# Patient Record
Sex: FEMALE | Race: BLACK OR AFRICAN AMERICAN | NOT HISPANIC OR LATINO | Employment: OTHER | URBAN - METROPOLITAN AREA
[De-identification: names, ages, dates, MRNs, and addresses within clinical notes are randomized per-mention and may not be internally consistent; named-entity substitution may affect disease eponyms.]

---

## 2017-07-06 DIAGNOSIS — M81.8 IDIOPATHIC OSTEOPOROSIS: Primary | ICD-10-CM

## 2017-08-14 ENCOUNTER — HOSPITAL ENCOUNTER (OUTPATIENT)
Dept: RADIOLOGY | Facility: HOSPITAL | Age: 72
Discharge: HOME | End: 2017-08-14
Attending: INTERNAL MEDICINE
Payer: MEDICARE

## 2017-08-14 DIAGNOSIS — M81.8 IDIOPATHIC OSTEOPOROSIS: ICD-10-CM

## 2017-08-14 PROCEDURE — 77080 DXA BONE DENSITY AXIAL: CPT | Mod: TC

## 2017-08-14 PROCEDURE — 77080 DXA BONE DENSITY AXIAL: CPT | Mod: 26,,, | Performed by: RADIOLOGY

## 2018-02-21 ENCOUNTER — OFFICE VISIT (OUTPATIENT)
Dept: ENDOCRINOLOGY | Facility: CLINIC | Age: 73
End: 2018-02-21
Payer: MEDICARE

## 2018-02-21 VITALS
HEART RATE: 90 BPM | SYSTOLIC BLOOD PRESSURE: 118 MMHG | BODY MASS INDEX: 22.16 KG/M2 | WEIGHT: 112.88 LBS | DIASTOLIC BLOOD PRESSURE: 68 MMHG | HEIGHT: 60 IN

## 2018-02-21 DIAGNOSIS — R63.1 POLYDIPSIA: ICD-10-CM

## 2018-02-21 DIAGNOSIS — M81.8 IDIOPATHIC OSTEOPOROSIS: Primary | ICD-10-CM

## 2018-02-21 DIAGNOSIS — E74.89 OTHER SPECIFIED DISORDERS OF CARBOHYDRATE METABOLISM: ICD-10-CM

## 2018-02-21 DIAGNOSIS — E55.9 VITAMIN D DEFICIENCY: ICD-10-CM

## 2018-02-21 PROCEDURE — 99214 OFFICE O/P EST MOD 30 MIN: CPT | Mod: GC,S$GLB,, | Performed by: INTERNAL MEDICINE

## 2018-02-21 PROCEDURE — 99999 PR PBB SHADOW E&M-EST. PATIENT-LVL III: CPT | Mod: PBBFAC,GC,, | Performed by: INTERNAL MEDICINE

## 2018-02-21 RX ORDER — OMEPRAZOLE 40 MG/1
40 CAPSULE, DELAYED RELEASE ORAL DAILY
COMMUNITY
End: 2019-04-23

## 2018-02-21 RX ORDER — OMEPRAZOLE 20 MG/1
20 CAPSULE, DELAYED RELEASE ORAL DAILY
COMMUNITY
Start: 2017-12-07 | End: 2019-04-23

## 2018-02-21 RX ORDER — ROSUVASTATIN CALCIUM 20 MG/1
20 TABLET, COATED ORAL DAILY
COMMUNITY
Start: 2017-12-08 | End: 2020-02-21 | Stop reason: SDUPTHER

## 2018-02-21 NOTE — PROGRESS NOTES
Subjective:      Patient ID: Tyra Riley is a 73 y.o. female.    Chief Complaint:  Osteoporosis      History of Present Illness  F/u visit for OP    The patient has had the diagnosis of osteoporosis since at least 2006.   The patient took Fosamax since 2006. Then transitioned to Reclast and received it in 2011, 2013, 10/20/14 and 10/2016    Most recent BMD 8/2017 done at [facility] reveals low scores: L spine t score -3.6, Fem neck t score -3.0.     She has never had a fracture.   Falls: previous falls ~2015 and none since.   Height loss: denies     JODI and oophorectomy in 1994. Premarin from 1994 until 1997 when she developed breast cancer. Required surgery and radiation and never on chemo or aromatase inhibitors.     The patient has always had a small body frame. She weighed 88 pounds when she graduated from high school.   Mother and sister also small body frames.   The patient is very active. Exercises with line dancing 2x/week.     Vit D deficiency: taking 1 tab citracal + D and most recent levels 32.    Has had kidney stones x1.   PTH was elevated at 92 in 2011. Calcium and phos normal.   Secondary w/u for 24hr UCa, SPEP normal.     Hx of hyperthyroidism 2012 and took methimazole 10 mg three times remotely for several months. Now in remission from thyroid dx (was followed by PCP).     Prilosec for GERD. Denies steroid use.     Review of Systems   Constitutional: Negative for unexpected weight change.   Cardiovascular: Negative for palpitations.   Gastrointestinal: Negative for constipation and diarrhea.   Endocrine: Positive for polydipsia.   Musculoskeletal: Positive for arthralgias (left knee pain ).   Neurological: Negative for tremors.   Psychiatric/Behavioral: Negative for sleep disturbance.       Objective:   Physical Exam   Constitutional:   Thin and small body frame     Neck: No thyromegaly present.   Cardiovascular: Normal rate.    Pulmonary/Chest: Effort normal.   Abdominal: Soft.    Musculoskeletal: She exhibits no edema.   Gait is normal   Vitals reviewed.      Lab Review:     Results for JOANN DURAN (MRN [id number]) as of 2/21/2018 09:26   Ref. Range 9/1/2016 16:42   Sodium Latest Ref Range: 136 - 145 mmol/L 139   Potassium Latest Ref Range: 3.5 - 5.1 mmol/L 3.7   Chloride Latest Ref Range: 95 - 110 mmol/L 104   CO2 Latest Ref Range: 23 - 29 mmol/L 27   Anion Gap Latest Ref Range: 8 - 16 mmol/L 8   BUN, Bld Latest Ref Range: 8 - 23 mg/dL 13   Creatinine Latest Ref Range: 0.5 - 1.4 mg/dL 0.9   eGFR if non African American Latest Ref Range: >60 mL/min/1.73 m^2 >60   eGFR if  Latest Ref Range: >60 mL/min/1.73 m^2 >60   Glucose Latest Ref Range: 70 - 110 mg/dL 83   Calcium Latest Ref Range: 8.7 - 10.5 mg/dL 9.3   Phosphorus Latest Ref Range: 2.7 - 4.5 mg/dL 3.6   Albumin Latest Ref Range: 3.5 - 5.2 g/dL 3.7   Results for JOANN DURAN (MRN [id number]) as of 2/21/2018 09:26   Ref. Range 9/1/2016 16:42   Vit D, 25-Hydroxy Latest Ref Range: 30 - 96 ng/mL 32   Results for JOANN DURAN (MRN [id number]) as of 2/21/2018 09:26   Ref. Range 1/21/2016 16:23   TSH Latest Ref Range: 0.400 - 4.000 uIU/mL 1.615   Results for JOANN DURAN (MRN [id number]) as of 2/21/2018 09:26   Ref. Range 4/21/2011 16:30   PTH Latest Ref Range: 10.0 - 65.0 pg/ml 92 (H)   Results for JOANN DURAN (MRN [id number]) as of 2/21/2018 09:26   Ref. Range 5/2/2011 09:30   Calcium, Urine Latest Ref Range: 0.0 - 15.0 mg/dl 8.5   Calcium, 24H Urine Latest Ref Range: 4 - 12 mg/Hr 5.7   CA Urine (mg/Spec) Latest Units: mg/sp 136.0   Creatinine, Ur (mg/spec) Latest Units: mg/sp 928.0     8/2018 BMD   The L1 to L4 vertebral bone mineral density is equal to 0.753 g/cm squared with a T score of -3.6 and a Z score of -1.3.  The left femoral neck bone mineral density is equal to 0.521 g/cm squared with a T score of -3.0 and a Z score of -1.6.    Assessment:     1. Idiopathic osteoporosis    2. Polydipsia    3. Vitamin D  deficiency        Plan:     BMD remains with low t-scores, but reassuring that she has never fractured.   Since her PTH was elevated in 2011, will repeat labs to eval secondary causes of bone loss: PTH, renal panel, Vit D.   Screen celiac panel and Repeat TSH.   Discussed that options for step up in therapy would include prolia vs forteo.   Discussed benefits of anabolic therapy given her very low t scores and that forteo is not associated with increased risk of AFF since she has been on longterm antiresorptive therapy intermittently from 200-2016.   She prefers prolia therapy as she wants to avoid daily injections. Discussed expected BMD gains with prolia. Discussed s/s AFF and to notify us if any vague thigh pain.   Schedule prolia, repeat BMD due 8/2019  Fall prevention stressed.   Increase calcium in diet and increase vit D to at least 1000 iu daily.   -     PTH, intact; Future  -     RENAL FUNCTION PANEL; Future  -     Celiac Disease Panel; Future  -     VITAMIN D; Future  -     TSH; Future  -     Hemoglobin A1c; Future       denosumab (PROLIA) injection 60 mg; Inject 1 mL (60 mg total) into the skin every 6 (six) months.    Discussed w Dr Ordoñez  rtc 1 year

## 2018-03-05 ENCOUNTER — INFUSION (OUTPATIENT)
Dept: INFECTIOUS DISEASES | Facility: HOSPITAL | Age: 73
End: 2018-03-05
Attending: INTERNAL MEDICINE
Payer: MEDICARE

## 2018-03-05 VITALS — HEIGHT: 60 IN | BODY MASS INDEX: 22.16 KG/M2 | WEIGHT: 112.88 LBS

## 2018-03-05 DIAGNOSIS — M81.8 IDIOPATHIC OSTEOPOROSIS: Primary | ICD-10-CM

## 2018-03-05 PROCEDURE — 96372 THER/PROPH/DIAG INJ SC/IM: CPT

## 2018-03-05 PROCEDURE — 63600175 PHARM REV CODE 636 W HCPCS: Mod: JG | Performed by: INTERNAL MEDICINE

## 2018-03-05 RX ADMIN — DENOSUMAB 60 MG: 60 INJECTION SUBCUTANEOUS at 11:03

## 2018-03-05 NOTE — PLAN OF CARE
Problem: Health Knowledge, Opportunity to Enhance (Adult,NICU,[place],Obstetrics,Pediatric)  Goal: Knowledgeable about Health Subject/Topic  Patient will demonstrate the desired outcomes by discharge/transition of care.  Outcome: Ongoing (interventions implemented as appropriate)  PATIENT EDUCATED ON HAND WASHING AND INFECTION CONTROL. PATIENT VERBALIZED UNDERSTANDING

## 2018-04-04 NOTE — PROGRESS NOTES
I, Katherine Ordoñez, have personally taken the history and physical exam and I agree with Dr. Mae's assessment and plan.

## 2018-08-01 ENCOUNTER — APPOINTMENT (RX ONLY)
Dept: URBAN - METROPOLITAN AREA CLINIC 98 | Facility: CLINIC | Age: 73
Setting detail: DERMATOLOGY
End: 2018-08-01

## 2018-08-01 DIAGNOSIS — L30.9 DERMATITIS, UNSPECIFIED: ICD-10-CM

## 2018-08-01 PROBLEM — Z92.3 PERSONAL HISTORY OF IRRADIATION: Status: ACTIVE | Noted: 2018-08-01

## 2018-08-01 PROBLEM — E05.90 THYROTOXICOSIS, UNSPECIFIED WITHOUT THYROTOXIC CRISIS OR STORM: Status: ACTIVE | Noted: 2018-08-01

## 2018-08-01 PROBLEM — Z85.3 PERSONAL HISTORY OF MALIGNANT NEOPLASM OF BREAST: Status: ACTIVE | Noted: 2018-08-01

## 2018-08-01 PROCEDURE — 11100: CPT

## 2018-08-01 PROCEDURE — ? BIOPSY BY PUNCH METHOD

## 2018-08-01 PROCEDURE — ? TREATMENT REGIMEN

## 2018-08-01 ASSESSMENT — LOCATION ZONE DERM: LOCATION ZONE: TRUNK

## 2018-08-01 ASSESSMENT — LOCATION DETAILED DESCRIPTION DERM: LOCATION DETAILED: UPPER STERNUM

## 2018-08-01 ASSESSMENT — LOCATION SIMPLE DESCRIPTION DERM: LOCATION SIMPLE: CHEST

## 2018-08-01 NOTE — HPI: RASH
How Severe Is Your Rash?: moderate
Is This A New Presentation, Or A Follow-Up?: Rash
Additional History: Pt went to urgent care 3 days ago which she was prescribed flucinonide 0.05% cream that she did not start yet .

## 2018-08-01 NOTE — PROCEDURE: TREATMENT REGIMEN
Initiate Treatment: Fluocinonide 0.05% cream twice daily for 2 weeks (pt already has a tube)
Detail Level: Zone

## 2018-08-01 NOTE — PROCEDURE: BIOPSY BY PUNCH METHOD
Punch Size In Mm: 3
X Depth Of Punch In Cm (Optional): 0
Lab Facility: [id number]
Dressing: pressure dressing with telfa
Anesthesia Type: 1% lidocaine with epinephrine
Post-Care Instructions: I reviewed with the patient in detail post-care instructions. Patient is to keep the biopsy site dry overnight, and then apply bacitracin twice daily until healed. Patient may apply hydrogen peroxide soaks to remove any crusting.
Notification Instructions: Patient will be notified of biopsy results. However, patient instructed to call the office if not contacted within 2 weeks.
Was A Bandage Applied: Yes
Hemostasis: None
Consent: Verbal consent was obtained and risks were reviewed including but not limited to scarring, infection, bleeding, scabbing, incomplete removal, nerve damage and allergy to anesthesia.
Epidermal Sutures: 4-0 Nylon
Billing Type: Third-Party Bill
Bill For Surgical Tray: no
Home Suture Removal Text: Patient was provided a home suture removal kit and will remove their sutures at home.  If they have any questions or difficulties they will call the office.
Detail Level: Simple
Number Of Epidermal Sutures (Optional): 1
Biopsy Type: H and E
Suture Removal: 14 days
Wound Care: Petrolatum
Lab: [id number]

## 2018-08-15 ENCOUNTER — APPOINTMENT (RX ONLY)
Dept: URBAN - METROPOLITAN AREA CLINIC 98 | Facility: CLINIC | Age: 73
Setting detail: DERMATOLOGY
End: 2018-08-15

## 2018-08-15 DIAGNOSIS — Z48.02 ENCOUNTER FOR REMOVAL OF SUTURES: ICD-10-CM

## 2018-08-15 DIAGNOSIS — L30.8 OTHER SPECIFIED DERMATITIS: ICD-10-CM

## 2018-08-15 PROCEDURE — ? SUTURE REMOVAL (NO GLOBAL PERIOD)

## 2018-08-15 PROCEDURE — ? TREATMENT REGIMEN

## 2018-08-15 PROCEDURE — 99213 OFFICE O/P EST LOW 20 MIN: CPT

## 2018-08-15 ASSESSMENT — LOCATION SIMPLE DESCRIPTION DERM: LOCATION SIMPLE: CHEST

## 2018-08-15 ASSESSMENT — LOCATION DETAILED DESCRIPTION DERM: LOCATION DETAILED: STERNAL NOTCH

## 2018-08-15 ASSESSMENT — LOCATION ZONE DERM: LOCATION ZONE: TRUNK

## 2018-08-15 NOTE — PROCEDURE: TREATMENT REGIMEN
Detail Level: Zone
Otc Regimen: Continue Alavert-D as usual for patient\\nZyrtec 10mg QPM x1wk then stop
Continue Regimen: OTC cetaphil cleanser and lotion QD, indefinitely

## 2018-09-06 ENCOUNTER — INFUSION (OUTPATIENT)
Dept: INFECTIOUS DISEASES | Facility: HOSPITAL | Age: 73
End: 2018-09-06
Attending: INTERNAL MEDICINE
Payer: MEDICARE

## 2018-09-06 VITALS — HEIGHT: 60 IN | WEIGHT: 112.88 LBS | BODY MASS INDEX: 22.16 KG/M2

## 2018-09-06 DIAGNOSIS — M81.8 IDIOPATHIC OSTEOPOROSIS: Primary | ICD-10-CM

## 2018-09-06 PROCEDURE — 63600175 PHARM REV CODE 636 W HCPCS: Mod: JG | Performed by: INTERNAL MEDICINE

## 2018-09-06 PROCEDURE — 96372 THER/PROPH/DIAG INJ SC/IM: CPT

## 2018-09-06 RX ADMIN — DENOSUMAB 60 MG: 60 INJECTION SUBCUTANEOUS at 12:09

## 2019-02-14 ENCOUNTER — TELEPHONE (OUTPATIENT)
Dept: ENDOCRINOLOGY | Facility: CLINIC | Age: 74
End: 2019-02-14

## 2019-02-14 NOTE — TELEPHONE ENCOUNTER
----- Message from Jessica Bustos MA sent at 2/13/2019  4:41 PM CST -----  Contact: Self      ----- Message -----  From: Chiara Lopez  Sent: 2/13/2019   4:36 PM  To: [facility] Staff    .Patient Requesting Order    Order Needed:  Prolia    Communication Preference: [phone]    Additional Information: Sari told Pt she needs to get orders from [facility] for Prolia schedule for 3/11 from [facility].

## 2019-03-11 ENCOUNTER — TELEPHONE (OUTPATIENT)
Dept: INFECTIOUS DISEASES | Facility: HOSPITAL | Age: 74
End: 2019-03-11

## 2019-04-18 ENCOUNTER — TELEPHONE (OUTPATIENT)
Dept: ENDOCRINOLOGY | Facility: CLINIC | Age: 74
End: 2019-04-18

## 2019-04-18 ENCOUNTER — TELEPHONE (OUTPATIENT)
Dept: ENDOCRINOLOGY | Facility: HOSPITAL | Age: 74
End: 2019-04-18

## 2019-04-18 DIAGNOSIS — E55.9 VITAMIN D DEFICIENCY: ICD-10-CM

## 2019-04-18 DIAGNOSIS — M81.8 IDIOPATHIC OSTEOPOROSIS: Primary | ICD-10-CM

## 2019-04-18 NOTE — TELEPHONE ENCOUNTER
Patient needs appointment   Idiopathic osteoporosis   Previously seen by dr. Frazier, last year myself and Dr. baez  Used reclast  Now switched to prolia.     Please schedule with any fellow or myself   Last vitamin D level is low  Needs updated labs before prolia    PLAN:  Labs BEFORE prolia  Please schedule for appointment,

## 2019-04-18 NOTE — TELEPHONE ENCOUNTER
----- Message from Sari Campbell LPN sent at 2/12/2019  4:34 PM CST -----  Gd afternoon Dr Hernandez you saw Mrs Riley with Dr Crowe in 2/2018 and wanted to see her back in feb this year but she doesn't have an appt.  I left message for patient to contact your office .  Her prolia injection is due 3/11/19 and I need a new sig on therapy plan.  Can you sign plan? If not I will move injection to after she gets an appt with endo.

## 2019-04-23 ENCOUNTER — INFUSION (OUTPATIENT)
Dept: INFECTIOUS DISEASES | Facility: HOSPITAL | Age: 74
End: 2019-04-23
Attending: INTERNAL MEDICINE
Payer: MEDICARE

## 2019-04-23 ENCOUNTER — OFFICE VISIT (OUTPATIENT)
Dept: ENDOCRINOLOGY | Facility: CLINIC | Age: 74
End: 2019-04-23
Payer: MEDICARE

## 2019-04-23 VITALS
WEIGHT: 118.19 LBS | SYSTOLIC BLOOD PRESSURE: 140 MMHG | HEIGHT: 60 IN | BODY MASS INDEX: 23.2 KG/M2 | DIASTOLIC BLOOD PRESSURE: 76 MMHG | HEART RATE: 72 BPM

## 2019-04-23 VITALS — HEIGHT: 60 IN | BODY MASS INDEX: 23.2 KG/M2 | WEIGHT: 118.19 LBS

## 2019-04-23 DIAGNOSIS — E55.9 VITAMIN D DEFICIENCY: ICD-10-CM

## 2019-04-23 DIAGNOSIS — M81.8 IDIOPATHIC OSTEOPOROSIS: Primary | ICD-10-CM

## 2019-04-23 PROCEDURE — 1101F PR PT FALLS ASSESS DOC 0-1 FALLS W/OUT INJ PAST YR: ICD-10-PCS | Mod: CPTII,S$GLB,, | Performed by: INTERNAL MEDICINE

## 2019-04-23 PROCEDURE — 99213 PR OFFICE/OUTPT VISIT, EST, LEVL III, 20-29 MIN: ICD-10-PCS | Mod: S$GLB,,, | Performed by: INTERNAL MEDICINE

## 2019-04-23 PROCEDURE — 99999 PR PBB SHADOW E&M-EST. PATIENT-LVL III: ICD-10-PCS | Mod: PBBFAC,,, | Performed by: INTERNAL MEDICINE

## 2019-04-23 PROCEDURE — 99999 PR PBB SHADOW E&M-EST. PATIENT-LVL III: CPT | Mod: PBBFAC,,, | Performed by: INTERNAL MEDICINE

## 2019-04-23 PROCEDURE — 63600175 PHARM REV CODE 636 W HCPCS: Mod: JG | Performed by: INTERNAL MEDICINE

## 2019-04-23 PROCEDURE — 99213 OFFICE O/P EST LOW 20 MIN: CPT | Mod: S$GLB,,, | Performed by: INTERNAL MEDICINE

## 2019-04-23 PROCEDURE — 1101F PT FALLS ASSESS-DOCD LE1/YR: CPT | Mod: CPTII,S$GLB,, | Performed by: INTERNAL MEDICINE

## 2019-04-23 PROCEDURE — 96372 THER/PROPH/DIAG INJ SC/IM: CPT

## 2019-04-23 RX ADMIN — DENOSUMAB 60 MG: 60 INJECTION SUBCUTANEOUS at 10:04

## 2019-04-23 NOTE — PROGRESS NOTES
"Subjective:     Patient ID: Tyra Riley is a 74 y.o. female.    Chief Complaint: No chief complaint on file.    HPI:   Ms. Riley is a 74 y.o. female who is here for a follow-up visit for evaluation of osteoporosis diagnosed at least 13 years ago.     Denies falls fractures since last visit. Denies loss of height. Has back pain but occurs with prolonged standing. 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 Occasionally takes gas-X after a meal.     Previous evaluation: recent vitamin D deficiency, one episode of kidney stones.   Normal TTG, TSH, 24 hr UCa, SPEP. PTH, Calcium and phos are normal,     Review of Systems   Constitutional: Negative for chills and fever.   HENT: Negative for congestion and sinus pressure.    Eyes: Negative for visual disturbance.   Respiratory: Negative for chest tightness and shortness of breath.    Cardiovascular: Negative for chest pain, palpitations and leg swelling.   Gastrointestinal: Negative for abdominal pain and vomiting.   Genitourinary: Negative for dysuria.   Musculoskeletal: Negative for arthralgias.   Skin: Negative for rash.   Neurological: Negative for weakness.   Hematological: Does not bruise/bleed easily.   Psychiatric/Behavioral: Negative for sleep disturbance.        Objective:     Physical Exam   Constitutional: She is oriented to person, place, and time. She appears well-developed and well-nourished. No distress.   HENT:   Head: Normocephalic and atraumatic.   Nose: Nose normal.   Mouth/Throat: " Oropharynx is clear and moist. No oropharyngeal exudate.   Eyes: Pupils are equal, round, and reactive to light. Conjunctivae and EOM are normal. No scleral icterus.   Neck: Normal range of motion. Neck supple. No thyromegaly present.   Cardiovascular: Normal rate, regular rhythm, normal heart sounds and intact distal pulses.   Pulmonary/Chest: Effort normal and breath sounds normal.   Abdominal: Soft. Bowel sounds are normal. She exhibits no distension.   Musculoskeletal: Normal range of motion. She exhibits no edema or tenderness.   Two fingers breadth gap between sup iliac crest and lower costal margin   Lymphadenopathy:     She has no cervical adenopathy.   Neurological: She is alert and oriented to person, place, and time. She has normal reflexes.   Skin: Skin is warm and dry.   Psychiatric: She has a normal mood and affect.     Vitals:    04/23/19 0921   BP: (!) 140/76   Pulse: 72   Weight: 53.6 kg (118 lb 2.7 oz)   Height: 5' (1.524 m)     8/2014  The L1 to L4 vertebral bone mineral density is equal to 0.753 g/cm squared with a T score of -3.6 and a Z score of -1.3.    The left femoral neck bone mineral density is equal to 0.521 g/cm squared with a T score of -3.0 and a Z score of -1.6.      Independently reviewed bone density images, demonstrating osteoporosis of the FN, total hip and LS.     Assessment/Plan:         Ms. Riley is a 74 year old woman with osteoporosis without recent falls or fractures who is here for a follow up visit. Little late on her prolia injection, will get today after labs.     1. Idiopathic osteoporosis  Risk factors include: family history of osteoporosis (sister), PPI use in the past, JODI with BSO at age 49 years used estrogen for three years, diagnosed with breast cancer (first surgery: lobectomy, second surgery three years later: mastectomy b/l, radiation only)    Continue prolia     Needs a MVI (has very low calcium diet) does not want to add a second citra nghia.   Labs  today    2. Vitamin D deficiency    Continue vitamin D supplementation

## 2019-10-25 ENCOUNTER — INFUSION (OUTPATIENT)
Dept: INFECTIOUS DISEASES | Facility: HOSPITAL | Age: 74
End: 2019-10-25
Attending: INTERNAL MEDICINE
Payer: MEDICARE

## 2019-10-25 VITALS — WEIGHT: 118.19 LBS | HEIGHT: 60 IN | BODY MASS INDEX: 23.2 KG/M2

## 2019-10-25 DIAGNOSIS — M81.8 IDIOPATHIC OSTEOPOROSIS: Primary | ICD-10-CM

## 2019-10-25 PROCEDURE — 96372 THER/PROPH/DIAG INJ SC/IM: CPT

## 2019-10-25 PROCEDURE — 63600175 PHARM REV CODE 636 W HCPCS: Mod: JG | Performed by: INTERNAL MEDICINE

## 2019-10-25 RX ADMIN — DENOSUMAB 60 MG: 60 INJECTION SUBCUTANEOUS at 12:10

## 2019-12-23 ENCOUNTER — CLINICAL SUPPORT (OUTPATIENT)
Dept: URGENT CARE | Facility: CLINIC | Age: 74
End: 2019-12-23
Payer: MEDICARE

## 2019-12-23 VITALS
OXYGEN SATURATION: 97 % | HEART RATE: 88 BPM | HEIGHT: 60 IN | WEIGHT: 121 LBS | BODY MASS INDEX: 23.75 KG/M2 | SYSTOLIC BLOOD PRESSURE: 175 MMHG | DIASTOLIC BLOOD PRESSURE: 77 MMHG | TEMPERATURE: 98 F

## 2019-12-23 DIAGNOSIS — J06.9 VIRAL UPPER RESPIRATORY ILLNESS: Primary | ICD-10-CM

## 2019-12-23 DIAGNOSIS — J02.9 SORE THROAT: ICD-10-CM

## 2019-12-23 LAB
CTP QC/QA: YES
CTP QC/QA: YES
FLUAV AG NPH QL: NEGATIVE
FLUBV AG NPH QL: NEGATIVE
S PYO RRNA THROAT QL PROBE: NEGATIVE

## 2019-12-23 PROCEDURE — 96372 THER/PROPH/DIAG INJ SC/IM: CPT | Mod: S$GLB,,, | Performed by: NURSE PRACTITIONER

## 2019-12-23 PROCEDURE — 87804 POCT INFLUENZA A/B: ICD-10-PCS | Mod: QW,,, | Performed by: NURSE PRACTITIONER

## 2019-12-23 PROCEDURE — 87880 POCT RAPID STREP A: ICD-10-PCS | Mod: QW,,, | Performed by: NURSE PRACTITIONER

## 2019-12-23 PROCEDURE — 87804 INFLUENZA ASSAY W/OPTIC: CPT | Mod: QW,,, | Performed by: NURSE PRACTITIONER

## 2019-12-23 PROCEDURE — 99204 OFFICE O/P NEW MOD 45 MIN: CPT | Mod: 25,S$GLB,, | Performed by: NURSE PRACTITIONER

## 2019-12-23 PROCEDURE — 96372 PR INJECTION,THERAP/PROPH/DIAG2ST, IM OR SUBCUT: ICD-10-PCS | Mod: S$GLB,,, | Performed by: NURSE PRACTITIONER

## 2019-12-23 PROCEDURE — 87880 STREP A ASSAY W/OPTIC: CPT | Mod: QW,,, | Performed by: NURSE PRACTITIONER

## 2019-12-23 PROCEDURE — 99204 PR OFFICE/OUTPT VISIT, NEW, LEVL IV, 45-59 MIN: ICD-10-PCS | Mod: 25,S$GLB,, | Performed by: NURSE PRACTITIONER

## 2019-12-23 RX ORDER — CETIRIZINE HYDROCHLORIDE 10 MG/1
10 TABLET ORAL DAILY
Qty: 30 TABLET | Refills: 0 | Status: ON HOLD | OUTPATIENT
Start: 2019-12-23 | End: 2020-05-05

## 2019-12-23 RX ORDER — DEXAMETHASONE SODIUM PHOSPHATE 4 MG/ML
8 INJECTION, SOLUTION INTRA-ARTICULAR; INTRALESIONAL; INTRAMUSCULAR; INTRAVENOUS; SOFT TISSUE ONCE
Status: COMPLETED | OUTPATIENT
Start: 2019-12-23 | End: 2019-12-23

## 2019-12-23 RX ORDER — CODEINE PHOSPHATE AND GUAIFENESIN 10; 100 MG/5ML; MG/5ML
7.5 SOLUTION ORAL EVERY 6 HOURS PRN
Qty: 100 ML | Refills: 0 | Status: SHIPPED | OUTPATIENT
Start: 2019-12-23 | End: 2020-01-02

## 2019-12-23 RX ORDER — PROMETHAZINE HYDROCHLORIDE AND CODEINE PHOSPHATE 6.25; 1 MG/5ML; MG/5ML
5 SOLUTION ORAL EVERY 6 HOURS PRN
Qty: 75 ML | Refills: 0 | Status: SHIPPED | OUTPATIENT
Start: 2019-12-23 | End: 2020-01-02

## 2019-12-23 RX ORDER — PROMETHAZINE HYDROCHLORIDE AND DEXTROMETHORPHAN HYDROBROMIDE 6.25; 15 MG/5ML; MG/5ML
5 SYRUP ORAL
Qty: 120 ML | Refills: 0 | Status: SHIPPED | OUTPATIENT
Start: 2019-12-23 | End: 2019-12-23 | Stop reason: ALTCHOICE

## 2019-12-23 RX ADMIN — DEXAMETHASONE SODIUM PHOSPHATE 8 MG: 4 INJECTION, SOLUTION INTRA-ARTICULAR; INTRALESIONAL; INTRAMUSCULAR; INTRAVENOUS; SOFT TISSUE at 11:12

## 2019-12-23 NOTE — PROGRESS NOTES
Subjective:       Patient ID: Tyra Riley is a 74 y.o. female.    Vitals:  height is 5' (1.524 m) and weight is 54.9 kg (121 lb). Her oral temperature is 97.5 °F (36.4 °C). Her blood pressure is 175/77 (abnormal) and her pulse is 88. Her oxygen saturation is 97%.     Chief Complaint: Sore Throat    Tyra Riley is a 74 year old female presenting to the clinic with c/o sore throat and cough. Symptoms began 3 days ago with no fever, chills, body aches. She has been gargling with warm salty water and spraying chloraseptic spray. Cough began yesterday with no shortness of breath or chest pain. She is tolerating PO intake without difficulty.     Sore Throat    This is a new problem. The current episode started yesterday. The problem has been gradually worsening. Neither side of throat is experiencing more pain than the other. There has been no fever. Associated symptoms include coughing and a hoarse voice. Pertinent negatives include no congestion, diarrhea, headaches, shortness of breath or vomiting. Treatments tried: Chloraseptic spray, warm salt water gargles. The treatment provided no relief.       Constitution: Negative for chills, fatigue and fever.   HENT: Positive for sore throat. Negative for congestion.    Neck: Negative for painful lymph nodes.   Cardiovascular: Negative for chest pain and leg swelling.   Eyes: Negative for double vision and blurred vision.   Respiratory: Positive for cough. Negative for shortness of breath.    Gastrointestinal: Negative for nausea, vomiting and diarrhea.   Genitourinary: Negative for dysuria, frequency, urgency and history of kidney stones.   Musculoskeletal: Negative for joint pain, joint swelling, muscle cramps and muscle ache.   Skin: Negative for color change, pale, rash and bruising.   Allergic/Immunologic: Negative for seasonal allergies.   Neurological: Negative for dizziness, history of vertigo, light-headedness, passing out and headaches.    Hematologic/Lymphatic: Negative for swollen lymph nodes.   Psychiatric/Behavioral: Negative for nervous/anxious, sleep disturbance and depression. The patient is not nervous/anxious.        Objective:      Physical Exam   Constitutional: She is oriented to person, place, and time. She appears well-developed and well-nourished. She is cooperative.  Non-toxic appearance. She does not appear ill. No distress.   HENT:   Head: Normocephalic and atraumatic.   Right Ear: Hearing, tympanic membrane, external ear and ear canal normal.   Left Ear: Hearing, tympanic membrane, external ear and ear canal normal.   Nose: Nose normal. No mucosal edema, rhinorrhea or nasal deformity. No epistaxis. Right sinus exhibits no maxillary sinus tenderness and no frontal sinus tenderness. Left sinus exhibits no maxillary sinus tenderness and no frontal sinus tenderness.   Mouth/Throat: Uvula is midline and mucous membranes are normal. No trismus in the jaw. Normal dentition. No uvula swelling. Posterior oropharyngeal erythema present. No oropharyngeal exudate.   Eyes: Conjunctivae and lids are normal. Right eye exhibits no discharge. Left eye exhibits no discharge. No scleral icterus.   Neck: Trachea normal, normal range of motion, full passive range of motion without pain and phonation normal. Neck supple.   Cardiovascular: Normal rate, regular rhythm, normal heart sounds, intact distal pulses and normal pulses.   Pulmonary/Chest: Effort normal and breath sounds normal. No respiratory distress.   Abdominal: Soft. Normal appearance and bowel sounds are normal. She exhibits no distension, no pulsatile midline mass and no mass. There is no tenderness.   Musculoskeletal: Normal range of motion. She exhibits no edema or deformity.   Neurological: She is alert and oriented to person, place, and time. She exhibits normal muscle tone. Coordination normal.   Skin: Skin is warm, dry, intact, not diaphoretic and not pale.   Psychiatric: She has a  normal mood and affect. Her speech is normal and behavior is normal. Judgment and thought content normal. Cognition and memory are normal.   Nursing note and vitals reviewed.        Assessment:       1. Viral upper respiratory illness    2. Sore throat        Plan:       The patient appears to have a viral upper respiratory infection.  Based upon the history and physical exam the patient does not appear to have a serious bacterial infection such as pneumonia, sepsis, otitis media, bacterial sinusitis, strep pharyngitis, parapharyngeal or peritonsillar abscess, meningitis.  Patient appears very well and I have given specific return precautions to the patient and/or family members.  The patient can take over the counter medications and does not appear to need antibiotics at this time.      Viral upper respiratory illness    Sore throat  -     POCT Influenza A/B  -     POCT rapid strep A    Other orders  -     cetirizine (ZYRTEC) 10 MG tablet; Take 1 tablet (10 mg total) by mouth once daily.  Dispense: 30 tablet; Refill: 0  -     dexamethasone injection 8 mg  -     promethazine-dextromethorphan (PROMETHAZINE-DM) 6.25-15 mg/5 mL Syrp; Take 5 mLs by mouth every 4 to 6 hours as needed (cough).  Dispense: 120 mL; Refill: 0

## 2019-12-23 NOTE — PATIENT INSTRUCTIONS
Viral Upper Respiratory Illness (Adult)  You have a viral upper respiratory illness (URI), which is another term for the common cold. This illness is contagious during the first few days. It is spread through the air by coughing and sneezing. It may also be spread by direct contact (touching the sick person and then touching your own eyes, nose, or mouth). Frequent handwashing will decrease risk of spread. Most viral illnesses go away within 7 to 10 days with rest and simple home remedies. Sometimes the illness may last for several weeks. Antibiotics will not kill a virus, and they are generally not prescribed for this condition.    Home care  · If symptoms are severe, rest at home for the first 2 to 3 days. When you resume activity, don't let yourself get too tired.  · Avoid being exposed to cigarette smoke (yours or others).  · You may use acetaminophen or ibuprofen to control pain and fever, unless another medicine was prescribed. (Note: If you have chronic liver or kidney disease, have ever had a stomach ulcer or gastrointestinal bleeding, or are taking blood-thinning medicines, talk with your healthcare provider before using these medicines.) Aspirin should never be given to anyone under 18 years of age who is ill with a viral infection or fever. It may cause severe liver or brain damage.  · Your appetite may be poor, so a light diet is fine. Avoid dehydration by drinking 6 to 8 glasses of fluids per day (water, soft drinks, juices, tea, or soup). Extra fluids will help loosen secretions in the nose and lungs.  · Over-the-counter cold medicines will not shorten the length of time youre sick, but they may be helpful for the following symptoms: cough, sore throat, and nasal and sinus congestion. (Note: Do not use decongestants if you have high blood pressure.)  Follow-up care  Follow up with your healthcare provider, or as advised.  When to seek medical advice  Call your healthcare provider right away if any  of these occur:  · Cough with lots of colored sputum (mucus)  · Severe headache; face, neck, or ear pain  · Difficulty swallowing due to throat pain  · Fever of 100.4°F (38°C)  Call 911, or get immediate medical care  Call emergency services right away if any of these occur:  · Chest pain, shortness of breath, wheezing, or difficulty breathing  · Coughing up blood  · Inability to swallow due to throat pain  Date Last Reviewed: 9/13/2015  © 6253-4817 [employer]. [address] [place] [address]. All rights reserved. This information is not intended as a substitute for professional medical care. Always follow your healthcare professional's instructions.

## 2020-01-06 ENCOUNTER — OFFICE VISIT (OUTPATIENT)
Dept: DERMATOLOGY | Facility: CLINIC | Age: 75
End: 2020-01-06
Payer: MEDICARE

## 2020-01-06 VITALS — WEIGHT: 121 LBS | BODY MASS INDEX: 23.75 KG/M2 | HEIGHT: 60 IN

## 2020-01-06 DIAGNOSIS — R21 RASH: Primary | ICD-10-CM

## 2020-01-06 DIAGNOSIS — L25.9 CONTACT DERMATITIS, UNSPECIFIED CONTACT DERMATITIS TYPE, UNSPECIFIED TRIGGER: ICD-10-CM

## 2020-01-06 PROCEDURE — 99999 PR PBB SHADOW E&M-EST. PATIENT-LVL III: CPT | Mod: PBBFAC,,, | Performed by: DERMATOLOGY

## 2020-01-06 PROCEDURE — 1101F PR PT FALLS ASSESS DOC 0-1 FALLS W/OUT INJ PAST YR: ICD-10-PCS | Mod: CPTII,S$GLB,, | Performed by: DERMATOLOGY

## 2020-01-06 PROCEDURE — 1159F PR MEDICATION LIST DOCUMENTED IN MEDICAL RECORD: ICD-10-PCS | Mod: S$GLB,,, | Performed by: DERMATOLOGY

## 2020-01-06 PROCEDURE — 1159F MED LIST DOCD IN RCRD: CPT | Mod: S$GLB,,, | Performed by: DERMATOLOGY

## 2020-01-06 PROCEDURE — 95044 PR ALLERGY PATCH TESTS: ICD-10-PCS | Mod: S$GLB,,, | Performed by: DERMATOLOGY

## 2020-01-06 PROCEDURE — 95044 PATCH/APPLICATION TESTS: CPT | Mod: S$GLB,,, | Performed by: DERMATOLOGY

## 2020-01-06 PROCEDURE — 1126F PR PAIN SEVERITY QUANTIFIED, NO PAIN PRESENT: ICD-10-PCS | Mod: S$GLB,,, | Performed by: DERMATOLOGY

## 2020-01-06 PROCEDURE — 1101F PT FALLS ASSESS-DOCD LE1/YR: CPT | Mod: CPTII,S$GLB,, | Performed by: DERMATOLOGY

## 2020-01-06 PROCEDURE — 99202 OFFICE O/P NEW SF 15 MIN: CPT | Mod: 25,S$GLB,, | Performed by: DERMATOLOGY

## 2020-01-06 PROCEDURE — 99999 PR PBB SHADOW E&M-EST. PATIENT-LVL III: ICD-10-PCS | Mod: PBBFAC,,, | Performed by: DERMATOLOGY

## 2020-01-06 PROCEDURE — 99202 PR OFFICE/OUTPT VISIT, NEW, LEVL II, 15-29 MIN: ICD-10-PCS | Mod: 25,S$GLB,, | Performed by: DERMATOLOGY

## 2020-01-06 PROCEDURE — 1126F AMNT PAIN NOTED NONE PRSNT: CPT | Mod: S$GLB,,, | Performed by: DERMATOLOGY

## 2020-01-06 RX ORDER — FLUOCINONIDE 0.5 MG/G
CREAM TOPICAL 2 TIMES DAILY
Qty: 45 G | Refills: 0 | Status: SHIPPED | OUTPATIENT
Start: 2020-01-06

## 2020-01-06 NOTE — PROGRESS NOTES
Subjective:       Patient ID:  Tyra Riley is a 74 y.o. female who presents for   Chief Complaint   Patient presents with    Itching     back of neck     HPI    New patient presents today for itching on the upper back/neck, x few days, very itchy, has been using Aquaphor spray.   Has random breakouts on the back about 1-2 times yearly. Use of lidex in the past helped.    Review of Systems   Constitutional: Negative for fever and chills.   HENT: Negative for nosebleeds, congestion and sore throat.    Musculoskeletal: Negative for joint swelling and arthralgias.   Skin: Positive for itching. Negative for rash.   Hematologic/Lymphatic: Does not bruise/bleed easily.        Objective:    Physical Exam   Constitutional: She appears well-developed and well-nourished. No distress.   HENT:   Mouth/Throat: Lips normal.    Eyes: No conjunctival no injection.   Neurological: She is alert and oriented to person, place, and time. She is not disoriented.   Psychiatric: She has a normal mood and affect.   Skin:   Areas Examined (abnormalities noted in diagram):   Scalp / Hair Palpated and Inspected  Head / Face Inspection Performed  Neck Inspection Performed  Chest / Axilla Inspection Performed  RUE Inspected  LUE Inspection Performed  Nails and Digits Inspection Performed              Diagram Legend     Erythematous scaling macule/papule c/w actinic keratosis       Vascular papule c/w angioma      Pigmented verrucoid papule/plaque c/w seborrheic keratosis      Yellow umbilicated papule c/w sebaceous hyperplasia      Irregularly shaped tan macule c/w lentigo     1-2 mm smooth white papules consistent with Milia      Movable subcutaneous cyst with punctum c/w epidermal inclusion cyst      Subcutaneous movable cyst c/w pilar cyst      Firm pink to brown papule c/w dermatofibroma      Pedunculated fleshy papule(s) c/w skin tag(s)      Evenly pigmented macule c/w junctional nevus     Mildly variegated pigmented, slightly  irregular-bordered macule c/w mildly atypical nevus      Flesh colored to evenly pigmented papule c/w intradermal nevus       Pink pearly papule/plaque c/w basal cell carcinoma      Erythematous hyperkeratotic cursted plaque c/w SCC      Surgical scar with no sign of skin cancer recurrence      Open and closed comedones      Inflammatory papules and pustules      Verrucoid papule consistent consistent with wart     Erythematous eczematous patches and plaques     Dystrophic onycholytic nail with subungual debris c/w onychomycosis     Umbilicated papule    Erythematous-base heme-crusted tan verrucoid plaque consistent with inflamed seborrheic keratosis     Erythematous Silvery Scaling Plaque c/w Psoriasis     See annotation      Assessment / Plan:        Rash  -     fluocinonide 0.05% (LIDEX) 0.05 % cream; Apply topically 2 (two) times daily.  Dispense: 45 g; Refill: 0  Discussed with patient the etiology and pathogenesis of the disease or skin lesion(s) and possible treatments and aggravators.    Reviewed with patient different treatment options and associated risks.  Proper application of medications and or care for affected area(s) and condition(s) reviewed.  Back applicator for creams, lotions, topical medications discussed.  Can try plastic mixing spoons or spatulas also or soft shower brushes.  No hot water bathing reviewed.  Instructed patient to use mild soaps like glycerin bar soap for washing the face and body.  Can also consider avoiding applying on body except for armpits, groin, and soles.    Contact dermatitis, unspecified contact dermatitis type, unspecified trigger  Suspected.  Discussed with patient the etiology and pathogenesis of the disease or skin lesion(s) and possible treatments and aggravators.    Reviewed with patient different treatment options and associated risks.  36 allergens applied to the back today.  Patient instructed to avoid getting the back wet or sweaty and to avoid  antihistamines.  Also advised that the testing may not divulge any useful information.  Possibly food contact related as this occurrence was after prepping meal for the holidays.           Follow up in about 2 days (around 1/8/2020).

## 2020-01-08 ENCOUNTER — OFFICE VISIT (OUTPATIENT)
Dept: DERMATOLOGY | Facility: CLINIC | Age: 75
End: 2020-01-08
Payer: MEDICARE

## 2020-01-08 VITALS — BODY MASS INDEX: 23.75 KG/M2 | WEIGHT: 121 LBS | HEIGHT: 60 IN

## 2020-01-08 DIAGNOSIS — R21 RASH: Primary | ICD-10-CM

## 2020-01-08 DIAGNOSIS — Z01.82 ENCOUNTER FOR ALLERGY TESTING: ICD-10-CM

## 2020-01-08 DIAGNOSIS — L25.9 CONTACT DERMATITIS, UNSPECIFIED CONTACT DERMATITIS TYPE, UNSPECIFIED TRIGGER: ICD-10-CM

## 2020-01-08 PROCEDURE — 1159F MED LIST DOCD IN RCRD: CPT | Mod: S$GLB,,, | Performed by: DERMATOLOGY

## 2020-01-08 PROCEDURE — 99212 OFFICE O/P EST SF 10 MIN: CPT | Mod: S$GLB,,, | Performed by: DERMATOLOGY

## 2020-01-08 PROCEDURE — 1159F PR MEDICATION LIST DOCUMENTED IN MEDICAL RECORD: ICD-10-PCS | Mod: S$GLB,,, | Performed by: DERMATOLOGY

## 2020-01-08 PROCEDURE — 99212 PR OFFICE/OUTPT VISIT, EST, LEVL II, 10-19 MIN: ICD-10-PCS | Mod: S$GLB,,, | Performed by: DERMATOLOGY

## 2020-01-08 PROCEDURE — 99999 PR PBB SHADOW E&M-EST. PATIENT-LVL III: ICD-10-PCS | Mod: PBBFAC,,, | Performed by: DERMATOLOGY

## 2020-01-08 PROCEDURE — 1126F AMNT PAIN NOTED NONE PRSNT: CPT | Mod: S$GLB,,, | Performed by: DERMATOLOGY

## 2020-01-08 PROCEDURE — 1101F PT FALLS ASSESS-DOCD LE1/YR: CPT | Mod: CPTII,S$GLB,, | Performed by: DERMATOLOGY

## 2020-01-08 PROCEDURE — 1126F PR PAIN SEVERITY QUANTIFIED, NO PAIN PRESENT: ICD-10-PCS | Mod: S$GLB,,, | Performed by: DERMATOLOGY

## 2020-01-08 PROCEDURE — 99999 PR PBB SHADOW E&M-EST. PATIENT-LVL III: CPT | Mod: PBBFAC,,, | Performed by: DERMATOLOGY

## 2020-01-08 PROCEDURE — 1101F PR PT FALLS ASSESS DOC 0-1 FALLS W/OUT INJ PAST YR: ICD-10-PCS | Mod: CPTII,S$GLB,, | Performed by: DERMATOLOGY

## 2020-01-08 NOTE — PROGRESS NOTES
Subjective:       Patient ID:  Tyra Riley is a 74 y.o. female who presents for   Chief Complaint   Patient presents with    Patch Testing     reading    Itching     HPI    Last o/v 1/6/2019    Rash  -     fluocinonide 0.05% (LIDEX) 0.05 % cream; Apply topically 2 (two) times daily.  Dispense: 45 g; Refill: 0  Discussed with patient the etiology and pathogenesis of the disease or skin lesion(s) and possible treatments and aggravators.    Reviewed with patient different treatment options and associated risks.  Proper application of medications and or care for affected area(s) and condition(s) reviewed.  Back applicator for creams, lotions, topical medications discussed.  Can try plastic mixing spoons or spatulas also or soft shower brushes.  No hot water bathing reviewed.  Instructed patient to use mild soaps like glycerin bar soap for washing the face and body.  Can also consider avoiding applying on body except for armpits, groin, and soles.     Contact dermatitis, unspecified contact dermatitis type, unspecified trigger  Suspected.  Discussed with patient the etiology and pathogenesis of the disease or skin lesion(s) and possible treatments and aggravators.    Reviewed with patient different treatment options and associated risks.  36 allergens applied to the back today.  Patient instructed to avoid getting the back wet or sweaty and to avoid antihistamines.  Also advised that the testing may not divulge any useful information.  Possibly food contact related as this occurrence was after prepping meal for the holidays.       Patient presents today for patch reading  Reaction to cobalt.      Review of Systems   Constitutional: Negative for fever and chills.   HENT: Negative for nosebleeds, congestion and sore throat.    Musculoskeletal: Negative for joint swelling and arthralgias.   Skin: Positive for itching. Negative for rash.   Hematologic/Lymphatic: Does not bruise/bleed easily.        Objective:     Physical Exam   Constitutional: She appears well-developed and well-nourished. No distress.   Eyes: No conjunctival no injection.   Neurological: She is alert and oriented to person, place, and time. She is not disoriented.   Psychiatric: She has a normal mood and affect.   Skin:   Areas Examined (abnormalities noted in diagram):   Head / Face Inspection Performed  Back Inspection Performed              Diagram Legend     Erythematous scaling macule/papule c/w actinic keratosis       Vascular papule c/w angioma      Pigmented verrucoid papule/plaque c/w seborrheic keratosis      Yellow umbilicated papule c/w sebaceous hyperplasia      Irregularly shaped tan macule c/w lentigo     1-2 mm smooth white papules consistent with Milia      Movable subcutaneous cyst with punctum c/w epidermal inclusion cyst      Subcutaneous movable cyst c/w pilar cyst      Firm pink to brown papule c/w dermatofibroma      Pedunculated fleshy papule(s) c/w skin tag(s)      Evenly pigmented macule c/w junctional nevus     Mildly variegated pigmented, slightly irregular-bordered macule c/w mildly atypical nevus      Flesh colored to evenly pigmented papule c/w intradermal nevus       Pink pearly papule/plaque c/w basal cell carcinoma      Erythematous hyperkeratotic cursted plaque c/w SCC      Surgical scar with no sign of skin cancer recurrence      Open and closed comedones      Inflammatory papules and pustules      Verrucoid papule consistent consistent with wart     Erythematous eczematous patches and plaques     Dystrophic onycholytic nail with subungual debris c/w onychomycosis     Umbilicated papule    Erythematous-base heme-crusted tan verrucoid plaque consistent with inflamed seborrheic keratosis     Erythematous Silvery Scaling Plaque c/w Psoriasis     See annotation      Assessment / Plan:        Rash  Discussed with patient the etiology and pathogenesis of the disease or skin lesion(s) and possible treatments and aggravators.     Patient to watch for recurrence or flares or worsening and to call the clinic for a follow up appointment for such.    Contact dermatitis, unspecified contact dermatitis type, unspecified trigger  Discussed with patient the etiology and pathogenesis of the disease or skin lesion(s) and possible treatments and aggravators.    [place] handouts given.  Patient to watch for recurrence or flares or worsening and to call the clinic for a follow up appointment for such.  Patient and or guardian to monitor this area/lesion or these areas/lesions for changes or worsening.  Patient and or guardian to contact us if any changes are noted for such.    Encounter for allergy testing  36 patch tests read today.  Pt to check back with family/friend for late phase reaction.  Edges of patch strips remarked for easy reference.  Panel frames given to patient to take home.           Follow up if symptoms worsen or fail to improve.

## 2020-02-06 ENCOUNTER — TELEPHONE (OUTPATIENT)
Dept: PRIMARY CARE CLINIC | Facility: CLINIC | Age: 75
End: 2020-02-06

## 2020-02-06 NOTE — TELEPHONE ENCOUNTER
----- Message from Benito Reis sent at 2/6/2020 12:11 PM CST -----  Contact: pt  Type: Needs Medical Advice    Who Called:  pt    Best Call Back Number: [phone] or [phone]  Additional Information: pt would like to est care with Dr. Sanderson. Pt would be a new pt. Please call to advise.

## 2020-02-07 NOTE — TELEPHONE ENCOUNTER
Nadia Sanderson MD  You 5 minutes ago (8:17 AM)      Yes I will. Please tell her to bring her medications and any medical records that she has to her appointment.     Routing comment

## 2020-02-17 ENCOUNTER — LAB VISIT (OUTPATIENT)
Dept: LAB | Facility: HOSPITAL | Age: 75
End: 2020-02-17
Attending: INTERNAL MEDICINE
Payer: MEDICARE

## 2020-02-17 ENCOUNTER — OFFICE VISIT (OUTPATIENT)
Dept: PRIMARY CARE CLINIC | Facility: CLINIC | Age: 75
End: 2020-02-17
Payer: MEDICARE

## 2020-02-17 ENCOUNTER — TELEPHONE (OUTPATIENT)
Dept: PRIMARY CARE CLINIC | Facility: CLINIC | Age: 75
End: 2020-02-17

## 2020-02-17 VITALS
WEIGHT: 115.94 LBS | SYSTOLIC BLOOD PRESSURE: 136 MMHG | OXYGEN SATURATION: 98 % | BODY MASS INDEX: 22.76 KG/M2 | HEART RATE: 72 BPM | HEIGHT: 60 IN | DIASTOLIC BLOOD PRESSURE: 64 MMHG | TEMPERATURE: 98 F

## 2020-02-17 DIAGNOSIS — E55.9 VITAMIN D DEFICIENCY: ICD-10-CM

## 2020-02-17 DIAGNOSIS — M81.8 IDIOPATHIC OSTEOPOROSIS: ICD-10-CM

## 2020-02-17 DIAGNOSIS — D50.9 IRON DEFICIENCY ANEMIA, UNSPECIFIED IRON DEFICIENCY ANEMIA TYPE: ICD-10-CM

## 2020-02-17 DIAGNOSIS — E53.8 B12 DEFICIENCY: ICD-10-CM

## 2020-02-17 DIAGNOSIS — R03.0 PREHYPERTENSION: ICD-10-CM

## 2020-02-17 DIAGNOSIS — E78.00 HYPERCHOLESTEREMIA: Primary | ICD-10-CM

## 2020-02-17 DIAGNOSIS — E78.00 HYPERCHOLESTEREMIA: ICD-10-CM

## 2020-02-17 DIAGNOSIS — D64.9 ANEMIA, UNSPECIFIED TYPE: ICD-10-CM

## 2020-02-17 DIAGNOSIS — R42 DIZZINESS: ICD-10-CM

## 2020-02-17 DIAGNOSIS — R53.83 FATIGUE, UNSPECIFIED TYPE: ICD-10-CM

## 2020-02-17 DIAGNOSIS — Z85.3 HISTORY OF BREAST CANCER: ICD-10-CM

## 2020-02-17 LAB
25(OH)D3+25(OH)D2 SERPL-MCNC: 22 NG/ML (ref 30–96)
ALBUMIN SERPL BCP-MCNC: 4 G/DL (ref 3.5–5.2)
ALP SERPL-CCNC: 36 U/L (ref 55–135)
ALT SERPL W/O P-5'-P-CCNC: 10 U/L (ref 10–44)
ANION GAP SERPL CALC-SCNC: 8 MMOL/L (ref 8–16)
AST SERPL-CCNC: 16 U/L (ref 10–40)
BASOPHILS # BLD AUTO: 0.03 K/UL (ref 0–0.2)
BASOPHILS NFR BLD: 0.6 % (ref 0–1.9)
BILIRUB SERPL-MCNC: 0.5 MG/DL (ref 0.1–1)
BUN SERPL-MCNC: 22 MG/DL (ref 8–23)
CALCIUM SERPL-MCNC: 8.9 MG/DL (ref 8.7–10.5)
CHLORIDE SERPL-SCNC: 108 MMOL/L (ref 95–110)
CHOLEST SERPL-MCNC: 270 MG/DL (ref 120–199)
CHOLEST/HDLC SERPL: 4.4 {RATIO} (ref 2–5)
CO2 SERPL-SCNC: 27 MMOL/L (ref 23–29)
CREAT SERPL-MCNC: 1 MG/DL (ref 0.5–1.4)
DIFFERENTIAL METHOD: ABNORMAL
EOSINOPHIL # BLD AUTO: 0.1 K/UL (ref 0–0.5)
EOSINOPHIL NFR BLD: 1.7 % (ref 0–8)
ERYTHROCYTE [DISTWIDTH] IN BLOOD BY AUTOMATED COUNT: 12.6 % (ref 11.5–14.5)
EST. GFR  (AFRICAN AMERICAN): >60 ML/MIN/1.73 M^2
EST. GFR  (NON AFRICAN AMERICAN): 56 ML/MIN/1.73 M^2
GLUCOSE SERPL-MCNC: 99 MG/DL (ref 70–110)
HCT VFR BLD AUTO: 36.6 % (ref 37–48.5)
HDLC SERPL-MCNC: 62 MG/DL (ref 40–75)
HDLC SERPL: 23 % (ref 20–50)
HGB BLD-MCNC: 11.9 G/DL (ref 12–16)
IMM GRANULOCYTES # BLD AUTO: 0.02 K/UL (ref 0–0.04)
IMM GRANULOCYTES NFR BLD AUTO: 0.4 % (ref 0–0.5)
LDLC SERPL CALC-MCNC: 188.2 MG/DL (ref 63–159)
LYMPHOCYTES # BLD AUTO: 1.4 K/UL (ref 1–4.8)
LYMPHOCYTES NFR BLD: 28.4 % (ref 18–48)
MCH RBC QN AUTO: 32.3 PG (ref 27–31)
MCHC RBC AUTO-ENTMCNC: 32.5 G/DL (ref 32–36)
MCV RBC AUTO: 100 FL (ref 82–98)
MONOCYTES # BLD AUTO: 0.4 K/UL (ref 0.3–1)
MONOCYTES NFR BLD: 8.8 % (ref 4–15)
NEUTROPHILS # BLD AUTO: 2.9 K/UL (ref 1.8–7.7)
NEUTROPHILS NFR BLD: 60.1 % (ref 38–73)
NONHDLC SERPL-MCNC: 208 MG/DL
NRBC BLD-RTO: 0 /100 WBC
PLATELET # BLD AUTO: 198 K/UL (ref 150–350)
PMV BLD AUTO: 10.2 FL (ref 9.2–12.9)
POTASSIUM SERPL-SCNC: 4.1 MMOL/L (ref 3.5–5.1)
PROT SERPL-MCNC: 7.2 G/DL (ref 6–8.4)
RBC # BLD AUTO: 3.68 M/UL (ref 4–5.4)
SODIUM SERPL-SCNC: 143 MMOL/L (ref 136–145)
T4 FREE SERPL-MCNC: 0.68 NG/DL (ref 0.71–1.51)
TRIGL SERPL-MCNC: 99 MG/DL (ref 30–150)
TSH SERPL DL<=0.005 MIU/L-ACNC: 1.57 UIU/ML (ref 0.34–5.6)
VIT B12 SERPL-MCNC: 1167 PG/ML (ref 210–950)
WBC # BLD AUTO: 4.75 K/UL (ref 3.9–12.7)

## 2020-02-17 PROCEDURE — 80053 COMPREHEN METABOLIC PANEL: CPT

## 2020-02-17 PROCEDURE — 36415 COLL VENOUS BLD VENIPUNCTURE: CPT

## 2020-02-17 PROCEDURE — 86803 HEPATITIS C AB TEST: CPT

## 2020-02-17 PROCEDURE — 99215 OFFICE O/P EST HI 40 MIN: CPT | Mod: HCNC,S$GLB,, | Performed by: INTERNAL MEDICINE

## 2020-02-17 PROCEDURE — 99215 PR OFFICE/OUTPT VISIT, EST, LEVL V, 40-54 MIN: ICD-10-PCS | Mod: HCNC,S$GLB,, | Performed by: INTERNAL MEDICINE

## 2020-02-17 PROCEDURE — 82306 VITAMIN D 25 HYDROXY: CPT

## 2020-02-17 PROCEDURE — 84443 ASSAY THYROID STIM HORMONE: CPT

## 2020-02-17 PROCEDURE — 84439 ASSAY OF FREE THYROXINE: CPT

## 2020-02-17 PROCEDURE — 82607 VITAMIN B-12: CPT

## 2020-02-17 PROCEDURE — 80061 LIPID PANEL: CPT

## 2020-02-17 PROCEDURE — 85025 COMPLETE CBC W/AUTO DIFF WBC: CPT

## 2020-02-17 RX ORDER — PLANT STANOL ESTER 450 MG
1 TABLET ORAL EVERY MORNING
COMMUNITY
End: 2020-08-06

## 2020-02-17 RX ORDER — VIT C/E/ZN/COPPR/LUTEIN/ZEAXAN 250MG-90MG
1000 CAPSULE ORAL DAILY
COMMUNITY
End: 2020-02-20

## 2020-02-17 NOTE — PROGRESS NOTES
Primary Care Provider Appointment    Subjective:      Patient ID: Tyra Riley is a 75 y.o. female with hx of osteoporosis, hx of breast cancer now in remission, hx of nephrolithasis and right renal cysts, GERD, HLD, hx of anemia    Chief Complaint: Establish Care    New patient to me, is a volunteer at [employer] and saw clinic flyers in the lobby.     Reports dizziness, had 3 episodes in the past 3 months. Only lasts for a few seconds.  Occurs at home or in Orthodoxy, denies any passing out. Has not check blood pressure. No new supplements and has not changed her diet. Denies any ringing in the ears, sometimes will have a ear ache and will use mineral oil.   No changes in vision or headaches.     Fatigue - wakes up tired, sometimes will take naps which may affect her sleep later but takes melatonin. Used to do vitamin B12 supplement but she stopped this when she was found to be allergic to cobalt. Will usually get up 1x overnight. Does not have a routine.   Sleeps between 4-6 hours a night which is her usual.  With melatonin, will sleep for 7 and does feel rested.     Osteoporosis since 2006, has been on fosamax, reclast now on prolia, last injection 10/25/2019 . Next injection due on 4/28/2020.     No hx of fracture, last fall in 2015.     Hx of breast cancer in 1997, s/p lumpectomy and recurrence in 2000 s/p b/l mastectomy, surgery and radiation only.     Had large left renal cysts removed by urology, has right small cysts noted and is being monitored by urology.   Has not had any recent bouts of active passing of renal stones.    Hx of anal leakage which improved on fiber, has not used this in months. Has BM every 4 days.     Poor appetite, has noticed more gradually. Sometimes can eat 1 meal a day. Reports in her younger years max wt 110 but used to eat 3 meals a day.     Functional Assessment     Patient is independent  of bathing, dressing, eating, transferring, using toilet and walking     Patient is independent  "of finance management, driving/transportation management, shopping, phone/computer use, housework/household chores and medication management     Other Providers:   Dr. Mae/Dr. Tiago AGRAWAL - endocrinology: last seen in 2018   Dr. Trujillo, dermatology - last visit 1/8/2020 - contact dermatitis, allergy patch testing   Dr. Shraddha Krishnamurthy, urology at [facility]   Dr. Noa Langston, hematology, oncology - last seen in 2015             Past Surgical History:   Procedure Laterality Date    BLADDER REPAIR      BREAST RECONSTRUCTION      CATARACT EXTRACTION      COLONOSCOPY N/A 1/4/2016    Procedure: COLONOSCOPY;  Surgeon: Kieran Krishnamurthy MD;  Location: [facility] ([address] [place] [address]);  Service: Endoscopy;  Laterality: N/A;    COLONOSCOPY W/ POLYPECTOMY      HYSTERECTOMY  1994    JODI, ovaries remain ("pain")    LASIK      MASTECTOMY  2000    Bilateral    OOPHORECTOMY  1994    PARTIAL NEPHRECTOMY      cyst from left kidney    Toes surgery      Hammer toe surgery    TONSILLECTOMY         Past Medical History:   Diagnosis Date    Abnormal Pap smear     colpo then hyst    Allergy     Breast cancer 1997, 2000    Colon polyp     Fever blister     GERD (gastroesophageal reflux disease)     Hyperlipidemia     Hyperthyroidism     Kidney cysts     Osteoporosis, unspecified     Seasonal allergies        Social History     Socioeconomic History    Marital status: Single     Spouse name: Not on file    Number of children: 1    Years of education: Not on file    Highest education level: Bachelor's degree (e.g., BA, AB, BS)   Occupational History     Comment: [employer]    Social Needs    Financial resource strain: Not hard at all    Food insecurity:     Worry: Never true     Inability: Never true    Transportation needs:     Medical: No     Non-medical: No   Tobacco Use    Smoking status: Never Smoker    Smokeless tobacco: Never Used   Substance and Sexual Activity    Alcohol use: Yes     Alcohol/week: 1.0 standard " drinks     Types: 1 Glasses of wine per week     Comment: Rare    Drug use: No    Sexual activity: Never   Lifestyle    Physical activity:     Days per week: 1 day     Minutes per session: 60 min    Stress: To some extent   Relationships    Social connections:     Talks on phone: More than three times a week     Gets together: More than three times a week     Attends Church service: More than 4 times per year     Active member of club or organization: Yes     Attends meetings of clubs or organizations: More than 4 times per year     Relationship status: Never    Other Topics Concern    Are you pregnant or think you may be? No    Breast-feeding Not Asked   Social History Narrative    Retired     Children:  Mike ([place])     Qian: Shinto    Hobbies: Line Dancing once a week , in Synagogue choir , [employer]        Review of Systems   Constitutional: Positive for fatigue. Negative for unexpected weight change.   HENT: Negative for congestion and sinus pressure.    Eyes: Negative for visual disturbance.   Respiratory: Negative for shortness of breath and wheezing.    Cardiovascular: Negative for chest pain and palpitations.   Gastrointestinal: Negative for abdominal pain, blood in stool, constipation and diarrhea.   Genitourinary: Negative for difficulty urinating and pelvic pain.   Musculoskeletal: Negative for arthralgias and gait problem.   Skin: Negative for rash.   Allergic/Immunologic: Negative for immunocompromised state.   Neurological: Positive for dizziness. Negative for tremors and weakness.   Hematological: Does not bruise/bleed easily.   Psychiatric/Behavioral: Negative for confusion and suicidal ideas. The patient is not nervous/anxious.        Objective:   /64 (BP Location: Right arm, Patient Position: Sitting, BP Method: Medium (Manual))   Pulse 72   Temp 97.8 °F (36.6 °C) (Oral)   Ht 5' (1.524 m)   Wt 52.6 kg (115 lb 15.4 oz)   SpO2 98%   BMI 22.65 kg/m²      Physical Exam   Constitutional: She is oriented to person, place, and time. She appears well-developed and well-nourished.   HENT:   Head: Normocephalic.   Right Ear: External ear normal.   Left Ear: External ear normal.   Mouth/Throat: Oropharynx is clear and moist. No oropharyngeal exudate.   Moderate nasal turbinate swelling    Eyes: Pupils are equal, round, and reactive to light. Conjunctivae are normal.   Neck: Normal range of motion. Neck supple.   Cardiovascular: Normal rate, regular rhythm, normal heart sounds and intact distal pulses.   No murmur heard.  Pulmonary/Chest: Effort normal and breath sounds normal. No respiratory distress. She has no wheezes.   B/l mastectomy scarring noted, no masses noted    Abdominal: Soft. Bowel sounds are normal. She exhibits no distension and no mass. There is no tenderness.   Musculoskeletal: Normal range of motion. She exhibits no edema, tenderness or deformity.   Neurological: She is alert and oriented to person, place, and time. No cranial nerve deficit or sensory deficit. She exhibits normal muscle tone. Coordination normal.   Skin: Skin is warm and dry. No rash noted.   Psychiatric: She has a normal mood and affect. Her behavior is normal. Judgment and thought content normal.   Vitals reviewed.          Lab Results   Component Value Date    WBC 4.75 02/17/2020    HGB 11.9 (L) 02/17/2020    HCT 36.6 (L) 02/17/2020     02/17/2020    CHOL 270 (H) 02/17/2020    TRIG 99 02/17/2020    HDL 62 02/17/2020    ALT 10 02/17/2020    AST 16 02/17/2020     02/17/2020    K 4.1 02/17/2020     02/17/2020    CREATININE 1.0 02/17/2020    BUN 22 02/17/2020    CO2 27 02/17/2020    TSH 1.570 02/17/2020    HGBA1C 5.3 02/21/2018       Current Outpatient Medications on File Prior to Visit   Medication Sig Dispense Refill    aspirin (ECOTRIN) 81 MG EC tablet Take 81 mg by mouth once daily.      CALCIUM PHOSPHATE TRIB/VIT D3 (CITRACAL + D ORAL) Take by mouth.       cholecalciferol, vitamin D3, (VITAMIN D3) 25 mcg (1,000 unit) capsule Take 1,000 Units by mouth once daily.      fluocinonide 0.05% (LIDEX) 0.05 % cream Apply topically 2 (two) times daily. 45 g 0    garlic 5,000 mcg Tab Take 1 capsule by mouth every evening.      loratadine/pseudoephedrine (ALAVERT D-12 ALLERGY-SINUS ORAL) Take 1 tablet by mouth once daily.      potassium gluconate 550 mg (90 mg) Tab Take 1 tablet by mouth every morning.      vit C/E/Zn/coppr/lutein/zeaxan (PRESERVISION AREDS-2 ORAL) Take 1 capsule by mouth 2 (two) times daily.      cetirizine (ZYRTEC) 10 MG tablet Take 1 tablet (10 mg total) by mouth once daily. 30 tablet 0    L GASSERI/B BIFIDUM/B LONGUM ([employer] HEALTH ORAL) Take by mouth every evening.      rosuvastatin (CRESTOR) 20 MG tablet Take 20 mg by mouth once daily.      UBIDECARENONE (CO Q-10 ORAL) Take by mouth.      VITAMIN B COMPLEX ORAL Take by mouth once daily.       No current facility-administered medications on file prior to visit.          Assessment:   75 y.o. female with multiple co-morbid illnesses here to establish care with new PCP and continue work-up of chronic issues notably hx of osteoporosis, hx of breast cancer now in remission, hx of nephrolithasis and right renal cysts, GERD, HLD, hx of anemia, dizziness and fatigue     Plan:     Problem List Items Addressed This Visit        Cardiac/Vascular    Hypercholesteremia - Primary     Check lipid panel          Relevant Orders    CBC auto differential (Completed)    Comprehensive metabolic panel (Completed)    Lipid panel (Completed)    Hepatitis C antibody    DXA Bone Density Spine And Hip    Vitamin D (Completed)    Vitamin B12 (Completed)    Orthostatic blood pressure    TSH (Completed)    T4, free (Completed)    Prehypertension     Monitor not on meds  Advised on low sodium diet to help reduce risk of higher BP   Invited to come to [facility] events             Oncology    History of breast cancer     Pt  has not f/u with heme onc since 2015   No further imaging needed   Monitor          Anemia     Check cbc with c/o of fatigue          Relevant Orders    CBC auto differential (Completed)    Comprehensive metabolic panel (Completed)    Lipid panel (Completed)    Hepatitis C antibody    DXA Bone Density Spine And Hip    Vitamin D (Completed)    Vitamin B12 (Completed)    Orthostatic blood pressure    TSH (Completed)    T4, free (Completed)    Iron deficiency anemia    Relevant Orders    CBC auto differential (Completed)    Comprehensive metabolic panel (Completed)    Lipid panel (Completed)    Hepatitis C antibody    DXA Bone Density Spine And Hip    Vitamin D (Completed)    Vitamin B12 (Completed)    Orthostatic blood pressure    TSH (Completed)    T4, free (Completed)       Endocrine    B12 deficiency     Check labs          Relevant Orders    CBC auto differential (Completed)    Comprehensive metabolic panel (Completed)    Lipid panel (Completed)    Hepatitis C antibody    DXA Bone Density Spine And Hip    Vitamin D (Completed)    Vitamin B12 (Completed)    Orthostatic blood pressure    TSH (Completed)    T4, free (Completed)       Orthopedic    Idiopathic osteoporosis     prolia infusion q6 months   Next due in April 2020          Relevant Orders    CBC auto differential (Completed)    Comprehensive metabolic panel (Completed)    Lipid panel (Completed)    Hepatitis C antibody    DXA Bone Density Spine And Hip    Vitamin D (Completed)    Vitamin B12 (Completed)    Orthostatic blood pressure    TSH (Completed)    T4, free (Completed)       Other    Fatigue     Could be due to inadequate sleep vs lab abnormality  Labs today   If negative, work on improving sleep          Relevant Orders    CBC auto differential (Completed)    Comprehensive metabolic panel (Completed)    Lipid panel (Completed)    Hepatitis C antibody    DXA Bone Density Spine And Hip    Vitamin D (Completed)    Vitamin B12 (Completed)    Orthostatic  blood pressure    TSH (Completed)    T4, free (Completed)    Dizziness     Orthstatics: lying 142/70; 136/70 sitting, 132/68 standing  Pt asx   Labs today   Less likely vertigo with quick resolution   No bruits on exam but consider getting ultrasound with hx of HLD            Other Visit Diagnoses     Vitamin D deficiency        Relevant Orders    CBC auto differential (Completed)    Comprehensive metabolic panel (Completed)    Lipid panel (Completed)    Hepatitis C antibody    DXA Bone Density Spine And Hip    Vitamin D (Completed)    Vitamin B12 (Completed)    Orthostatic blood pressure    TSH (Completed)    T4, free (Completed)          Health Maintenance       Date Due Completion Date    Hepatitis C Screening 1945 ---    TETANUS VACCINE 02/15/1965 ---    Mammogram 02/15/1987 ---    Pneumococcal Vaccine (65+ High/Highest Risk) (1 of 2 - PCV13) 02/15/2012 ---    Shingles Vaccine (2 of 3) 12/27/2014 11/1/2014    Lipid Panel 05/20/2019 5/20/2014    DEXA SCAN 08/14/2020 8/14/2017    Colonoscopy 01/04/2026 1/4/2016        Request past medical records including HM information       Follow up in about 4 weeks (around 3/16/2020) for dizziness, fatigue, HM . Total face-to-face time was 60 min, greater than 50% of this was spent on counseling and coordination of care. The following issues were discussed:  hx of osteoporosis, hx of breast cancer now in remission, hx of nephrolithasis and right renal cysts, GERD, HLD, hx of anemia, dizziness and fatigue and Updated and reviewed medical, surgical, family, social history in chart       Nadia Sanderson MD  Internal Medicine- Geriatrics  [facility] [place]

## 2020-02-17 NOTE — ASSESSMENT & PLAN NOTE
Orthstatics: lying 142/70; 136/70 sitting, 132/68 standing  Pt asx   Labs today   Less likely vertigo with quick resolution   No bruits on exam but consider getting ultrasound with hx of HLD

## 2020-02-17 NOTE — ASSESSMENT & PLAN NOTE
Could be due to inadequate sleep vs lab abnormality  Labs today   If negative, work on improving sleep

## 2020-02-17 NOTE — PATIENT INSTRUCTIONS
- labs this week to workup fatigue and dizziness  - recommend having a least one high antioxidant serving of food daily ( see handout)   - recommend women in family having genetic testing ( younger generation)   - recommend reading [employer] by Urban Clark and Prevent and Reverse Heart Disease by Dr. Veronica Drake

## 2020-02-17 NOTE — ASSESSMENT & PLAN NOTE
Monitor not on meds  Advised on low sodium diet to help reduce risk of higher BP   Invited to come to [facility] events

## 2020-02-18 LAB — HCV AB S/CO SERPL IA: <0.1 S/CO RATIO (ref 0–0.9)

## 2020-02-20 DIAGNOSIS — E03.9 HYPOTHYROIDISM, UNSPECIFIED TYPE: Primary | ICD-10-CM

## 2020-02-20 DIAGNOSIS — E55.9 VITAMIN D DEFICIENCY: Primary | ICD-10-CM

## 2020-02-20 RX ORDER — ERGOCALCIFEROL 1.25 MG/1
50000 CAPSULE ORAL
Qty: 12 CAPSULE | Refills: 0 | Status: SHIPPED | OUTPATIENT
Start: 2020-02-20 | End: 2023-07-06

## 2020-02-21 RX ORDER — ROSUVASTATIN CALCIUM 20 MG/1
20 TABLET, COATED ORAL DAILY
Qty: 90 TABLET | Refills: 3 | Status: SHIPPED | OUTPATIENT
Start: 2020-02-21 | End: 2021-04-29 | Stop reason: SDUPTHER

## 2020-02-21 NOTE — TELEPHONE ENCOUNTER
Pt not taking statin and does not have med, refilled to pharmacy     Nadia Sanderson MD  Internal Medicine- Geriatrics  [facility] - [place]

## 2020-03-02 ENCOUNTER — HOSPITAL ENCOUNTER (OUTPATIENT)
Dept: RADIOLOGY | Facility: HOSPITAL | Age: 75
Discharge: HOME | End: 2020-03-02
Attending: INTERNAL MEDICINE
Payer: MEDICARE

## 2020-03-02 DIAGNOSIS — D50.9 IRON DEFICIENCY ANEMIA, UNSPECIFIED IRON DEFICIENCY ANEMIA TYPE: ICD-10-CM

## 2020-03-02 DIAGNOSIS — E55.9 VITAMIN D DEFICIENCY: ICD-10-CM

## 2020-03-02 DIAGNOSIS — E78.00 HYPERCHOLESTEREMIA: ICD-10-CM

## 2020-03-02 DIAGNOSIS — R53.83 FATIGUE, UNSPECIFIED TYPE: ICD-10-CM

## 2020-03-02 DIAGNOSIS — M81.8 IDIOPATHIC OSTEOPOROSIS: ICD-10-CM

## 2020-03-02 DIAGNOSIS — E53.8 B12 DEFICIENCY: ICD-10-CM

## 2020-03-02 PROCEDURE — 77080 DXA BONE DENSITY AXIAL: CPT | Mod: TC,PO

## 2020-03-11 ENCOUNTER — TELEPHONE (OUTPATIENT)
Dept: PRIMARY CARE CLINIC | Facility: CLINIC | Age: 75
End: 2020-03-11

## 2020-03-11 ENCOUNTER — LAB VISIT (OUTPATIENT)
Dept: LAB | Facility: HOSPITAL | Age: 75
End: 2020-03-11
Attending: INTERNAL MEDICINE
Payer: MEDICARE

## 2020-03-11 DIAGNOSIS — E03.9 HYPOTHYROIDISM, UNSPECIFIED TYPE: ICD-10-CM

## 2020-03-11 LAB
T4 FREE SERPL-MCNC: 0.8 NG/DL (ref 0.71–1.51)
TSH SERPL DL<=0.005 MIU/L-ACNC: 2.71 UIU/ML (ref 0.4–4)

## 2020-03-11 PROCEDURE — 84443 ASSAY THYROID STIM HORMONE: CPT | Mod: HCNC

## 2020-03-11 PROCEDURE — 36415 COLL VENOUS BLD VENIPUNCTURE: CPT | Mod: HCNC,PO

## 2020-03-11 PROCEDURE — 84439 ASSAY OF FREE THYROXINE: CPT | Mod: HCNC

## 2020-03-11 NOTE — TELEPHONE ENCOUNTER
If the only symptom she is having is sore throat, I would recommend using dayquil,  halls or lozenges which are available over the counter. We have seen a lot of viral infection with sore throat being symptom.     If she develops fever, pain with eating/drinking or swallowing spit, swollen lymph nodes in her neck then she will need to be tested for strep in the office.

## 2020-03-11 NOTE — TELEPHONE ENCOUNTER
Spoke with patient and she states that she has been having a sore throat since last Saturday.  She does suffer from allergies and gets allergy injections.  She has been gargling with salt water and it's not getting better, no fever, no cough.  Wants to know what else can be done.

## 2020-03-12 NOTE — TELEPHONE ENCOUNTER
Spoke with patient and she states that she is better today.  Patient was given instructions as advised by Dr. Sanderson. She will call back if she needs to be seen.

## 2020-03-16 ENCOUNTER — OFFICE VISIT (OUTPATIENT)
Dept: PRIMARY CARE CLINIC | Facility: CLINIC | Age: 75
End: 2020-03-16
Payer: MEDICARE

## 2020-03-16 VITALS
TEMPERATURE: 98 F | DIASTOLIC BLOOD PRESSURE: 82 MMHG | WEIGHT: 117.94 LBS | BODY MASS INDEX: 23.16 KG/M2 | SYSTOLIC BLOOD PRESSURE: 120 MMHG | HEIGHT: 60 IN | OXYGEN SATURATION: 97 % | HEART RATE: 63 BPM

## 2020-03-16 DIAGNOSIS — R03.0 PREHYPERTENSION: ICD-10-CM

## 2020-03-16 DIAGNOSIS — F81.9 SPECIFIC DEVELOPMENTAL LEARNING DIFFICULTY: ICD-10-CM

## 2020-03-16 DIAGNOSIS — J30.89 NON-SEASONAL ALLERGIC RHINITIS, UNSPECIFIED TRIGGER: ICD-10-CM

## 2020-03-16 DIAGNOSIS — R53.83 FATIGUE, UNSPECIFIED TYPE: ICD-10-CM

## 2020-03-16 DIAGNOSIS — Z86.39 HISTORY OF THYROTOXICOSIS: ICD-10-CM

## 2020-03-16 DIAGNOSIS — R47.01 APHASIA: Primary | ICD-10-CM

## 2020-03-16 DIAGNOSIS — R29.818 FOCAL NEUROLOGICAL DEFICIT: ICD-10-CM

## 2020-03-16 PROCEDURE — 99499 UNLISTED E&M SERVICE: CPT | Mod: HCNC,S$GLB,, | Performed by: INTERNAL MEDICINE

## 2020-03-16 PROCEDURE — 99215 OFFICE O/P EST HI 40 MIN: CPT | Mod: HCNC,S$GLB,, | Performed by: INTERNAL MEDICINE

## 2020-03-16 PROCEDURE — 99499 RISK ADDL DX/OHS AUDIT: ICD-10-PCS | Mod: HCNC,S$GLB,, | Performed by: INTERNAL MEDICINE

## 2020-03-16 PROCEDURE — 99215 PR OFFICE/OUTPT VISIT, EST, LEVL V, 40-54 MIN: ICD-10-PCS | Mod: HCNC,S$GLB,, | Performed by: INTERNAL MEDICINE

## 2020-03-16 NOTE — ASSESSMENT & PLAN NOTE
No neuro notes with past medical record, request past imaging and former records from past pcp   Pt has high HLD with hx of not staying on statin as recommended, with worsening of sx and with some reported short term loss, will obtain MRI/MRA of Brain to look for vascular changes   Mini-cog today was 5/5, will check slums at next visit   Given pt handout on healthy brain health tips to follow as well as resources on alz dementia info for more information on potential symptoms to look out for.

## 2020-03-16 NOTE — PROGRESS NOTES
Primary Care Provider Appointment    Subjective:      Patient ID: Tyra Riley is a 75 y.o. female with hx of osteoporosis, hx of breast cancer now in remission, hx of nephrolithasis and right renal cysts, GERD, HLD, hx of anemia    Chief Complaint: Follow-up (sore throat)      Pt called with sore throat on 3/11, intermittent off and on. No other sx.   She uses zyrtec- D and she gets allergy shots q2 weeks. She is due for injection soon.     She is using vitamin D. Has increased crestor to 40 mg.     She breaks out with a rash for several years. Has seen 2 different dermatologists. She is concerned about crestor causing a rash. Only breaks out in the front and back of her neck, itches, starts with tiny bumps and starts with itching before bumps appear. Occurs about twice a year for over 5 years. Has seen derm in the past.     Reports sometimes she has issues with speech, she sometimes have trouble with getting word out - it only lasts for a few seconds. This has been occurring since at least 20 years.  She has seen 2 neurologist - one in [place] and one here locally.   She thinks that this is starting to happen more often but cannot tell me how frequent this is happening now. She was told by one that she could have TIA. She thinks she has had an EEG and MRI in the past, she does not recall either physician names. She does not recall what the other neurologist thought but he had wrote a letter to her prior pcp.   She was on plavix in the past but it made her sick. She was taking a baby aspirin a day but stopped this due to wanting to reduce medication use.     Her niece was recently diagnosed with an autoimmune disease and she is concerned about this with her issues with skin rash and speech issues.     Pt reports short term memory loss - forgets why she went to a room in her house but then will eventually will remember, this has been happening for a while. Denies issues with remembering names, issues  "driving/remembering how to get home, denies issues with keeping up with appts, cooking, paying bills. Denies family members noticing any changes.     Denies any hx of tremors or balance instablity. Denies any issues with dizziness since last visit.   Does not feel as fatigued as before.   Her routine has not changed.     Does not note sometimes a few of her fingers can get very cold at the tip, does not happen often and not all fingers at the same time. Denies noticing any color changes.     Denies dysphagia, joint pains, myalgias, fevers.      years max wt 110 but used to eat 3 meals a day.         Other Providers:   Dr. Mae/Dr. Tiago AGRAWAL - endocrinology: last seen in 2018   Dr. Trujillo, dermatology - last visit 1/8/2020 - contact dermatitis, allergy patch testing   Dr. Shraddha Krishnamurthy, urology at [facility]   Dr. Noa Langston, hematology, oncology - last seen in 2015    Received and reviewd Dr. Vaughn last medical records, will upload records in the chart - pt intemittently takes statin, f/u every 6 months. Added hx of thyrotoxicosis to her problem list.              Past Surgical History:   Procedure Laterality Date    BLADDER REPAIR      BREAST RECONSTRUCTION      CATARACT EXTRACTION      COLONOSCOPY N/A 1/4/2016    Procedure: COLONOSCOPY;  Surgeon: Kieran Krishnamurthy MD;  Location: [address]);  Service: Endoscopy;  Laterality: N/A;    COLONOSCOPY W/ POLYPECTOMY      HYSTERECTOMY  1994    JODI, ovaries remain ("pain")    LASIK      MASTECTOMY  2000    Bilateral    OOPHORECTOMY  1994    PARTIAL NEPHRECTOMY      cyst from left kidney    Toes surgery      Hammer toe surgery    TONSILLECTOMY         Past Medical History:   Diagnosis Date    Abnormal Pap smear     colpo then hyst    Allergy     Breast cancer 1997, 2000    Colon polyp     Fever blister     GERD (gastroesophageal reflux disease)     Hyperlipidemia     Hyperthyroidism     Kidney cysts     Osteoporosis, unspecified     Seasonal " allergies        Social History     Socioeconomic History    Marital status: Single     Spouse name: Not on file    Number of children: 1    Years of education: Not on file    Highest education level: Bachelor's degree (e.g., BA, AB, BS)   Occupational History     Comment: [employer]    Social Needs    Financial resource strain: Not hard at all    Food insecurity:     Worry: Never true     Inability: Never true    Transportation needs:     Medical: No     Non-medical: No   Tobacco Use    Smoking status: Never Smoker    Smokeless tobacco: Never Used   Substance and Sexual Activity    Alcohol use: Yes     Alcohol/week: 1.0 standard drinks     Types: 1 Glasses of wine per week     Comment: Rare    Drug use: No    Sexual activity: Never   Lifestyle    Physical activity:     Days per week: 1 day     Minutes per session: 60 min    Stress: To some extent   Relationships    Social connections:     Talks on phone: More than three times a week     Gets together: More than three times a week     Attends Sabianist service: More than 4 times per year     Active member of club or organization: Yes     Attends meetings of clubs or organizations: More than 4 times per year     Relationship status: Never    Other Topics Concern    Are you pregnant or think you may be? No    Breast-feeding Not Asked   Social History Narrative    Retired     Children:  Mike ([place])     Qian: Christian    Hobbies: Line Dancing once a week , in [employer] choir , [employer]        Review of Systems   Constitutional: Positive for activity change. Negative for chills, fatigue and unexpected weight change.   HENT: Positive for postnasal drip and sore throat. Negative for congestion, sinus pressure and trouble swallowing.    Eyes: Negative for visual disturbance.   Respiratory: Negative for shortness of breath and wheezing.    Cardiovascular: Negative for chest pain and palpitations.   Gastrointestinal: Negative  for abdominal pain, blood in stool, constipation and diarrhea.   Endocrine: Positive for cold intolerance.   Genitourinary: Negative for difficulty urinating.   Musculoskeletal: Negative for arthralgias and gait problem.   Skin: Negative for rash.   Allergic/Immunologic: Negative for immunocompromised state.   Neurological: Negative for dizziness, tremors and weakness.   Hematological: Does not bruise/bleed easily.   Psychiatric/Behavioral: Negative for confusion and suicidal ideas. The patient is not nervous/anxious.        Objective:   /82 (BP Location: Left arm, Patient Position: Sitting, BP Method: Medium (Manual))   Pulse 63   Temp 97.9 °F (36.6 °C) (Oral)   Ht 5' (1.524 m)   Wt 53.5 kg (117 lb 15.1 oz)   SpO2 97%   BMI 23.03 kg/m²     Physical Exam   Constitutional: She is oriented to person, place, and time. She appears well-developed and well-nourished.   HENT:   Head: Normocephalic.   Right Ear: External ear normal.   Left Ear: External ear normal.   Mouth/Throat: Oropharynx is clear and moist. No oropharyngeal exudate.   Moderate nasal turbinate swelling noted, no discharge      Eyes: Pupils are equal, round, and reactive to light. Conjunctivae are normal.   Neck: Normal range of motion. Neck supple. No thyromegaly present.   Cardiovascular: Normal rate, regular rhythm, normal heart sounds and intact distal pulses.   No murmur heard.  Pulmonary/Chest: Effort normal and breath sounds normal. No respiratory distress. She has no wheezes.   B/l mastectomy scarring noted, no masses noted    Musculoskeletal: Normal range of motion. She exhibits no edema.   No synovitis noted    Lymphadenopathy:     She has no cervical adenopathy.   Neurological: She is alert and oriented to person, place, and time.   Clear speech    Skin: No rash noted.   Psychiatric: She has a normal mood and affect. Her behavior is normal. Judgment and thought content normal.   Vitals reviewed.          Lab Results   Component  Value Date    WBC 4.75 2020    HGB 11.9 (L) 2020    HCT 36.6 (L) 2020     2020    CHOL 270 (H) 2020    TRIG 99 2020    HDL 62 2020    ALT 10 2020    AST 16 2020     2020    K 4.1 2020     2020    CREATININE 1.0 2020    BUN 22 2020    CO2 27 2020    TSH 2.708 2020    HGBA1C 5.3 2018       Current Outpatient Medications on File Prior to Visit   Medication Sig Dispense Refill    aspirin (ECOTRIN) 81 MG EC tablet Take 81 mg by mouth once daily.      ergocalciferol (ERGOCALCIFEROL) 50,000 unit Cap Take 1 capsule (50,000 Units total) by mouth every 7 days. 12 capsule 0    rosuvastatin (CRESTOR) 20 MG tablet Take 1 tablet (20 mg total) by mouth once daily. 90 tablet 3    cetirizine (ZYRTEC) 10 MG tablet Take 1 tablet (10 mg total) by mouth once daily. (Patient not taking: Reported on 3/16/2020) 30 tablet 0    fluocinonide 0.05% (LIDEX) 0.05 % cream Apply topically 2 (two) times daily. (Patient not taking: Reported on 3/16/2020) 45 g 0    garlic 5,000 mcg Tab Take 1 capsule by mouth every evening.      L GASSERI/B BIFIDUM/B LONGUM ([facility] HEALTH ORAL) Take by mouth every evening.      loratadine/pseudoephedrine (ALAVERT D-12 ALLERGY-SINUS ORAL) Take 1 tablet by mouth once daily.      potassium gluconate 550 mg (90 mg) Tab Take 1 tablet by mouth every morning.      UBIDECARENONE (CO Q-10 ORAL) Take by mouth.      vit C/E/Zn/coppr/lutein/zeaxan (PRESERVISION AREDS-2 ORAL) Take 1 capsule by mouth 2 (two) times daily.      VITAMIN B COMPLEX ORAL Take by mouth once daily.       No current facility-administered medications on file prior to visit.      Mini-Co/5       Assessment:   75 y.o. female with multiple co-morbid illnesses here for followup for continuing work-up of chronic issues notably hx of osteoporosis, hx of breast cancer now in remission, hx of nephrolithasis and right  renal cysts, GERD, HLD, hx of anemia, dizziness and fatigue     Plan:     Problem List Items Addressed This Visit        Neuro    Aphasia - Primary     No neuro notes with past medical record, request past imaging and former records from past pcp   Pt has high HLD with hx of not staying on statin as recommended, with worsening of sx and with some reported short term loss, will obtain MRI/MRA of Brain to look for vascular changes   Mini-cog today was 5/5, will check slums at next visit   Given pt handout on healthy brain health tips to follow as well as resources on alz dementia info for more information on potential symptoms to look out for.          Relevant Orders    MRI Brain W WO Contrast    MRA Brain with and without contrast       Cardiac/Vascular    Prehypertension     bp in good range here today  Cont to monitor             Endocrine    History of thyrotoxicosis       Other    Fatigue     Has resolved   Lab work without etiology   Thyroid function has normalized, cont to monitor          Non-seasonal allergic rhinitis     Advised that having underlying strong allergies can place her at greater chance of developing hives. Discuss drug rash which low suspicion of this given pt has taken crestor for years and rash has not been consistent with drug induced type of rash based upon her explanation of symptoms and time course.   Instructed to take picture of rash and call if it occurs again   We have mutually agreed for her to continue statin  for now.   Discussed given her negative sx and benign physical exam, low suspicion of undiagnosed autoimmune disorder.            Other Visit Diagnoses     Focal neurological deficit        Relevant Orders    MRI Brain W WO Contrast    MRA Brain with and without contrast    Specific developmental learning difficulty              Health Maintenance       Date Due Completion Date    Hepatitis C Screening 1945 ---    TETANUS VACCINE 02/15/1965 ---    Mammogram 02/15/1987  ---    Pneumococcal Vaccine (65+ High/Highest Risk) (1 of 2 - PCV13) 02/15/2012 ---    Shingles Vaccine (2 of 3) 12/27/2014 11/1/2014    Lipid Panel 05/20/2019 5/20/2014    DEXA SCAN 08/14/2020 8/14/2017    Colonoscopy 01/04/2026 1/4/2016              Follow up in about 3 months (around 6/16/2020) for routine follow up , cognitive exam . Total face-to-face time was 60 min, greater than 50% of this was spent on counseling and coordination of care. The following issues were discussed:  hx of osteoporosis, hx of breast cancer now in remission, hx of nephrolithasis and right renal cysts, GERD, HLD, hx of anemia, dizziness and fatigue and Updated and reviewed medical, surgical, family, social history in chart       Nadia Sanderson MD  Internal Medicine- Geriatrics  [facility] [place]

## 2020-03-16 NOTE — ASSESSMENT & PLAN NOTE
Advised that having underlying strong allergies can place her at greater chance of developing hives. Discuss drug rash which low suspicion of this given pt has taken crestor for years and rash has not been consistent with drug induced type of rash based upon her explanation of symptoms and time course.   Instructed to take picture of rash and call if it occurs again   We have mutually agreed for her to continue statin  for now.   Discussed given her negative sx and benign physical exam, low suspicion of undiagnosed autoimmune disorder.

## 2020-03-16 NOTE — PATIENT INSTRUCTIONS
- if rash breaks out, take a picture and call   - will request neurology records from prior pcp   - obtain MRI/MRA of the brain for evaluation of slurred speech   - practice good brain health activities

## 2020-03-18 ENCOUNTER — HOSPITAL ENCOUNTER (OUTPATIENT)
Dept: RADIOLOGY | Facility: HOSPITAL | Age: 75
Discharge: HOME | End: 2020-03-18
Attending: INTERNAL MEDICINE
Payer: MEDICARE

## 2020-03-18 DIAGNOSIS — R29.818 FOCAL NEUROLOGICAL DEFICIT: ICD-10-CM

## 2020-03-18 DIAGNOSIS — R47.01 APHASIA: ICD-10-CM

## 2020-03-18 PROCEDURE — 70544 MRA BRAIN WITHOUT CONTRAST: ICD-10-PCS | Mod: 26,59,HCNC, | Performed by: RADIOLOGY

## 2020-03-18 PROCEDURE — A9585 GADOBUTROL INJECTION: HCPCS | Mod: HCNC | Performed by: INTERNAL MEDICINE

## 2020-03-18 PROCEDURE — 25500020 PHARM REV CODE 255: Mod: HCNC | Performed by: INTERNAL MEDICINE

## 2020-03-18 PROCEDURE — 70544 MR ANGIOGRAPHY HEAD W/O DYE: CPT | Mod: TC,HCNC,59

## 2020-03-18 PROCEDURE — 70553 MRI BRAIN W WO CONTRAST: ICD-10-PCS | Mod: 26,HCNC,, | Performed by: RADIOLOGY

## 2020-03-18 PROCEDURE — 70553 MRI BRAIN STEM W/O & W/DYE: CPT | Mod: TC,HCNC

## 2020-03-18 PROCEDURE — 70544 MR ANGIOGRAPHY HEAD W/O DYE: CPT | Mod: 26,59,HCNC, | Performed by: RADIOLOGY

## 2020-03-18 PROCEDURE — 70553 MRI BRAIN STEM W/O & W/DYE: CPT | Mod: 26,HCNC,, | Performed by: RADIOLOGY

## 2020-03-18 RX ORDER — GADOBUTROL 604.72 MG/ML
5 INJECTION INTRAVENOUS
Status: COMPLETED | OUTPATIENT
Start: 2020-03-18 | End: 2020-03-18

## 2020-03-18 RX ADMIN — GADOBUTROL 5 ML: 604.72 INJECTION INTRAVENOUS at 12:03

## 2020-03-19 ENCOUNTER — TELEPHONE (OUTPATIENT)
Dept: PRIMARY CARE CLINIC | Facility: CLINIC | Age: 75
End: 2020-03-19

## 2020-03-19 NOTE — TELEPHONE ENCOUNTER
Called  to discuss her MRI and MRA findings - nothing found to explain her aphasia which has been chronic for years but progressively worsening for her.     Still awaiting records from prior pcp office from neurology consult.     Nadia Sanderson MD  Internal Medicine- Geriatrics  [facility] - [place]

## 2020-04-27 ENCOUNTER — TELEPHONE (OUTPATIENT)
Dept: INFECTIOUS DISEASES | Facility: HOSPITAL | Age: 75
End: 2020-04-27

## 2020-04-28 ENCOUNTER — INFUSION (OUTPATIENT)
Dept: INFECTIOUS DISEASES | Facility: HOSPITAL | Age: 75
End: 2020-04-28
Attending: INTERNAL MEDICINE
Payer: MEDICARE

## 2020-04-28 VITALS — DIASTOLIC BLOOD PRESSURE: 60 MMHG | HEART RATE: 75 BPM | SYSTOLIC BLOOD PRESSURE: 146 MMHG | TEMPERATURE: 97 F

## 2020-04-28 DIAGNOSIS — M81.8 IDIOPATHIC OSTEOPOROSIS: Primary | ICD-10-CM

## 2020-04-28 PROCEDURE — 96372 THER/PROPH/DIAG INJ SC/IM: CPT | Mod: HCNC

## 2020-04-28 PROCEDURE — 63600175 PHARM REV CODE 636 W HCPCS: Mod: JG,HCNC | Performed by: INTERNAL MEDICINE

## 2020-04-28 RX ADMIN — DENOSUMAB 60 MG: 60 INJECTION SUBCUTANEOUS at 01:04

## 2020-04-28 NOTE — PROGRESS NOTES
Patient received Prolia 60 mg injection sub Q in the left arm.  Tolerated well and left in NAD.    Patient is on vit d

## 2020-05-05 ENCOUNTER — HOSPITAL ENCOUNTER (OUTPATIENT)
Facility: HOSPITAL | Age: 75
Discharge: HOME | End: 2020-05-07
Attending: EMERGENCY MEDICINE | Admitting: INTERNAL MEDICINE
Payer: MEDICARE

## 2020-05-05 DIAGNOSIS — R07.9 CHEST PAIN: ICD-10-CM

## 2020-05-05 DIAGNOSIS — R40.20 LOSS OF CONSCIOUSNESS: Primary | ICD-10-CM

## 2020-05-05 DIAGNOSIS — R55 SYNCOPE: ICD-10-CM

## 2020-05-05 PROBLEM — N17.9 AKI (ACUTE KIDNEY INJURY): Status: ACTIVE | Noted: 2020-05-05

## 2020-05-05 LAB
ALBUMIN SERPL BCP-MCNC: 3.7 G/DL (ref 3.5–5.2)
ALP SERPL-CCNC: 45 U/L (ref 55–135)
ALT SERPL W/O P-5'-P-CCNC: 11 U/L (ref 10–44)
ANION GAP SERPL CALC-SCNC: 10 MMOL/L (ref 8–16)
AST SERPL-CCNC: 16 U/L (ref 10–40)
BASOPHILS # BLD AUTO: 0.02 K/UL (ref 0–0.2)
BASOPHILS NFR BLD: 0.4 % (ref 0–1.9)
BILIRUB SERPL-MCNC: 0.2 MG/DL (ref 0.1–1)
BUN SERPL-MCNC: 19 MG/DL (ref 8–23)
CALCIUM SERPL-MCNC: 9.2 MG/DL (ref 8.7–10.5)
CHLORIDE SERPL-SCNC: 103 MMOL/L (ref 95–110)
CO2 SERPL-SCNC: 26 MMOL/L (ref 23–29)
CREAT SERPL-MCNC: 1.3 MG/DL (ref 0.5–1.4)
DIFFERENTIAL METHOD: ABNORMAL
EOSINOPHIL # BLD AUTO: 0.1 K/UL (ref 0–0.5)
EOSINOPHIL NFR BLD: 2 % (ref 0–8)
ERYTHROCYTE [DISTWIDTH] IN BLOOD BY AUTOMATED COUNT: 12.6 % (ref 11.5–14.5)
EST. GFR  (AFRICAN AMERICAN): 47 ML/MIN/1.73 M^2
EST. GFR  (NON AFRICAN AMERICAN): 41 ML/MIN/1.73 M^2
GLUCOSE SERPL-MCNC: 111 MG/DL (ref 70–110)
HCT VFR BLD AUTO: 36.6 % (ref 37–48.5)
HGB BLD-MCNC: 11.7 G/DL (ref 12–16)
IMM GRANULOCYTES # BLD AUTO: 0.02 K/UL (ref 0–0.04)
IMM GRANULOCYTES NFR BLD AUTO: 0.4 % (ref 0–0.5)
LYMPHOCYTES # BLD AUTO: 1.4 K/UL (ref 1–4.8)
LYMPHOCYTES NFR BLD: 27.2 % (ref 18–48)
MCH RBC QN AUTO: 31.8 PG (ref 27–31)
MCHC RBC AUTO-ENTMCNC: 32 G/DL (ref 32–36)
MCV RBC AUTO: 100 FL (ref 82–98)
MONOCYTES # BLD AUTO: 0.3 K/UL (ref 0.3–1)
MONOCYTES NFR BLD: 6.6 % (ref 4–15)
NEUTROPHILS # BLD AUTO: 3.2 K/UL (ref 1.8–7.7)
NEUTROPHILS NFR BLD: 63.4 % (ref 38–73)
NRBC BLD-RTO: 0 /100 WBC
PLATELET # BLD AUTO: 193 K/UL (ref 150–350)
PMV BLD AUTO: 10 FL (ref 9.2–12.9)
POTASSIUM SERPL-SCNC: 3.8 MMOL/L (ref 3.5–5.1)
PROT SERPL-MCNC: 7.2 G/DL (ref 6–8.4)
RBC # BLD AUTO: 3.68 M/UL (ref 4–5.4)
SARS-COV-2 RDRP RESP QL NAA+PROBE: NEGATIVE
SODIUM SERPL-SCNC: 139 MMOL/L (ref 136–145)
TROPONIN I SERPL DL<=0.01 NG/ML-MCNC: <0.006 NG/ML (ref 0–0.03)
WBC # BLD AUTO: 4.97 K/UL (ref 3.9–12.7)

## 2020-05-05 PROCEDURE — U0002 COVID-19 LAB TEST NON-CDC: HCPCS | Mod: HCNC

## 2020-05-05 PROCEDURE — 85025 COMPLETE CBC W/AUTO DIFF WBC: CPT | Mod: HCNC

## 2020-05-05 PROCEDURE — 84484 ASSAY OF TROPONIN QUANT: CPT | Mod: HCNC

## 2020-05-05 PROCEDURE — 93005 ELECTROCARDIOGRAM TRACING: CPT | Mod: HCNC

## 2020-05-05 PROCEDURE — G0378 HOSPITAL OBSERVATION PER HR: HCPCS | Mod: HCNC

## 2020-05-05 PROCEDURE — 25000003 PHARM REV CODE 250: Mod: HCNC | Performed by: EMERGENCY MEDICINE

## 2020-05-05 PROCEDURE — 36415 COLL VENOUS BLD VENIPUNCTURE: CPT | Mod: HCNC

## 2020-05-05 PROCEDURE — 99285 EMERGENCY DEPT VISIT HI MDM: CPT | Mod: 25,HCNC

## 2020-05-05 PROCEDURE — 80053 COMPREHEN METABOLIC PANEL: CPT | Mod: HCNC

## 2020-05-05 RX ORDER — LANOLIN ALCOHOL/MO/W.PET/CERES
800 CREAM (GRAM) TOPICAL
Status: DISCONTINUED | OUTPATIENT
Start: 2020-05-05 | End: 2020-05-07 | Stop reason: HOSPADM

## 2020-05-05 RX ORDER — GLUCAGON 1 MG
1 KIT INJECTION
Status: DISCONTINUED | OUTPATIENT
Start: 2020-05-05 | End: 2020-05-07 | Stop reason: HOSPADM

## 2020-05-05 RX ORDER — ASPIRIN 325 MG
325 TABLET ORAL
Status: COMPLETED | OUTPATIENT
Start: 2020-05-05 | End: 2020-05-05

## 2020-05-05 RX ORDER — POTASSIUM CHLORIDE 20 MEQ/15ML
40 SOLUTION ORAL
Status: DISCONTINUED | OUTPATIENT
Start: 2020-05-05 | End: 2020-05-07 | Stop reason: HOSPADM

## 2020-05-05 RX ORDER — SODIUM CHLORIDE 9 MG/ML
INJECTION, SOLUTION INTRAVENOUS CONTINUOUS
Status: DISCONTINUED | OUTPATIENT
Start: 2020-05-05 | End: 2020-05-07 | Stop reason: HOSPADM

## 2020-05-05 RX ORDER — ASPIRIN 81 MG/1
81 TABLET ORAL DAILY
Status: DISCONTINUED | OUTPATIENT
Start: 2020-05-06 | End: 2020-05-07 | Stop reason: HOSPADM

## 2020-05-05 RX ORDER — IBUPROFEN 200 MG
16 TABLET ORAL
Status: DISCONTINUED | OUTPATIENT
Start: 2020-05-05 | End: 2020-05-07 | Stop reason: HOSPADM

## 2020-05-05 RX ORDER — TALC
6 POWDER (GRAM) TOPICAL NIGHTLY PRN
Status: DISCONTINUED | OUTPATIENT
Start: 2020-05-05 | End: 2020-05-07 | Stop reason: HOSPADM

## 2020-05-05 RX ORDER — ROSUVASTATIN CALCIUM 20 MG/1
20 TABLET, COATED ORAL DAILY
Status: DISCONTINUED | OUTPATIENT
Start: 2020-05-06 | End: 2020-05-07 | Stop reason: HOSPADM

## 2020-05-05 RX ORDER — ONDANSETRON 2 MG/ML
4 INJECTION INTRAMUSCULAR; INTRAVENOUS EVERY 6 HOURS PRN
Status: DISCONTINUED | OUTPATIENT
Start: 2020-05-05 | End: 2020-05-07 | Stop reason: HOSPADM

## 2020-05-05 RX ORDER — SODIUM CHLORIDE 0.9 % (FLUSH) 0.9 %
10 SYRINGE (ML) INJECTION
Status: DISCONTINUED | OUTPATIENT
Start: 2020-05-05 | End: 2020-05-07 | Stop reason: HOSPADM

## 2020-05-05 RX ORDER — IBUPROFEN 200 MG
24 TABLET ORAL
Status: DISCONTINUED | OUTPATIENT
Start: 2020-05-05 | End: 2020-05-07 | Stop reason: HOSPADM

## 2020-05-05 RX ORDER — ACETAMINOPHEN 325 MG/1
650 TABLET ORAL EVERY 4 HOURS PRN
Status: DISCONTINUED | OUTPATIENT
Start: 2020-05-05 | End: 2020-05-07 | Stop reason: HOSPADM

## 2020-05-05 RX ADMIN — ASPIRIN 325 MG ORAL TABLET 325 MG: 325 PILL ORAL at 07:05

## 2020-05-06 PROBLEM — R40.20 LOSS OF CONSCIOUSNESS: Status: ACTIVE | Noted: 2020-05-05

## 2020-05-06 LAB
ALBUMIN SERPL BCP-MCNC: 3.5 G/DL (ref 3.5–5.2)
ALP SERPL-CCNC: 43 U/L (ref 55–135)
ALT SERPL W/O P-5'-P-CCNC: 11 U/L (ref 10–44)
ANION GAP SERPL CALC-SCNC: 9 MMOL/L (ref 8–16)
AST SERPL-CCNC: 15 U/L (ref 10–40)
BACTERIA #/AREA URNS HPF: NORMAL /HPF
BASOPHILS # BLD AUTO: 0.02 K/UL (ref 0–0.2)
BASOPHILS NFR BLD: 0.4 % (ref 0–1.9)
BILIRUB SERPL-MCNC: 0.2 MG/DL (ref 0.1–1)
BILIRUB UR QL STRIP: NEGATIVE
BUN SERPL-MCNC: 19 MG/DL (ref 8–23)
CALCIUM SERPL-MCNC: 9.1 MG/DL (ref 8.7–10.5)
CHLORIDE SERPL-SCNC: 104 MMOL/L (ref 95–110)
CHOLEST SERPL-MCNC: 199 MG/DL (ref 120–199)
CHOLEST/HDLC SERPL: 4.3 {RATIO} (ref 2–5)
CLARITY UR: CLEAR
CO2 SERPL-SCNC: 27 MMOL/L (ref 23–29)
COLOR UR: YELLOW
CREAT SERPL-MCNC: 1.1 MG/DL (ref 0.5–1.4)
DIFFERENTIAL METHOD: ABNORMAL
EOSINOPHIL # BLD AUTO: 0.1 K/UL (ref 0–0.5)
EOSINOPHIL NFR BLD: 1.9 % (ref 0–8)
ERYTHROCYTE [DISTWIDTH] IN BLOOD BY AUTOMATED COUNT: 12.7 % (ref 11.5–14.5)
EST. GFR  (AFRICAN AMERICAN): 58 ML/MIN/1.73 M^2
EST. GFR  (NON AFRICAN AMERICAN): 50 ML/MIN/1.73 M^2
GLUCOSE SERPL-MCNC: 96 MG/DL (ref 70–110)
GLUCOSE UR QL STRIP: NEGATIVE
HCT VFR BLD AUTO: 34.1 % (ref 37–48.5)
HDLC SERPL-MCNC: 46 MG/DL (ref 40–75)
HDLC SERPL: 23.1 % (ref 20–50)
HGB BLD-MCNC: 11.1 G/DL (ref 12–16)
HGB UR QL STRIP: ABNORMAL
IMM GRANULOCYTES # BLD AUTO: 0.01 K/UL (ref 0–0.04)
IMM GRANULOCYTES NFR BLD AUTO: 0.2 % (ref 0–0.5)
KETONES UR QL STRIP: NEGATIVE
LDLC SERPL CALC-MCNC: 114.8 MG/DL (ref 63–159)
LEUKOCYTE ESTERASE UR QL STRIP: ABNORMAL
LYMPHOCYTES # BLD AUTO: 1.5 K/UL (ref 1–4.8)
LYMPHOCYTES NFR BLD: 32.3 % (ref 18–48)
MAGNESIUM SERPL-MCNC: 2.1 MG/DL (ref 1.6–2.6)
MCH RBC QN AUTO: 31.9 PG (ref 27–31)
MCHC RBC AUTO-ENTMCNC: 32.6 G/DL (ref 32–36)
MCV RBC AUTO: 98 FL (ref 82–98)
MICROSCOPIC COMMENT: NORMAL
MONOCYTES # BLD AUTO: 0.4 K/UL (ref 0.3–1)
MONOCYTES NFR BLD: 7.5 % (ref 4–15)
NEUTROPHILS # BLD AUTO: 2.7 K/UL (ref 1.8–7.7)
NEUTROPHILS NFR BLD: 57.7 % (ref 38–73)
NITRITE UR QL STRIP: NEGATIVE
NONHDLC SERPL-MCNC: 153 MG/DL
NRBC BLD-RTO: 0 /100 WBC
PH UR STRIP: 6 [PH] (ref 5–8)
PLATELET # BLD AUTO: 187 K/UL (ref 150–350)
PMV BLD AUTO: 9.9 FL (ref 9.2–12.9)
POTASSIUM SERPL-SCNC: 4 MMOL/L (ref 3.5–5.1)
PROT SERPL-MCNC: 6.8 G/DL (ref 6–8.4)
PROT UR QL STRIP: NEGATIVE
RBC # BLD AUTO: 3.48 M/UL (ref 4–5.4)
RBC #/AREA URNS HPF: 3 /HPF (ref 0–4)
SODIUM SERPL-SCNC: 140 MMOL/L (ref 136–145)
SP GR UR STRIP: 1.01 (ref 1–1.03)
SQUAMOUS #/AREA URNS HPF: 2 /HPF
TRIGL SERPL-MCNC: 191 MG/DL (ref 30–150)
TROPONIN I SERPL DL<=0.01 NG/ML-MCNC: 0.01 NG/ML (ref 0–0.03)
URN SPEC COLLECT METH UR: ABNORMAL
UROBILINOGEN UR STRIP-ACNC: NEGATIVE EU/DL
VIT B12 SERPL-MCNC: 1116 PG/ML (ref 210–950)
WBC # BLD AUTO: 4.68 K/UL (ref 3.9–12.7)
WBC #/AREA URNS HPF: 0 /HPF (ref 0–5)

## 2020-05-06 PROCEDURE — 80053 COMPREHEN METABOLIC PANEL: CPT | Mod: HCNC

## 2020-05-06 PROCEDURE — 82607 VITAMIN B-12: CPT | Mod: HCNC

## 2020-05-06 PROCEDURE — 95819 EEG AWAKE AND ASLEEP: CPT | Mod: 26,HCNC,, | Performed by: PSYCHIATRY & NEUROLOGY

## 2020-05-06 PROCEDURE — 25000003 PHARM REV CODE 250: Mod: HCNC | Performed by: NURSE PRACTITIONER

## 2020-05-06 PROCEDURE — 84425 ASSAY OF VITAMIN B-1: CPT | Mod: HCNC

## 2020-05-06 PROCEDURE — G0378 HOSPITAL OBSERVATION PER HR: HCPCS | Mod: HCNC

## 2020-05-06 PROCEDURE — 95819 EEG AWAKE AND ASLEEP: CPT | Mod: HCNC

## 2020-05-06 PROCEDURE — 84484 ASSAY OF TROPONIN QUANT: CPT | Mod: HCNC

## 2020-05-06 PROCEDURE — 96374 THER/PROPH/DIAG INJ IV PUSH: CPT

## 2020-05-06 PROCEDURE — 36415 COLL VENOUS BLD VENIPUNCTURE: CPT | Mod: HCNC

## 2020-05-06 PROCEDURE — 95819 PR EEG,W/AWAKE & ASLEEP RECORD: ICD-10-PCS | Mod: 26,HCNC,, | Performed by: PSYCHIATRY & NEUROLOGY

## 2020-05-06 PROCEDURE — 63600175 PHARM REV CODE 636 W HCPCS: Mod: HCNC | Performed by: NURSE PRACTITIONER

## 2020-05-06 PROCEDURE — 94761 N-INVAS EAR/PLS OXIMETRY MLT: CPT | Mod: HCNC

## 2020-05-06 PROCEDURE — 84207 ASSAY OF VITAMIN B-6: CPT | Mod: HCNC

## 2020-05-06 PROCEDURE — 81000 URINALYSIS NONAUTO W/SCOPE: CPT | Mod: HCNC

## 2020-05-06 PROCEDURE — 85025 COMPLETE CBC W/AUTO DIFF WBC: CPT | Mod: HCNC

## 2020-05-06 PROCEDURE — 83735 ASSAY OF MAGNESIUM: CPT | Mod: HCNC

## 2020-05-06 PROCEDURE — 80061 LIPID PANEL: CPT | Mod: HCNC

## 2020-05-06 PROCEDURE — 96361 HYDRATE IV INFUSION ADD-ON: CPT

## 2020-05-06 RX ORDER — NALOXONE HCL 0.4 MG/ML
VIAL (ML) INJECTION
Status: DISCONTINUED
Start: 2020-05-06 | End: 2020-05-06 | Stop reason: CLARIF

## 2020-05-06 RX ORDER — ONDANSETRON 4 MG/1
4 TABLET, ORALLY DISINTEGRATING ORAL EVERY 6 HOURS PRN
Qty: 12 TABLET | Refills: 0 | Status: SHIPPED | OUTPATIENT
Start: 2020-05-06 | End: 2021-04-20

## 2020-05-06 RX ADMIN — SODIUM CHLORIDE: 0.9 INJECTION, SOLUTION INTRAVENOUS at 05:05

## 2020-05-06 RX ADMIN — SODIUM CHLORIDE: 0.9 INJECTION, SOLUTION INTRAVENOUS at 04:05

## 2020-05-06 RX ADMIN — ROSUVASTATIN CALCIUM 20 MG: 20 TABLET, FILM COATED ORAL at 09:05

## 2020-05-06 RX ADMIN — Medication 6 MG: at 08:05

## 2020-05-06 RX ADMIN — ONDANSETRON 4 MG: 2 INJECTION INTRAMUSCULAR; INTRAVENOUS at 12:05

## 2020-05-06 RX ADMIN — ASPIRIN 81 MG: 81 TABLET, COATED ORAL at 09:05

## 2020-05-06 NOTE — SUBJECTIVE & OBJECTIVE
"Interval History:  Pt seen and examined.  Denies any further "black outs".  No dizziness, lightheadedness, HA, visual changes, numbness, tingling, or weakness.  No chest pain or SOB.  Pt denies any precipitating features prior to event.  She denies any leg swelling.  She denies any abdominal pain, but does endorse some nausea that improved with antiemetics.  Evaluation by neurology in progress.    Review of Systems   Constitutional: Negative for chills, fatigue and fever.   HENT: Negative for trouble swallowing.    Eyes: Negative for visual disturbance.   Respiratory: Negative for cough and shortness of breath.    Cardiovascular: Negative for chest pain and leg swelling.   Gastrointestinal: Positive for nausea. Negative for abdominal pain and vomiting.   Genitourinary: Negative for difficulty urinating.   Musculoskeletal: Negative for arthralgias.   Neurological: Negative for dizziness, seizures, syncope, weakness, light-headedness and headaches.   Psychiatric/Behavioral: Negative for confusion. The patient is not nervous/anxious.      Objective:     Vital Signs (Most Recent):  Temp: 98.3 °F (36.8 °C) (05/06/20 1451)  Pulse: 72 (05/06/20 1513)  Resp: 18 (05/06/20 1451)  BP: (!) 149/69 (05/06/20 1513)  SpO2: 97 % (05/06/20 1451) Vital Signs (24h Range):  Temp:  [96.8 °F (36 °C)-98.3 °F (36.8 °C)] 98.3 °F (36.8 °C)  Pulse:  [67-80] 72  Resp:  [14-18] 18  SpO2:  [97 %-100 %] 97 %  BP: (139-188)/(64-81) 149/69     Weight: 53.1 kg (117 lb)  Body mass index is 22.85 kg/m².    Intake/Output Summary (Last 24 hours) at 5/6/2020 1635  Last data filed at 5/6/2020 1200  Gross per 24 hour   Intake 360 ml   Output --   Net 360 ml      Physical Exam   Constitutional: She is oriented to person, place, and time. She appears well-developed and well-nourished. No distress.   HENT:   Head: Normocephalic and atraumatic.   Eyes: Pupils are equal, round, and reactive to light. Conjunctivae and EOM are normal.   Neck: Normal range of " motion. Neck supple. No thyromegaly present.   Cardiovascular: Normal rate, regular rhythm, normal heart sounds and intact distal pulses.   No murmur heard.  Pulmonary/Chest: Effort normal and breath sounds normal. No respiratory distress.   Abdominal: Soft. Bowel sounds are normal. She exhibits no distension.   Musculoskeletal: Normal range of motion. She exhibits no edema or tenderness.   Neurological: She is alert and oriented to person, place, and time. No cranial nerve deficit or sensory deficit. Coordination normal.   Skin: Skin is warm and dry. Capillary refill takes less than 2 seconds. No erythema.   Psychiatric: She has a normal mood and affect. Her behavior is normal. Judgment and thought content normal.       Significant Labs:   CBC:   Recent Labs   Lab 05/05/20 2017 05/06/20  0320   WBC 4.97 4.68   HGB 11.7* 11.1*   HCT 36.6* 34.1*    187     CMP:   Recent Labs   Lab 05/05/20 2017 05/06/20  0320    140   K 3.8 4.0    104   CO2 26 27   * 96   BUN 19 19   CREATININE 1.3 1.1   CALCIUM 9.2 9.1   PROT 7.2 6.8   ALBUMIN 3.7 3.5   BILITOT 0.2 0.2   ALKPHOS 45* 43*   AST 16 15   ALT 11 11   ANIONGAP 10 9   EGFRNONAA 41* 50*     Magnesium:   Recent Labs   Lab 05/06/20  0320   MG 2.1     TSH:   Recent Labs   Lab 03/11/20  1122   TSH 2.708       Significant Imaging:   Carotid ultrasound:No evidence of a hemodynamically significant carotid bifurcation stenosis.

## 2020-05-06 NOTE — ASSESSMENT & PLAN NOTE
Patient with episode of LOC-Pt remained alert with eyes open though not cognizant of episode.  No precipitating factors.  EKG nonischemic, carotid ultrasound without acute abnormality.  Neurology has evaluated.  EEG and echo pending.  Per Neurology, Pt appropriate for discharge if those results are normal.  Can follow-up outpatient with Neurology for ongoing workup given Hx of dizzy spells.

## 2020-05-06 NOTE — H&P
"[facility] Medicine  History & Physical    Patient Name: Tyra Riley  MRN: [id number]  Admission Date: 5/5/2020  Attending Physician: Sheri Vásquez MD   Primary Care Provider: Nadia Sanderson MD         Patient information was obtained from patient, past medical records and ER records.     Subjective:     Principal Problem:Syncope    Chief Complaint:   Chief Complaint   Patient presents with    Loss of Consciousness     states she was visiting a friend and passed out in the middle of a conversation; denies discomfort        HPI: Tyra Riley is a 75-year-old female with a past medical history of hyperlipidemia, allergies, and breast cancer post bilateral mastectomy in 2007 who presents to the emergency room tonight with reports of syncopal episode.  Patient states that earlier today she was sitting outside, under a shaded area talking to her friend when she blacked out.  Patient states she remembers waking up and her friend was asking her if she was okay while on the phone with EMS.  Patient is unsure of the duration of her loss of consciousness.  Patient denies falling, no trauma to the head reported. Per ER MD note, "EMS reports that her friend noted that her eyes were wide open and she was shaking." No history of seizure reported. Patient endorses 3 episodes of diarrhea this morning, prompting her to take Lomotil, which relieved her symptoms. Patient also endorses drinking one michael today for Moni Chirinos. Dose endorse increased alcohol intake since being under quarantine, however not a daily habit. Patient endorses "dizzy spells" over the last 3 months, that are usually relieved with "laying down on bed." Patient endorses on Sunday she developed lightheadedness after taking a shower, that was relieved with laying down as well. Patient states she usually walks her neighborhood 2-3 times per week, but hasn't "felt like it" over the past two weeks. Patient was taking " "vitamin B12 supplementation but however discontinued medication approximately one month ago due to an intermittent developing rash to the neck area. The patient states cessation of B12 has not affected the appearance/disappearance of the rash. Patient endorses a significant history of intermittent expressive aphasia that has progressed over the last 20 years, states she has been worked up by several neurologists with no definite diagnosis. She states her PCP ordered an MRI/MRA? In which she had done approximately 1 month ago. In the ER initial workup negative for acute process - patient placed in observation on 5/5/2020 at approximately 2130.   Patient states she lives at home by herself, denies the use of supplemental oxygen or ambulatory assist devices.      Past Medical History:   Diagnosis Date    Abnormal Pap smear     colpo then hyst    Allergy     Breast cancer 1997, 2000    Colon polyp     Fever blister     GERD (gastroesophageal reflux disease)     Hyperlipidemia     Hyperthyroidism     Kidney cysts     Osteoporosis, unspecified     Seasonal allergies        Past Surgical History:   Procedure Laterality Date    BLADDER REPAIR      BREAST RECONSTRUCTION      CATARACT EXTRACTION      COLONOSCOPY N/A 1/4/2016    Procedure: COLONOSCOPY;  Surgeon: Kieran Krishnamurthy MD;  Location: [address]);  Service: Endoscopy;  Laterality: N/A;    COLONOSCOPY W/ POLYPECTOMY      HYSTERECTOMY  1994    JODI, ovaries remain ("pain")    LASIK      MASTECTOMY  2000    Bilateral    OOPHORECTOMY  1994    PARTIAL NEPHRECTOMY      cyst from left kidney    Toes surgery      Hammer toe surgery    TONSILLECTOMY         Review of patient's allergies indicates:   Allergen Reactions    Darvocet a500 [propoxyphene n-acetaminophen]     Pcn [penicillins]     Sulfa (sulfonamide antibiotics)        No current facility-administered medications on file prior to encounter.      Current Outpatient Medications on File " Prior to Encounter   Medication Sig    aspirin (ECOTRIN) 81 MG EC tablet Take 81 mg by mouth once daily.    ergocalciferol (ERGOCALCIFEROL) 50,000 unit Cap Take 1 capsule (50,000 Units total) by mouth every 7 days.    garlic 5,000 mcg Tab Take 1 capsule by mouth every evening.    L GASSERI/B BIFIDUM/B LONGUM (Deb[employer]ESS [facility] ORAL) Take by mouth every evening.    loratadine/pseudoephedrine (ALAVERT D-12 ALLERGY-SINUS ORAL) Take 1 tablet by mouth once daily.    potassium gluconate 550 mg (90 mg) Tab Take 1 tablet by mouth every morning.    rosuvastatin (CRESTOR) 20 MG tablet Take 1 tablet (20 mg total) by mouth once daily.    UBIDECARENONE (CO Q-10 ORAL) Take by mouth.    vit C/E/Zn/coppr/lutein/zeaxan (PRESERVISION AREDS-2 ORAL) Take 1 capsule by mouth 2 (two) times daily.    VITAMIN B COMPLEX ORAL Take by mouth once daily.    fluocinonide 0.05% (LIDEX) 0.05 % cream Apply topically 2 (two) times daily. (Patient not taking: Reported on 3/16/2020)    [DISCONTINUED] cetirizine (ZYRTEC) 10 MG tablet Take 1 tablet (10 mg total) by mouth once daily. (Patient not taking: Reported on 3/16/2020)     Family History     Problem Relation (Age of Onset)    Breast cancer Mother, Sister, Other, Cousin, Cousin    Cancer Father, Maternal Aunt, Paternal Aunt    Celiac disease Cousin    Colon cancer Maternal Aunt, Paternal Aunt    Heart failure Paternal Aunt, Paternal Uncle, Paternal Grandmother    Osteoporosis Sister        Tobacco Use    Smoking status: Never Smoker    Smokeless tobacco: Never Used   Substance and Sexual Activity    Alcohol use: Yes     Alcohol/week: 1.0 standard drinks     Types: 1 Glasses of wine per week     Comment: Rare    Drug use: No    Sexual activity: Never     Review of Systems   Constitutional: Positive for activity change and fatigue. Negative for appetite change, chills and fever.   HENT: Negative for congestion, sinus pressure, sinus pain and sore throat.    Eyes: Negative for  photophobia and visual disturbance.   Respiratory: Negative for cough, choking, chest tightness, shortness of breath and wheezing.    Cardiovascular: Negative for chest pain, palpitations and leg swelling.   Gastrointestinal: Positive for nausea. Negative for abdominal distention, abdominal pain, blood in stool, constipation, diarrhea and vomiting.   Genitourinary: Negative for difficulty urinating, dysuria, flank pain and hematuria.   Musculoskeletal: Negative for back pain, gait problem and joint swelling.   Skin: Negative for rash and wound.   Neurological: Positive for dizziness, syncope and light-headedness. Negative for weakness, numbness and headaches.   Psychiatric/Behavioral: Positive for sleep disturbance. Negative for confusion. The patient is not nervous/anxious.      Objective:     Vital Signs (Most Recent):  Temp: 98.2 °F (36.8 °C) (05/05/20 1913)  Pulse: 71 (05/05/20 2004)  Resp: 16 (05/05/20 1913)  BP: (!) 169/76 (05/05/20 2004)  SpO2: 100 % (05/05/20 2004) Vital Signs (24h Range):  Temp:  [98.2 °F (36.8 °C)] 98.2 °F (36.8 °C)  Pulse:  [71-80] 71  Resp:  [16] 16  SpO2:  [98 %-100 %] 100 %  BP: (151-170)/(70-77) 169/76     Weight: 53.1 kg (117 lb)  Body mass index is 22.85 kg/m².    Physical Exam   Constitutional: She is oriented to person, place, and time. She appears well-developed and well-nourished. No distress.   HENT:   Head: Normocephalic and atraumatic.   Eyeglasses     Eyes: Pupils are equal, round, and reactive to light. EOM are normal. Right eye exhibits no discharge. Left eye exhibits no discharge.   Neck: Normal range of motion. No JVD present.   Cardiovascular: Normal rate, regular rhythm, normal heart sounds and intact distal pulses.   No murmur heard.  Pulmonary/Chest: Effort normal and breath sounds normal. No respiratory distress. She has no wheezes. She has no rales. She exhibits no tenderness.   On Room Air   Abdominal: Soft. Bowel sounds are normal. She exhibits no distension.  There is no tenderness. There is no rebound and no guarding.   Genitourinary:   Genitourinary Comments: Exam Deferred      Musculoskeletal: Normal range of motion. She exhibits no edema, tenderness or deformity.   Neurological: She is alert and oriented to person, place, and time. No cranial nerve deficit or sensory deficit.   Skin: Skin is warm and dry. Capillary refill takes 2 to 3 seconds. No rash noted. She is not diaphoretic. No erythema.   Scarring from bilateral mastectomy, well healed.     Psychiatric: She has a normal mood and affect. Her behavior is normal. Judgment and thought content normal.   Nursing note and vitals reviewed.        CRANIAL NERVES     CN III, IV, VI   Pupils are equal, round, and reactive to light.  Extraocular motions are normal.        Significant Labs:   CBC:   Recent Labs   Lab 05/05/20 2017   WBC 4.97   HGB 11.7*   HCT 36.6*        CMP:   Recent Labs   Lab 05/05/20 2017      K 3.8      CO2 26   *   BUN 19   CREATININE 1.3   CALCIUM 9.2   PROT 7.2   ALBUMIN 3.7   BILITOT 0.2   ALKPHOS 45*   AST 16   ALT 11   ANIONGAP 10   EGFRNONAA 41*     Recent Labs   Lab 05/05/20 2017   TROPONINI <0.006       Significant Imaging: I have reviewed all pertinent imaging results/findings within the past 24 hours.   Chest Xray:  Impression:       No acute radiographic abnormality.     CT Head without Contrast:  Impression:       No acute intracranial process.     EKG performed on 5/5/2020, impression as below:  Normal sinus rhythm  Moderate voltage criteria for LVH, may be normal variant  Borderline Abnormal ECG  When compared with ECG of 28-APR-2014 13:35,  No significant change was found    Assessment/Plan:     * Syncope  Patient with syncopal episode - troponin negative, EKG not indicative of acute ischemia, patient denies SOB/CP upon admission. No obvious neurological deficit noted on initial physical exam - patient endorses intermittent dizziness x3 months. CT head  WNL in ER. Electrolytes WNL. Patient had MRI/MRA brain done on 3/18/2020, no acute abnormalities noted upon review of radiologist report. Neurochecks q 4 hours. Will pursue further metabolic workup with Vitamin B12, B6 level in the setting of patient discontinuing her B12 supplementation one month ago. Seizure precautions. Neurology consult.        KRIS (acute kidney injury)  Mild KRIS noted with decrease in GFR, reserved creatinine. Will give light IVF hydration. Trend renal function with CMP.       Non-seasonal allergic rhinitis  Chronic, controlled. Will continue home medication.        Macrocytic anemia with vitamin B12 deficiency  Monitor and transfuse if patient becomes hemodynamically unstable or H/H < 7/21. No evidence of active or acute bleeding noted on initial physical exam. Will trend daily CBC. Vit B12 level pending.  Lab Results   Component Value Date    [id number] 1167 (H) 02/17/2020             Hypercholesteremia  Chronic, uncontrolled. Will continue home medication.    Lab Results   Component Value Date    CHOL 270 (H) 02/17/2020    CHOL 225 (H) 05/20/2014     Lab Results   Component Value Date    HDL 62 02/17/2020    HDL 58 05/20/2014     Lab Results   Component Value Date    LDLCALC 188.2 (H) 02/17/2020    LDLCALC 153.2 05/20/2014     Lab Results   Component Value Date    TRIG 99 02/17/2020    TRIG 69 05/20/2014     Lab Results   Component Value Date    CHOLHDL 23.0 02/17/2020    CHOLHDL 25.8 05/20/2014                 VTE Risk Mitigation (From admission, onward)         Ordered     IP VTE HIGH RISK PATIENT  Once      05/05/20 2242     Place sequential compression device  Until discontinued      05/05/20 2242     Place AMAN hose  Until discontinued      05/05/20 2242               Time spent seeing patient( greater than 1/2 spent in direct contact) : 65 minutes     Stephanie Wang NP  Department of Hospital Medicine   [facility]

## 2020-05-06 NOTE — PLAN OF CARE
Hospital follow up scheduled with PCP. AVS updated.      05/06/20 8796   Discharge Assessment   Assessment Type Discharge Planning Reassessment

## 2020-05-06 NOTE — PLAN OF CARE
Patient admitted to room 206 for syncope like episode. No co of sob,patient alert and oriented, states she has not taken any meds in the last 2 weeks since she just got tired of taking meds, tele on and functioning, iv intact, able to make needs known, minimal assist with transfers, safety and neuro intact

## 2020-05-06 NOTE — ASSESSMENT & PLAN NOTE
Chronic, uncontrolled. Will continue home medication.    Lab Results   Component Value Date    CHOL 199 05/06/2020    CHOL 270 (H) 02/17/2020    CHOL 225 (H) 05/20/2014     Lab Results   Component Value Date    HDL 46 05/06/2020    HDL 62 02/17/2020    HDL 58 05/20/2014     Lab Results   Component Value Date    LDLCALC 114.8 05/06/2020    LDLCALC 188.2 (H) 02/17/2020    LDLCALC 153.2 05/20/2014     Lab Results   Component Value Date    TRIG 191 (H) 05/06/2020    TRIG 99 02/17/2020    TRIG 69 05/20/2014     Lab Results   Component Value Date    CHOLHDL 23.1 05/06/2020    CHOLHDL 23.0 02/17/2020    CHOLHDL 25.8 05/20/2014

## 2020-05-06 NOTE — ASSESSMENT & PLAN NOTE
Patient with syncopal episode - troponin negative, EKG not indicative of acute ischemia, patient denies SOB/CP upon admission. No obvious neurological deficit noted on initial physical exam - patient endorses intermittent dizziness x3 months. CT head WNL in ER. Electrolytes WNL. Patient had MRI/MRA brain done on 3/18/2020, no acute abnormalities noted upon review of radiologist report. Neurochecks q 4 hours. Will pursue further metabolic workup with Vitamin B12, B6 level in the setting of patient discontinuing her B12 supplementation one month ago. Seizure precautions. Neurology consult.

## 2020-05-06 NOTE — ED PROVIDER NOTES
Encounter Date: 5/5/2020    SCRIBE #1 NOTE: I, Krysta Ocasio, am scribing for, and in the presence of, Ty Fisher MD.       History     Chief Complaint   Patient presents with    Loss of Consciousness     states she was visiting a friend and passed out in the middle of a conversation; denies discomfort       Time seen by provider: 7:36 PM on 05/05/2020    Tyra Riley is a 75 y.o. Female who presents to the ED via EMS with an onset of dizziness and loss of consciousness. She states that she was sitting outside talking to her friend and passed out mid conversation. EMS reports that her friend noted that her eyes were wide open and she was shaking. She states that she feels okay now other than some residual dizziness. She voices that boughs of dizziness have happened x 3 times in the last 6 months, which she reports having to sit down or lie down in order to get her bearings again. She notes speaking to her PCP about these episodes but has not followed up for full work-up. She endorses having some diarrhea this morning that has since resolved. She denies nausea, vomiting, chest pain, shortness of breath, heart palpitations, abdominal pain, headache, visual disturbances, ear ringing, hx of heart disease, or any other related symptoms at this time. She endorses having an extensive family hx of heart disease. Patients record shows hx of breast cancer with bilateral mastectomy. PMHx includes GERD, osteoporosis, hyperthyroidism, hyperlipidemia, kidney cysts. PSHx tonsillectomy,partial nephrectomy, hysterectomy, oophorectomy, bladder repair.     The history is provided by the patient.     Review of patient's allergies indicates:   Allergen Reactions    Darvocet a500 [propoxyphene n-acetaminophen]     Pcn [penicillins]     Sulfa (sulfonamide antibiotics)      Past Medical History:   Diagnosis Date    Abnormal Pap smear     colpo then hyst    Allergy     Breast cancer 1997, 2000    Colon polyp     Fever  "blister     GERD (gastroesophageal reflux disease)     Hyperlipidemia     Hyperthyroidism     Kidney cysts     Osteoporosis, unspecified     Seasonal allergies      Past Surgical History:   Procedure Laterality Date    BLADDER REPAIR      BREAST RECONSTRUCTION      CATARACT EXTRACTION      COLONOSCOPY N/A 2016    Procedure: COLONOSCOPY;  Surgeon: Kieran Krishnamurthy MD;  Location: [facility] ([address] [place] [address]);  Service: Endoscopy;  Laterality: N/A;    COLONOSCOPY W/ POLYPECTOMY      HYSTERECTOMY      JODI, ovaries remain ("pain")    LASIK      MASTECTOMY      Bilateral    OOPHORECTOMY      PARTIAL NEPHRECTOMY      cyst from left kidney    Toes surgery      Hammer toe surgery    TONSILLECTOMY       Family History   Problem Relation Age of Onset    Breast cancer Mother     Cancer Father         Lung Cancer    Osteoporosis Sister     Breast cancer Sister     Colon cancer Maternal Aunt     Cancer Maternal Aunt         colon    Colon cancer Paternal Aunt     Heart failure Paternal Aunt     Cancer Paternal Aunt         colon    Breast cancer Other     Breast cancer Cousin     Celiac disease Cousin     Breast cancer Cousin     Heart failure Paternal Uncle     Heart failure Paternal Grandmother     Diabetes Neg Hx     Eclampsia Neg Hx     Hypertension Neg Hx     Miscarriages / Stillbirths Neg Hx     Ovarian cancer Neg Hx      labor Neg Hx     Stroke Neg Hx     Melanoma Neg Hx     Cirrhosis Neg Hx     Crohn's disease Neg Hx     Ulcerative colitis Neg Hx     Stomach cancer Neg Hx     Rectal cancer Neg Hx     Liver cancer Neg Hx     Esophageal cancer Neg Hx     Irritable bowel syndrome Neg Hx      Social History     Tobacco Use    Smoking status: Never Smoker    Smokeless tobacco: Never Used   Substance Use Topics    Alcohol use: Yes     Alcohol/week: 1.0 standard drinks     Types: 1 Glasses of wine per week     Comment: Rare    Drug use: No     Review of Systems "   Constitutional: Negative for activity change, appetite change, chills, fatigue and fever.   HENT: Negative for ear pain.    Eyes: Negative for visual disturbance.   Respiratory: Negative for apnea and shortness of breath.    Cardiovascular: Negative for chest pain and palpitations.   Gastrointestinal: Positive for diarrhea. Negative for abdominal distention, abdominal pain, nausea and vomiting.   Genitourinary: Negative for difficulty urinating.   Musculoskeletal: Negative for neck pain.   Skin: Negative for pallor and rash.   Neurological: Positive for dizziness and syncope. Negative for headaches.   Hematological: Does not bruise/bleed easily.   Psychiatric/Behavioral: Negative for agitation.       Physical Exam     Initial Vitals [05/05/20 1913]   BP Pulse Resp Temp SpO2   (!) 151/70 80 16 98.2 °F (36.8 °C) 98 %      MAP       --         Physical Exam    Nursing note and vitals reviewed.  Constitutional: She appears well-developed and well-nourished.  Non-toxic appearance.   Nontoxic, well appearing female.    HENT:   Head: Normocephalic and atraumatic.   Eyes: EOM are normal. Pupils are equal, round, and reactive to light.   Neck: Trachea normal and normal range of motion. Neck supple.   Cardiovascular: Normal rate, regular rhythm, normal heart sounds and intact distal pulses. Exam reveals no gallop and no friction rub.    No murmur heard.  Pulmonary/Chest: Effort normal and breath sounds normal. No respiratory distress. She has no decreased breath sounds. She has no wheezes. She has no rhonchi. She has no rales.   Abdominal: Soft. Bowel sounds are normal. She exhibits no distension. There is no tenderness.   Musculoskeletal: Normal range of motion.   Neurological: She is alert and oriented to person, place, and time. She has normal strength. No cranial nerve deficit or sensory deficit. She displays a negative Romberg sign. GCS eye subscore is 4. GCS verbal subscore is 5. GCS motor subscore is 6.   Cranial  nerves II through XII grossly intact. 5/5 motor strength to all 4 extremities. Sensation is intact. Speech and cognition is normal. No focal neurologic deficit.   Skin: Skin is warm and dry.   Psychiatric: She has a normal mood and affect.         ED Course   Procedures  Labs Reviewed   CBC W/ AUTO DIFFERENTIAL - Abnormal; Notable for the following components:       Result Value    RBC 3.68 (*)     Hemoglobin 11.7 (*)     Hematocrit 36.6 (*)     Mean Corpuscular Volume 100 (*)     Mean Corpuscular Hemoglobin 31.8 (*)     All other components within normal limits   COMPREHENSIVE METABOLIC PANEL - Abnormal; Notable for the following components:    Glucose 111 (*)     Alkaline Phosphatase 45 (*)     eGFR if  47 (*)     eGFR if non  41 (*)     All other components within normal limits   TROPONIN I   SARS-COV-2 RNA AMPLIFICATION, QUAL   TROPONIN I          Imaging Results          CT Head Without Contrast (Final result)  Result time 05/05/20 20:19:52    Final result by Gaudencio Simpson MD (05/05/20 20:19:52)                 Impression:      No acute intracranial process.      Electronically signed by: Gaudencio Simpson  Date:    05/05/2020  Time:    20:19             Narrative:    EXAMINATION:  CT HEAD WITHOUT CONTRAST    CLINICAL HISTORY:  Confusion/delirium, altered LOC, unexplained;syncope, hx of breast cancer;    TECHNIQUE:  Low dose axial CT images obtained throughout the head without intravenous contrast. Sagittal and coronal reconstructions were performed.    COMPARISON:  None.    FINDINGS:  Metallic earrings are not removed.  Mild artifact.    Intracranial compartment:    Ventricles and sulci are normal in size for age without evidence of hydrocephalus. No extra-axial blood or fluid collections.    The brain parenchyma appears normal. No parenchymal mass, hemorrhage, edema or major vascular distribution infarct.    Skull/extracranial contents (limited evaluation): No fracture. Mastoid  air cells and paranasal sinuses are essentially clear.                               X-Ray Chest AP Portable (Final result)  Result time 05/05/20 21:01:57    Final result by Gaudencio Simpson MD (05/05/20 21:01:57)                 Impression:      No acute radiographic abnormality.      Electronically signed by: Gaudencio Simpson  Date:    05/05/2020  Time:    21:01             Narrative:    EXAMINATION:  XR CHEST AP PORTABLE    CLINICAL HISTORY:  Chest Pain;    TECHNIQUE:  Single frontal view of the chest was performed.    COMPARISON:  03/22/2016    FINDINGS:  The lungs are clear, with normal appearance of pulmonary vasculature and no pleural effusion or pneumothorax.    The cardiac silhouette is normal in size. The hilar and mediastinal contours are unremarkable.    Bones are intact.    Bilateral overlying surgical clips.  No significant change.                                 Medical Decision Making:   History:   Old Medical Records: I decided to obtain old medical records.  Independently Interpreted Test(s):   I have ordered and independently interpreted EKG Reading(s) - see prior notes  Clinical Tests:   Lab Tests: Ordered and Reviewed  Radiological Study: Ordered and Reviewed  Medical Tests: Ordered and Reviewed            Scribe Attestation:   Scribe #1: I performed the above scribed service and the documentation accurately describes the services I performed. I attest to the accuracy of the note.    I, Dr. Ty Fisher personally performed the services described in this documentation. All medical record entries made by the scribe were at my direction and in my presence.  I have reviewed the chart and agree that the record reflects my personal performance and is accurate and complete. Ty Fisher MD.  2:04 AM 05/07/2020    DISCLAIMER: This note was prepared with Dragon NaturallySpeaking voice recognition transcription software. Garbled syntax, mangled pronouns, and other bizarre constructions may be  attributed to that software system         ED Course as of May 05 2141   Tue May 05, 2020   1944 I spoke with pt sister who states over several second she occasionally gets mild aphasia.  She has no hx of strokes . She has had MRIs by neurology and are negative.  Today pt stopped talking, had a blank stare, started shaking, and then fell over.  Yesterday the patient was quite dizzy and almost passed out.  Anticipate admission for syncope.  Pt sister, Ms. Riley [phone].     [JS]   2026 Rate 67 normal sinus rhythm normal axis normal intervals no ST segment elevation or depression mild LVH    [JS]   2037 Pt's friend said they were talking  under a tree outside.  She got quiet, not talking, started shaking, eyes open, became unresponsive.  The whole episode was  5 minutes.  She was slightly post ictal     [JS]   2046 Chest x-ray shows mild cardiomegaly but no focal infiltrate pneumothorax.  She has flat diaphragms.  No signs of acute cardiopulmonary process.  X-ray independently interpreted by me.    [JS]   2138 Given syncopal episode with questionable postictal state I think the patient needs to be worked up for potential seizure versus cardiovascular component to syncope.  Thankfully CT head negative but she does have history of breast cancer.  She may need further imaging with an MRI.    [JS]      ED Course User Index  [JS] Ty Fisher MD                Clinical Impression:       ICD-10-CM ICD-9-CM   1. Loss of consciousness R40.20 780.09   2. Chest pain R07.9 786.50   3. Syncope R55 780.2         Disposition:   Disposition: Admitted  Condition: Stable     ED Disposition Condition    Observation                           Ty Fisher MD  05/07/20 0204

## 2020-05-06 NOTE — PROGRESS NOTES
"[facility] Hospital Medicine  Progress Note    Patient Name: Tyra Riley  MRN: [id number]  Patient Class: OP- Observation   Admission Date: 5/5/2020  Length of Stay: 0 days  Attending Physician: Sheri Vásquez MD  Primary Care Provider: Nadia Sanderson MD        Subjective:     Principal Problem:Loss of consciousness        HPI:  Tyra Riley is a 75-year-old female with a past medical history of hyperlipidemia, allergies, and breast cancer post bilateral mastectomy in 2007 who presents to the emergency room tonight with reports of syncopal episode.  Patient states that earlier today she was sitting outside, under a shaded area talking to her friend when she blacked out.  Patient states she remembers waking up and her friend was asking her if she was okay while on the phone with EMS.  Patient is unsure of the duration of her loss of consciousness.  Patient denies falling, no trauma to the head reported. Per ER MD note, "EMS reports that her friend noted that her eyes were wide open and she was shaking." No history of seizure reported. Patient endorses 3 episodes of diarrhea this morning, prompting her to take Lomotil, which relieved her symptoms. Patient also endorses drinking one michael today for Moni Chirinos. Dose endorse increased alcohol intake since being under quarantine, however not a daily habit. Patient endorses "dizzy spells" over the last 3 months, that are usually relieved with "laying down on bed." Patient endorses on Sunday she developed lightheadedness after taking a shower, that was relieved with laying down as well. Patient states she usually walks her neighborhood 2-3 times per week, but hasn't "felt like it" over the past two weeks. Patient was taking vitamin B12 supplementation but however discontinued medication approximately one month ago due to an intermittent developing rash to the neck area. The patient states cessation of B12 has not affected the " "appearance/disappearance of the rash. Patient endorses a significant history of intermittent expressive aphasia that has progressed over the last 20 years, states she has been worked up by several neurologists with no definite diagnosis. She states her PCP ordered an MRI/MRA? In which she had done approximately 1 month ago. In the ER initial workup negative for acute process - patient placed in observation on 5/5/2020 at approximately 2130.   Patient states she lives at home by herself, denies the use of supplemental oxygen or ambulatory assist devices.      Overview/Hospital Course:  No notes on file    Interval History:  Pt seen and examined.  Denies any further "black outs".  No dizziness, lightheadedness, HA, visual changes, numbness, tingling, or weakness.  No chest pain or SOB.  Pt denies any precipitating features prior to event.  She denies any leg swelling.  She denies any abdominal pain, but does endorse some nausea that improved with antiemetics.  Evaluation by neurology in progress.    Review of Systems   Constitutional: Negative for chills, fatigue and fever.   HENT: Negative for trouble swallowing.    Eyes: Negative for visual disturbance.   Respiratory: Negative for cough and shortness of breath.    Cardiovascular: Negative for chest pain and leg swelling.   Gastrointestinal: Positive for nausea. Negative for abdominal pain and vomiting.   Genitourinary: Negative for difficulty urinating.   Musculoskeletal: Negative for arthralgias.   Neurological: Negative for dizziness, seizures, syncope, weakness, light-headedness and headaches.   Psychiatric/Behavioral: Negative for confusion. The patient is not nervous/anxious.      Objective:     Vital Signs (Most Recent):  Temp: 98.3 °F (36.8 °C) (05/06/20 1451)  Pulse: 72 (05/06/20 1513)  Resp: 18 (05/06/20 1451)  BP: (!) 149/69 (05/06/20 1513)  SpO2: 97 % (05/06/20 1451) Vital Signs (24h Range):  Temp:  [96.8 °F (36 °C)-98.3 °F (36.8 °C)] 98.3 °F (36.8 " °C)  Pulse:  [67-80] 72  Resp:  [14-18] 18  SpO2:  [97 %-100 %] 97 %  BP: (139-188)/(64-81) 149/69     Weight: 53.1 kg (117 lb)  Body mass index is 22.85 kg/m².    Intake/Output Summary (Last 24 hours) at 5/6/2020 1635  Last data filed at 5/6/2020 1200  Gross per 24 hour   Intake 360 ml   Output --   Net 360 ml      Physical Exam   Constitutional: She is oriented to person, place, and time. She appears well-developed and well-nourished. No distress.   HENT:   Head: Normocephalic and atraumatic.   Eyes: Pupils are equal, round, and reactive to light. Conjunctivae and EOM are normal.   Neck: Normal range of motion. Neck supple. No thyromegaly present.   Cardiovascular: Normal rate, regular rhythm, normal heart sounds and intact distal pulses.   No murmur heard.  Pulmonary/Chest: Effort normal and breath sounds normal. No respiratory distress.   Abdominal: Soft. Bowel sounds are normal. She exhibits no distension.   Musculoskeletal: Normal range of motion. She exhibits no edema or tenderness.   Neurological: She is alert and oriented to person, place, and time. No cranial nerve deficit or sensory deficit. Coordination normal.   Skin: Skin is warm and dry. Capillary refill takes less than 2 seconds. No erythema.   Psychiatric: She has a normal mood and affect. Her behavior is normal. Judgment and thought content normal.       Significant Labs:   CBC:   Recent Labs   Lab 05/05/20 2017 05/06/20  0320   WBC 4.97 4.68   HGB 11.7* 11.1*   HCT 36.6* 34.1*    187     CMP:   Recent Labs   Lab 05/05/20 2017 05/06/20  0320    140   K 3.8 4.0    104   CO2 26 27   * 96   BUN 19 19   CREATININE 1.3 1.1   CALCIUM 9.2 9.1   PROT 7.2 6.8   ALBUMIN 3.7 3.5   BILITOT 0.2 0.2   ALKPHOS 45* 43*   AST 16 15   ALT 11 11   ANIONGAP 10 9   EGFRNONAA 41* 50*     Magnesium:   Recent Labs   Lab 05/06/20  0320   MG 2.1     TSH:   Recent Labs   Lab 03/11/20  1122   TSH 2.708       Significant Imaging:   Carotid  ultrasound:No evidence of a hemodynamically significant carotid bifurcation stenosis.      Assessment/Plan:      * Loss of consciousness  Patient with episode of LOC-Pt remained alert with eyes open though not cognizant of episode.  No precipitating factors.  EKG nonischemic, carotid ultrasound without acute abnormality.  Neurology has evaluated.  EEG and echo pending.  Per Neurology, Pt appropriate for discharge if those results are normal.  Can follow-up outpatient with Neurology for ongoing workup given Hx of dizzy spells.    KRIS (acute kidney injury)  Mild KRIS noted with decrease in GFR, reserved creatinine.  This is improving.  Will give light IVF hydration. Trend renal function with CMP.       Non-seasonal allergic rhinitis  Chronic, controlled. Will continue home medication.        Macrocytic anemia with vitamin B12 deficiency  Monitor and transfuse if patient becomes hemodynamically unstable or H/H < 7/21. No evidence of active or acute bleeding noted on initial physical exam. Will trend daily CBC.   Lab Results   Component Value Date    [id number] 1116 (H) 05/06/2020             Hypercholesteremia  Chronic, uncontrolled. Will continue home medication.    Lab Results   Component Value Date    CHOL 199 05/06/2020    CHOL 270 (H) 02/17/2020    CHOL 225 (H) 05/20/2014     Lab Results   Component Value Date    HDL 46 05/06/2020    HDL 62 02/17/2020    HDL 58 05/20/2014     Lab Results   Component Value Date    LDLCALC 114.8 05/06/2020    LDLCALC 188.2 (H) 02/17/2020    LDLCALC 153.2 05/20/2014     Lab Results   Component Value Date    TRIG 191 (H) 05/06/2020    TRIG 99 02/17/2020    TRIG 69 05/20/2014     Lab Results   Component Value Date    CHOLHDL 23.1 05/06/2020    CHOLHDL 23.0 02/17/2020    CHOLHDL 25.8 05/20/2014                 VTE Risk Mitigation (From admission, onward)         Ordered     IP VTE HIGH RISK PATIENT  Once      05/05/20 2242                Discussed with neurology.  Per Neurology, Pt  appropriate for discharge if TTE an EEG negative.  Can follow-up with neurology as an outpatient.  Terese Schmidt NP  Department of Hospital Medicine   [facility]

## 2020-05-06 NOTE — CONSULTS
"[facility]  Neurology  Consult Note    Patient Name: Tyra Riley  MRN: [id number]  Admission Date: 5/5/2020  Hospital Length of Stay: 0 days  Code Status: Full Code   Attending Provider: Dr Vásquez  Consulting Provider: Dr Diaz  Primary Care Physician: Nadia Sanderson MD  Principal Problem:Syncope      Subjective:     Chief Complaint:  syncope    HPI from EMR: Tyra Riley is a 75-year-old female with a past medical history of hyperlipidemia, allergies, and breast cancer post bilateral mastectomy in 2007 [facility].  Patient states that earlier today she was sitting outside, under a shaded area talking to her friend when she blacked out.  Patient states she remembers waking up and her friend was asking her if she was okay while on the phone with EMS.  Patient is unsure of the duration of her loss of consciousness.  Patient denies falling, no trauma to the head reported. Per ER MD note, "EMS reports that her friend noted that her eyes were wide open and she was shaking." No history of seizure reported. Patient endorses 3 episodes of diarrhea this morning, prompting her to take Lomotil, which relieved her symptoms. Patient also endorses drinking one michael today for [facility]. Dose endorse increased alcohol intake since being under quarantine, however not a daily habit. Patient endorses "dizzy spells" over the last 3 months, that are usually relieved with "laying down on bed." Patient endorses on Sunday she developed lightheadedness after taking a shower, that was relieved with laying down as well. Patient states she usually walks her neighborhood 2-3 times per week, but hasn't "felt like it" over the past two weeks. Patient was taking vitamin B12 supplementation but however discontinued medication approximately one month ago due to an intermittent developing rash to the neck area. The patient states cessation of B12 " "has not affected the appearance/disappearance of the rash. Patient endorses a significant history of intermittent expressive aphasia that has progressed over the last 20 years, states she has been worked up by several neurologists with no definite diagnosis. She states her PCP ordered an MRI/MRA? In which she had done approximately 1 month ago. In the ER initial workup negative for acute process - patient placed in observation on 5/5/2020 at approximately 2130.   Patient states she lives at home by herself, denies the use of supplemental oxygen or ambulatory assist devices.     Neurological Consult: Pt reports that she was talking with her friend and lost consciousness. She denies that it has happened before. She reports that she had 3 episodes of dizziness in the past but never lost consciousness.  She does not know of any triggers. Pt denies pain and vision changes. H/O GERD, Breast Cancer, HLD, Hyperthyroid, kidney cysts, osteoporosis. Pt does take aspirin and crestor at home.  CT head showed no acute abnormality. MRI and MRA from March 2020 were unremarkable.  EEG ordered.     Past Medical History:   Diagnosis Date    Abnormal Pap smear     colpo then hyst    Allergy     Breast cancer 1997, 2000    Colon polyp     Fever blister     GERD (gastroesophageal reflux disease)     Hyperlipidemia     Hyperthyroidism     Kidney cysts     Osteoporosis, unspecified     Seasonal allergies        Past Surgical History:   Procedure Laterality Date    BLADDER REPAIR      BREAST RECONSTRUCTION      CATARACT EXTRACTION      COLONOSCOPY N/A 1/4/2016    Procedure: COLONOSCOPY;  Surgeon: Kieran Krishnamurthy MD;  [facility] ([address] [place] [address]);  Service: Endoscopy;  Laterality: N/A;    COLONOSCOPY W/ POLYPECTOMY      HYSTERECTOMY  1994    JODI, ovaries remain ("pain")    LASIK      MASTECTOMY  2000    Bilateral    OOPHORECTOMY  1994    PARTIAL NEPHRECTOMY      cyst from left kidney    Toes surgery      Hammer toe " surgery    TONSILLECTOMY         Review of patient's allergies indicates:   Allergen Reactions    Darvocet a500 [propoxyphene n-acetaminophen]     Pcn [penicillins]     Sulfa (sulfonamide antibiotics)        Current Neurological Medications: none    No current facility-administered medications on file prior to encounter.      Current Outpatient Medications on File Prior to Encounter   Medication Sig    aspirin (ECOTRIN) 81 MG EC tablet Take 81 mg by mouth once daily.    ergocalciferol (ERGOCALCIFEROL) 50,000 unit Cap Take 1 capsule (50,000 Units total) by mouth every 7 days.    garlic 5,000 mcg Tab Take 1 capsule by mouth every evening.    L GASSERI/B BIFIDUM/B LONGUM ([employer] HEALTH ORAL) Take by mouth every evening.    loratadine/pseudoephedrine (ALAVERT D-12 ALLERGY-SINUS ORAL) Take 1 tablet by mouth once daily.    potassium gluconate 550 mg (90 mg) Tab Take 1 tablet by mouth every morning.    rosuvastatin (CRESTOR) 20 MG tablet Take 1 tablet (20 mg total) by mouth once daily.    UBIDECARENONE (CO Q-10 ORAL) Take by mouth.    vit C/E/Zn/coppr/lutein/zeaxan (PRESERVISION AREDS-2 ORAL) Take 1 capsule by mouth 2 (two) times daily.    VITAMIN B COMPLEX ORAL Take by mouth once daily.    fluocinonide 0.05% (LIDEX) 0.05 % cream Apply topically 2 (two) times daily. (Patient not taking: Reported on 3/16/2020)      Family History     Problem Relation (Age of Onset)    Breast cancer Mother, Sister, Other, Cousin, Cousin    Cancer Father, Maternal Aunt, Paternal Aunt    Celiac disease Cousin    Colon cancer Maternal Aunt, Paternal Aunt    Heart failure Paternal Aunt, Paternal Uncle, Paternal Grandmother    Osteoporosis Sister        Tobacco Use    Smoking status: Never Smoker    Smokeless tobacco: Never Used   Substance and Sexual Activity    Alcohol use: Yes     Alcohol/week: 1.0 standard drinks     Types: 1 Glasses of wine per week     Comment: Rare    Drug use: No    Sexual activity: Never      Review of Systems   Constitutional: Negative.    HENT: Negative.    Eyes: Negative.    Respiratory: Negative.    Cardiovascular: Negative.    Gastrointestinal: Positive for diarrhea and nausea.   Genitourinary: Negative.    Musculoskeletal: Negative.    Skin: Negative.    Allergic/Immunologic: Negative.    Neurological: Positive for syncope.   Hematological: Negative.    Psychiatric/Behavioral: Negative.      Objective:     Vital Signs (Most Recent):  Temp: 97.4 °F (36.3 °C) (05/06/20 0825)  Pulse: 67 (05/06/20 0825)  Resp: 14 (05/06/20 0825)  BP: (!) 149/69 (05/06/20 0825)  SpO2: 99 % (05/06/20 0825) Vital Signs (24h Range):  Temp:  [96.8 °F (36 °C)-98.2 °F (36.8 °C)] 97.4 °F (36.3 °C)  Pulse:  [67-80] 67  Resp:  [14-18] 14  SpO2:  [98 %-100 %] 99 %  BP: (149-188)/(69-81) 149/69     Weight: 53.1 kg (117 lb 1 oz)  Body mass index is 22.86 kg/m².    Physical Exam   Constitutional: She is oriented to person, place, and time. She appears well-developed and well-nourished.   HENT:   Head: Normocephalic and atraumatic.   Eyes: Pupils are equal, round, and reactive to light. EOM are normal.   Neck: Normal range of motion. Neck supple.   Cardiovascular: Normal rate and normal heart sounds.   Pulmonary/Chest: Effort normal.   Abdominal: Soft. Bowel sounds are normal.   Musculoskeletal: Normal range of motion.   Neurological: She is alert and oriented to person, place, and time. She has normal strength. She has a normal Finger-Nose-Finger Test.   Skin: Skin is warm and dry.   Psychiatric: She has a normal mood and affect. Her speech is normal and behavior is normal.       NEUROLOGICAL EXAMINATION:     MENTAL STATUS   Oriented to person, place, and time.   Attention: normal. Concentration: normal.   Speech: speech is normal   Level of consciousness: alert  Able to name object. Able to repeat.     CRANIAL NERVES   Cranial nerves II through XII intact.     CN III, IV, VI   Pupils are equal, round, and reactive to  light.  Extraocular motions are normal.     MOTOR EXAM     Strength   Strength 5/5 throughout.     SENSORY EXAM   Light touch normal.     GAIT AND COORDINATION      Coordination   Finger to nose coordination: normal    Tremor   Resting tremor: absent      Significant Labs:   CMP  Sodium   Date Value Ref Range Status   05/06/2020 140 136 - 145 mmol/L Final     Potassium   Date Value Ref Range Status   05/06/2020 4.0 3.5 - 5.1 mmol/L Final     Chloride   Date Value Ref Range Status   05/06/2020 104 95 - 110 mmol/L Final     CO2   Date Value Ref Range Status   05/06/2020 27 23 - 29 mmol/L Final     Glucose   Date Value Ref Range Status   05/06/2020 96 70 - 110 mg/dL Final     BUN, Bld   Date Value Ref Range Status   05/06/2020 19 8 - 23 mg/dL Final     Creatinine   Date Value Ref Range Status   05/06/2020 1.1 0.5 - 1.4 mg/dL Final     Calcium   Date Value Ref Range Status   05/06/2020 9.1 8.7 - 10.5 mg/dL Final     Total Protein   Date Value Ref Range Status   05/06/2020 6.8 6.0 - 8.4 g/dL Final     Albumin   Date Value Ref Range Status   05/06/2020 3.5 3.5 - 5.2 g/dL Final     Total Bilirubin   Date Value Ref Range Status   05/06/2020 0.2 0.1 - 1.0 mg/dL Final     Comment:     For infants and newborns, interpretation of results should be based  on gestational age, weight and in agreement with clinical  observations.  Premature Infant recommended reference ranges:  Up to 24 hours.............<8.0 mg/dL  Up to 48 hours............<12.0 mg/dL  3-5 days..................<15.0 mg/dL  6-29 days.................<15.0 mg/dL       Alkaline Phosphatase   Date Value Ref Range Status   05/06/2020 43 (L) 55 - 135 U/L Final     AST   Date Value Ref Range Status   05/06/2020 15 10 - 40 U/L Final     ALT   Date Value Ref Range Status   05/06/2020 11 10 - 44 U/L Final     Anion Gap   Date Value Ref Range Status   05/06/2020 9 8 - 16 mmol/L Final     eGFR if    Date Value Ref Range Status   05/06/2020 58 (A) >60  mL/min/1.73 m^2 Final     eGFR if non    Date Value Ref Range Status   05/06/2020 50 (A) >60 mL/min/1.73 m^2 Final     Comment:     Calculation used to obtain the estimated glomerular filtration  rate (eGFR) is the CKD-EPI equation.        Lab Results   Component Value Date    WBC 4.68 05/06/2020    HGB 11.1 (L) 05/06/2020    HCT 34.1 (L) 05/06/2020    MCV 98 05/06/2020     05/06/2020     Lab Results   Component Value Date    LDLCALC 188.2 (H) 02/17/2020     Lab Results   Component Value Date    CHOL 270 (H) 02/17/2020    CHOL 225 (H) 05/20/2014     Lab Results   Component Value Date    HDL 62 02/17/2020    HDL 58 05/20/2014     Lab Results   Component Value Date    LDLCALC 188.2 (H) 02/17/2020    LDLCALC 153.2 05/20/2014     Lab Results   Component Value Date    TRIG 99 02/17/2020    TRIG 69 05/20/2014     Lab Results   Component Value Date    CHOLHDL 23.0 02/17/2020    CHOLHDL 25.8 05/20/2014     Lab Results   Component Value Date    HGBA1C 5.3 02/21/2018     Significant Imaging:   US Carotid Bilateral  Narrative: EXAMINATION:  US CAROTID BILATERAL    CLINICAL HISTORY:  syncope;    TECHNIQUE:  Grayscale and color Doppler ultrasound examination of the carotid and vertebral artery systems bilaterally.  Stenosis estimates are per the NASCET measurement criteria.    COMPARISON:  None.    FINDINGS:  Right:    Internal Carotid Artery (ICA) peak systolic velocity 69 cm/sec    ICA/CCA peak systolic velocity ratio: 1.4    Plaque formation: Homogeneous    Vertebral artery: Antegrade flow and normal waveform.    Left:    Internal Carotid Artery (ICA)  peak systolic velocity 52 cm/sec    ICA/CCA peak systolic velocity ratio: 1.0    Plaque formation: Homogeneous    Vertebral artery: Antegrade flow and normal waveform.  Impression: No evidence of a hemodynamically significant carotid bifurcation stenosis.    Electronically signed by: Gary Okeefe  Date:    05/06/2020  Time:    08:48  3/18/20    MRI  Brain  1. There is no acute or significant abnormality.  There is no hemorrhage, mass, mass effect, acute infarction or abnormal enhancement.  There is only minimal nonspecific white matter change.    MRA Brain:    1. Normal imaging of the intracranial arteries without critical stenosis, occlusion thrombosis, dissection or large aneurysm.  Assessment and Plan:    1. Syncope  -Rule out TIA/Seizure  -CT Head negative acute  -CUS negative, results above  -Echo ordered  -MRI noted above  -MRA noted above  EEG: ordered  -Fall/safety precautions  -Rec cardiology referral if neuro work up negative    HLD  -2/2020:CHOL 270  .2  -continue crestor/asprin  -repeat lipid panel ordered    KRIS  -GFR 58  -IM managing      Will order ECHO to evaluate heart.  Neuro workup unremarkable at this time, will continue workup.  If EEG is negative, we can continue workup outpatient.      Patient and/caregiver advised that he  should not to drive or operate heavy machinery until syncope free for at least 3 months. Also explained that patient is to avoid activities that could be high risk during syncope, including but not limited to swimming alone, climbing to high levels, cooking,  and bathing in a tub.       Patient to follow up with [facility] at [phone] within 2 weeks from discharge.      All questions were answered.          Active Diagnoses:    Diagnosis Date Noted POA    PRINCIPAL PROBLEM:  Syncope [R55] 05/05/2020 Yes    KRIS (acute kidney injury) [N17.9] 05/05/2020 Yes    Non-seasonal allergic rhinitis [J30.89] 03/16/2020 Yes    Macrocytic anemia with vitamin B12 deficiency [D51.9] 02/22/2013 Yes    Hypercholesteremia [E78.00] 08/02/2012 Yes      Problems Resolved During this Admission:       VTE Risk Mitigation (From admission, onward)         Ordered     IP VTE HIGH RISK PATIENT  Once      05/05/20 6844                Thank you for your consult. I will follow-up with patient. Please contact us if you  have any additional questions.    Minnie Courtney NP  Neurology  [facility]

## 2020-05-06 NOTE — SUBJECTIVE & OBJECTIVE
"Past Medical History:   Diagnosis Date    Abnormal Pap smear     colpo then hyst    Allergy     Breast cancer 1997, 2000    Colon polyp     Fever blister     GERD (gastroesophageal reflux disease)     Hyperlipidemia     Hyperthyroidism     Kidney cysts     Osteoporosis, unspecified     Seasonal allergies        Past Surgical History:   Procedure Laterality Date    BLADDER REPAIR      BREAST RECONSTRUCTION      CATARACT EXTRACTION      COLONOSCOPY N/A 1/4/2016    Procedure: COLONOSCOPY;  Surgeon: Kieran Krishnamurthy MD;  Location: [address]);  Service: Endoscopy;  Laterality: N/A;    COLONOSCOPY W/ POLYPECTOMY      HYSTERECTOMY  1994    JODI, ovaries remain ("pain")    LASIK      MASTECTOMY  2000    Bilateral    OOPHORECTOMY  1994    PARTIAL NEPHRECTOMY      cyst from left kidney    Toes surgery      Hammer toe surgery    TONSILLECTOMY         Review of patient's allergies indicates:   Allergen Reactions    Darvocet a500 [propoxyphene n-acetaminophen]     Pcn [penicillins]     Sulfa (sulfonamide antibiotics)        No current facility-administered medications on file prior to encounter.      Current Outpatient Medications on File Prior to Encounter   Medication Sig    aspirin (ECOTRIN) 81 MG EC tablet Take 81 mg by mouth once daily.    ergocalciferol (ERGOCALCIFEROL) 50,000 unit Cap Take 1 capsule (50,000 Units total) by mouth every 7 days.    garlic 5,000 mcg Tab Take 1 capsule by mouth every evening.    L GASSERI/B BIFIDUM/B LONGUM ([employer] COLON HEALTH ORAL) Take by mouth every evening.    loratadine/pseudoephedrine (ALAVERT D-12 ALLERGY-SINUS ORAL) Take 1 tablet by mouth once daily.    potassium gluconate 550 mg (90 mg) Tab Take 1 tablet by mouth every morning.    rosuvastatin (CRESTOR) 20 MG tablet Take 1 tablet (20 mg total) by mouth once daily.    UBIDECARENONE (CO Q-10 ORAL) Take by mouth.    vit C/E/Zn/coppr/lutein/zeaxan (PRESERVISION AREDS-2 ORAL) Take 1 capsule " by mouth 2 (two) times daily.    VITAMIN B COMPLEX ORAL Take by mouth once daily.    fluocinonide 0.05% (LIDEX) 0.05 % cream Apply topically 2 (two) times daily. (Patient not taking: Reported on 3/16/2020)    [DISCONTINUED] cetirizine (ZYRTEC) 10 MG tablet Take 1 tablet (10 mg total) by mouth once daily. (Patient not taking: Reported on 3/16/2020)     Family History     Problem Relation (Age of Onset)    Breast cancer Mother, Sister, Other, Cousin, Cousin    Cancer Father, Maternal Aunt, Paternal Aunt    Celiac disease Cousin    Colon cancer Maternal Aunt, Paternal Aunt    Heart failure Paternal Aunt, Paternal Uncle, Paternal Grandmother    Osteoporosis Sister        Tobacco Use    Smoking status: Never Smoker    Smokeless tobacco: Never Used   Substance and Sexual Activity    Alcohol use: Yes     Alcohol/week: 1.0 standard drinks     Types: 1 Glasses of wine per week     Comment: Rare    Drug use: No    Sexual activity: Never     Review of Systems   Constitutional: Positive for activity change and fatigue. Negative for appetite change, chills and fever.   HENT: Negative for congestion, sinus pressure, sinus pain and sore throat.    Eyes: Negative for photophobia and visual disturbance.   Respiratory: Negative for cough, choking, chest tightness, shortness of breath and wheezing.    Cardiovascular: Negative for chest pain, palpitations and leg swelling.   Gastrointestinal: Positive for nausea. Negative for abdominal distention, abdominal pain, blood in stool, constipation, diarrhea and vomiting.   Genitourinary: Negative for difficulty urinating, dysuria, flank pain and hematuria.   Musculoskeletal: Negative for back pain, gait problem and joint swelling.   Skin: Negative for rash and wound.   Neurological: Positive for dizziness, syncope and light-headedness. Negative for weakness, numbness and headaches.   Psychiatric/Behavioral: Positive for sleep disturbance. Negative for confusion. The patient is not  nervous/anxious.      Objective:     Vital Signs (Most Recent):  Temp: 98.2 °F (36.8 °C) (05/05/20 1913)  Pulse: 71 (05/05/20 2004)  Resp: 16 (05/05/20 1913)  BP: (!) 169/76 (05/05/20 2004)  SpO2: 100 % (05/05/20 2004) Vital Signs (24h Range):  Temp:  [98.2 °F (36.8 °C)] 98.2 °F (36.8 °C)  Pulse:  [71-80] 71  Resp:  [16] 16  SpO2:  [98 %-100 %] 100 %  BP: (151-170)/(70-77) 169/76     Weight: 53.1 kg (117 lb)  Body mass index is 22.85 kg/m².    Physical Exam   Constitutional: She is oriented to person, place, and time. She appears well-developed and well-nourished. No distress.   HENT:   Head: Normocephalic and atraumatic.   Eyeglasses     Eyes: Pupils are equal, round, and reactive to light. EOM are normal. Right eye exhibits no discharge. Left eye exhibits no discharge.   Neck: Normal range of motion. No JVD present.   Cardiovascular: Normal rate, regular rhythm, normal heart sounds and intact distal pulses.   No murmur heard.  Pulmonary/Chest: Effort normal and breath sounds normal. No respiratory distress. She has no wheezes. She has no rales. She exhibits no tenderness.   On Room Air   Abdominal: Soft. Bowel sounds are normal. She exhibits no distension. There is no tenderness. There is no rebound and no guarding.   Genitourinary:   Genitourinary Comments: Exam Deferred      Musculoskeletal: Normal range of motion. She exhibits no edema, tenderness or deformity.   Neurological: She is alert and oriented to person, place, and time. No cranial nerve deficit or sensory deficit.   Skin: Skin is warm and dry. Capillary refill takes 2 to 3 seconds. No rash noted. She is not diaphoretic. No erythema.   Scarring from bilateral mastectomy, well healed.     Psychiatric: She has a normal mood and affect. Her behavior is normal. Judgment and thought content normal.   Nursing note and vitals reviewed.        CRANIAL NERVES     CN III, IV, VI   Pupils are equal, round, and reactive to light.  Extraocular motions are normal.         Significant Labs:   CBC:   Recent Labs   Lab 05/05/20 2017   WBC 4.97   HGB 11.7*   HCT 36.6*        CMP:   Recent Labs   Lab 05/05/20 2017      K 3.8      CO2 26   *   BUN 19   CREATININE 1.3   CALCIUM 9.2   PROT 7.2   ALBUMIN 3.7   BILITOT 0.2   ALKPHOS 45*   AST 16   ALT 11   ANIONGAP 10   EGFRNONAA 41*     Recent Labs   Lab 05/05/20 2017   TROPONINI <0.006       Significant Imaging: I have reviewed all pertinent imaging results/findings within the past 24 hours.   Chest Xray:  Impression:       No acute radiographic abnormality.     CT Head without Contrast:  Impression:       No acute intracranial process.     EKG performed on 5/5/2020, impression as below:  Normal sinus rhythm  Moderate voltage criteria for LVH, may be normal variant  Borderline Abnormal ECG  When compared with ECG of 28-APR-2014 13:35,  No significant change was found

## 2020-05-06 NOTE — ASSESSMENT & PLAN NOTE
Monitor and transfuse if patient becomes hemodynamically unstable or H/H < 7/21. No evidence of active or acute bleeding noted on initial physical exam. Will trend daily CBC. Vit B12 level pending.  Lab Results   Component Value Date    LZURMARG60 1167 (H) 02/17/2020

## 2020-05-06 NOTE — ASSESSMENT & PLAN NOTE
Chronic, uncontrolled. Will continue home medication.    Lab Results   Component Value Date    CHOL 270 (H) 02/17/2020    CHOL 225 (H) 05/20/2014     Lab Results   Component Value Date    HDL 62 02/17/2020    HDL 58 05/20/2014     Lab Results   Component Value Date    LDLCALC 188.2 (H) 02/17/2020    LDLCALC 153.2 05/20/2014     Lab Results   Component Value Date    TRIG 99 02/17/2020    TRIG 69 05/20/2014     Lab Results   Component Value Date    CHOLHDL 23.0 02/17/2020    CHOLHDL 25.8 05/20/2014

## 2020-05-06 NOTE — PLAN OF CARE
CM completed discharge assessment over the phone with pt. Pt verified information on facesheet as correct. Denies POA/LW. ALTHEA is her son, Wilman [phone], who lives in [place] (did not want CM to add his information to facesheet and did not want him called). PCP is Dr. Sanderson. Pharm is [facility] on [address]. Pt reports living at listed address alone- reports having help from her sister, Ramirez, when needed. Pt denies hh/hd/dme. Reports being independent with ADLs and able to drive herself to apts. Reports that her sister will provide transportation home upon Dc. DC plan is home. CM following.   Pt requested for CM to add her brother Trang [phone] her facesheet. facesheet updated     05/06/20 1005   Discharge Assessment   Assessment Type Discharge Planning Assessment   Confirmed/corrected address and phone number on facesheet? Yes   Assessment information obtained from? Patient   Communicated expected length of stay with patient/caregiver yes   Prior to hospitilization cognitive status: Alert/Oriented   Prior to hospitalization functional status: Independent   Current cognitive status: Alert/Oriented   Current Functional Status: Independent   Lives With alone   Able to Return to Prior Arrangements yes   Is patient able to care for self after discharge? Yes   Patient's perception of discharge disposition home or selfcare   Readmission Within the Last 30 Days no previous admission in last 30 days   Patient currently being followed by outpatient case management? No   Patient currently receives any other outside agency services? No   Equipment Currently Used at Home none   Do you have any problems affording any of your prescribed medications? No   Is the patient taking medications as prescribed? yes   Does the patient have transportation home? Yes   Transportation Anticipated family or friend will provide   Does the patient receive services at the [facility]? No   Discharge Plan A Home   Discharge  Plan B Home   DME Needed Upon Discharge  none   Patient/Family in Agreement with Plan yes

## 2020-05-06 NOTE — ASSESSMENT & PLAN NOTE
Mild KRIS noted with decrease in GFR, reserved creatinine.  This is improving.  Will give light IVF hydration. Trend renal function with CMP.

## 2020-05-06 NOTE — HPI
"Tyra Riley is a 75-year-old female with a past medical history of hyperlipidemia, allergies, and breast cancer post bilateral mastectomy in 2007 who presents to the emergency room tonight with reports of syncopal episode.  Patient states that earlier today she was sitting outside, under a shaded area talking to her friend when she blacked out.  Patient states she remembers waking up and her friend was asking her if she was okay while on the phone with EMS.  Patient is unsure of the duration of her loss of consciousness.  Patient denies falling, no trauma to the head reported. Per ER MD note, "EMS reports that her friend noted that her eyes were wide open and she was shaking." No history of seizure reported. Patient endorses 3 episodes of diarrhea this morning, prompting her to take Lomotil, which relieved her symptoms. Patient also endorses drinking one michael today for Moni Chirinos. Dose endorse increased alcohol intake since being under quarantine, however not a daily habit. Patient endorses "dizzy spells" over the last 3 months, that are usually relieved with "laying down on bed." Patient endorses on Sunday she developed lightheadedness after taking a shower, that was relieved with laying down as well. Patient states she usually walks her neighborhood 2-3 times per week, but hasn't "felt like it" over the past two weeks. Patient was taking vitamin B12 supplementation but however discontinued medication approximately one month ago due to an intermittent developing rash to the neck area. The patient states cessation of B12 has not affected the appearance/disappearance of the rash. Patient endorses a significant history of intermittent expressive aphasia that has progressed over the last 20 years, states she has been worked up by several neurologists with no definite diagnosis. She states her PCP ordered an MRI/MRA? In which she had done approximately 1 month ago. In the ER initial workup negative for " acute process - patient placed in observation on 5/5/2020 at approximately 2130.   Patient states she lives at home by herself, denies the use of supplemental oxygen or ambulatory assist devices.

## 2020-05-06 NOTE — RESPIRATORY THERAPY
05/06/20 0717   PRE-TX-O2   O2 Device (Oxygen Therapy) room air   SpO2 100 %   Pulse Oximetry Type Intermittent   $ Pulse Oximetry - Multiple Charge Pulse Oximetry - Multiple

## 2020-05-06 NOTE — PLAN OF CARE
05/06/20 1457   Final Note   Assessment Type Final Discharge Note   Anticipated Discharge Disposition Home   Hospital Follow Up  Appt(s) scheduled? Yes

## 2020-05-07 VITALS
BODY MASS INDEX: 22.97 KG/M2 | WEIGHT: 117 LBS | HEART RATE: 73 BPM | DIASTOLIC BLOOD PRESSURE: 66 MMHG | SYSTOLIC BLOOD PRESSURE: 141 MMHG | OXYGEN SATURATION: 98 % | HEIGHT: 60 IN | RESPIRATION RATE: 18 BRPM | TEMPERATURE: 98 F

## 2020-05-07 LAB
ALBUMIN SERPL BCP-MCNC: 3.2 G/DL (ref 3.5–5.2)
ALP SERPL-CCNC: 41 U/L (ref 55–135)
ALT SERPL W/O P-5'-P-CCNC: 13 U/L (ref 10–44)
ANION GAP SERPL CALC-SCNC: 6 MMOL/L (ref 8–16)
AORTIC ROOT ANNULUS: 3.17 CM
AORTIC VALVE CUSP SEPERATION: 1.61 CM
ASCENDING AORTA: 3 CM
AST SERPL-CCNC: 16 U/L (ref 10–40)
AV INDEX (PROSTH): 0.91
AV MEAN GRADIENT: 4 MMHG
AV PEAK GRADIENT: 6 MMHG
AV VALVE AREA: 2.76 CM2
AV VELOCITY RATIO: 0.86
BASOPHILS # BLD AUTO: 0.02 K/UL (ref 0–0.2)
BASOPHILS NFR BLD: 0.5 % (ref 0–1.9)
BILIRUB SERPL-MCNC: 0.2 MG/DL (ref 0.1–1)
BSA FOR ECHO PROCEDURE: 1.5 M2
BUN SERPL-MCNC: 17 MG/DL (ref 8–23)
CALCIUM SERPL-MCNC: 8.6 MG/DL (ref 8.7–10.5)
CHLORIDE SERPL-SCNC: 109 MMOL/L (ref 95–110)
CO2 SERPL-SCNC: 26 MMOL/L (ref 23–29)
CREAT SERPL-MCNC: 1.1 MG/DL (ref 0.5–1.4)
CV ECHO LV RWT: 0.28 CM
DIFFERENTIAL METHOD: ABNORMAL
DOP CALC AO PEAK VEL: 1.22 M/S
DOP CALC AO VTI: 27.35 CM
DOP CALC LVOT AREA: 3 CM2
DOP CALC LVOT DIAMETER: 1.97 CM
DOP CALC LVOT PEAK VEL: 1.05 M/S
DOP CALC LVOT STROKE VOLUME: 75.46 CM3
DOP CALCLVOT PEAK VEL VTI: 24.77 CM
E WAVE DECELERATION TIME: 191.54 MSEC
E/A RATIO: 1
E/E' RATIO: 13.69 M/S
ECHO LV POSTERIOR WALL: 0.78 CM (ref 0.6–1.1)
EOSINOPHIL # BLD AUTO: 0.1 K/UL (ref 0–0.5)
EOSINOPHIL NFR BLD: 2.7 % (ref 0–8)
ERYTHROCYTE [DISTWIDTH] IN BLOOD BY AUTOMATED COUNT: 12.5 % (ref 11.5–14.5)
EST. GFR  (AFRICAN AMERICAN): 58 ML/MIN/1.73 M^2
EST. GFR  (NON AFRICAN AMERICAN): 50 ML/MIN/1.73 M^2
FRACTIONAL SHORTENING: 30 % (ref 28–44)
GLUCOSE SERPL-MCNC: 95 MG/DL (ref 70–110)
HCT VFR BLD AUTO: 32.4 % (ref 37–48.5)
HGB BLD-MCNC: 10.4 G/DL (ref 12–16)
IMM GRANULOCYTES # BLD AUTO: 0.02 K/UL (ref 0–0.04)
IMM GRANULOCYTES NFR BLD AUTO: 0.5 % (ref 0–0.5)
INTERVENTRICULAR SEPTUM: 0.78 CM (ref 0.6–1.1)
IVRT: 114.18 MSEC
LA MAJOR: 4.77 CM
LA MINOR: 4.76 CM
LA WIDTH: 3.09 CM
LEFT ATRIUM SIZE: 3.14 CM
LEFT ATRIUM VOLUME INDEX: 26.4 ML/M2
LEFT ATRIUM VOLUME: 39.3 CM3
LEFT INTERNAL DIMENSION IN SYSTOLE: 3.81 CM (ref 2.1–4)
LEFT VENTRICLE DIASTOLIC VOLUME INDEX: 98.25 ML/M2
LEFT VENTRICLE DIASTOLIC VOLUME: 146.03 ML
LEFT VENTRICLE MASS INDEX: 104 G/M2
LEFT VENTRICLE SYSTOLIC VOLUME INDEX: 41.8 ML/M2
LEFT VENTRICLE SYSTOLIC VOLUME: 62.16 ML
LEFT VENTRICULAR INTERNAL DIMENSION IN DIASTOLE: 5.48 CM (ref 3.5–6)
LEFT VENTRICULAR MASS: 153.98 G
LV LATERAL E/E' RATIO: 12.71 M/S
LV SEPTAL E/E' RATIO: 14.83 M/S
LYMPHOCYTES # BLD AUTO: 1.5 K/UL (ref 1–4.8)
LYMPHOCYTES NFR BLD: 33.4 % (ref 18–48)
MAGNESIUM SERPL-MCNC: 2 MG/DL (ref 1.6–2.6)
MCH RBC QN AUTO: 32.1 PG (ref 27–31)
MCHC RBC AUTO-ENTMCNC: 32.1 G/DL (ref 32–36)
MCV RBC AUTO: 100 FL (ref 82–98)
MONOCYTES # BLD AUTO: 0.4 K/UL (ref 0.3–1)
MONOCYTES NFR BLD: 8 % (ref 4–15)
MV A" WAVE DURATION": 148 MSEC
MV MEAN GRADIENT: 5 MMHG
MV PEAK A VEL: 0.89 M/S
MV PEAK E VEL: 0.89 M/S
MV STENOSIS PRESSURE HALF TIME: 41 MS
MV VALVE AREA P 1/2 METHOD: 5.37 CM2
NEUTROPHILS # BLD AUTO: 2.4 K/UL (ref 1.8–7.7)
NEUTROPHILS NFR BLD: 54.9 % (ref 38–73)
NRBC BLD-RTO: 0 /100 WBC
PISA TR MAX VEL: 2.77 M/S
PLATELET # BLD AUTO: 168 K/UL (ref 150–350)
PMV BLD AUTO: 10.3 FL (ref 9.2–12.9)
POTASSIUM SERPL-SCNC: 4.7 MMOL/L (ref 3.5–5.1)
PROT SERPL-MCNC: 6.1 G/DL (ref 6–8.4)
PULM VEIN A" WAVE DURATION": 65 MSEC
PULM VEIN S/D RATIO: 1.57
PV PEAK D VEL: 0.46 M/S
PV PEAK S VEL: 0.72 M/S
PV PEAK VELOCITY: 0.81 CM/S
RA MAJOR: 4.24 CM
RA PRESSURE: 3 MMHG
RA WIDTH: 2.88 CM
RBC # BLD AUTO: 3.24 M/UL (ref 4–5.4)
RIGHT VENTRICULAR END-DIASTOLIC DIMENSION: 2.53 CM
SINUS: 3.03 CM
SODIUM SERPL-SCNC: 141 MMOL/L (ref 136–145)
STJ: 2.86 CM
TDI LATERAL: 0.07 M/S
TDI SEPTAL: 0.06 M/S
TDI: 0.07 M/S
TR MAX PG: 31 MMHG
TRICUSPID ANNULAR PLANE SYSTOLIC EXCURSION: 2 CM
TV REST PULMONARY ARTERY PRESSURE: 34 MMHG
WBC # BLD AUTO: 4.37 K/UL (ref 3.9–12.7)

## 2020-05-07 PROCEDURE — 94761 N-INVAS EAR/PLS OXIMETRY MLT: CPT | Mod: HCNC

## 2020-05-07 PROCEDURE — G0378 HOSPITAL OBSERVATION PER HR: HCPCS | Mod: HCNC

## 2020-05-07 PROCEDURE — 85025 COMPLETE CBC W/AUTO DIFF WBC: CPT | Mod: HCNC

## 2020-05-07 PROCEDURE — 36415 COLL VENOUS BLD VENIPUNCTURE: CPT | Mod: HCNC

## 2020-05-07 PROCEDURE — 96361 HYDRATE IV INFUSION ADD-ON: CPT

## 2020-05-07 PROCEDURE — 25000003 PHARM REV CODE 250: Mod: HCNC | Performed by: NURSE PRACTITIONER

## 2020-05-07 PROCEDURE — 80053 COMPREHEN METABOLIC PANEL: CPT | Mod: HCNC

## 2020-05-07 PROCEDURE — 83735 ASSAY OF MAGNESIUM: CPT | Mod: HCNC

## 2020-05-07 RX ADMIN — ASPIRIN 81 MG: 81 TABLET, COATED ORAL at 08:05

## 2020-05-07 RX ADMIN — ROSUVASTATIN CALCIUM 20 MG: 20 TABLET, FILM COATED ORAL at 08:05

## 2020-05-07 NOTE — NURSING
Pt has orders for pending discharge once TTE is read by Cardiologist. Spoke with Cardiologist on unit, Dr Hancock who agreed to interpret TTE for clarity on pending D/C  - TTE results interpreted in chart. Updated UZMA Schmidt NP and stated to proceed with discharging pt now. Updated Primary Nurse Oanh Orellana.

## 2020-05-07 NOTE — NURSING
D/C instructions provided and explained to pt, understanding of D/C instructions verbalized. IV removed, catheter intact. Tolerated well. Telemetry monitor removed and returned to monitor room. VSS. Pt denies complaints. Transported off unit via wheelchair with personal items in hand.

## 2020-05-07 NOTE — HOSPITAL COURSE
Pt was admitted to the service of hospital Medicine on 05/05 for evaluation of LOC.  Pt underwent CT of the head which was negative for any acute findings.  Neurology was consulted.  Pt underwent TTE, carotid ultrasound, and EEG with no acute finding.  Her vitamin B12 levels were checked as she recently discontinued her vitamin-B supplementation.  This was within normal limits.  Remainder of vitamin-B studies pending.  She had no further episodes while she was hospitalized.  She was monitor on telemetry with no cardiac arrhythmias reported.  She was D/C home once cleared by Neurology.  She will follow-up with neurology in 2 weeks.  She was advised not to drive in case this was seizure activity.  She will follow-up with neurology in 2 weeks for further instructions regarding her ability to drive.  Return precautions were discussed at length.  Pt verbalized understanding, agreement with plan of care, and readiness for discharge.    Physical Exam:  HRRR, lungs CTA.  No neuro deficits on exam.  Abd soft, non-tender.

## 2020-05-07 NOTE — PROCEDURES
ELECTROENCEPHALOGRAM REPORT    DATE OF SERVICE:  05/06/2020  EEG NUMBER: ON   REQUESTED BY:  Dr. Diaz  LOCATION OF SERVICE:  [facility] in patient    METHODOLOGY   Electroencephalographic (EEG) recording is with electrodes placed according to the International 10-20 placement system.  Thirty two (32) channels of digital signal (sampling rate of 512/sec) including T1 and T2 was simultaneously recorded from the scalp and may include  EKG, EMG, and/or eye monitors.  Recording band pass was 0.1 to 512 hz.  Digital video recording of the patient is simultaneously recorded with the EEG.  The patient is instructed report clinical symptoms which may occur during the recording session.  EEG and video recording is stored and archived in digital format. Activation procedures which include photic stimulation, hyperventilation and instructing patients to perform simple task are done in selected patients.    The EEG is displayed on a monitor screen and can be reviewed using different montages.  Computer assisted analysis is employed to detect spike and electrographic seizure activity.   The entire record is submitted for computer analysis.  The entire recording is visually reviewed and the times identified by computer analysis as being spikes or seizures are reviewed again.  Compresses spectral analysis (CSA) is also performed on the activity recorded from each individual channel.  This is displayed as a power display of frequencies from 0 to 30 Hz over time.   The CSA is reviewed looking for asymmetries in power between homologous areas of the scalp and then compared with the original EEG recording.     [employer] software was also utilized in the review of this study.  This software suite analyzes the EEG recording in multiple domains.  Coherence and rhythmicity is computed to identify EEG sections which may contain organized seizures.  Each channel undergoes analysis to detect presence of spike and sharp waves which have  special and morphological characteristic of epileptic activity.  The routine EEG recording is converted from spacial into frequency domain.  This is then displayed comparing homologous areas to identify areas of significant asymmetry.  Algorithm to identify non-cortically generated artifact is used to separate eye movement, EMG and other artifact from the EEG    EEG FINGINGS  Waking state -     patient was awake at the onset of the recording.  Very rhythmic 9 hertz posterior dominant rhythm was seen in the occipital parietal posterior temporal regions bilaterally.  Irregular mid-range beta frequency was seen in the mid and frontal regions.  Occasionally the posterior dominant attenuate patient became drowsy.  There is no lateralized or focal changes and no spike or sharp wave activity seen.  Sleep state -   Sleep was in countered with the posterior dominant rhythm attenuated be replaced by diffused low and mid-range beta mixture which was punctuated by bursts mid-range theta along with the sleep spindles as stage 2 sleep was achieved.  Background is symmetrical evidence for focal changes nor an epileptic process.  Activation procedures:   Photic stimulation - flash rates of 1-30 per second were used with no significant driving response elicited.  Hyperventilation - Not performed    Cognitive testing  Patient was ask the her location and the month which she answered correctly  Cardiac Monitoring  Single lead EKG was obtained and normal cardiac rhythm was present the rate approximately 60      IMPRESSION:  Normal EEG

## 2020-05-07 NOTE — DISCHARGE SUMMARY
"[facility] Medicine  Discharge Summary      Patient Name: Tyra Riley  MRN: [id number]  Admission Date: 5/5/2020  Hospital Length of Stay: 0 days  Discharge Date and Time: 5/7/2020 12:35 PM  Attending Physician: No att. providers found   Discharging Provider: Terese Schmidt NP  Primary Care Provider: Nadia Sanderson MD      HPI:   Tyra Riley is a 75-year-old female with a past medical history of hyperlipidemia, allergies, and breast cancer post bilateral mastectomy in 2007 who presents to the emergency room tonight with reports of syncopal episode.  Patient states that earlier today she was sitting outside, under a shaded area talking to her friend when she blacked out.  Patient states she remembers waking up and her friend was asking her if she was okay while on the phone with EMS.  Patient is unsure of the duration of her loss of consciousness.  Patient denies falling, no trauma to the head reported. Per ER MD note, "EMS reports that her friend noted that her eyes were wide open and she was shaking." No history of seizure reported. Patient endorses 3 episodes of diarrhea this morning, prompting her to take Lomotil, which relieved her symptoms. Patient also endorses drinking one michael today for Moni Chirinos. Dose endorse increased alcohol intake since being under quarantine, however not a daily habit. Patient endorses "dizzy spells" over the last 3 months, that are usually relieved with "laying down on bed." Patient endorses on Sunday she developed lightheadedness after taking a shower, that was relieved with laying down as well. Patient states she usually walks her neighborhood 2-3 times per week, but hasn't "felt like it" over the past two weeks. Patient was taking vitamin B12 supplementation but however discontinued medication approximately one month ago due to an intermittent developing rash to the neck area. The patient states cessation of B12 has not affected the " appearance/disappearance of the rash. Patient endorses a significant history of intermittent expressive aphasia that has progressed over the last 20 years, states she has been worked up by several neurologists with no definite diagnosis. She states her PCP ordered an MRI/MRA? In which she had done approximately 1 month ago. In the ER initial workup negative for acute process - patient placed in observation on 5/5/2020 at approximately 2130.   Patient states she lives at home by herself, denies the use of supplemental oxygen or ambulatory assist devices.      * No surgery found *      Hospital Course:   Pt was admitted to the service of hospital Medicine on 05/05 for evaluation of LOC.  Pt underwent CT of the head which was negative for any acute findings.  Neurology was consulted.  Pt underwent TTE, carotid ultrasound, and EEG with no acute finding.  Her vitamin B12 levels were checked as she recently discontinued her vitamin-B supplementation.  This was within normal limits.  Remainder of vitamin-B studies pending.  She had no further episodes while she was hospitalized.  She was monitor on telemetry with no cardiac arrhythmias reported.  She was D/C home once cleared by Neurology.  She will follow-up with neurology in 2 weeks.  She was advised not to drive in case this was seizure activity.  She will follow-up with neurology in 2 weeks for further instructions regarding her ability to drive.  Return precautions were discussed at length.  Pt verbalized understanding, agreement with plan of care, and readiness for discharge.    Physical Exam:  HRRR, lungs CTA.  No neuro deficits on exam.  Abd soft, non-tender.     Consults:     No new Assessment & Plan notes have been filed under this hospital service since the last note was generated.  Service: Hospital Medicine    Final Active Diagnoses:    Diagnosis Date Noted POA    PRINCIPAL PROBLEM:  Loss of consciousness [R40.20] 05/05/2020 Yes    KRIS (acute kidney injury)  [N17.9] 05/05/2020 Yes    Non-seasonal allergic rhinitis [J30.89] 03/16/2020 Yes    Macrocytic anemia with vitamin B12 deficiency [D51.9] 02/22/2013 Yes    Hypercholesteremia [E78.00] 08/02/2012 Yes      Problems Resolved During this Admission:       Discharged Condition: good    Disposition: Home or Self Care    Follow Up:  Follow-up Information     Nadia Sanderson MD In 1 week.    Specialty:  Internal Medicine  Contact information:  [address]  [address]  [facility] [address]  [phone]             Ty Diaz MD In 2 weeks.    Specialty:  Neurology  Contact information:  [address] [address] [phone]                 Patient Instructions:      Diet Adult Regular     No driving until:   Order Comments: Cleared by neurology     Notify your health care provider if you experience any of the following:  temperature >100.4     Notify your health care provider if you experience any of the following:  persistent nausea and vomiting or diarrhea     Notify your health care provider if you experience any of the following:  severe uncontrolled pain     Notify your health care provider if you experience any of the following:  redness, tenderness, or signs of infection (pain, swelling, redness, odor or green/yellow discharge around incision site)     Notify your health care provider if you experience any of the following:  difficulty breathing or increased cough     Notify your health care provider if you experience any of the following:  persistent dizziness, light-headedness, or visual disturbances     Notify your health care provider if you experience any of the following:  increased confusion or weakness     Notify your health care provider if you experience any of the following:   Order Comments: Loss of consciousness     Activity as tolerated       Significant Diagnostic Studies: Labs:   CMP   Recent Labs   Lab 05/05/20 2017 05/06/20  0320 05/07/20  0446    140 141   K  3.8 4.0 4.7    104 109   CO2 26 27 26   * 96 95   BUN 19 19 17   CREATININE 1.3 1.1 1.1   CALCIUM 9.2 9.1 8.6*   PROT 7.2 6.8 6.1   ALBUMIN 3.7 3.5 3.2*   BILITOT 0.2 0.2 0.2   ALKPHOS 45* 43* 41*   AST 16 15 16   ALT 11 11 13   ANIONGAP 10 9 6*   ESTGFRAFRICA 47* 58* 58*   EGFRNONAA 41* 50* 50*   , CBC   Recent Labs   Lab 05/05/20 2017 05/06/20 0320 05/07/20  0446   WBC 4.97 4.68 4.37   HGB 11.7* 11.1* 10.4*   HCT 36.6* 34.1* 32.4*    187 168    and INR No results found for: INR, PROTIME    Pending Diagnostic Studies:     Procedure Component Value Units Date/Time    Vitamin B1 [[id number]] Collected:  05/06/20 0320    Order Status:  Sent Lab Status:  In process Updated:  05/06/20 0339    Specimen:  Blood     Vitamin B6 [[id number]] Collected:  05/06/20 0320    Order Status:  Sent Lab Status:  In process Updated:  05/06/20 0339    Specimen:  Blood          US Carotid Bilateral [[id number]] Resulted: 05/06/20 0848   Order Status: Completed Updated: 05/06/20 0850   Narrative:     EXAMINATION:  US CAROTID BILATERAL    CLINICAL HISTORY:  syncope;    TECHNIQUE:  Grayscale and color Doppler ultrasound examination of the carotid and vertebral artery systems bilaterally.  Stenosis estimates are per the NASCET measurement criteria.    COMPARISON:  None.    FINDINGS:  Right:    Internal Carotid Artery (ICA) peak systolic velocity 69 cm/sec    ICA/CCA peak systolic velocity ratio: 1.4    Plaque formation: Homogeneous    Vertebral artery: Antegrade flow and normal waveform.    Left:    Internal Carotid Artery (ICA)  peak systolic velocity 52 cm/sec    ICA/CCA peak systolic velocity ratio: 1.0    Plaque formation: Homogeneous    Vertebral artery: Antegrade flow and normal waveform.   Impression:       No evidence of a hemodynamically significant carotid bifurcation stenosis.      Electronically signed by: Gary Okeefe  Date: 05/06/2020  Time: 08:48   X-Ray Chest AP Portable [[id number]] Resulted: 05/05/20  2101   Order Status: Completed Updated: 05/05/20 2104   Narrative:     EXAMINATION:  XR CHEST AP PORTABLE    CLINICAL HISTORY:  Chest Pain;    TECHNIQUE:  Single frontal view of the chest was performed.    COMPARISON:  03/22/2016    FINDINGS:  The lungs are clear, with normal appearance of pulmonary vasculature and no pleural effusion or pneumothorax.    The cardiac silhouette is normal in size. The hilar and mediastinal contours are unremarkable.    Bones are intact.    Bilateral overlying surgical clips.  No significant change.   Impression:       No acute radiographic abnormality.      Electronically signed by: Gaudencio Lloyd  Date: 05/05/2020  Time: 21:01   CT Head Without Contrast [[id number]] Resulted: 05/05/20 2019   Order Status: Completed Updated: 05/05/20 2022   Narrative:     EXAMINATION:  CT HEAD WITHOUT CONTRAST    CLINICAL HISTORY:  Confusion/delirium, altered LOC, unexplained;syncope, hx of breast cancer;    TECHNIQUE:  Low dose axial CT images obtained throughout the head without intravenous contrast. Sagittal and coronal reconstructions were performed.    COMPARISON:  None.    FINDINGS:  Metallic earrings are not removed.  Mild artifact.    Intracranial compartment:    Ventricles and sulci are normal in size for age without evidence of hydrocephalus. No extra-axial blood or fluid collections.    The brain parenchyma appears normal. No parenchymal mass, hemorrhage, edema or major vascular distribution infarct.    Skull/extracranial contents (limited evaluation): No fracture. Mastoid air cells and paranasal sinuses are essentially clear.   Impression:       No acute intracranial process.      Electronically signed by: Gaudencio Lloyd  Date: 05/05/2020  Time: 20:19     TTE:  · The mean diastolic gradient across the mitral valve is 5 mmHg at a heart rate of bpm.  · Normal left ventricular systolic function. The estimated ejection fraction is 55%.  · Normal LV diastolic function.  · No wall motion  abnormalities.  · Mild tricuspid regurgitation.  · Mild pulmonic regurgitation.  · Normal right ventricular systolic function.  · Normal central venous pressure (3 mmHg).  · The estimated PA systolic pressure is 34 mmHg.  · Mild mitral regurgitation.       Medications:  Reconciled Home Medications:      Medication List      START taking these medications    ondansetron 4 MG Tbdl  Commonly known as:  ZOFRAN-ODT  Take 1 tablet (4 mg total) by mouth every 6 (six) hours as needed.        CONTINUE taking these medications    ALAVERT D-12 ALLERGY-SINUS ORAL  Take 1 tablet by mouth once daily.     aspirin 81 MG EC tablet  Commonly known as:  ECOTRIN  Take 81 mg by mouth once daily.     CO Q-10 ORAL  Take by mouth.     ergocalciferol 50,000 unit Cap  Commonly known as:  ERGOCALCIFEROL  Take 1 capsule (50,000 Units total) by mouth every 7 days.     fluocinonide 0.05% 0.05 % cream  Commonly known as:  LIDEX  Apply topically 2 (two) times daily.     garlic 5,000 mcg Tab  Take 1 capsule by mouth every evening.     Rizzoma HEALTH ORAL  Take by mouth every evening.     potassium gluconate 550 mg (90 mg) Tab  Take 1 tablet by mouth every morning.     PRESERVISION AREDS-2 ORAL  Take 1 capsule by mouth 2 (two) times daily.     rosuvastatin 20 MG tablet  Commonly known as:  CRESTOR  Take 1 tablet (20 mg total) by mouth once daily.     VITAMIN B COMPLEX ORAL  Take by mouth once daily.            Indwelling Lines/Drains at time of discharge:   Lines/Drains/Airways     None                 Time spent on the discharge of patient: 35 minutes  Patient was seen and examined on the date of discharge and determined to be suitable for discharge.         Terese Schmidt NP  Department of Hospital Medicine  [facility]

## 2020-05-07 NOTE — PLAN OF CARE
05/07/20 1231   Final Note   Assessment Type Final Discharge Note   Anticipated Discharge Disposition Home   Hospital Follow Up  Appt(s) scheduled? Yes

## 2020-05-07 NOTE — PLAN OF CARE
Pt already has follow up scheduled with PCP     05/07/20 1001   Discharge Assessment   Assessment Type Discharge Planning Reassessment

## 2020-05-08 LAB — PYRIDOXAL SERPL-MCNC: 6 UG/L (ref 5–50)

## 2020-05-11 ENCOUNTER — OFFICE VISIT (OUTPATIENT)
Dept: PRIMARY CARE CLINIC | Facility: CLINIC | Age: 75
End: 2020-05-11
Payer: MEDICARE

## 2020-05-11 VITALS
SYSTOLIC BLOOD PRESSURE: 110 MMHG | TEMPERATURE: 98 F | OXYGEN SATURATION: 97 % | WEIGHT: 114.19 LBS | HEIGHT: 60 IN | BODY MASS INDEX: 22.42 KG/M2 | DIASTOLIC BLOOD PRESSURE: 60 MMHG | HEART RATE: 86 BPM

## 2020-05-11 DIAGNOSIS — R40.20 LOSS OF CONSCIOUSNESS: ICD-10-CM

## 2020-05-11 LAB — VIT B1 BLD-MCNC: 57 UG/L (ref 38–122)

## 2020-05-11 PROCEDURE — 99214 OFFICE O/P EST MOD 30 MIN: CPT | Mod: HCNC,S$GLB,, | Performed by: NURSE PRACTITIONER

## 2020-05-11 PROCEDURE — 99214 PR OFFICE/OUTPT VISIT, EST, LEVL IV, 30-39 MIN: ICD-10-PCS | Mod: HCNC,S$GLB,, | Performed by: NURSE PRACTITIONER

## 2020-05-11 NOTE — PROGRESS NOTES
"Primary Care Provider Appointment    Subjective:      Patient ID: Tyra Riley is a 75 y.o. female. with hx of osteoporosis, hx of breast cancer now in remission, hx of nephrolithasis and right renal cysts, GERD, HLD, hx of anemia    Chief Complaint: Hospital Follow Up    Pt seen today for post hospital discharge follow-up. Pt accompanied by sister. Discharge summary reviewed. Medication reconciliation completed.      Hospital Course:   Pt was admitted to the service of hospital Medicine on 05/05 for evaluation of LOC.  Pt underwent CT of the head which was negative for any acute findings.  Neurology was consulted.  Pt underwent TTE, carotid ultrasound, and EEG with no acute finding.  Her vitamin B12 levels were checked as she recently discontinued her vitamin-B supplementation.  This was within normal limits.  Remainder of vitamin-B studies pending.  She had no further episodes while she was hospitalized.  She was monitor on telemetry with no cardiac arrhythmias reported.  She was D/C home once cleared by Neurology.  She will follow-up with neurology in 2 weeks.  She was advised not to drive in case this was seizure activity.  She will follow-up with neurology in 2 weeks for further instructions regarding her ability to drive.  Return precautions were discussed at length.  Pt verbalized understanding, agreement with plan of care, and readiness for discharge.    Pt states she feels well. In the last month, she has had 4 episodes of dizziness. She was told her bp was low while in hospital. Pt was sitting on a chair at a friend's house talking when she passed out. Her friend states she mid-sentence she stopped talking, appeared "glassy eyed" and then passed out although her eyes remained open. Friend states pt was non-responsive for approximately 5 minutes. Pt denies any previous syncopal episodes. Pt does have a hx of "episdoes of speech arrest", present since 1997 with negative work-up.    Pt has f/u with " "neurology, would like to see Dr. Tasha Rodriguez.     Past Surgical History:   Procedure Laterality Date    BLADDER REPAIR      BREAST RECONSTRUCTION      CATARACT EXTRACTION      COLONOSCOPY N/A 1/4/2016    Procedure: COLONOSCOPY;  Surgeon: Kieran Krishnamurthy MD;  Location: [facility] ([address] [place] [address]);  Service: Endoscopy;  Laterality: N/A;    COLONOSCOPY W/ POLYPECTOMY      HYSTERECTOMY  1994    JODI, ovaries remain ("pain")    LASIK      MASTECTOMY  2000    Bilateral    OOPHORECTOMY  1994    PARTIAL NEPHRECTOMY      cyst from left kidney    Toes surgery      Hammer toe surgery    TONSILLECTOMY         Past Medical History:   Diagnosis Date    Abnormal Pap smear     colpo then hyst    Allergy     Breast cancer 1997, 2000    Colon polyp     Fever blister     GERD (gastroesophageal reflux disease)     Hyperlipidemia     Hyperthyroidism     Kidney cysts     Osteoporosis, unspecified     Seasonal allergies        Social History     Socioeconomic History    Marital status: Single     Spouse name: Not on file    Number of children: 1    Years of education: Not on file    Highest education level: Bachelor's degree (e.g., BA, AB, BS)   Occupational History     Comment: [employer]    Social Needs    Financial resource strain: Not hard at all    Food insecurity:     Worry: Never true     Inability: Never true    Transportation needs:     Medical: No     Non-medical: No   Tobacco Use    Smoking status: Never Smoker    Smokeless tobacco: Never Used   Substance and Sexual Activity    Alcohol use: Yes     Alcohol/week: 1.0 standard drinks     Types: 1 Glasses of wine per week     Comment: Rare    Drug use: No    Sexual activity: Never   Lifestyle    Physical activity:     Days per week: 1 day     Minutes per session: 60 min    Stress: To some extent   Relationships    Social connections:     Talks on phone: More than three times a week     Gets together: More than three times a week     " Attends [employer] service: More than 4 times per year     Active member of club or organization: Yes     Attends meetings of clubs or organizations: More than 4 times per year     Relationship status: Never    Other Topics Concern    Are you pregnant or think you may be? No    Breast-feeding Not Asked   Social History Narrative    Retired     Children:  Mike ([place])     Qian: Orthodox    Hobbies: Line Dancing once a week , in [employer] choir , [employer]        Review of Systems   Constitutional: Negative for fatigue and unexpected weight change.   HENT: Negative for congestion and sinus pressure.    Eyes: Negative for visual disturbance.   Respiratory: Negative for shortness of breath and wheezing.    Cardiovascular: Negative for chest pain and palpitations.   Gastrointestinal: Negative for abdominal pain, blood in stool, constipation and diarrhea.   Genitourinary: Negative for difficulty urinating and pelvic pain.   Musculoskeletal: Negative for arthralgias and gait problem.   Skin: Negative for rash.   Allergic/Immunologic: Negative for immunocompromised state.   Neurological: Positive for dizziness. Negative for tremors and weakness.   Hematological: Does not bruise/bleed easily.   Psychiatric/Behavioral: Negative for confusion and suicidal ideas. The patient is not nervous/anxious.        Objective:   /60 (BP Location: Right arm, Patient Position: Sitting, BP Method: Medium (Manual))   Pulse 86   Temp 98 °F (36.7 °C) (Oral)   Ht 5' (1.524 m)   Wt 51.8 kg (114 lb 3.2 oz)   SpO2 97%   BMI 22.30 kg/m²     Physical Exam   Constitutional: She is oriented to person, place, and time. She appears well-developed and well-nourished. No distress.   Eyes: Pupils are equal, round, and reactive to light. Conjunctivae are normal.   Neck: Normal range of motion. Neck supple. No JVD present.   Cardiovascular: Normal rate, regular rhythm and normal heart sounds.   No murmur heard.  Pulmonary/Chest:  Effort normal and breath sounds normal. No stridor. No respiratory distress. She has no wheezes. She exhibits no tenderness.   Abdominal: Soft. Bowel sounds are normal. There is no tenderness. There is no rebound.   Musculoskeletal: Normal range of motion. She exhibits no edema.   Neurological: She is alert and oriented to person, place, and time. No cranial nerve deficit or sensory deficit.   Skin: Skin is warm and dry. Capillary refill takes less than 2 seconds. No rash noted. She is not diaphoretic. No erythema.   Psychiatric: She has a normal mood and affect. Her behavior is normal. Judgment and thought content normal.           Lab Results   Component Value Date    WBC 4.37 05/07/2020    HGB 10.4 (L) 05/07/2020    HCT 32.4 (L) 05/07/2020     05/07/2020    CHOL 199 05/06/2020    TRIG 191 (H) 05/06/2020    HDL 46 05/06/2020    ALT 13 05/07/2020    AST 16 05/07/2020     05/07/2020    K 4.7 05/07/2020     05/07/2020    CREATININE 1.1 05/07/2020    BUN 17 05/07/2020    CO2 26 05/07/2020    TSH 2.708 03/11/2020    HGBA1C 5.3 02/21/2018       Medication List with Changes/Refills   Current Medications    ASPIRIN (ECOTRIN) 81 MG EC TABLET    Take 81 mg by mouth once daily.    ERGOCALCIFEROL (ERGOCALCIFEROL) 50,000 UNIT CAP    Take 1 capsule (50,000 Units total) by mouth every 7 days.    FLUOCINONIDE 0.05% (LIDEX) 0.05 % CREAM    Apply topically 2 (two) times daily.    GARLIC 5,000 MCG TAB    Take 1 capsule by mouth every evening.    L GASSERI/B BIFIDUM/B LONGUM ([facility] HEALTH ORAL)    Take by mouth every evening.    LORATADINE/PSEUDOEPHEDRINE (ALAVERT D-12 ALLERGY-SINUS ORAL)    Take 1 tablet by mouth once daily.    ONDANSETRON (ZOFRAN-ODT) 4 MG TBDL    Take 1 tablet (4 mg total) by mouth every 6 (six) hours as needed.    POTASSIUM GLUCONATE 550 MG (90 MG) TAB    Take 1 tablet by mouth every morning.    ROSUVASTATIN (CRESTOR) 20 MG TABLET    Take 1 tablet (20 mg total) by mouth once daily.     UBIDECARENONE (CO Q-10 ORAL)    Take by mouth.    VIT C/E/ZN/COPPR/LUTEIN/ZEAXAN (PRESERVISION AREDS-2 ORAL)    Take 1 capsule by mouth 2 (two) times daily.    VITAMIN B COMPLEX ORAL    Take by mouth once daily.         Assessment:   75 y.o. female with multiple co-morbid illnesses here to continue chronic care management.     Plan:     Problem List Items Addressed This Visit        Neuro    Loss of consciousness     CT of the head which was negative for any acute findings.    TTE, carotid ultrasound, and EEG with no acute finding  Vitamin B levels wnl.    Pt reports she was hypotensive, bp today 110/60  4 episodes of dizziness prior to syncopal episode this month.   Pt only drinks 16oz of water daily, encourage to increase to 40oz daily  Keep bp log for 2 weeks  Follow-up with neurology                  Health Maintenance       Date Due Completion Date    TETANUS VACCINE 02/15/1965 ---    Shingles Vaccine (2 of 3) 12/27/2014 11/1/2014    Mammogram 06/12/2020 6/12/2018 (Done)    Override on 6/12/2018: Done (no longer need mammogram)    DEXA SCAN 03/02/2023 3/2/2020    Lipid Panel 05/06/2025 5/6/2020    Colonoscopy 01/04/2026 1/4/2016          No follow-ups on file. Total face-to-face time was 60 min, greater than 50% of this was spent on counseling and coordination of care.       PAT Pereira-UCHE  Family Medicine  [facility] [place]

## 2020-05-12 NOTE — ASSESSMENT & PLAN NOTE
CT of the head which was negative for any acute findings.    TTE, carotid ultrasound, and EEG with no acute finding  Vitamin B levels wnl.    Pt reports she was hypotensive, bp today 110/60  4 episodes of dizziness prior to syncopal episode this month.   Pt only drinks 16oz of water daily, encourage to increase to 40oz daily  Keep bp log for 2 weeks  Follow-up with neurology

## 2020-05-20 ENCOUNTER — TELEPHONE (OUTPATIENT)
Dept: PRIMARY CARE CLINIC | Facility: CLINIC | Age: 75
End: 2020-05-20

## 2020-05-20 NOTE — TELEPHONE ENCOUNTER
Dr. Diaz is private practice so his appointments are not in Psychiatric.  I called the office and she has an appointment on 5/27.

## 2020-05-20 NOTE — TELEPHONE ENCOUNTER
----- Message from Nadia Sanderson MD sent at 5/19/2020  3:06 PM CDT -----    Pt was referred to neurology see below but no appt seen in [facility]. Neuro in [place] was supposed to schedule but no appt in chart.     Ty Diaz MD In 2 weeks.   Specialty:  Neurology  Contact information:  [address] [facility] [id number] [phone]

## 2020-05-27 ENCOUNTER — OFFICE VISIT (OUTPATIENT)
Dept: PRIMARY CARE CLINIC | Facility: CLINIC | Age: 75
End: 2020-05-27
Payer: MEDICARE

## 2020-05-27 VITALS
OXYGEN SATURATION: 98 % | BODY MASS INDEX: 22.55 KG/M2 | WEIGHT: 114.88 LBS | HEIGHT: 60 IN | TEMPERATURE: 98 F | DIASTOLIC BLOOD PRESSURE: 90 MMHG | SYSTOLIC BLOOD PRESSURE: 170 MMHG | HEART RATE: 78 BPM

## 2020-05-27 DIAGNOSIS — R03.0 PREHYPERTENSION: ICD-10-CM

## 2020-05-27 PROCEDURE — 1101F PT FALLS ASSESS-DOCD LE1/YR: CPT | Mod: HCNC,CPTII,S$GLB, | Performed by: NURSE PRACTITIONER

## 2020-05-27 PROCEDURE — 1126F AMNT PAIN NOTED NONE PRSNT: CPT | Mod: HCNC,S$GLB,, | Performed by: NURSE PRACTITIONER

## 2020-05-27 PROCEDURE — 1159F MED LIST DOCD IN RCRD: CPT | Mod: HCNC,S$GLB,, | Performed by: NURSE PRACTITIONER

## 2020-05-27 PROCEDURE — 99213 PR OFFICE/OUTPT VISIT, EST, LEVL III, 20-29 MIN: ICD-10-PCS | Mod: HCNC,S$GLB,, | Performed by: NURSE PRACTITIONER

## 2020-05-27 PROCEDURE — 99213 OFFICE O/P EST LOW 20 MIN: CPT | Mod: HCNC,S$GLB,, | Performed by: NURSE PRACTITIONER

## 2020-05-27 PROCEDURE — 1101F PR PT FALLS ASSESS DOC 0-1 FALLS W/OUT INJ PAST YR: ICD-10-PCS | Mod: HCNC,CPTII,S$GLB, | Performed by: NURSE PRACTITIONER

## 2020-05-27 PROCEDURE — 1126F PR PAIN SEVERITY QUANTIFIED, NO PAIN PRESENT: ICD-10-PCS | Mod: HCNC,S$GLB,, | Performed by: NURSE PRACTITIONER

## 2020-05-27 PROCEDURE — 1159F PR MEDICATION LIST DOCUMENTED IN MEDICAL RECORD: ICD-10-PCS | Mod: HCNC,S$GLB,, | Performed by: NURSE PRACTITIONER

## 2020-05-27 NOTE — PROGRESS NOTES
"Primary Care Provider Appointment    Subjective:      Patient ID: Tyra Riley is a 75 y.o. female with hx of osteoporosis, hx of breast cancer now in remission, hx of nephrolithasis and right renal cysts, GERD, HLD, hx of anemia    Chief Complaint: Follow-up    Pt here for bp follow-up, accompanied by sister. Reports she has had no further episode of dizziness. Had initial consult with neuro, Dr. Diaz earlier today. Will schedule a 3 day EEG once approved by insurance.     BP in clinic 170/70 after taking it twice. Pt brought in home bp monitor to correlate, reading 182/74. Pt belives it is because she was nervous about having two appointments today, especially with neurology.  Pt educated on proper use of home BP cuff. BP log reviewed, at goal 60% of the time. Pt eats out often. Pt encourage to prepare more meals to reduce salt intake and ask for less seasoning when eating out. Not currently on bp meds, will try diet modifications first. Pt to call clinic with tomorrow's bp reading.        Past Surgical History:   Procedure Laterality Date    BLADDER REPAIR      BREAST RECONSTRUCTION      CATARACT EXTRACTION      COLONOSCOPY N/A 1/4/2016    Procedure: COLONOSCOPY;  Surgeon: Kieran Krishnamurthy MD;  Location: [address]);  Service: Endoscopy;  Laterality: N/A;    COLONOSCOPY W/ POLYPECTOMY      HYSTERECTOMY  1994    JODI, ovaries remain ("pain")    LASIK      MASTECTOMY  2000    Bilateral    OOPHORECTOMY  1994    PARTIAL NEPHRECTOMY      cyst from left kidney    Toes surgery      Hammer toe surgery    TONSILLECTOMY         Past Medical History:   Diagnosis Date    Abnormal Pap smear     colpo then hyst    Allergy     Breast cancer 1997, 2000    Colon polyp     Fever blister     GERD (gastroesophageal reflux disease)     Hyperlipidemia     Hyperthyroidism     Kidney cysts     Osteoporosis, unspecified     Seasonal allergies        Social History     Socioeconomic History    Marital " status: Single     Spouse name: Not on file    Number of children: 1    Years of education: Not on file    Highest education level: Bachelor's degree (e.g., BA, AB, BS)   Occupational History     Comment: [employer]    Social Needs    Financial resource strain: Not hard at all    Food insecurity:     Worry: Never true     Inability: Never true    Transportation needs:     Medical: No     Non-medical: No   Tobacco Use    Smoking status: Never Smoker    Smokeless tobacco: Never Used   Substance and Sexual Activity    Alcohol use: Yes     Alcohol/week: 1.0 standard drinks     Types: 1 Glasses of wine per week     Comment: Rare    Drug use: No    Sexual activity: Never   Lifestyle    Physical activity:     Days per week: 1 day     Minutes per session: 60 min    Stress: To some extent   Relationships    Social connections:     Talks on phone: More than three times a week     Gets together: More than three times a week     Attends [employer] service: More than 4 times per year     Active member of club or organization: Yes     Attends meetings of clubs or organizations: More than 4 times per year     Relationship status: Never    Other Topics Concern    Are you pregnant or think you may be? No    Breast-feeding Not Asked   Social History Narrative    Retired     Children:  Mike ([place])     Qian: Mos[employer]    Hobbies: Line Dancing once a week , in Taoist choir , [employer]        Review of Systems   Constitutional: Negative for fatigue and unexpected weight change.   HENT: Negative for congestion and sinus pressure.    Eyes: Negative for visual disturbance.   Respiratory: Negative for shortness of breath and wheezing.    Cardiovascular: Negative for chest pain and palpitations.   Musculoskeletal: Negative for arthralgias and gait problem.   Skin: Negative for rash.   Neurological: Negative for dizziness, tremors, weakness and headaches.   Psychiatric/Behavioral: Negative for  confusion and suicidal ideas. The patient is not nervous/anxious.        Objective:   BP (!) 170/90 (BP Location: Right arm, Patient Position: Sitting, BP Method: Medium (Manual))   Pulse 78   Temp 98.1 °F (36.7 °C) (Oral)   Ht 5' (1.524 m)   Wt 52.1 kg (114 lb 13.8 oz)   SpO2 98%   BMI 22.43 kg/m²     Physical Exam   Constitutional: She is oriented to person, place, and time. She appears well-developed and well-nourished. No distress.   Cardiovascular: Normal rate, regular rhythm and normal heart sounds.   No murmur heard.  Pulmonary/Chest: Effort normal and breath sounds normal. No stridor. No respiratory distress. She has no wheezes. She exhibits no tenderness.   Neurological: She is alert and oriented to person, place, and time. No cranial nerve deficit or sensory deficit.   Skin: Skin is warm and dry. Capillary refill takes less than 2 seconds. No rash noted. She is not diaphoretic. No erythema.   Psychiatric: She has a normal mood and affect. Her behavior is normal. Judgment and thought content normal.       Lab Results   Component Value Date    WBC 4.37 05/07/2020    HGB 10.4 (L) 05/07/2020    HCT 32.4 (L) 05/07/2020     05/07/2020    CHOL 199 05/06/2020    TRIG 191 (H) 05/06/2020    HDL 46 05/06/2020    ALT 13 05/07/2020    AST 16 05/07/2020     05/07/2020    K 4.7 05/07/2020     05/07/2020    CREATININE 1.1 05/07/2020    BUN 17 05/07/2020    CO2 26 05/07/2020    TSH 2.708 03/11/2020    HGBA1C 5.3 02/21/2018       Medication List with Changes/Refills   Current Medications    ASPIRIN (ECOTRIN) 81 MG EC TABLET    Take 81 mg by mouth once daily.    ERGOCALCIFEROL (ERGOCALCIFEROL) 50,000 UNIT CAP    Take 1 capsule (50,000 Units total) by mouth every 7 days.    FLUOCINONIDE 0.05% (LIDEX) 0.05 % CREAM    Apply topically 2 (two) times daily.    GARLIC 5,000 MCG TAB    Take 1 capsule by mouth every evening.    L GASSERI/B BIFIDUM/B LONGUM ([facility] COLON HEALTH ORAL)    Take by mouth every  evening.    LORATADINE/PSEUDOEPHEDRINE (ALAVERT D-12 ALLERGY-SINUS ORAL)    Take 1 tablet by mouth once daily.    ONDANSETRON (ZOFRAN-ODT) 4 MG TBDL    Take 1 tablet (4 mg total) by mouth every 6 (six) hours as needed.    POTASSIUM GLUCONATE 550 MG (90 MG) TAB    Take 1 tablet by mouth every morning.    ROSUVASTATIN (CRESTOR) 20 MG TABLET    Take 1 tablet (20 mg total) by mouth once daily.    UBIDECARENONE (CO Q-10 ORAL)    Take by mouth.    VIT C/E/ZN/COPPR/LUTEIN/ZEAXAN (PRESERVISION AREDS-2 ORAL)    Take 1 capsule by mouth 2 (two) times daily.    VITAMIN B COMPLEX ORAL    Take by mouth once daily.             RESULTS: Reviewed labs and images today    Assessment:   75 y.o. female with multiple co-morbid illnesses here to chronic care management.     Plan:     Problem List Items Addressed This Visit        Cardiac/Vascular    Prehypertension     BP today 170/70  Home BP log, 60% at goal  Pt eats out often. Pt encourage to prepare more meals to reduce salt intake and ask for less seasoning when eating out.   Not currently on bp meds, will try diet modifications first.   Pt to call clinic with tomorrow's bp reading.                  Health Maintenance       Date Due Completion Date    TETANUS VACCINE 02/15/1965 ---    Shingles Vaccine (2 of 3) 12/27/2014 11/1/2014    Mammogram 06/12/2020 6/12/2018 (Done)    Override on 6/12/2018: Done (no longer need mammogram)    Colonoscopy 01/04/2021 1/4/2016    DEXA SCAN 03/02/2023 3/2/2020    Lipid Panel 05/06/2025 5/6/2020          Follow up in about 6 weeks (around 7/8/2020). . Total face-to-face time was 20 min, 50% of this was spent on counseling and coordination of care.       SHANICE Pereira  Family Medicine  [facility] [place]

## 2020-05-27 NOTE — ASSESSMENT & PLAN NOTE
BP today 170/70  Home BP log, 60% at goal  Pt eats out often. Pt encourage to prepare more meals to reduce salt intake and ask for less seasoning when eating out.   Not currently on bp meds, will try diet modifications first.   Pt to call clinic with tomorrow's bp reading.

## 2020-05-28 ENCOUNTER — TELEPHONE (OUTPATIENT)
Dept: PRIMARY CARE CLINIC | Facility: CLINIC | Age: 75
End: 2020-05-28

## 2020-05-28 NOTE — TELEPHONE ENCOUNTER
----- Message from Gina Dallas NP sent at 5/27/2020 11:43 AM CDT -----  Expect call from pt to report BP.

## 2020-07-09 ENCOUNTER — OFFICE VISIT (OUTPATIENT)
Dept: PRIMARY CARE CLINIC | Facility: CLINIC | Age: 75
End: 2020-07-09
Payer: MEDICARE

## 2020-07-09 VITALS
WEIGHT: 114.88 LBS | DIASTOLIC BLOOD PRESSURE: 90 MMHG | HEART RATE: 70 BPM | TEMPERATURE: 98 F | SYSTOLIC BLOOD PRESSURE: 160 MMHG | OXYGEN SATURATION: 97 % | BODY MASS INDEX: 22.55 KG/M2 | HEIGHT: 60 IN

## 2020-07-09 DIAGNOSIS — D64.9 ANEMIA, UNSPECIFIED TYPE: Primary | ICD-10-CM

## 2020-07-09 DIAGNOSIS — E78.00 HYPERCHOLESTEREMIA: ICD-10-CM

## 2020-07-09 DIAGNOSIS — R47.01 APHASIA: ICD-10-CM

## 2020-07-09 DIAGNOSIS — I10 ESSENTIAL HYPERTENSION: ICD-10-CM

## 2020-07-09 PROBLEM — N17.9 AKI (ACUTE KIDNEY INJURY): Status: RESOLVED | Noted: 2020-05-05 | Resolved: 2020-07-09

## 2020-07-09 PROCEDURE — 99215 PR OFFICE/OUTPT VISIT, EST, LEVL V, 40-54 MIN: ICD-10-PCS | Mod: HCNC,S$GLB,, | Performed by: INTERNAL MEDICINE

## 2020-07-09 PROCEDURE — 99215 OFFICE O/P EST HI 40 MIN: CPT | Mod: HCNC,S$GLB,, | Performed by: INTERNAL MEDICINE

## 2020-07-09 RX ORDER — AMOXICILLIN 500 MG
CAPSULE ORAL DAILY
COMMUNITY
End: 2021-03-08

## 2020-07-09 RX ORDER — VALSARTAN 40 MG/1
40 TABLET ORAL DAILY
Qty: 30 TABLET | Refills: 0 | Status: SHIPPED | OUTPATIENT
Start: 2020-07-09 | End: 2020-08-10 | Stop reason: SDUPTHER

## 2020-07-09 NOTE — ASSESSMENT & PLAN NOTE
Discussed risk of uncontrolled HTN which pt understands  Spent significant amount of time discussing low sodium diet, target sodium rate which is 1500 mg, reviewed nutrition facts for common restaurants patient attends. Given education for pt to review and I reviewed with her in clinic.   Discussed goal BP is 140/80.   After much discussion, pt is agreeable to start medication to help lower her blood pressure and reduce risk of endorgan damage  After discussion of several agents, we have decided on valsartan 40 mg to start.   Instructed to keep bp log.

## 2020-07-09 NOTE — PATIENT INSTRUCTIONS
Given information on low sodium diet to review     Blood pressure goal 140/80.     If pressure are consistently above your goal, recommend starting valsartan 40 mg once daily.     Please inform us in one week concerning your blood pressures.

## 2020-07-09 NOTE — Clinical Note
Dr. Ty Diaz any notes since 5/27/2020  [phone]

## 2020-07-09 NOTE — PROGRESS NOTES
"  Primary Care Provider Appointment    Subjective:      Patient ID: Tyra Riley is a 75 y.o. female with hx of osteoporosis, hx of breast cancer now in remission, hx of nephrolithasis and right renal cysts, GERD, HLD, hx of anemia    Chief Complaint: Follow-up    Pt is concerned about her blood pressure. Did not bring log, she has family hx of heart disease that she is concerned about as well.     Pt reports being stressed about the covid pandemic, she cannot drive due to seizure workup, she gets nervous checking her blood pressure.     4 of her classmates passed this year, here recently she lost a close friend.     She does not know what changes to make to her diet.     She has seen Dr. Gabino Montes vascular surgeon at [facility] for second opinion concerning her aphasia, she is pending prolonged EEG for next month. She has not yet followed back up with Dr. Diaz.       Other Providers:   Dr. Mae/Dr. Tiago AGRAWAL - endocrinology: last seen in 2019  Dr. Trujillo, dermatology - last visit 1/8/2020 - contact dermatitis, allergy patch testing   Dr. Shraddha Krishnamurthy, urology at [facility]   Dr. Noa Langston, hematology, oncology - last seen in 2015  Dr. Vaughn, cardiology   Dr. Diaz, neurology last record from 5/27/2020 - ordered long term EEG procedure.   Dr. Mike Day neuro note: at least 1997,  normal MRI and EEG, no definitive diagnosis.            Past Surgical History:   Procedure Laterality Date    BLADDER REPAIR      BREAST RECONSTRUCTION      CATARACT EXTRACTION      COLONOSCOPY N/A 1/4/2016    Procedure: COLONOSCOPY;  Surgeon: Kieran Krishnamurthy MD;  Location: [address]);  Service: Endoscopy;  Laterality: N/A;    COLONOSCOPY W/ POLYPECTOMY      HYSTERECTOMY  1994    JODI, ovaries remain ("pain")    LASIK      MASTECTOMY  2000    Bilateral    OOPHORECTOMY  1994    PARTIAL NEPHRECTOMY      cyst from left kidney    Toes surgery      Hammer toe surgery    TONSILLECTOMY         Past Medical History: "   Diagnosis Date    Abnormal Pap smear     colpo then hyst    Allergy     Breast cancer 1997, 2000    Colon polyp     Fever blister     GERD (gastroesophageal reflux disease)     Hyperlipidemia     Hyperthyroidism     Kidney cysts     Osteoporosis, unspecified     Seasonal allergies        Social History     Socioeconomic History    Marital status: Single     Spouse name: Not on file    Number of children: 1    Years of education: Not on file    Highest education level: Bachelor's degree (e.g., BA, AB, BS)   Occupational History     Comment: [employer]    Social Needs    Financial resource strain: Not hard at all    Food insecurity     Worry: Never true     Inability: Never true    Transportation needs     Medical: No     Non-medical: No   Tobacco Use    Smoking status: Never Smoker    Smokeless tobacco: Never Used   Substance and Sexual Activity    Alcohol use: Yes     Alcohol/week: 1.0 standard drinks     Types: 1 Glasses of wine per week     Comment: Rare    Drug use: No    Sexual activity: Never   Lifestyle    Physical activity     Days per week: 1 day     Minutes per session: 60 min    Stress: To some extent   Relationships    Social connections     Talks on phone: More than three times a week     Gets together: More than three times a week     Attends Restorationism service: More than 4 times per year     Active member of club or organization: Yes     Attends meetings of clubs or organizations: More than 4 times per year     Relationship status: Never    Other Topics Concern    Are you pregnant or think you may be? No    Breast-feeding Not Asked   Social History Narrative    Retired     Children:  Mike ([place])     Qian: Rastafari    Hobbies: Line Dancing once a week , in [employer] , [employer]        Review of Systems   Constitutional: Negative for activity change, chills, fatigue and unexpected weight change.   HENT: Negative for congestion, postnasal  drip, sinus pressure, sore throat and trouble swallowing.    Eyes: Negative for visual disturbance.   Respiratory: Negative for shortness of breath and wheezing.    Cardiovascular: Negative for chest pain and palpitations.   Gastrointestinal: Negative for abdominal pain, blood in stool, constipation and diarrhea.   Endocrine: Negative for cold intolerance.   Genitourinary: Negative for difficulty urinating.   Musculoskeletal: Negative for arthralgias and gait problem.   Skin: Negative for rash.   Allergic/Immunologic: Negative for immunocompromised state.   Neurological: Negative for dizziness, tremors and weakness.   Hematological: Does not bruise/bleed easily.   Psychiatric/Behavioral: Positive for dysphoric mood. Negative for confusion and suicidal ideas. The patient is not nervous/anxious.        Objective:   BP (!) 160/90 (BP Location: Left arm, Patient Position: Sitting, BP Method: Medium (Manual))   Pulse 70   Temp 98.3 °F (36.8 °C) (Oral)   Ht 5' (1.524 m)   Wt 52.1 kg (114 lb 13.8 oz)   SpO2 97%   BMI 22.43 kg/m²     Physical Exam  Vitals signs reviewed.   Constitutional:       Appearance: She is well-developed.   HENT:      Head: Normocephalic.   Eyes:      Extraocular Movements: Extraocular movements intact.      Conjunctiva/sclera: Conjunctivae normal.   Neck:      Thyroid: No thyromegaly.   Cardiovascular:      Rate and Rhythm: Normal rate and regular rhythm.      Heart sounds: Normal heart sounds. No murmur.   Pulmonary:      Effort: Pulmonary effort is normal. No respiratory distress.      Breath sounds: Normal breath sounds. No wheezing.   Musculoskeletal: Normal range of motion.      Comments: No synovitis noted    Skin:     Findings: No bruising or rash.   Neurological:      General: No focal deficit present.      Mental Status: She is alert and oriented to person, place, and time. Mental status is at baseline.   Psychiatric:         Behavior: Behavior normal.         Thought Content: Thought  content normal.         Judgment: Judgment normal.             Lab Results   Component Value Date    WBC 4.37 05/07/2020    HGB 10.4 (L) 05/07/2020    HCT 32.4 (L) 05/07/2020     05/07/2020    CHOL 199 05/06/2020    TRIG 191 (H) 05/06/2020    HDL 46 05/06/2020    ALT 13 05/07/2020    AST 16 05/07/2020     05/07/2020    K 4.7 05/07/2020     05/07/2020    CREATININE 1.1 05/07/2020    BUN 17 05/07/2020    CO2 26 05/07/2020    TSH 2.708 03/11/2020    HGBA1C 5.3 02/21/2018       Current Outpatient Medications on File Prior to Visit   Medication Sig Dispense Refill    aspirin (ECOTRIN) 81 MG EC tablet Take 81 mg by mouth once daily.      ergocalciferol (ERGOCALCIFEROL) 50,000 unit Cap Take 1 capsule (50,000 Units total) by mouth every 7 days. 12 capsule 0    fluocinonide 0.05% (LIDEX) 0.05 % cream Apply topically 2 (two) times daily. 45 g 0    garlic 5,000 mcg Tab Take 1 capsule by mouth every evening.      loratadine/pseudoephedrine (ALAVERT D-12 ALLERGY-SINUS ORAL) Take 1 tablet by mouth once daily.      omega-3 fatty acids/fish oil (FISH OIL-OMEGA-3 FATTY ACIDS) 300-1,000 mg capsule Take by mouth once daily.      ondansetron (ZOFRAN-ODT) 4 MG TbDL Take 1 tablet (4 mg total) by mouth every 6 (six) hours as needed. 12 tablet 0    potassium gluconate 550 mg (90 mg) Tab Take 1 tablet by mouth every morning.      rosuvastatin (CRESTOR) 20 MG tablet Take 1 tablet (20 mg total) by mouth once daily. 90 tablet 3    vit C/E/Zn/coppr/lutein/zeaxan (PRESERVISION AREDS-2 ORAL) Take 1 capsule by mouth 2 (two) times daily.      [DISCONTINUED] VITAMIN B COMPLEX ORAL Take by mouth once daily.      L GASSERI/B BIFIDUM/B LONGUM ([employer] HEALTH ORAL) Take by mouth every evening.      UBIDECARENONE (CO Q-10 ORAL) Take by mouth.       No current facility-administered medications on file prior to visit.          Assessment:   75 y.o. female with multiple co-morbid illnesses here for followup for  continuing work-up of chronic issues notably hx of osteoporosis, hx of breast cancer now in remission, hx of nephrolithasis and right renal cysts, GERD, HLD, hx of anemia, dizziness and fatigue     Plan:     Problem List Items Addressed This Visit        Neuro    Aphasia     Intermittent, undergoing seizure workup   Pending prolonged EEG   Negative imaging studies             Cardiac/Vascular    Essential hypertension     Discussed risk of uncontrolled HTN which pt understands  Spent significant amount of time discussing low sodium diet, target sodium rate which is 1500 mg, reviewed nutrition facts for common restaurants patient attends. Given education for pt to review and I reviewed with her in clinic.   Discussed goal BP is 140/80.   After much discussion, pt is agreeable to start medication to help lower her blood pressure and reduce risk of endorgan damage  After discussion of several agents, we have decided on valsartan 40 mg to start.   Instructed to keep bp log.          Relevant Medications    valsartan (DIOVAN) 40 MG tablet      Other Visit Diagnoses     Anemia, unspecified type    -  Primary    Relevant Orders    Comprehensive metabolic panel    CBC auto differential    Iron and TIBC    Ferritin          Health Maintenance       Date Due Completion Date    Hepatitis C Screening 1945 ---    TETANUS VACCINE 02/15/1965 ---    Mammogram 02/15/1987 ---    Pneumococcal Vaccine (65+ High/Highest Risk) (1 of 2 - PCV13) 02/15/2012 ---    Shingles Vaccine (2 of 3) 12/27/2014 11/1/2014    Lipid Panel 05/20/2019 5/20/2014    DEXA SCAN 08/14/2020 8/14/2017    Colonoscopy 01/04/2026 1/4/2016              Follow up in about 4 weeks (around 8/6/2020) for high blood pressure. Total face-to-face time was 40 min, greater than 50% of this was spent on counseling and coordination of care. The following issues were discussed: htn, aphasia, anemia    Nadia Sanderson MD  Internal Medicine- Geriatrics  [facility] [place]

## 2020-07-16 ENCOUNTER — TELEPHONE (OUTPATIENT)
Dept: PRIMARY CARE CLINIC | Facility: CLINIC | Age: 75
End: 2020-07-16

## 2020-07-16 NOTE — TELEPHONE ENCOUNTER
----- Message from Nadia Sanderson MD sent at 7/16/2020  9:05 AM CDT -----  Please call patient about her blood pressure, did she start valsartan

## 2020-07-16 NOTE — TELEPHONE ENCOUNTER
Spoke with pt, Pt has not taken B/P since office visit.  Took B/P this /75.  Pt has not started Valsartan.  Discussed need and reason to check BP.  Informed pt that I would call back in 7 days to check BP log.  Pt voiced understanding and agrees to take BP.

## 2020-07-17 ENCOUNTER — LAB VISIT (OUTPATIENT)
Dept: PRIMARY CARE CLINIC | Facility: OTHER | Age: 75
End: 2020-07-17
Attending: INTERNAL MEDICINE
Payer: MEDICARE

## 2020-07-17 ENCOUNTER — TELEPHONE (OUTPATIENT)
Dept: FAMILY MEDICINE | Facility: CLINIC | Age: 75
End: 2020-07-17

## 2020-07-17 DIAGNOSIS — Z03.818 ENCOUNTER FOR OBSERVATION FOR SUSPECTED EXPOSURE TO OTHER BIOLOGICAL AGENTS RULED OUT: ICD-10-CM

## 2020-07-17 PROCEDURE — U0003 INFECTIOUS AGENT DETECTION BY NUCLEIC ACID (DNA OR RNA); SEVERE ACUTE RESPIRATORY SYNDROME CORONAVIRUS 2 (SARS-COV-2) (CORONAVIRUS DISEASE [COVID-19]), AMPLIFIED PROBE TECHNIQUE, MAKING USE OF HIGH THROUGHPUT TECHNOLOGIES AS DESCRIBED BY CMS-2020-01-R: HCPCS | Mod: HCNC

## 2020-07-17 NOTE — TELEPHONE ENCOUNTER
"Called patient and asked if she wanted to schedule her yearly mammogram. She explained the following to me: That she has history of left breast cancer diagnosed originally after an abnormal mammogram in 1997. She opted for lumpectomy followed by XRT. No chemotherapy or hormonal therapy required. In 2000 she had a recurrence in her left breast and was told "tiny" and opted for bilateral mastectomy  And reconstruction at that time. No further therapy recommended.    She further explained that she was originally told by her oncologist and gynecologist that she would not require any future mammograms, then she had one in 2018. She sees Dr. Sanderson on 8/6/2020 and agrees to ask her opinion regarding this question. She also states that she might have to get back in touch with her Oncologist to have this question answered. Explained to her that if indeed her Oncologist or Gynecologist recommended that she have no further Mammograms, that there might be a a way that we could possibly caryl her mammogram in health mas Not clinically appropriate. I will follow up pt and Dr. Sanderson regarding this question and see what her recommendations are for the future.  "

## 2020-07-20 ENCOUNTER — TELEPHONE (OUTPATIENT)
Dept: ENDOCRINOLOGY | Facility: CLINIC | Age: 75
End: 2020-07-20

## 2020-07-20 NOTE — TELEPHONE ENCOUNTER
----- Message from Chuy Borjas sent at 7/20/2020  2:05 PM CDT -----  Regarding: Pt Inquiry  Pt called requesting a call back inquiring on her injection ordered by7 Dr. Ordoñez       [phone] (home)

## 2020-07-20 NOTE — TELEPHONE ENCOUNTER
Talk to pt she have already scheduled appt with prolia injection in oct. Pt [facility] [place]. Since main campus is far away.

## 2020-07-23 ENCOUNTER — PATIENT OUTREACH (OUTPATIENT)
Dept: ADMINISTRATIVE | Facility: HOSPITAL | Age: 75
End: 2020-07-23

## 2020-07-23 ENCOUNTER — TELEPHONE (OUTPATIENT)
Dept: PRIMARY CARE CLINIC | Facility: CLINIC | Age: 75
End: 2020-07-23

## 2020-07-23 DIAGNOSIS — Z90.13 HISTORY OF BILATERAL MASTECTOMY: ICD-10-CM

## 2020-07-23 LAB — SARS-COV-2 RNA RESP QL NAA+PROBE: NEGATIVE

## 2020-07-23 NOTE — PROGRESS NOTES
Chart review completed 2020.  Care Everywhere updates requested and reviewed.  Immunizations reconciled. Media reports reviewed.  Duplicate HM overrides and  orders removed.  Overdue HM topic chart audit and/or requested.  Overdue lab testing linked to upcoming lab appointments if applies.      Health Maintenance Due   Topic Date Due    TETANUS VACCINE  02/15/1965    Shingles Vaccine (1 of 2) 02/15/1997    Pneumococcal Vaccine (65+ Low/Medium Risk) (1 of 2 - PCV13) 02/15/2012

## 2020-07-23 NOTE — TELEPHONE ENCOUNTER
Left message for pt to call office, was checking in how her B/P has been doing with medication change.

## 2020-07-24 NOTE — TELEPHONE ENCOUNTER
Spoke with pt this AM, doing well no complaints voiced.  Pt did start Losartan on 7/18 after B/P as noted.  7/18    146/68  7/19    139/66  7/20    136/69  7/21    158/79  7/22     140/68  7/23     136 79   Pt cont top work on her salt intake.  Pr next appt scheduled for 8/6

## 2020-08-05 ENCOUNTER — LAB VISIT (OUTPATIENT)
Dept: LAB | Facility: HOSPITAL | Age: 75
End: 2020-08-05
Attending: INTERNAL MEDICINE
Payer: MEDICARE

## 2020-08-05 DIAGNOSIS — D64.9 ANEMIA, UNSPECIFIED TYPE: ICD-10-CM

## 2020-08-05 LAB
ALBUMIN SERPL BCP-MCNC: 3.7 G/DL (ref 3.5–5.2)
ALP SERPL-CCNC: 48 U/L (ref 55–135)
ALT SERPL W/O P-5'-P-CCNC: 8 U/L (ref 10–44)
ANION GAP SERPL CALC-SCNC: 7 MMOL/L (ref 8–16)
AST SERPL-CCNC: 17 U/L (ref 10–40)
BASOPHILS # BLD AUTO: 0.04 K/UL (ref 0–0.2)
BASOPHILS NFR BLD: 0.9 % (ref 0–1.9)
BILIRUB SERPL-MCNC: 0.3 MG/DL (ref 0.1–1)
BUN SERPL-MCNC: 22 MG/DL (ref 8–23)
CALCIUM SERPL-MCNC: 9.9 MG/DL (ref 8.7–10.5)
CHLORIDE SERPL-SCNC: 106 MMOL/L (ref 95–110)
CO2 SERPL-SCNC: 29 MMOL/L (ref 23–29)
CREAT SERPL-MCNC: 1.2 MG/DL (ref 0.5–1.4)
DIFFERENTIAL METHOD: ABNORMAL
EOSINOPHIL # BLD AUTO: 0.4 K/UL (ref 0–0.5)
EOSINOPHIL NFR BLD: 9 % (ref 0–8)
ERYTHROCYTE [DISTWIDTH] IN BLOOD BY AUTOMATED COUNT: 12.3 % (ref 11.5–14.5)
EST. GFR  (AFRICAN AMERICAN): 51.8 ML/MIN/1.73 M^2
EST. GFR  (NON AFRICAN AMERICAN): 44.9 ML/MIN/1.73 M^2
FERRITIN SERPL-MCNC: 82 NG/ML (ref 20–300)
GLUCOSE SERPL-MCNC: 80 MG/DL (ref 70–110)
HCT VFR BLD AUTO: 36.6 % (ref 37–48.5)
HGB BLD-MCNC: 11.4 G/DL (ref 12–16)
IMM GRANULOCYTES # BLD AUTO: 0.01 K/UL (ref 0–0.04)
IMM GRANULOCYTES NFR BLD AUTO: 0.2 % (ref 0–0.5)
IRON SERPL-MCNC: 80 UG/DL (ref 30–160)
LYMPHOCYTES # BLD AUTO: 1.6 K/UL (ref 1–4.8)
LYMPHOCYTES NFR BLD: 34.6 % (ref 18–48)
MCH RBC QN AUTO: 31.6 PG (ref 27–31)
MCHC RBC AUTO-ENTMCNC: 31.1 G/DL (ref 32–36)
MCV RBC AUTO: 101 FL (ref 82–98)
MONOCYTES # BLD AUTO: 0.3 K/UL (ref 0.3–1)
MONOCYTES NFR BLD: 6.1 % (ref 4–15)
NEUTROPHILS # BLD AUTO: 2.2 K/UL (ref 1.8–7.7)
NEUTROPHILS NFR BLD: 49.2 % (ref 38–73)
NRBC BLD-RTO: 0 /100 WBC
PLATELET # BLD AUTO: 214 K/UL (ref 150–350)
PMV BLD AUTO: 11.5 FL (ref 9.2–12.9)
POTASSIUM SERPL-SCNC: 4.4 MMOL/L (ref 3.5–5.1)
PROT SERPL-MCNC: 7.4 G/DL (ref 6–8.4)
RBC # BLD AUTO: 3.61 M/UL (ref 4–5.4)
SATURATED IRON: 23 % (ref 20–50)
SODIUM SERPL-SCNC: 142 MMOL/L (ref 136–145)
TOTAL IRON BINDING CAPACITY: 346 UG/DL (ref 250–450)
TRANSFERRIN SERPL-MCNC: 234 MG/DL (ref 200–375)
WBC # BLD AUTO: 4.56 K/UL (ref 3.9–12.7)

## 2020-08-05 PROCEDURE — 82728 ASSAY OF FERRITIN: CPT | Mod: HCNC

## 2020-08-05 PROCEDURE — 80053 COMPREHEN METABOLIC PANEL: CPT | Mod: HCNC

## 2020-08-05 PROCEDURE — 85025 COMPLETE CBC W/AUTO DIFF WBC: CPT | Mod: HCNC

## 2020-08-05 PROCEDURE — 36415 COLL VENOUS BLD VENIPUNCTURE: CPT | Mod: HCNC,PO

## 2020-08-05 PROCEDURE — 83540 ASSAY OF IRON: CPT | Mod: HCNC

## 2020-08-06 ENCOUNTER — OFFICE VISIT (OUTPATIENT)
Dept: PRIMARY CARE CLINIC | Facility: CLINIC | Age: 75
End: 2020-08-06
Payer: MEDICARE

## 2020-08-06 VITALS
DIASTOLIC BLOOD PRESSURE: 80 MMHG | HEART RATE: 78 BPM | WEIGHT: 115.5 LBS | HEIGHT: 60 IN | OXYGEN SATURATION: 97 % | TEMPERATURE: 98 F | BODY MASS INDEX: 22.68 KG/M2 | SYSTOLIC BLOOD PRESSURE: 148 MMHG

## 2020-08-06 DIAGNOSIS — R53.83 FATIGUE, UNSPECIFIED TYPE: ICD-10-CM

## 2020-08-06 DIAGNOSIS — I10 ESSENTIAL HYPERTENSION: ICD-10-CM

## 2020-08-06 PROCEDURE — 99214 PR OFFICE/OUTPT VISIT, EST, LEVL IV, 30-39 MIN: ICD-10-PCS | Mod: HCNC,S$GLB,, | Performed by: INTERNAL MEDICINE

## 2020-08-06 PROCEDURE — 99214 OFFICE O/P EST MOD 30 MIN: CPT | Mod: HCNC,S$GLB,, | Performed by: INTERNAL MEDICINE

## 2020-08-06 NOTE — PROGRESS NOTES
"Primary Care Provider Appointment    Subjective:      Patient ID: Tyra Riley is a 75 y.o. female. with hx of osteoporosis, hx of breast cancer now in remission, hx of nephrolithasis and right renal cysts, GERD, HLD, hx of anemia    Chief Complaint: Hypertension    Is working on diet with lower sodium. Drinking more water.   Taking valsartan without issues.   Brought bp log.           Has not had eye appt this year.   EEG is scheduled for 8/18.   Pt does not exercise     Pt request referral for endocrinology due to need for need to have authoriozation for prolia injeciton, pt's current provider is at [facility] main [facility] and she wants to have provider here in [place].     Past Surgical History:   Procedure Laterality Date    BILATERAL MASTECTOMY  02/2000    BLADDER REPAIR      BREAST RECONSTRUCTION      CATARACT EXTRACTION      COLONOSCOPY N/A 1/4/2016    Procedure: COLONOSCOPY;  Surgeon: Kieran Krishnamurthy MD;  Location: [facility] ([address] [place] [address]);  Service: Endoscopy;  Laterality: N/A;    COLONOSCOPY W/ POLYPECTOMY      HYSTERECTOMY  1994    JODI, ovaries remain ("pain")    LASIK      MASTECTOMY  2000    Bilateral    OOPHORECTOMY  1994    PARTIAL NEPHRECTOMY      cyst from left kidney    Toes surgery      Hammer toe surgery    TONSILLECTOMY         Past Medical History:   Diagnosis Date    Abnormal Pap smear     colpo then hyst    Allergy     Breast cancer 1997, 2000    Colon polyp     Fever blister     GERD (gastroesophageal reflux disease)     Hyperlipidemia     Hyperthyroidism     Kidney cysts     Osteoporosis, unspecified     Seasonal allergies        Social History     Socioeconomic History    Marital status: Single     Spouse name: Not on file    Number of children: 1    Years of education: Not on file    Highest education level: Bachelor's degree (e.g., BA, AB, BS)   Occupational History     Comment: [employer]    Social Needs    Financial resource strain: Not hard at all "    Food insecurity     Worry: Never true     Inability: Never true    Transportation needs     Medical: No     Non-medical: No   Tobacco Use    Smoking status: Never Smoker    Smokeless tobacco: Never Used   Substance and Sexual Activity    Alcohol use: Yes     Alcohol/week: 1.0 standard drinks     Types: 1 Glasses of wine per week     Comment: Rare    Drug use: No    Sexual activity: Never   Lifestyle    Physical activity     Days per week: 1 day     Minutes per session: 60 min    Stress: To some extent   Relationships    Social connections     Talks on phone: More than three times a week     Gets together: More than three times a week     Attends Shinto service: More than 4 times per year     Active member of club or organization: Yes     Attends meetings of clubs or organizations: More than 4 times per year     Relationship status: Never    Other Topics Concern    Are you pregnant or think you may be? No    Breast-feeding Not Asked   Social History Narrative    Retired     Children:  Mike ([place])     Qian: Mandaen    Hobbies: Line Dancing once a week , in [employer] , [employer]        Review of Systems   Constitutional: Negative for activity change and appetite change.   Respiratory: Negative for cough and shortness of breath.    Cardiovascular: Negative for chest pain.   Gastrointestinal: Negative for constipation.   Neurological: Negative for dizziness and weakness.   Psychiatric/Behavioral:        + stress        Objective:   BP (!) 148/80   Pulse 78   Temp 97.8 °F (36.6 °C) (Temporal)   Ht 5' (1.524 m)   Wt 52.4 kg (115 lb 8.3 oz)   SpO2 97%   BMI 22.56 kg/m²     Physical Exam  Vitals signs reviewed.   Constitutional:       Appearance: She is normal weight.   HENT:      Head: Normocephalic.   Neurological:      General: No focal deficit present.      Mental Status: She is alert and oriented to person, place, and time. Mental status is at baseline.   Psychiatric:          Mood and Affect: Mood normal.         Behavior: Behavior normal.         Thought Content: Thought content normal.         Judgment: Judgment normal.         Lab Results   Component Value Date    WBC 4.56 08/05/2020    HGB 11.4 (L) 08/05/2020    HCT 36.6 (L) 08/05/2020     08/05/2020    CHOL 199 05/06/2020    TRIG 191 (H) 05/06/2020    HDL 46 05/06/2020    ALT 8 (L) 08/05/2020    AST 17 08/05/2020     08/05/2020    K 4.4 08/05/2020     08/05/2020    CREATININE 1.2 08/05/2020    BUN 22 08/05/2020    CO2 29 08/05/2020    TSH 2.708 03/11/2020    HGBA1C 5.3 02/21/2018       RESULTS: Reviewed labs and images today    Assessment:   75 y.o. female with multiple co-morbid illnesses here to continue work-up of chronic issues notably with hx of osteoporosis, hx of breast cancer now in remission, hx of nephrolithasis and right renal cysts, GERD, HLD, hx of anemia         Plan:     Problem List Items Addressed This Visit        Cardiac/Vascular    Essential hypertension     Better but not at goal   Cont valsartan 40 mg   Discussed with patient recommendation to exercise at least 30 mins 4 days a week but to start slowly.   Pt will ask her friend to serve as accountability partner, used to line dance together before covid             Other    Fatigue     Stable   Recommended to start back exercising                Health Maintenance       Date Due Completion Date    TETANUS VACCINE 02/15/1965 ---    Shingles Vaccine (1 of 2) 02/15/1997 ---    Pneumococcal Vaccine (65+ Low/Medium Risk) (1 of 2 - PCV13) 02/15/2012 ---    Influenza Vaccine (1) 09/01/2020 ---    Colorectal Cancer Screening 01/04/2021 1/4/2016    DEXA SCAN 03/02/2023 3/2/2020    Lipid Panel 05/06/2025 5/6/2020        Reviewed immunization - pt is update - updated tdap, zostavax - discussed taking shringrix and pneumococcal vacinations - unable to update pneumococcal vaccinations - aksing pop helaht for assistance.     Follow up in about 2  months (around 10/6/2020) for blood pressure, activity level .  Total face-to-face time was 30 min, greater than 50% of this was spent on counseling and coordination of care. The following issues were discussed: HTN, HM     Nadia Sanderson MD  Internal Medicine- Geriatrics  [facility] [place]

## 2020-08-06 NOTE — ASSESSMENT & PLAN NOTE
Better but not at goal   Cont valsartan 40 mg   Discussed with patient recommendation to exercise at least 30 mins 4 days a week but to start slowly.   Pt will ask her friend to serve as accountability partner, used to line dance together before covid

## 2020-08-06 NOTE — PATIENT INSTRUCTIONS
Blood pressure goal of less than 140/80 mostly, if notice higher range please inform me.     Please schedule eye exam once transportation is available and please have office fax report to my office.     Recommend daily activity for at least 30 minutes at least 4 days a week ( dancing, aerobics, strength training)

## 2020-08-07 ENCOUNTER — TELEPHONE (OUTPATIENT)
Dept: ENDOCRINOLOGY | Facility: CLINIC | Age: 75
End: 2020-08-07

## 2020-08-07 DIAGNOSIS — M81.8 IDIOPATHIC OSTEOPOROSIS: Primary | ICD-10-CM

## 2020-08-07 NOTE — TELEPHONE ENCOUNTER
Called and pt stated she was on phone and would call back; pt is not a pt of our clinic but would like to see Dr. Sanchez.      ----- Message from Brayan Jackson sent at 8/7/2020 10:47 AM CDT -----  Regarding: Establish care. Established ptnt of Dr Josephine Ordoñez at [facility].  Type:  Sooner Apoointment Request    Caller is requesting a sooner appointment.  Caller declined first available appointment listed below.  Caller will not accept being placed on the waitlist and is requesting a message be sent to doctor.    Name of Caller:  Ptnt  [phone]    When is the first available appointment?  01-26-21    Symptoms:  Osteoporosis, will be needing a prolia shot in October.    Additional Information:      Advised wants to change to Dr Sanchez going forward, please call to schedule.

## 2020-08-10 DIAGNOSIS — I10 ESSENTIAL HYPERTENSION: ICD-10-CM

## 2020-08-10 RX ORDER — VALSARTAN 40 MG/1
40 TABLET ORAL DAILY
Qty: 90 TABLET | Refills: 3 | Status: SHIPPED | OUTPATIENT
Start: 2020-08-10 | End: 2020-09-29

## 2020-08-17 ENCOUNTER — PATIENT OUTREACH (OUTPATIENT)
Dept: ADMINISTRATIVE | Facility: HOSPITAL | Age: 75
End: 2020-08-17

## 2020-09-22 ENCOUNTER — PATIENT OUTREACH (OUTPATIENT)
Dept: ADMINISTRATIVE | Facility: HOSPITAL | Age: 75
End: 2020-09-22

## 2020-09-22 NOTE — PROGRESS NOTES
Chart review completed 2020.  Care Everywhere updates requested and reviewed.  Immunizations reconciled. Media reports reviewed.  Duplicate HM overrides and  orders removed.  Overdue HM topic chart audit and/or requested.  Overdue lab testing linked to upcoming lab appointments if applies.      Health Maintenance Due   Topic Date Due    Shingles Vaccine (2 of 3) 2014    Influenza Vaccine (1) 2020

## 2020-09-24 ENCOUNTER — PATIENT OUTREACH (OUTPATIENT)
Dept: ADMINISTRATIVE | Facility: OTHER | Age: 75
End: 2020-09-24

## 2020-09-24 NOTE — PROGRESS NOTES
Chart was reviewed for overdue [facility] Encounters ([facility])  topics  Updates were requested from care everywhere  Health Maintenance has been updated  [facility] immunization registry triggered

## 2020-09-28 ENCOUNTER — LAB VISIT (OUTPATIENT)
Dept: LAB | Facility: HOSPITAL | Age: 75
End: 2020-09-28
Attending: INTERNAL MEDICINE
Payer: MEDICARE

## 2020-09-28 ENCOUNTER — OFFICE VISIT (OUTPATIENT)
Dept: ENDOCRINOLOGY | Facility: CLINIC | Age: 75
End: 2020-09-28
Payer: MEDICARE

## 2020-09-28 VITALS
WEIGHT: 114.31 LBS | HEIGHT: 60 IN | HEART RATE: 77 BPM | BODY MASS INDEX: 22.44 KG/M2 | SYSTOLIC BLOOD PRESSURE: 110 MMHG | DIASTOLIC BLOOD PRESSURE: 60 MMHG | TEMPERATURE: 99 F

## 2020-09-28 DIAGNOSIS — I10 ESSENTIAL HYPERTENSION: ICD-10-CM

## 2020-09-28 DIAGNOSIS — E78.00 HYPERCHOLESTEREMIA: ICD-10-CM

## 2020-09-28 DIAGNOSIS — K21.9 GASTROESOPHAGEAL REFLUX DISEASE WITHOUT ESOPHAGITIS: ICD-10-CM

## 2020-09-28 DIAGNOSIS — M81.8 IDIOPATHIC OSTEOPOROSIS: ICD-10-CM

## 2020-09-28 DIAGNOSIS — E55.9 HYPOVITAMINOSIS D: ICD-10-CM

## 2020-09-28 DIAGNOSIS — Z86.39 HISTORY OF THYROTOXICOSIS: ICD-10-CM

## 2020-09-28 DIAGNOSIS — M81.8 IDIOPATHIC OSTEOPOROSIS: Primary | ICD-10-CM

## 2020-09-28 DIAGNOSIS — Z85.3 HISTORY OF BREAST CANCER: ICD-10-CM

## 2020-09-28 DIAGNOSIS — E78.49 OTHER HYPERLIPIDEMIA: ICD-10-CM

## 2020-09-28 DIAGNOSIS — E53.8 B12 DEFICIENCY: ICD-10-CM

## 2020-09-28 DIAGNOSIS — Z78.0 POSTMENOPAUSAL: ICD-10-CM

## 2020-09-28 PROBLEM — E78.5 HYPERLIPIDEMIA: Status: ACTIVE | Noted: 2020-09-28

## 2020-09-28 PROCEDURE — 83970 ASSAY OF PARATHORMONE: CPT | Mod: HCNC

## 2020-09-28 PROCEDURE — 84075 ASSAY ALKALINE PHOSPHATASE: CPT | Mod: HCNC

## 2020-09-28 PROCEDURE — 82397 CHEMILUMINESCENT ASSAY: CPT | Mod: HCNC

## 2020-09-28 PROCEDURE — 1126F PR PAIN SEVERITY QUANTIFIED, NO PAIN PRESENT: ICD-10-PCS | Mod: HCNC,S$GLB,, | Performed by: INTERNAL MEDICINE

## 2020-09-28 PROCEDURE — 84165 PATHOLOGIST INTERPRETATION SPE: ICD-10-PCS | Mod: 26,HCNC,, | Performed by: PATHOLOGY

## 2020-09-28 PROCEDURE — 82306 VITAMIN D 25 HYDROXY: CPT | Mod: HCNC

## 2020-09-28 PROCEDURE — 82306 VITAMIN D 25 HYDROXY: CPT | Mod: 91,HCNC

## 2020-09-28 PROCEDURE — 82652 VIT D 1 25-DIHYDROXY: CPT | Mod: HCNC

## 2020-09-28 PROCEDURE — 1101F PR PT FALLS ASSESS DOC 0-1 FALLS W/OUT INJ PAST YR: ICD-10-PCS | Mod: HCNC,CPTII,S$GLB, | Performed by: INTERNAL MEDICINE

## 2020-09-28 PROCEDURE — 83519 RIA NONANTIBODY: CPT | Mod: HCNC

## 2020-09-28 PROCEDURE — 84080 ASSAY ALKALINE PHOSPHATASES: CPT | Mod: HCNC

## 2020-09-28 PROCEDURE — 1159F PR MEDICATION LIST DOCUMENTED IN MEDICAL RECORD: ICD-10-PCS | Mod: HCNC,S$GLB,, | Performed by: INTERNAL MEDICINE

## 2020-09-28 PROCEDURE — 1101F PT FALLS ASSESS-DOCD LE1/YR: CPT | Mod: HCNC,CPTII,S$GLB, | Performed by: INTERNAL MEDICINE

## 2020-09-28 PROCEDURE — 3008F PR BODY MASS INDEX (BMI) DOCUMENTED: ICD-10-PCS | Mod: HCNC,CPTII,S$GLB, | Performed by: INTERNAL MEDICINE

## 2020-09-28 PROCEDURE — 99214 OFFICE O/P EST MOD 30 MIN: CPT | Mod: HCNC,S$GLB,, | Performed by: INTERNAL MEDICINE

## 2020-09-28 PROCEDURE — 99999 PR PBB SHADOW E&M-EST. PATIENT-LVL IV: CPT | Mod: PBBFAC,HCNC,, | Performed by: INTERNAL MEDICINE

## 2020-09-28 PROCEDURE — 84165 PROTEIN E-PHORESIS SERUM: CPT | Mod: 26,HCNC,, | Performed by: PATHOLOGY

## 2020-09-28 PROCEDURE — 83520 IMMUNOASSAY QUANT NOS NONAB: CPT | Mod: HCNC

## 2020-09-28 PROCEDURE — 99999 PR PBB SHADOW E&M-EST. PATIENT-LVL IV: ICD-10-PCS | Mod: PBBFAC,HCNC,, | Performed by: INTERNAL MEDICINE

## 2020-09-28 PROCEDURE — 1159F MED LIST DOCD IN RCRD: CPT | Mod: HCNC,S$GLB,, | Performed by: INTERNAL MEDICINE

## 2020-09-28 PROCEDURE — 82523 COLLAGEN CROSSLINKS: CPT | Mod: 91,HCNC

## 2020-09-28 PROCEDURE — 99214 PR OFFICE/OUTPT VISIT, EST, LEVL IV, 30-39 MIN: ICD-10-PCS | Mod: HCNC,S$GLB,, | Performed by: INTERNAL MEDICINE

## 2020-09-28 PROCEDURE — 1126F AMNT PAIN NOTED NONE PRSNT: CPT | Mod: HCNC,S$GLB,, | Performed by: INTERNAL MEDICINE

## 2020-09-28 PROCEDURE — 82533 TOTAL CORTISOL: CPT | Mod: HCNC

## 2020-09-28 PROCEDURE — 82523 COLLAGEN CROSSLINKS: CPT | Mod: HCNC

## 2020-09-28 PROCEDURE — 82330 ASSAY OF CALCIUM: CPT | Mod: HCNC

## 2020-09-28 PROCEDURE — 3008F BODY MASS INDEX DOCD: CPT | Mod: HCNC,CPTII,S$GLB, | Performed by: INTERNAL MEDICINE

## 2020-09-28 PROCEDURE — 84165 PROTEIN E-PHORESIS SERUM: CPT | Mod: HCNC

## 2020-09-28 PROCEDURE — 80061 LIPID PANEL: CPT | Mod: HCNC

## 2020-09-28 PROCEDURE — 83937 ASSAY OF OSTEOCALCIN: CPT | Mod: HCNC

## 2020-09-28 PROCEDURE — 84550 ASSAY OF BLOOD/URIC ACID: CPT | Mod: HCNC

## 2020-09-28 PROCEDURE — 82024 ASSAY OF ACTH: CPT | Mod: HCNC

## 2020-09-28 NOTE — PROGRESS NOTES
"Subjective:      Patient ID: Tyra Riley is a 75 y.o. female.    Chief Complaint:      Patient is a 75 postmenopausal lady with osteoporosis seen for initial care visit today.     History of Present Illness    The patient, Ms Riley  Is a 75 yr old postmenopausal lady with osteoporosis seen for initial care today with me. She had previously been seen and ffed at [facility] where she had seen Dr Frazier and Dr Ordoñez.  She was first diagnosed~ 14 years ago.  Patients baseline [place] score is 7.     Denies falls or  fractures since last visit. Denies loss of height. Has back pain but occurs with prolonged standing. ("in the kitchen and cooking for awhile or doing house work.").     There is a +ve family history of osteopoosis in patients younger sister who is now .  Patient  Had a short term history of thyrotoxicosis which was medically treated for a few months. Did not require ablation nor surgery.     Previous medication:  Alendronate  -   reclast , ,  and   Prolia: 2018, 3/2018,  , 2020 (this was the latest received @ [facility])  Her most recent DEXA from 2020 continued to show osteoporosis of the hip and spine. Her next DEXA should be for ~ .     Supplementations:  Calcium: citra Niraj + D one tablet daily   Vitamin D: 2000 IUs daily     Exercise: line dance once a week  Not very active, volunteers at [employer]     Stopped taking prilosec more than one year ago. Occasionally takes gas-X after a meal.     Her background comorbidities are detailed below;    1. Idiopathic osteoporosis     2. History of breast cancer     3. History of thyrotoxicosis     4. Gastroesophageal reflux disease without esophagitis     5. B12 deficiency     6. Essential hypertension     7. Other hyperlipidemia     8. Hypercholesteremia     9. Postmenopausal     10. Hypovitaminosis D       Patient had bilateral mastectomy on account of breast ca () ?? Hormone status and " receptor status.        Review of Systems   Constitutional: Negative for chills, fever and unexpected weight change.   HENT: Negative for congestion, sinus pressure, trouble swallowing and voice change.    Eyes: Negative for visual disturbance.   Respiratory: Negative for chest tightness and shortness of breath.    Cardiovascular: Negative for chest pain, palpitations and leg swelling.   Gastrointestinal: Negative for abdominal pain and vomiting.   Genitourinary: Negative for dysuria.   Musculoskeletal: Negative for arthralgias and myalgias.   Skin: Negative for rash.   Neurological: Negative for weakness, numbness and headaches.   Hematological: Does not bruise/bleed easily.   Psychiatric/Behavioral: Negative for sleep disturbance.       Objective: /60 (BP Location: Right arm, Patient Position: Sitting, BP Method: Small (Manual))   Pulse 77   Temp 98.6 °F (37 °C) (Temporal)   Ht 5' (1.524 m)   Wt 51.9 kg (114 lb 4.9 oz)   BMI 22.32 kg/m²  Body surface area is 1.48 meters squared.         Physical Exam  Vitals signs reviewed.   Constitutional:       Appearance: Normal appearance.      Comments: Pleasant elderly lady with petite build. She is not pale, anicteric and afebrile. Well hydrated.  Clinically comfortable. Not in any active distress. Patient looks significantly younger than stated chronologic age.   HENT:      Head: Normocephalic.      Mouth/Throat:      Mouth: Mucous membranes are moist.      Pharynx: No posterior oropharyngeal erythema.   Eyes:      General: No scleral icterus.     Conjunctiva/sclera: Conjunctivae normal.      Pupils: Pupils are equal, round, and reactive to light.   Neck:      Musculoskeletal: Normal range of motion and neck supple. No neck rigidity or muscular tenderness.      Vascular: No carotid bruit.   Cardiovascular:      Rate and Rhythm: Normal rate and regular rhythm.      Pulses: Normal pulses.      Heart sounds: Normal heart sounds. No murmur.   Pulmonary:       Effort: Pulmonary effort is normal. No respiratory distress.      Breath sounds: Normal breath sounds. No stridor. No wheezing, rhonchi or rales.   Abdominal:      General: Abdomen is flat. Bowel sounds are normal. There is no distension.      Tenderness: There is no abdominal tenderness.   Musculoskeletal: Normal range of motion.         General: No swelling.   Lymphadenopathy:      Cervical: No cervical adenopathy.   Skin:     General: Skin is warm and dry.      Coloration: Skin is not jaundiced or pale.      Findings: No bruising or rash.   Neurological:      General: No focal deficit present.      Mental Status: She is alert and oriented to person, place, and time.      Cranial Nerves: No cranial nerve deficit.   Psychiatric:         Mood and Affect: Mood normal.         Behavior: Behavior normal.         Thought Content: Thought content normal.         Lab Review:     Results for JOANN DURAN (MRN [id number]) as of 9/28/2020 15:20   Ref. Range 5/7/2020 04:46 7/17/2020 10:35 8/5/2020 10:17   WBC Latest Ref Range: 3.90 - 12.70 K/uL 4.37  4.56   RBC Latest Ref Range: 4.00 - 5.40 M/uL 3.24 (L)  3.61 (L)   Hemoglobin Latest Ref Range: 12.0 - 16.0 g/dL 10.4 (L)  11.4 (L)   Hematocrit Latest Ref Range: 37.0 - 48.5 % 32.4 (L)  36.6 (L)   MCV Latest Ref Range: 82 - 98 fL 100 (H)  101 (H)   MCH Latest Ref Range: 27.0 - 31.0 pg 32.1 (H)  31.6 (H)   MCHC Latest Ref Range: 32.0 - 36.0 g/dL 32.1  31.1 (L)   RDW Latest Ref Range: 11.5 - 14.5 % 12.5  12.3   Platelets Latest Ref Range: 150 - 350 K/uL 168  214   MPV Latest Ref Range: 9.2 - 12.9 fL 10.3  11.5   Gran% Latest Ref Range: 38.0 - 73.0 % 54.9  49.2   Gran # (ANC) Latest Ref Range: 1.8 - 7.7 K/uL 2.4  2.2   Lymph% Latest Ref Range: 18.0 - 48.0 % 33.4  34.6   Lymph # Latest Ref Range: 1.0 - 4.8 K/uL 1.5  1.6   Mono% Latest Ref Range: 4.0 - 15.0 % 8.0  6.1   Mono # Latest Ref Range: 0.3 - 1.0 K/uL 0.4  0.3   Eosinophil% Latest Ref Range: 0.0 - 8.0 % 2.7  9.0 (H)   Eos #  Latest Ref Range: 0.0 - 0.5 K/uL 0.1  0.4   Basophil% Latest Ref Range: 0.0 - 1.9 % 0.5  0.9   Baso # Latest Ref Range: 0.00 - 0.20 K/uL 0.02  0.04   nRBC Latest Ref Range: 0 /100 WBC 0  0   Differential Method Unknown Automated  Automated   Immature Grans (Abs) Latest Ref Range: 0.00 - 0.04 K/uL 0.02  0.01   Immature Granulocytes Latest Ref Range: 0.0 - 0.5 % 0.5  0.2   Iron Latest Ref Range: 30 - 160 ug/dL   80   TIBC Latest Ref Range: 250 - 450 ug/dL   346   Saturated Iron Latest Ref Range: 20 - 50 %   23   Transferrin Latest Ref Range: 200 - 375 mg/dL   234   Ferritin Latest Ref Range: 20.0 - 300.0 ng/mL   82   Sodium Latest Ref Range: 136 - 145 mmol/L 141  142   Potassium Latest Ref Range: 3.5 - 5.1 mmol/L 4.7  4.4   Chloride Latest Ref Range: 95 - 110 mmol/L 109  106   CO2 Latest Ref Range: 23 - 29 mmol/L 26  29   Anion Gap Latest Ref Range: 8 - 16 mmol/L 6 (L)  7 (L)   BUN, Bld Latest Ref Range: 8 - 23 mg/dL 17  22   Creatinine Latest Ref Range: 0.5 - 1.4 mg/dL 1.1  1.2   eGFR if non African American Latest Ref Range: >60 mL/min/1.73 m^2 50 (A)  44.9 (A)   eGFR if African American Latest Ref Range: >60 mL/min/1.73 m^2 58 (A)  51.8 (A)   Glucose Latest Ref Range: 70 - 110 mg/dL 95  80   Calcium Latest Ref Range: 8.7 - 10.5 mg/dL 8.6 (L)  9.9   Magnesium Latest Ref Range: 1.6 - 2.6 mg/dL 2.0     Alkaline Phosphatase Latest Ref Range: 55 - 135 U/L 41 (L)  48 (L)   PROTEIN TOTAL Latest Ref Range: 6.0 - 8.4 g/dL 6.1  7.4   Albumin Latest Ref Range: 3.5 - 5.2 g/dL 3.2 (L)  3.7   BILIRUBIN TOTAL Latest Ref Range: 0.1 - 1.0 mg/dL 0.2  0.3   AST Latest Ref Range: 10 - 40 U/L 16  17   ALT Latest Ref Range: 10 - 44 U/L 13  8 (L)   SARS-CoV-2 RNA, Amplification, Qual Unknown  Negative        Assessment:     1. Idiopathic osteoporosis  Calcitriol    Fibroblast Growth Factor 23, Plasma    Phosphorus, Random Urine    Calcium, Random Urine    Osteocalcin    N-telopeptide, urine    N-Telopeptide, Serum    Protein  Electrophoresis, Random Urine    Protein electrophoresis, serum    PTH-related peptide    ACTH    Cortisol    Procollagen Type 1 Propeptide    C Telopeptide (CTX), Serum    Alkaline phosphatase, isoenzymes    Misc Sendout Test, Blood 25 OH D: 24, 25 Di OH Vit D ratio   2. History of breast cancer     3. History of thyrotoxicosis     4. Gastroesophageal reflux disease without esophagitis     5. B12 deficiency     6. Essential hypertension  Microalbumin/creatinine urine ratio    Creatinine, urine, random    Uric acid    Urinalysis    Lipid Panel   7. Other hyperlipidemia  Lipid Panel   8. Hypercholesteremia     9. Postmenopausal     10. Hypovitaminosis D  Vitamin D    PTH, intact    Calcium, Ionized    Calcitriol        1. Idiopathic osteoporosis  Risk factors include: family history of osteoporosis (sister), PPI use in the past, JODI with BSO at age 49 years used estrogen for three years, diagnosed with breast cancer (first surgery: lobectomy, second surgery three years later: mastectomy b/l, radiation only)     Continue prolia; has received 4 doses thus far. To complete set of 6 and will then commence medication holiday. Her next prolia injection is due now; she is going to start getting these @ [facility] instead of main campus.    After completing present Prolia set of 6 depending on bone turno over marker results may consider placing her on anabolic agents while in that  Medication holiday. For ffup DEXA ~ 03/22.     To continue calcium and vitamin supplementation as before.      2. Vitamin D deficiency     To check 25 OH Vit D levels and replete as needed    3. Regarding GERD; clinically stable.     4.  Regarding essential hypertension; BP well controlled. No change to present antihypertensive regimen. To continue serial ambulatory BP and pulse profile tracking.    Regarding hyperlipidemia; To check current lipid panel and for now to continue present antilipidemic regimen.     5. Regarding history of breast cancer;  apparently cured.     6. Regarding history of thyrotoxicosis; now resolved. Most recent TFTs were normal.    Plan:       FFup in ~ 6mths.

## 2020-09-28 NOTE — LETTER
September 28, 2020    Dr. Juvencio Sanchez  [address] [place]  [address]  [phone]      To Whom It May Concern:      Tyra Riley needs a letter of clearance stating she has no dental caries or other active jaw disease and that it is cleared to start either Prolia (denosumab) or Zometa (Reclast) for osteoporosis. If you have any questions or concerns, or if I can be of further assistance, please do not hesitate to contact my office.       Regards,      Juvencio Sanchez MD

## 2020-09-29 ENCOUNTER — PATIENT MESSAGE (OUTPATIENT)
Dept: OTHER | Facility: OTHER | Age: 75
End: 2020-09-29

## 2020-09-29 DIAGNOSIS — R80.8 OTHER PROTEINURIA: ICD-10-CM

## 2020-09-29 DIAGNOSIS — I10 ESSENTIAL HYPERTENSION: ICD-10-CM

## 2020-09-29 PROBLEM — R80.9 PROTEINURIA: Status: ACTIVE | Noted: 2020-09-29

## 2020-09-29 LAB
25(OH)D3+25(OH)D2 SERPL-MCNC: 46 NG/ML (ref 30–96)
ALBUMIN SERPL ELPH-MCNC: 4.22 G/DL (ref 3.35–5.55)
ALPHA1 GLOB SERPL ELPH-MCNC: 0.38 G/DL (ref 0.17–0.41)
ALPHA2 GLOB SERPL ELPH-MCNC: 1.01 G/DL (ref 0.43–0.99)
B-GLOBULIN SERPL ELPH-MCNC: 0.83 G/DL (ref 0.5–1.1)
CA-I BLDV-SCNC: 1.32 MMOL/L (ref 1.06–1.42)
CHOLEST SERPL-MCNC: 159 MG/DL (ref 120–199)
CHOLEST/HDLC SERPL: 2.5 {RATIO} (ref 2–5)
CORTIS SERPL-MCNC: 7.9 UG/DL
GAMMA GLOB SERPL ELPH-MCNC: 1.35 G/DL (ref 0.67–1.58)
HDLC SERPL-MCNC: 64 MG/DL (ref 40–75)
HDLC SERPL: 40.3 % (ref 20–50)
LDLC SERPL CALC-MCNC: 78.6 MG/DL (ref 63–159)
NONHDLC SERPL-MCNC: 95 MG/DL
PROT SERPL-MCNC: 7.8 G/DL (ref 6–8.4)
PTH-INTACT SERPL-MCNC: 53 PG/ML (ref 9–77)
TRIGL SERPL-MCNC: 82 MG/DL (ref 30–150)
URATE SERPL-MCNC: 5.4 MG/DL (ref 2.4–5.7)

## 2020-09-29 RX ORDER — VALSARTAN 80 MG/1
80 TABLET ORAL DAILY
Qty: 90 TABLET | Refills: 3 | Status: SHIPPED | OUTPATIENT
Start: 2020-09-29 | End: 2021-04-20

## 2020-09-30 LAB — PATHOLOGIST INTERPRETATION SPE: NORMAL

## 2020-10-01 LAB
1,25(OH)2D3 SERPL-MCNC: 31 PG/ML (ref 20–79)
OSTEOCALCIN SERPL-MCNC: 7 NG/ML (ref 8–32)
PROCOLLAGEN TYPE 1 PROPEPTIDE: 14 MCG/L

## 2020-10-02 LAB
ACTH PLAS-MCNC: 14 PG/ML (ref 0–46)
PTH RELATED PROT SERPL-SCNC: 0.8 PMOL/L

## 2020-10-03 LAB
ALP BONE CFR SERPL: 48 % (ref 28–66)
ALP INTEST CFR SERPL: 0 % (ref 1–24)
ALP LIVER CFR SERPL: 52 % (ref 25–69)
ALP PLAC CFR SERPL: 0 %
ALP SERPL-CCNC: 42 U/L (ref 37–153)

## 2020-10-06 ENCOUNTER — OFFICE VISIT (OUTPATIENT)
Dept: PRIMARY CARE CLINIC | Facility: CLINIC | Age: 75
End: 2020-10-06
Payer: MEDICARE

## 2020-10-06 VITALS
WEIGHT: 116.19 LBS | BODY MASS INDEX: 22.69 KG/M2 | SYSTOLIC BLOOD PRESSURE: 132 MMHG | OXYGEN SATURATION: 98 % | TEMPERATURE: 98 F | HEART RATE: 72 BPM | DIASTOLIC BLOOD PRESSURE: 76 MMHG

## 2020-10-06 DIAGNOSIS — E78.00 HYPERCHOLESTEREMIA: ICD-10-CM

## 2020-10-06 DIAGNOSIS — D50.9 IRON DEFICIENCY ANEMIA, UNSPECIFIED IRON DEFICIENCY ANEMIA TYPE: ICD-10-CM

## 2020-10-06 DIAGNOSIS — I10 ESSENTIAL HYPERTENSION: ICD-10-CM

## 2020-10-06 DIAGNOSIS — R80.8 OTHER PROTEINURIA: Primary | ICD-10-CM

## 2020-10-06 DIAGNOSIS — R79.89 ELEVATED SERUM CREATININE: ICD-10-CM

## 2020-10-06 DIAGNOSIS — Z86.39 HISTORY OF THYROTOXICOSIS: ICD-10-CM

## 2020-10-06 DIAGNOSIS — R47.01 APHASIA: ICD-10-CM

## 2020-10-06 DIAGNOSIS — R40.20 LOSS OF CONSCIOUSNESS: ICD-10-CM

## 2020-10-06 DIAGNOSIS — K21.9 GASTROESOPHAGEAL REFLUX DISEASE WITHOUT ESOPHAGITIS: ICD-10-CM

## 2020-10-06 PROBLEM — R42 DIZZINESS: Status: RESOLVED | Noted: 2020-02-17 | Resolved: 2020-10-06

## 2020-10-06 PROBLEM — E55.9 HYPOVITAMINOSIS D: Status: RESOLVED | Noted: 2020-09-28 | Resolved: 2020-10-06

## 2020-10-06 LAB — FIBROBLAST GROWTH FACTOR 23: 105 RU/ML

## 2020-10-06 PROCEDURE — 99215 PR OFFICE/OUTPT VISIT, EST, LEVL V, 40-54 MIN: ICD-10-PCS | Mod: HCNC,S$GLB,, | Performed by: INTERNAL MEDICINE

## 2020-10-06 PROCEDURE — 99215 OFFICE O/P EST HI 40 MIN: CPT | Mod: HCNC,S$GLB,, | Performed by: INTERNAL MEDICINE

## 2020-10-06 RX ORDER — ASCORBIC ACID 500 MG
500 TABLET ORAL DAILY
COMMUNITY
Start: 2020-10-01

## 2020-10-06 NOTE — ASSESSMENT & PLAN NOTE
Noted on urine studies   Given information on how to diet information  Instructed pt to keep BP log and bring to next appt

## 2020-10-06 NOTE — PROGRESS NOTES
"Primary Care Provider Appointment    Subjective:      Patient ID: Tyra Riley is a 75 y.o. female. hx of osteoporosis, hx of breast cancer now in remission, hx of nephrolithasis and right renal cysts, GERD, HLD, hx of anemia    Chief Complaint: Follow-up    Pt reports overall she feels well. Since last visit, she had follow up with neurology - she was told no sign of stroke or seizure but was asked to see cardiology and she is asking for a referral today.  She is now back driving and going back to [employer] and she is feeling much better.      She reports that she had cysts in the past on her kidney which were removed about 6-7 years ago.   She sees Dr. Krishnamurthy at [facility] with urology for survillence of her kidney, has follow up next year.     Drinking mostly water, is taking her blood pressure medication daily. She asks to review recent bloodwork done by endocrinology recently.      She started walking last week in her neighborhood but has not been consistent.     She was having cramps in her legs and had several studies at neurology office, was told that studies were negative. Was told no clot. Denies since then, has not had this happen in a few months.     Denies fatigue, pt thinks that this was a response due to covid pandemic.     Denies dizziness     Occasional gerd     Has not had her eye appts- she sees ophthalmology in [place] and she wishes to stay with this group.           Past Surgical History:   Procedure Laterality Date    BILATERAL MASTECTOMY  02/2000    BLADDER REPAIR      BREAST RECONSTRUCTION      CATARACT EXTRACTION      COLONOSCOPY N/A 1/4/2016    Procedure: COLONOSCOPY;  Surgeon: Kieran Krishnamurthy MD;  Location: [facility] ([address] [place] [address]);  Service: Endoscopy;  Laterality: N/A;    COLONOSCOPY W/ POLYPECTOMY      HYSTERECTOMY  1994    JODI, ovaries remain ("pain")    LASIK      MASTECTOMY  2000    Bilateral    OOPHORECTOMY  1994    PARTIAL NEPHRECTOMY      cyst from left kidney    Toes " surgery      Hammer toe surgery    TONSILLECTOMY         Past Medical History:   Diagnosis Date    Abnormal Pap smear     colpo then hyst    Allergy     Breast cancer 1997, 2000    Colon polyp     Fever blister     GERD (gastroesophageal reflux disease)     Hyperlipidemia     Hyperthyroidism     Kidney cysts     Osteoporosis, unspecified     Seasonal allergies        Social History     Socioeconomic History    Marital status: Single     Spouse name: Not on file    Number of children: 1    Years of education: Not on file    Highest education level: Bachelor's degree (e.g., BA, AB, BS)   Occupational History     Comment: [employer]    Social Needs    Financial resource strain: Not hard at all    Food insecurity     Worry: Never true     Inability: Never true    Transportation needs     Medical: No     Non-medical: No   Tobacco Use    Smoking status: Never Smoker    Smokeless tobacco: Never Used   Substance and Sexual Activity    Alcohol use: Yes     Alcohol/week: 1.0 standard drinks     Types: 1 Glasses of wine per week     Comment: Rare    Drug use: No    Sexual activity: Never   Lifestyle    Physical activity     Days per week: 1 day     Minutes per session: 60 min    Stress: To some extent   Relationships    Social connections     Talks on phone: More than three times a week     Gets together: More than three times a week     Attends [employer] service: More than 4 times per year     Active member of club or organization: Yes     Attends meetings of clubs or organizations: More than 4 times per year     Relationship status: Never    Other Topics Concern    Are you pregnant or think you may be? No    Breast-feeding Not Asked   Social History Narrative    Retired     Children:  Mike (Victor)     Qian: Islam    Hobbies: Line Dancing once a week , in [employer] , [employer]        Review of Systems   Constitutional: Negative for activity change, appetite  change, fatigue and fever.   HENT: Negative for congestion.    Respiratory: Negative for cough, choking and shortness of breath.    Cardiovascular: Negative for chest pain, palpitations and leg swelling.   Gastrointestinal: Negative for abdominal pain, constipation, diarrhea, nausea and vomiting.   Genitourinary: Negative for difficulty urinating.   Musculoskeletal: Negative for arthralgias.   Skin: Negative for rash and wound.   Neurological: Negative for dizziness, syncope, weakness, light-headedness and numbness.   Psychiatric/Behavioral: Negative for dysphoric mood. The patient is not nervous/anxious.        Objective:   /76 (BP Location: Left arm, Patient Position: Sitting)   Pulse 72   Temp 97.9 °F (36.6 °C) (Temporal)   Wt 52.7 kg (116 lb 2.9 oz)   SpO2 98%   BMI 22.69 kg/m²     Physical Exam  Vitals signs reviewed.   Constitutional:       General: She is not in acute distress.     Appearance: She is normal weight. She is not toxic-appearing.   HENT:      Head: Normocephalic and atraumatic.   Cardiovascular:      Rate and Rhythm: Normal rate and regular rhythm.      Heart sounds: Normal heart sounds.   Pulmonary:      Effort: No respiratory distress.      Breath sounds: Normal breath sounds. No wheezing or rales.   Abdominal:      General: Bowel sounds are normal.      Palpations: Abdomen is soft. There is no mass.      Tenderness: There is no abdominal tenderness.   Musculoskeletal: Normal range of motion.   Skin:     General: Skin is dry.      Findings: No lesion or rash.   Neurological:      General: No focal deficit present.      Mental Status: She is alert and oriented to person, place, and time. Mental status is at baseline.         Lab Results   Component Value Date    WBC 4.56 08/05/2020    HGB 11.4 (L) 08/05/2020    HCT 36.6 (L) 08/05/2020     08/05/2020    CHOL 159 09/28/2020    TRIG 82 09/28/2020    HDL 64 09/28/2020    ALT 8 (L) 08/05/2020    AST 17 08/05/2020      08/05/2020    K 4.4 08/05/2020     08/05/2020    CREATININE 1.2 08/05/2020    BUN 22 08/05/2020    CO2 29 08/05/2020    TSH 2.708 03/11/2020    HGBA1C 5.3 02/21/2018       RESULTS: Reviewed labs and images today    Assessment:   75 y.o. female with multiple co-morbid illnesses here to continue work-up of chronic issues notably hx of osteoporosis, hx of breast cancer now in remission, hx of nephrolithasis and right renal cysts, GERD, HLD, hx of anemia       Plan:     Problem List Items Addressed This Visit        Neuro    Aphasia     No recent episodes per patient          Loss of consciousness     Obtain last neurology note   Will refer to cardiology per neuro recommendation               Cardiac/Vascular    Hypercholesteremia     Lipid panel continues to improve with crestor   Monitor          Essential hypertension     bp in good range today  Pt now on 80 mg of valsartan               Renal/    Proteinuria - Primary     Noted on urine studies   Given information on how to diet information  Instructed pt to keep BP log and bring to next appt          Relevant Orders    Renal Function Panel    Urinalysis    Microalbumin/Creatinine Ratio, Urine       Oncology    Iron deficiency anemia     Stable per last labs, no significant changes             Endocrine    History of thyrotoxicosis     Last thyroid function panel wnl this year   Monitor             GI    GERD (gastroesophageal reflux disease)     Currently not active   Discussed to avoid food triggers             Other    Elevated serum creatinine     Cr usually 1.0 or less, most recent of 1.2 in August with GFR of 51  Recent increase in valsartan in July for cause? Otherwise no new medications. Pt is following a lower sodium diet than previously. BP is better today, If continues to have decline, will rule out renal artery stenosis.   Recheck renal function in one month                Health Maintenance       Date Due Completion Date    Colorectal Cancer  Screening 01/04/2021 1/4/2016    Shingles Vaccine (3 of 3) 11/12/2020 9/17/2020    TETANUS VACCINE 06/23/2022 6/23/2012    DEXA SCAN 03/02/2023 3/2/2020    Lipid Panel 09/28/2025 9/28/2020          Follow up in about 3 months (around 1/6/2021) for HTN, proteinuria .  Total face-to-face time was 60 min, greater than 50% of this was spent on counseling and coordination of care. The following issues were discussed: HTN, elevated serum creatinine, loss of consciousness     Nadia Sanderson MD  Internal Medicine- Geriatrics  [facility] [place]

## 2020-10-06 NOTE — Clinical Note
Request last note from [facility] - include prolonged EEG as well and any recent imaging studies

## 2020-10-06 NOTE — ASSESSMENT & PLAN NOTE
Cr usually 1.0 or less, most recent of 1.2 in August with GFR of 51  Recent increase in valsartan in July for cause? Otherwise no new medications. Pt is following a lower sodium diet than previously. BP is better today, If continues to have decline, will rule out renal artery stenosis.   Recheck renal function in one month

## 2020-10-08 LAB
COLLAGEN CTX SERPL-MCNC: 56 PG/ML
NTX TELOPEPTIDE: <5 NM BCE

## 2020-10-16 LAB
24R-OH-CALCIDIOL SERPL-MCNC: 3.65 NG/ML
25(OH)D2 SERPL-MCNC: 13 NG/ML
25(OH)D3 SERPL-MCNC: 52 NG/ML
25(OH)D3+25(OH)D2 SERPL-MCNC: 65 NG/ML
25HDN:24,25 DIHYDROXY VITD RATIO: 17.81

## 2020-10-21 ENCOUNTER — TELEPHONE (OUTPATIENT)
Dept: ENDOCRINOLOGY | Facility: CLINIC | Age: 75
End: 2020-10-21

## 2020-10-21 NOTE — TELEPHONE ENCOUNTER
Returned patient's call and informed patient that Proila injection is scheduled by [facility].  Patient verbalized understanding.

## 2020-11-04 ENCOUNTER — PATIENT MESSAGE (OUTPATIENT)
Dept: PRIMARY CARE CLINIC | Facility: CLINIC | Age: 75
End: 2020-11-04

## 2020-11-05 ENCOUNTER — LAB VISIT (OUTPATIENT)
Dept: LAB | Facility: HOSPITAL | Age: 75
End: 2020-11-05
Attending: INTERNAL MEDICINE
Payer: MEDICARE

## 2020-11-05 DIAGNOSIS — R80.8 OTHER PROTEINURIA: ICD-10-CM

## 2020-11-05 LAB
ALBUMIN SERPL BCP-MCNC: 3.6 G/DL (ref 3.5–5.2)
ANION GAP SERPL CALC-SCNC: 11 MMOL/L (ref 8–16)
BACTERIA #/AREA URNS HPF: ABNORMAL /HPF
BILIRUB UR QL STRIP: NEGATIVE
BUN SERPL-MCNC: 20 MG/DL (ref 8–23)
CALCIUM SERPL-MCNC: 9.8 MG/DL (ref 8.7–10.5)
CHLORIDE SERPL-SCNC: 102 MMOL/L (ref 95–110)
CLARITY UR: CLEAR
CO2 SERPL-SCNC: 28 MMOL/L (ref 23–29)
COLOR UR: YELLOW
CREAT SERPL-MCNC: 1.2 MG/DL (ref 0.5–1.4)
EST. GFR  (AFRICAN AMERICAN): 52 ML/MIN/1.73 M^2
EST. GFR  (NON AFRICAN AMERICAN): 45 ML/MIN/1.73 M^2
GLUCOSE SERPL-MCNC: 90 MG/DL (ref 70–110)
GLUCOSE UR QL STRIP: NEGATIVE
HGB UR QL STRIP: ABNORMAL
KETONES UR QL STRIP: NEGATIVE
LEUKOCYTE ESTERASE UR QL STRIP: ABNORMAL
MICROSCOPIC COMMENT: ABNORMAL
NITRITE UR QL STRIP: NEGATIVE
PH UR STRIP: 6 [PH] (ref 5–8)
PHOSPHATE SERPL-MCNC: 5 MG/DL (ref 2.7–4.5)
POTASSIUM SERPL-SCNC: 3.6 MMOL/L (ref 3.5–5.1)
PROT UR QL STRIP: NEGATIVE
RBC #/AREA URNS HPF: 5 /HPF (ref 0–4)
SODIUM SERPL-SCNC: 141 MMOL/L (ref 136–145)
SP GR UR STRIP: 1.02 (ref 1–1.03)
SQUAMOUS #/AREA URNS HPF: 3 /HPF
URN SPEC COLLECT METH UR: ABNORMAL
UROBILINOGEN UR STRIP-ACNC: NEGATIVE EU/DL
WBC #/AREA URNS HPF: 3 /HPF (ref 0–5)

## 2020-11-05 PROCEDURE — 36415 COLL VENOUS BLD VENIPUNCTURE: CPT | Mod: HCNC

## 2020-11-05 PROCEDURE — 82043 UR ALBUMIN QUANTITATIVE: CPT | Mod: HCNC

## 2020-11-05 PROCEDURE — 81000 URINALYSIS NONAUTO W/SCOPE: CPT | Mod: HCNC

## 2020-11-05 PROCEDURE — 80069 RENAL FUNCTION PANEL: CPT | Mod: HCNC

## 2020-11-06 LAB
ALBUMIN/CREAT UR: 19.6 UG/MG (ref 0–30)
CREAT UR-MCNC: 143 MG/DL (ref 15–325)
MICROALBUMIN UR DL<=1MG/L-MCNC: 28 UG/ML

## 2020-11-09 ENCOUNTER — INFUSION (OUTPATIENT)
Dept: INFUSION THERAPY | Facility: HOSPITAL | Age: 75
End: 2020-11-09
Attending: INTERNAL MEDICINE
Payer: MEDICARE

## 2020-11-09 ENCOUNTER — TELEPHONE (OUTPATIENT)
Dept: INFUSION THERAPY | Facility: HOSPITAL | Age: 75
End: 2020-11-09

## 2020-11-09 VITALS
OXYGEN SATURATION: 100 % | SYSTOLIC BLOOD PRESSURE: 137 MMHG | HEIGHT: 60 IN | DIASTOLIC BLOOD PRESSURE: 75 MMHG | TEMPERATURE: 98 F | RESPIRATION RATE: 15 BRPM | BODY MASS INDEX: 22.78 KG/M2 | WEIGHT: 116 LBS | HEART RATE: 77 BPM

## 2020-11-09 DIAGNOSIS — M81.8 IDIOPATHIC OSTEOPOROSIS: Primary | ICD-10-CM

## 2020-11-09 PROCEDURE — 63600175 PHARM REV CODE 636 W HCPCS: Mod: JG | Performed by: INTERNAL MEDICINE

## 2020-11-09 PROCEDURE — 96372 THER/PROPH/DIAG INJ SC/IM: CPT

## 2020-11-09 RX ADMIN — DENOSUMAB 60 MG: 60 INJECTION SUBCUTANEOUS at 02:11

## 2020-11-09 NOTE — PLAN OF CARE
Problem: Infection  Goal: Infection Symptom Resolution  Outcome: Ongoing, Progressing  Intervention: Prevent or Manage Infection  Flowsheets (Taken 11/9/2020 4926)  Infection Management: aseptic technique maintained  Isolation Precautions:   protective environment initiated   protective environment maintained

## 2020-11-10 ENCOUNTER — DOCUMENTATION ONLY (OUTPATIENT)
Dept: ENDOCRINOLOGY | Facility: CLINIC | Age: 75
End: 2020-11-10

## 2020-11-10 DIAGNOSIS — M81.8 IDIOPATHIC OSTEOPOROSIS: Primary | ICD-10-CM

## 2020-11-11 NOTE — PROGRESS NOTES
Patient has received her next prolia injection (# 5); 11/9/2020.  Her next (#6 of the present set of 6) should be for  ~  05/21.

## 2020-11-13 ENCOUNTER — TELEPHONE (OUTPATIENT)
Dept: PRIMARY CARE CLINIC | Facility: CLINIC | Age: 75
End: 2020-11-13

## 2020-11-13 DIAGNOSIS — N18.31 STAGE 3A CHRONIC KIDNEY DISEASE: ICD-10-CM

## 2020-11-13 DIAGNOSIS — R79.89 ELEVATED SERUM CREATININE: Primary | ICD-10-CM

## 2020-11-13 NOTE — TELEPHONE ENCOUNTER
----- Message from Nadia Sanderson MD sent at 11/13/2020 11:27 AM CST -----  Please request last note from Dr. Shraddha Krishnamurthy urology at [facility] and last renal ultrasound or CT imaging.

## 2020-11-20 ENCOUNTER — HOSPITAL ENCOUNTER (OUTPATIENT)
Dept: RADIOLOGY | Facility: HOSPITAL | Age: 75
Discharge: HOME | End: 2020-11-20
Attending: INTERNAL MEDICINE
Payer: MEDICARE

## 2020-11-20 DIAGNOSIS — N18.31 STAGE 3A CHRONIC KIDNEY DISEASE: ICD-10-CM

## 2020-11-20 PROCEDURE — 93975 VASCULAR STUDY: CPT | Mod: TC,HCNC

## 2020-11-20 PROCEDURE — 93975 VASCULAR STUDY: CPT | Mod: 26,HCNC,, | Performed by: RADIOLOGY

## 2020-11-20 PROCEDURE — 93975 US RENAL ARTERY STENOSIS HYPERTEN (XPD): ICD-10-PCS | Mod: 26,HCNC,, | Performed by: RADIOLOGY

## 2020-11-24 ENCOUNTER — PATIENT MESSAGE (OUTPATIENT)
Dept: PRIMARY CARE CLINIC | Facility: CLINIC | Age: 75
End: 2020-11-24

## 2020-12-08 ENCOUNTER — TELEPHONE (OUTPATIENT)
Dept: PRIMARY CARE CLINIC | Facility: CLINIC | Age: 75
End: 2020-12-08

## 2020-12-08 NOTE — TELEPHONE ENCOUNTER
Yes I would recommend 1-2 tablet of extra strength tylenol up to every 8 hours as needed. If her pain is daily, I would recommend scheduling 1-2 tablets daily for a week and see if this help.   Heating pad is a great idea.

## 2020-12-08 NOTE — TELEPHONE ENCOUNTER
Pt called with c/o lower back pain,  Stated has been going on for a few months, pain increases with standing and is relieved with sitting.  Pt denies any urinary tract symptoms.  Denies fever, frequency, burning or pain with urination.  Pt asked if she could take Tylenol for the pain and try heat to the area.

## 2020-12-11 ENCOUNTER — PATIENT MESSAGE (OUTPATIENT)
Dept: OTHER | Facility: OTHER | Age: 75
End: 2020-12-11

## 2020-12-23 ENCOUNTER — IMMUNIZATION (OUTPATIENT)
Dept: FAMILY MEDICINE | Facility: CLINIC | Age: 75
End: 2020-12-23
Payer: MEDICARE

## 2020-12-23 DIAGNOSIS — Z23 NEED FOR VACCINATION: ICD-10-CM

## 2020-12-23 PROCEDURE — 91300 COVID-19, MRNA, LNP-S, PF, 30 MCG/0.3 ML DOSE VACCINE: CPT | Mod: S$GLB,,, | Performed by: INTERNAL MEDICINE

## 2020-12-23 PROCEDURE — 0001A COVID-19, MRNA, LNP-S, PF, 30 MCG/0.3 ML DOSE VACCINE: ICD-10-PCS | Mod: S$GLB,,, | Performed by: INTERNAL MEDICINE

## 2020-12-23 PROCEDURE — 0001A COVID-19, MRNA, LNP-S, PF, 30 MCG/0.3 ML DOSE VACCINE: CPT | Mod: S$GLB,,, | Performed by: INTERNAL MEDICINE

## 2020-12-23 PROCEDURE — 91300 COVID-19, MRNA, LNP-S, PF, 30 MCG/0.3 ML DOSE VACCINE: ICD-10-PCS | Mod: S$GLB,,, | Performed by: INTERNAL MEDICINE

## 2021-01-06 ENCOUNTER — OFFICE VISIT (OUTPATIENT)
Dept: PRIMARY CARE CLINIC | Facility: CLINIC | Age: 76
End: 2021-01-06
Payer: MEDICARE

## 2021-01-06 VITALS
DIASTOLIC BLOOD PRESSURE: 66 MMHG | RESPIRATION RATE: 16 BRPM | HEART RATE: 80 BPM | OXYGEN SATURATION: 98 % | BODY MASS INDEX: 22.51 KG/M2 | HEIGHT: 60 IN | TEMPERATURE: 97 F | WEIGHT: 114.63 LBS | SYSTOLIC BLOOD PRESSURE: 122 MMHG

## 2021-01-06 DIAGNOSIS — Z12.11 COLON CANCER SCREENING: Primary | ICD-10-CM

## 2021-01-06 DIAGNOSIS — I10 ESSENTIAL HYPERTENSION: ICD-10-CM

## 2021-01-06 DIAGNOSIS — Z87.898 HISTORY OF SYNCOPE: ICD-10-CM

## 2021-01-06 DIAGNOSIS — M54.9 DORSALGIA, UNSPECIFIED: ICD-10-CM

## 2021-01-06 DIAGNOSIS — R47.01 APHASIA: ICD-10-CM

## 2021-01-06 DIAGNOSIS — N18.31 STAGE 3A CHRONIC KIDNEY DISEASE: ICD-10-CM

## 2021-01-06 PROCEDURE — 1101F PT FALLS ASSESS-DOCD LE1/YR: CPT | Mod: CPTII,S$GLB,, | Performed by: INTERNAL MEDICINE

## 2021-01-06 PROCEDURE — 1101F PR PT FALLS ASSESS DOC 0-1 FALLS W/OUT INJ PAST YR: ICD-10-PCS | Mod: CPTII,S$GLB,, | Performed by: INTERNAL MEDICINE

## 2021-01-06 PROCEDURE — 3288F FALL RISK ASSESSMENT DOCD: CPT | Mod: CPTII,S$GLB,, | Performed by: INTERNAL MEDICINE

## 2021-01-06 PROCEDURE — 99215 OFFICE O/P EST HI 40 MIN: CPT | Mod: S$GLB,,, | Performed by: INTERNAL MEDICINE

## 2021-01-06 PROCEDURE — 1125F PR PAIN SEVERITY QUANTIFIED, PAIN PRESENT: ICD-10-PCS | Mod: S$GLB,,, | Performed by: INTERNAL MEDICINE

## 2021-01-06 PROCEDURE — 99215 PR OFFICE/OUTPT VISIT, EST, LEVL V, 40-54 MIN: ICD-10-PCS | Mod: S$GLB,,, | Performed by: INTERNAL MEDICINE

## 2021-01-06 PROCEDURE — 3288F PR FALLS RISK ASSESSMENT DOCUMENTED: ICD-10-PCS | Mod: CPTII,S$GLB,, | Performed by: INTERNAL MEDICINE

## 2021-01-06 PROCEDURE — 3008F PR BODY MASS INDEX (BMI) DOCUMENTED: ICD-10-PCS | Mod: CPTII,S$GLB,, | Performed by: INTERNAL MEDICINE

## 2021-01-06 PROCEDURE — 1125F AMNT PAIN NOTED PAIN PRSNT: CPT | Mod: S$GLB,,, | Performed by: INTERNAL MEDICINE

## 2021-01-06 PROCEDURE — 3008F BODY MASS INDEX DOCD: CPT | Mod: CPTII,S$GLB,, | Performed by: INTERNAL MEDICINE

## 2021-01-07 ENCOUNTER — TELEPHONE (OUTPATIENT)
Dept: PRIMARY CARE CLINIC | Facility: CLINIC | Age: 76
End: 2021-01-07

## 2021-01-13 ENCOUNTER — IMMUNIZATION (OUTPATIENT)
Dept: FAMILY MEDICINE | Facility: CLINIC | Age: 76
End: 2021-01-13
Payer: MEDICARE

## 2021-01-13 DIAGNOSIS — Z23 NEED FOR VACCINATION: ICD-10-CM

## 2021-01-13 PROCEDURE — 0002A COVID-19, MRNA, LNP-S, PF, 30 MCG/0.3 ML DOSE VACCINE: CPT | Mod: CV19,S$GLB,, | Performed by: INTERNAL MEDICINE

## 2021-01-13 PROCEDURE — 0002A COVID-19, MRNA, LNP-S, PF, 30 MCG/0.3 ML DOSE VACCINE: ICD-10-PCS | Mod: CV19,S$GLB,, | Performed by: INTERNAL MEDICINE

## 2021-01-13 PROCEDURE — 91300 COVID-19, MRNA, LNP-S, PF, 30 MCG/0.3 ML DOSE VACCINE: CPT | Mod: S$GLB,,, | Performed by: INTERNAL MEDICINE

## 2021-01-13 PROCEDURE — 91300 COVID-19, MRNA, LNP-S, PF, 30 MCG/0.3 ML DOSE VACCINE: ICD-10-PCS | Mod: S$GLB,,, | Performed by: INTERNAL MEDICINE

## 2021-01-20 ENCOUNTER — OFFICE VISIT (OUTPATIENT)
Dept: CARDIOLOGY | Facility: CLINIC | Age: 76
End: 2021-01-20
Payer: MEDICARE

## 2021-01-20 ENCOUNTER — TELEPHONE (OUTPATIENT)
Dept: GASTROENTEROLOGY | Facility: CLINIC | Age: 76
End: 2021-01-20

## 2021-01-20 VITALS
WEIGHT: 112 LBS | BODY MASS INDEX: 21.99 KG/M2 | OXYGEN SATURATION: 98 % | HEIGHT: 60 IN | HEART RATE: 81 BPM | SYSTOLIC BLOOD PRESSURE: 126 MMHG | RESPIRATION RATE: 16 BRPM | DIASTOLIC BLOOD PRESSURE: 78 MMHG

## 2021-01-20 DIAGNOSIS — Z01.818 PRE-OP TESTING: ICD-10-CM

## 2021-01-20 DIAGNOSIS — D51.8 MACROCYTIC ANEMIA WITH VITAMIN B12 DEFICIENCY: ICD-10-CM

## 2021-01-20 DIAGNOSIS — Z86.010 HISTORY OF COLON POLYPS: Primary | ICD-10-CM

## 2021-01-20 DIAGNOSIS — Z87.898 HISTORY OF SYNCOPE: ICD-10-CM

## 2021-01-20 DIAGNOSIS — N18.31 STAGE 3A CHRONIC KIDNEY DISEASE: ICD-10-CM

## 2021-01-20 DIAGNOSIS — Z00.00 ANNUAL PHYSICAL EXAM: ICD-10-CM

## 2021-01-20 DIAGNOSIS — I10 ESSENTIAL HYPERTENSION: ICD-10-CM

## 2021-01-20 DIAGNOSIS — R40.20 LOSS OF CONSCIOUSNESS: Primary | ICD-10-CM

## 2021-01-20 DIAGNOSIS — R00.2 PALPITATIONS: ICD-10-CM

## 2021-01-20 DIAGNOSIS — E78.00 HYPERCHOLESTEREMIA: ICD-10-CM

## 2021-01-20 PROCEDURE — 99205 PR OFFICE/OUTPT VISIT, NEW, LEVL V, 60-74 MIN: ICD-10-PCS | Mod: 25,S$GLB,, | Performed by: INTERNAL MEDICINE

## 2021-01-20 PROCEDURE — 99205 OFFICE O/P NEW HI 60 MIN: CPT | Mod: 25,S$GLB,, | Performed by: INTERNAL MEDICINE

## 2021-01-20 PROCEDURE — 1159F PR MEDICATION LIST DOCUMENTED IN MEDICAL RECORD: ICD-10-PCS | Mod: S$GLB,,, | Performed by: INTERNAL MEDICINE

## 2021-01-20 PROCEDURE — 93000 ELECTROCARDIOGRAM COMPLETE: CPT | Mod: S$GLB,,, | Performed by: INTERNAL MEDICINE

## 2021-01-20 PROCEDURE — 1125F PR PAIN SEVERITY QUANTIFIED, PAIN PRESENT: ICD-10-PCS | Mod: S$GLB,,, | Performed by: INTERNAL MEDICINE

## 2021-01-20 PROCEDURE — 93000 EKG 12-LEAD: ICD-10-PCS | Mod: S$GLB,,, | Performed by: INTERNAL MEDICINE

## 2021-01-20 PROCEDURE — 1159F MED LIST DOCD IN RCRD: CPT | Mod: S$GLB,,, | Performed by: INTERNAL MEDICINE

## 2021-01-20 PROCEDURE — 1125F AMNT PAIN NOTED PAIN PRSNT: CPT | Mod: S$GLB,,, | Performed by: INTERNAL MEDICINE

## 2021-01-20 RX ORDER — ASCORBIC ACID 1000 MG
TABLET ORAL
COMMUNITY
End: 2021-06-10

## 2021-01-20 NOTE — PLAN OF CARE
Problem: Patient Care Overview  Goal: Individualization & Mutuality  Outcome: Ongoing (interventions implemented as appropriate)  PATIENT EDUCATED ON HAND WASHING AND INFECTION CONTROL. PATIENT VERBALIZED UNDERSTANDING       ENT ISSUE/POD: Right Epistaxis    HPI: 73 yo female with significant cardiac history on coumadin and well known by ENT for epistaxis and multiple packing placements / cauterizations, presents with new episode of right nare epistaxis s/p cautery and packed with dissolvable nasopore and surgicel. Pt denies further bleeding since. INR 2.71         PAST MEDICAL & SURGICAL HISTORY:  COPD (chronic obstructive pulmonary disease)    Hypertension    CHF (congestive heart failure)    Tricuspid valve replaced    Mitral valve replaced    Gout    HTN (hypertension)    CHF (congestive heart failure)    COPD (chronic obstructive pulmonary disease)  on home O2    MIGUEL (obstructive sleep apnea)    Pulmonary hypertension    Atrial fibrillation  S/P Ablation    No significant past surgical history    Tricuspid valve replaced  mechanical    History of mitral valve replacement with mechanical valve      Allergies    No Known Allergies    Intolerances      MEDICATIONS  (STANDING):  allopurinol 100 milliGRAM(s) Oral daily  amoxicillin  500 milliGRAM(s)/clavulanate 1 Tablet(s) Oral two times a day  BACItracin   Ointment 1 Application(s) Topical two times a day  budesonide 160 MICROgram(s)/formoterol 4.5 MICROgram(s) Inhaler 2 Puff(s) Inhalation two times a day  metoprolol succinate ER 25 milliGRAM(s) Oral daily  simvastatin 10 milliGRAM(s) Oral at bedtime  sodium chloride 0.65% Nasal 2 Spray(s) Both Nostrils four times a day  warfarin 5 milliGRAM(s) Oral once    MEDICATIONS  (PRN):      Social History: see consult    Family history: see consult    ROS:   ENT: all negative except as noted in HPI   Pulm: denies SOB, cough, hemoptysis  Neuro: denies numbness/tingling, loss of sensation  Endo: denies heat/cold intolerance, excessive sweating      Vital Signs Last 24 Hrs  T(C): 37.6 (20 Jan 2021 11:35), Max: 37.8 (19 Jan 2021 21:06)  T(F): 99.6 (20 Jan 2021 11:35), Max: 100 (19 Jan 2021 21:06)  HR: 68 (20 Jan 2021 11:35) (58 - 88)  BP: 130/70 (20 Jan 2021 11:35) (96/56 - 133/64)  BP(mean): --  RR: 20 (20 Jan 2021 11:35) (18 - 20)  SpO2: 96% (20 Jan 2021 11:35) (94% - 97%)                          8.3    5.75  )-----------( 318      ( 19 Jan 2021 06:57 )             28.2    01-20    130<L>  |  87<L>  |  83<H>  ----------------------------<  99  4.6   |  33<H>  |  2.00<H>    Ca    10.2      20 Jan 2021 06:54  Mg     2.4     01-19     PT/INR - ( 20 Jan 2021 06:56 )   PT: 31.0 sec;   INR: 2.71 ratio             PHYSICAL EXAM:  Gen: NAD  Skin: No rashes, bruises, or lesions  Head: Normocephalic, Atraumatic  Face: no edema, erythema, or fluctuance. Parotid glands soft without mass  Eyes: no scleral injection  Nose: Right nare: packed with nasopore wrapped in surgicel coated in bacitracin, removed. Left nare: no active bleeding or discharge.   Mouth: No Stridor / Drooling / Trismus.  Mucosa moist, tongue/uvula midline, oropharynx clear with no blood in posterior oropharynx  Neck: Flat, supple, no lymphadenopathy, trachea midline, no masses  Lymphatic: No lymphadenopathy  Resp: breathing easily, no stridor  Neuro: facial nerve intact, no facial droop

## 2021-01-28 ENCOUNTER — HOSPITAL ENCOUNTER (OUTPATIENT)
Dept: CARDIOLOGY | Facility: CLINIC | Age: 76
Discharge: HOME | End: 2021-01-28
Attending: INTERNAL MEDICINE
Payer: MEDICARE

## 2021-01-28 DIAGNOSIS — I10 ESSENTIAL HYPERTENSION: ICD-10-CM

## 2021-01-28 DIAGNOSIS — N18.31 STAGE 3A CHRONIC KIDNEY DISEASE: ICD-10-CM

## 2021-01-28 DIAGNOSIS — Z87.898 HISTORY OF SYNCOPE: ICD-10-CM

## 2021-01-28 PROCEDURE — 93224 HOLTER MONITOR - 48 HOUR (CUPID ONLY): ICD-10-PCS | Mod: S$GLB,,, | Performed by: INTERNAL MEDICINE

## 2021-01-28 PROCEDURE — 93224 XTRNL ECG REC UP TO 48 HRS: CPT | Mod: S$GLB,,, | Performed by: INTERNAL MEDICINE

## 2021-02-08 ENCOUNTER — TELEPHONE (OUTPATIENT)
Dept: CARDIOLOGY | Facility: CLINIC | Age: 76
End: 2021-02-08

## 2021-02-09 ENCOUNTER — LAB VISIT (OUTPATIENT)
Dept: LAB | Facility: HOSPITAL | Age: 76
End: 2021-02-09
Attending: INTERNAL MEDICINE
Payer: MEDICARE

## 2021-02-09 DIAGNOSIS — N18.31 STAGE 3A CHRONIC KIDNEY DISEASE: ICD-10-CM

## 2021-02-09 LAB
ALBUMIN SERPL BCP-MCNC: 3.9 G/DL (ref 3.5–5.2)
ANION GAP SERPL CALC-SCNC: 9 MMOL/L (ref 8–16)
BUN SERPL-MCNC: 23 MG/DL (ref 8–23)
CALCIUM SERPL-MCNC: 9.4 MG/DL (ref 8.7–10.5)
CHLORIDE SERPL-SCNC: 107 MMOL/L (ref 95–110)
CO2 SERPL-SCNC: 27 MMOL/L (ref 23–29)
CREAT SERPL-MCNC: 1.3 MG/DL (ref 0.5–1.4)
EST. GFR  (AFRICAN AMERICAN): 47 ML/MIN/1.73 M^2
EST. GFR  (NON AFRICAN AMERICAN): 40.8 ML/MIN/1.73 M^2
GLUCOSE SERPL-MCNC: 92 MG/DL (ref 70–110)
PHOSPHATE SERPL-MCNC: 3.5 MG/DL (ref 2.7–4.5)
POTASSIUM SERPL-SCNC: 4.6 MMOL/L (ref 3.5–5.1)
SODIUM SERPL-SCNC: 143 MMOL/L (ref 136–145)

## 2021-02-09 PROCEDURE — 80069 RENAL FUNCTION PANEL: CPT

## 2021-02-09 PROCEDURE — 36415 COLL VENOUS BLD VENIPUNCTURE: CPT

## 2021-02-21 ENCOUNTER — LAB VISIT (OUTPATIENT)
Dept: PRIMARY CARE CLINIC | Facility: CLINIC | Age: 76
End: 2021-02-21
Payer: MEDICARE

## 2021-02-21 DIAGNOSIS — Z01.818 PRE-OP TESTING: ICD-10-CM

## 2021-02-21 PROCEDURE — U0003 INFECTIOUS AGENT DETECTION BY NUCLEIC ACID (DNA OR RNA); SEVERE ACUTE RESPIRATORY SYNDROME CORONAVIRUS 2 (SARS-COV-2) (CORONAVIRUS DISEASE [COVID-19]), AMPLIFIED PROBE TECHNIQUE, MAKING USE OF HIGH THROUGHPUT TECHNOLOGIES AS DESCRIBED BY CMS-2020-01-R: HCPCS

## 2021-02-21 PROCEDURE — U0005 INFEC AGEN DETEC AMPLI PROBE: HCPCS

## 2021-02-22 LAB — SARS-COV-2 RNA RESP QL NAA+PROBE: NOT DETECTED

## 2021-02-24 ENCOUNTER — HOSPITAL ENCOUNTER (OUTPATIENT)
Facility: HOSPITAL | Age: 76
Discharge: HOME | End: 2021-02-24
Attending: INTERNAL MEDICINE | Admitting: INTERNAL MEDICINE
Payer: MEDICARE

## 2021-02-24 ENCOUNTER — ANESTHESIA (OUTPATIENT)
Dept: ENDOSCOPY | Facility: HOSPITAL | Age: 76
End: 2021-02-24
Payer: MEDICARE

## 2021-02-24 ENCOUNTER — ANESTHESIA EVENT (OUTPATIENT)
Dept: ENDOSCOPY | Facility: HOSPITAL | Age: 76
End: 2021-02-24
Payer: MEDICARE

## 2021-02-24 VITALS
RESPIRATION RATE: 15 BRPM | HEART RATE: 82 BPM | WEIGHT: 112 LBS | SYSTOLIC BLOOD PRESSURE: 119 MMHG | HEIGHT: 60 IN | TEMPERATURE: 98 F | OXYGEN SATURATION: 100 % | DIASTOLIC BLOOD PRESSURE: 60 MMHG | BODY MASS INDEX: 21.99 KG/M2

## 2021-02-24 DIAGNOSIS — Z86.010 HISTORY OF COLON POLYPS: ICD-10-CM

## 2021-02-24 PROBLEM — Z86.0100 HISTORY OF COLON POLYPS: Status: ACTIVE | Noted: 2021-02-24

## 2021-02-24 PROCEDURE — G0105 COLORECTAL SCRN; HI RISK IND: ICD-10-PCS | Mod: ,,, | Performed by: INTERNAL MEDICINE

## 2021-02-24 PROCEDURE — D9220A PRA ANESTHESIA: ICD-10-PCS | Mod: CRNA,,, | Performed by: NURSE ANESTHETIST, CERTIFIED REGISTERED

## 2021-02-24 PROCEDURE — D9220A PRA ANESTHESIA: Mod: ANES,,, | Performed by: ANESTHESIOLOGY

## 2021-02-24 PROCEDURE — 37000009 HC ANESTHESIA EA ADD 15 MINS: Performed by: INTERNAL MEDICINE

## 2021-02-24 PROCEDURE — 25000003 PHARM REV CODE 250: Performed by: INTERNAL MEDICINE

## 2021-02-24 PROCEDURE — D9220A PRA ANESTHESIA: ICD-10-PCS | Mod: ANES,,, | Performed by: ANESTHESIOLOGY

## 2021-02-24 PROCEDURE — 25000003 PHARM REV CODE 250: Performed by: NURSE ANESTHETIST, CERTIFIED REGISTERED

## 2021-02-24 PROCEDURE — 37000008 HC ANESTHESIA 1ST 15 MINUTES: Performed by: INTERNAL MEDICINE

## 2021-02-24 PROCEDURE — G0105 COLORECTAL SCRN; HI RISK IND: HCPCS | Performed by: INTERNAL MEDICINE

## 2021-02-24 PROCEDURE — G0105 COLORECTAL SCRN; HI RISK IND: HCPCS | Mod: ,,, | Performed by: INTERNAL MEDICINE

## 2021-02-24 PROCEDURE — D9220A PRA ANESTHESIA: Mod: CRNA,,, | Performed by: NURSE ANESTHETIST, CERTIFIED REGISTERED

## 2021-02-24 PROCEDURE — 63600175 PHARM REV CODE 636 W HCPCS: Performed by: NURSE ANESTHETIST, CERTIFIED REGISTERED

## 2021-02-24 RX ORDER — LIDOCAINE HCL/PF 100 MG/5ML
SYRINGE (ML) INTRAVENOUS
Status: DISCONTINUED | OUTPATIENT
Start: 2021-02-24 | End: 2021-02-24

## 2021-02-24 RX ORDER — PROPOFOL 10 MG/ML
VIAL (ML) INTRAVENOUS
Status: DISCONTINUED | OUTPATIENT
Start: 2021-02-24 | End: 2021-02-24

## 2021-02-24 RX ORDER — SODIUM CHLORIDE 9 MG/ML
INJECTION, SOLUTION INTRAVENOUS CONTINUOUS
Status: DISCONTINUED | OUTPATIENT
Start: 2021-02-24 | End: 2021-02-24 | Stop reason: HOSPADM

## 2021-02-24 RX ADMIN — SODIUM CHLORIDE: 0.9 INJECTION, SOLUTION INTRAVENOUS at 10:02

## 2021-02-24 RX ADMIN — PROPOFOL 50 MG: 10 INJECTION, EMULSION INTRAVENOUS at 10:02

## 2021-02-24 RX ADMIN — LIDOCAINE HYDROCHLORIDE 100 MG: 20 INJECTION INTRAVENOUS at 10:02

## 2021-02-24 RX ADMIN — GLYCOPYRROLATE 0.2 MG: 0.2 INJECTION, SOLUTION INTRAMUSCULAR; INTRAVITREAL at 10:02

## 2021-02-24 RX ADMIN — PROPOFOL 100 MG: 10 INJECTION, EMULSION INTRAVENOUS at 10:02

## 2021-03-01 LAB
OHS CV EVENT MONITOR DAY: 0
OHS CV HOLTER LENGTH DECIMAL HOURS: 48
OHS CV HOLTER LENGTH HOURS: 48
OHS CV HOLTER LENGTH MINUTES: 0

## 2021-03-08 ENCOUNTER — OFFICE VISIT (OUTPATIENT)
Dept: CARDIOLOGY | Facility: CLINIC | Age: 76
End: 2021-03-08
Payer: MEDICARE

## 2021-03-08 VITALS
RESPIRATION RATE: 16 BRPM | OXYGEN SATURATION: 98 % | BODY MASS INDEX: 21.6 KG/M2 | HEIGHT: 60 IN | HEART RATE: 72 BPM | WEIGHT: 110 LBS | SYSTOLIC BLOOD PRESSURE: 142 MMHG | DIASTOLIC BLOOD PRESSURE: 72 MMHG

## 2021-03-08 DIAGNOSIS — R55 SYNCOPE, UNSPECIFIED SYNCOPE TYPE: Primary | ICD-10-CM

## 2021-03-08 DIAGNOSIS — R00.2 PALPITATIONS: ICD-10-CM

## 2021-03-08 DIAGNOSIS — D51.8 MACROCYTIC ANEMIA WITH VITAMIN B12 DEFICIENCY: ICD-10-CM

## 2021-03-08 DIAGNOSIS — N18.31 STAGE 3A CHRONIC KIDNEY DISEASE: ICD-10-CM

## 2021-03-08 DIAGNOSIS — I10 ESSENTIAL HYPERTENSION: ICD-10-CM

## 2021-03-08 DIAGNOSIS — Z86.39 HISTORY OF THYROTOXICOSIS: ICD-10-CM

## 2021-03-08 DIAGNOSIS — K21.9 GASTROESOPHAGEAL REFLUX DISEASE WITHOUT ESOPHAGITIS: ICD-10-CM

## 2021-03-08 DIAGNOSIS — E78.00 HYPERCHOLESTEREMIA: ICD-10-CM

## 2021-03-08 PROCEDURE — 1101F PR PT FALLS ASSESS DOC 0-1 FALLS W/OUT INJ PAST YR: ICD-10-PCS | Mod: CPTII,S$GLB,, | Performed by: INTERNAL MEDICINE

## 2021-03-08 PROCEDURE — 99214 OFFICE O/P EST MOD 30 MIN: CPT | Mod: S$GLB,,, | Performed by: INTERNAL MEDICINE

## 2021-03-08 PROCEDURE — 3078F DIAST BP <80 MM HG: CPT | Mod: CPTII,S$GLB,, | Performed by: INTERNAL MEDICINE

## 2021-03-08 PROCEDURE — 1159F PR MEDICATION LIST DOCUMENTED IN MEDICAL RECORD: ICD-10-PCS | Mod: S$GLB,,, | Performed by: INTERNAL MEDICINE

## 2021-03-08 PROCEDURE — 3077F PR MOST RECENT SYSTOLIC BLOOD PRESSURE >= 140 MM HG: ICD-10-PCS | Mod: CPTII,S$GLB,, | Performed by: INTERNAL MEDICINE

## 2021-03-08 PROCEDURE — 1159F MED LIST DOCD IN RCRD: CPT | Mod: S$GLB,,, | Performed by: INTERNAL MEDICINE

## 2021-03-08 PROCEDURE — 3008F BODY MASS INDEX DOCD: CPT | Mod: CPTII,S$GLB,, | Performed by: INTERNAL MEDICINE

## 2021-03-08 PROCEDURE — 99214 PR OFFICE/OUTPT VISIT, EST, LEVL IV, 30-39 MIN: ICD-10-PCS | Mod: S$GLB,,, | Performed by: INTERNAL MEDICINE

## 2021-03-08 PROCEDURE — 3288F FALL RISK ASSESSMENT DOCD: CPT | Mod: CPTII,S$GLB,, | Performed by: INTERNAL MEDICINE

## 2021-03-08 PROCEDURE — 1101F PT FALLS ASSESS-DOCD LE1/YR: CPT | Mod: CPTII,S$GLB,, | Performed by: INTERNAL MEDICINE

## 2021-03-08 PROCEDURE — 1126F AMNT PAIN NOTED NONE PRSNT: CPT | Mod: S$GLB,,, | Performed by: INTERNAL MEDICINE

## 2021-03-08 PROCEDURE — 3008F PR BODY MASS INDEX (BMI) DOCUMENTED: ICD-10-PCS | Mod: CPTII,S$GLB,, | Performed by: INTERNAL MEDICINE

## 2021-03-08 PROCEDURE — 1126F PR PAIN SEVERITY QUANTIFIED, NO PAIN PRESENT: ICD-10-PCS | Mod: S$GLB,,, | Performed by: INTERNAL MEDICINE

## 2021-03-08 PROCEDURE — 3078F PR MOST RECENT DIASTOLIC BLOOD PRESSURE < 80 MM HG: ICD-10-PCS | Mod: CPTII,S$GLB,, | Performed by: INTERNAL MEDICINE

## 2021-03-08 PROCEDURE — 3288F PR FALLS RISK ASSESSMENT DOCUMENTED: ICD-10-PCS | Mod: CPTII,S$GLB,, | Performed by: INTERNAL MEDICINE

## 2021-03-08 PROCEDURE — 3077F SYST BP >= 140 MM HG: CPT | Mod: CPTII,S$GLB,, | Performed by: INTERNAL MEDICINE

## 2021-03-30 ENCOUNTER — OFFICE VISIT (OUTPATIENT)
Dept: ENDOCRINOLOGY | Facility: CLINIC | Age: 76
End: 2021-03-30
Payer: MEDICARE

## 2021-03-30 ENCOUNTER — LAB VISIT (OUTPATIENT)
Dept: LAB | Facility: HOSPITAL | Age: 76
End: 2021-03-30
Attending: INTERNAL MEDICINE
Payer: MEDICARE

## 2021-03-30 VITALS
BODY MASS INDEX: 21.28 KG/M2 | HEIGHT: 60 IN | SYSTOLIC BLOOD PRESSURE: 124 MMHG | WEIGHT: 108.38 LBS | OXYGEN SATURATION: 98 % | TEMPERATURE: 98 F | DIASTOLIC BLOOD PRESSURE: 60 MMHG | HEART RATE: 71 BPM

## 2021-03-30 DIAGNOSIS — D51.8 MACROCYTIC ANEMIA WITH VITAMIN B12 DEFICIENCY: ICD-10-CM

## 2021-03-30 DIAGNOSIS — Z86.39 HISTORY OF THYROTOXICOSIS: ICD-10-CM

## 2021-03-30 DIAGNOSIS — N18.31 STAGE 3A CHRONIC KIDNEY DISEASE: ICD-10-CM

## 2021-03-30 DIAGNOSIS — D50.9 IRON DEFICIENCY ANEMIA, UNSPECIFIED IRON DEFICIENCY ANEMIA TYPE: ICD-10-CM

## 2021-03-30 DIAGNOSIS — M81.8 IDIOPATHIC OSTEOPOROSIS: ICD-10-CM

## 2021-03-30 DIAGNOSIS — Z78.0 POSTMENOPAUSAL: ICD-10-CM

## 2021-03-30 DIAGNOSIS — K21.9 GASTROESOPHAGEAL REFLUX DISEASE WITHOUT ESOPHAGITIS: ICD-10-CM

## 2021-03-30 DIAGNOSIS — E78.49 OTHER HYPERLIPIDEMIA: ICD-10-CM

## 2021-03-30 DIAGNOSIS — I10 ESSENTIAL HYPERTENSION: ICD-10-CM

## 2021-03-30 DIAGNOSIS — E78.00 HYPERCHOLESTEREMIA: ICD-10-CM

## 2021-03-30 DIAGNOSIS — E55.9 HYPOVITAMINOSIS D: ICD-10-CM

## 2021-03-30 DIAGNOSIS — R80.8 OTHER PROTEINURIA: ICD-10-CM

## 2021-03-30 DIAGNOSIS — M81.8 IDIOPATHIC OSTEOPOROSIS: Primary | ICD-10-CM

## 2021-03-30 DIAGNOSIS — Z85.3 HISTORY OF BREAST CANCER: ICD-10-CM

## 2021-03-30 DIAGNOSIS — Z86.010 HISTORY OF COLON POLYPS: ICD-10-CM

## 2021-03-30 LAB — CA-I BLDV-SCNC: 1.31 MMOL/L (ref 1.06–1.42)

## 2021-03-30 PROCEDURE — 80053 COMPREHEN METABOLIC PANEL: CPT | Performed by: INTERNAL MEDICINE

## 2021-03-30 PROCEDURE — 84432 ASSAY OF THYROGLOBULIN: CPT | Performed by: INTERNAL MEDICINE

## 2021-03-30 PROCEDURE — 83970 ASSAY OF PARATHORMONE: CPT | Performed by: INTERNAL MEDICINE

## 2021-03-30 PROCEDURE — 3288F FALL RISK ASSESSMENT DOCD: CPT | Mod: CPTII,S$GLB,, | Performed by: INTERNAL MEDICINE

## 2021-03-30 PROCEDURE — 99999 PR PBB SHADOW E&M-EST. PATIENT-LVL IV: CPT | Mod: PBBFAC,,, | Performed by: INTERNAL MEDICINE

## 2021-03-30 PROCEDURE — 82607 VITAMIN B-12: CPT | Mod: 91 | Performed by: INTERNAL MEDICINE

## 2021-03-30 PROCEDURE — 82652 VIT D 1 25-DIHYDROXY: CPT | Performed by: INTERNAL MEDICINE

## 2021-03-30 PROCEDURE — 80061 LIPID PANEL: CPT | Performed by: INTERNAL MEDICINE

## 2021-03-30 PROCEDURE — 1101F PT FALLS ASSESS-DOCD LE1/YR: CPT | Mod: CPTII,S$GLB,, | Performed by: INTERNAL MEDICINE

## 2021-03-30 PROCEDURE — 84439 ASSAY OF FREE THYROXINE: CPT | Performed by: INTERNAL MEDICINE

## 2021-03-30 PROCEDURE — 3008F BODY MASS INDEX DOCD: CPT | Mod: CPTII,S$GLB,, | Performed by: INTERNAL MEDICINE

## 2021-03-30 PROCEDURE — 99214 PR OFFICE/OUTPT VISIT, EST, LEVL IV, 30-39 MIN: ICD-10-PCS | Mod: S$GLB,,, | Performed by: INTERNAL MEDICINE

## 2021-03-30 PROCEDURE — 99214 OFFICE O/P EST MOD 30 MIN: CPT | Mod: S$GLB,,, | Performed by: INTERNAL MEDICINE

## 2021-03-30 PROCEDURE — 3008F PR BODY MASS INDEX (BMI) DOCUMENTED: ICD-10-PCS | Mod: CPTII,S$GLB,, | Performed by: INTERNAL MEDICINE

## 2021-03-30 PROCEDURE — 82746 ASSAY OF FOLIC ACID SERUM: CPT | Performed by: INTERNAL MEDICINE

## 2021-03-30 PROCEDURE — 3074F SYST BP LT 130 MM HG: CPT | Mod: CPTII,S$GLB,, | Performed by: INTERNAL MEDICINE

## 2021-03-30 PROCEDURE — 36415 COLL VENOUS BLD VENIPUNCTURE: CPT | Mod: PO | Performed by: INTERNAL MEDICINE

## 2021-03-30 PROCEDURE — 1159F PR MEDICATION LIST DOCUMENTED IN MEDICAL RECORD: ICD-10-PCS | Mod: S$GLB,,, | Performed by: INTERNAL MEDICINE

## 2021-03-30 PROCEDURE — 84480 ASSAY TRIIODOTHYRONINE (T3): CPT | Performed by: INTERNAL MEDICINE

## 2021-03-30 PROCEDURE — 82747 ASSAY OF FOLIC ACID RBC: CPT | Performed by: INTERNAL MEDICINE

## 2021-03-30 PROCEDURE — 3288F PR FALLS RISK ASSESSMENT DOCUMENTED: ICD-10-PCS | Mod: CPTII,S$GLB,, | Performed by: INTERNAL MEDICINE

## 2021-03-30 PROCEDURE — 1126F PR PAIN SEVERITY QUANTIFIED, NO PAIN PRESENT: ICD-10-PCS | Mod: S$GLB,,, | Performed by: INTERNAL MEDICINE

## 2021-03-30 PROCEDURE — 99999 PR PBB SHADOW E&M-EST. PATIENT-LVL IV: ICD-10-PCS | Mod: PBBFAC,,, | Performed by: INTERNAL MEDICINE

## 2021-03-30 PROCEDURE — 3078F PR MOST RECENT DIASTOLIC BLOOD PRESSURE < 80 MM HG: ICD-10-PCS | Mod: CPTII,S$GLB,, | Performed by: INTERNAL MEDICINE

## 2021-03-30 PROCEDURE — 1159F MED LIST DOCD IN RCRD: CPT | Mod: S$GLB,,, | Performed by: INTERNAL MEDICINE

## 2021-03-30 PROCEDURE — 82607 VITAMIN B-12: CPT | Performed by: INTERNAL MEDICINE

## 2021-03-30 PROCEDURE — 3078F DIAST BP <80 MM HG: CPT | Mod: CPTII,S$GLB,, | Performed by: INTERNAL MEDICINE

## 2021-03-30 PROCEDURE — 82330 ASSAY OF CALCIUM: CPT | Performed by: INTERNAL MEDICINE

## 2021-03-30 PROCEDURE — 84443 ASSAY THYROID STIM HORMONE: CPT | Performed by: INTERNAL MEDICINE

## 2021-03-30 PROCEDURE — 85025 COMPLETE CBC W/AUTO DIFF WBC: CPT | Performed by: INTERNAL MEDICINE

## 2021-03-30 PROCEDURE — 1101F PR PT FALLS ASSESS DOC 0-1 FALLS W/OUT INJ PAST YR: ICD-10-PCS | Mod: CPTII,S$GLB,, | Performed by: INTERNAL MEDICINE

## 2021-03-30 PROCEDURE — 84550 ASSAY OF BLOOD/URIC ACID: CPT | Performed by: INTERNAL MEDICINE

## 2021-03-30 PROCEDURE — 82306 VITAMIN D 25 HYDROXY: CPT | Performed by: INTERNAL MEDICINE

## 2021-03-30 PROCEDURE — 1126F AMNT PAIN NOTED NONE PRSNT: CPT | Mod: S$GLB,,, | Performed by: INTERNAL MEDICINE

## 2021-03-30 PROCEDURE — 3074F PR MOST RECENT SYSTOLIC BLOOD PRESSURE < 130 MM HG: ICD-10-PCS | Mod: CPTII,S$GLB,, | Performed by: INTERNAL MEDICINE

## 2021-03-30 RX ORDER — LANOLIN ALCOHOL/MO/W.PET/CERES
250 CREAM (GRAM) TOPICAL DAILY
COMMUNITY
End: 2021-06-10

## 2021-03-31 LAB
25(OH)D3+25(OH)D2 SERPL-MCNC: 50 NG/ML (ref 30–96)
ALBUMIN SERPL BCP-MCNC: 3.8 G/DL (ref 3.5–5.2)
ALP SERPL-CCNC: 49 U/L (ref 55–135)
ALT SERPL W/O P-5'-P-CCNC: 13 U/L (ref 10–44)
ANION GAP SERPL CALC-SCNC: 8 MMOL/L (ref 8–16)
AST SERPL-CCNC: 24 U/L (ref 10–40)
BASOPHILS # BLD AUTO: 0.03 K/UL (ref 0–0.2)
BASOPHILS NFR BLD: 0.6 % (ref 0–1.9)
BILIRUB SERPL-MCNC: 0.3 MG/DL (ref 0.1–1)
BUN SERPL-MCNC: 26 MG/DL (ref 8–23)
CALCIUM SERPL-MCNC: 9.2 MG/DL (ref 8.7–10.5)
CHLORIDE SERPL-SCNC: 105 MMOL/L (ref 95–110)
CHOLEST SERPL-MCNC: 166 MG/DL (ref 120–199)
CHOLEST/HDLC SERPL: 3.1 {RATIO} (ref 2–5)
CO2 SERPL-SCNC: 26 MMOL/L (ref 23–29)
CREAT SERPL-MCNC: 1.3 MG/DL (ref 0.5–1.4)
DIFFERENTIAL METHOD: ABNORMAL
EOSINOPHIL # BLD AUTO: 0.2 K/UL (ref 0–0.5)
EOSINOPHIL NFR BLD: 3.8 % (ref 0–8)
ERYTHROCYTE [DISTWIDTH] IN BLOOD BY AUTOMATED COUNT: 13.5 % (ref 11.5–14.5)
EST. GFR  (AFRICAN AMERICAN): 46.7 ML/MIN/1.73 M^2
EST. GFR  (NON AFRICAN AMERICAN): 40.5 ML/MIN/1.73 M^2
FOLATE SERPL-MCNC: 17.2 NG/ML (ref 4–24)
GLUCOSE SERPL-MCNC: 84 MG/DL (ref 70–110)
HCT VFR BLD AUTO: 32.9 % (ref 37–48.5)
HDLC SERPL-MCNC: 53 MG/DL (ref 40–75)
HDLC SERPL: 31.9 % (ref 20–50)
HGB BLD-MCNC: 10.5 G/DL (ref 12–16)
IMM GRANULOCYTES # BLD AUTO: 0.01 K/UL (ref 0–0.04)
IMM GRANULOCYTES NFR BLD AUTO: 0.2 % (ref 0–0.5)
LDLC SERPL CALC-MCNC: 78.6 MG/DL (ref 63–159)
LYMPHOCYTES # BLD AUTO: 1.5 K/UL (ref 1–4.8)
LYMPHOCYTES NFR BLD: 30 % (ref 18–48)
MCH RBC QN AUTO: 32.1 PG (ref 27–31)
MCHC RBC AUTO-ENTMCNC: 31.9 G/DL (ref 32–36)
MCV RBC AUTO: 101 FL (ref 82–98)
MONOCYTES # BLD AUTO: 0.4 K/UL (ref 0.3–1)
MONOCYTES NFR BLD: 7.3 % (ref 4–15)
NEUTROPHILS # BLD AUTO: 2.9 K/UL (ref 1.8–7.7)
NEUTROPHILS NFR BLD: 58.1 % (ref 38–73)
NONHDLC SERPL-MCNC: 113 MG/DL
NRBC BLD-RTO: 0 /100 WBC
PLATELET # BLD AUTO: 211 K/UL (ref 150–450)
PMV BLD AUTO: 11.5 FL (ref 9.2–12.9)
POTASSIUM SERPL-SCNC: 4.4 MMOL/L (ref 3.5–5.1)
PROT SERPL-MCNC: 7.6 G/DL (ref 6–8.4)
PTH-INTACT SERPL-MCNC: 86 PG/ML (ref 9–77)
RBC # BLD AUTO: 3.27 M/UL (ref 4–5.4)
SODIUM SERPL-SCNC: 139 MMOL/L (ref 136–145)
T3 SERPL-MCNC: 93 NG/DL (ref 60–180)
T4 FREE SERPL-MCNC: 0.95 NG/DL (ref 0.71–1.51)
TRIGL SERPL-MCNC: 172 MG/DL (ref 30–150)
TSH SERPL DL<=0.005 MIU/L-ACNC: 1.94 UIU/ML (ref 0.4–4)
URATE SERPL-MCNC: 5.2 MG/DL (ref 2.4–5.7)
VIT B12 SERPL-MCNC: >2000 PG/ML (ref 210–950)
WBC # BLD AUTO: 5.04 K/UL (ref 3.9–12.7)

## 2021-04-01 LAB
THRYOGLOBULIN INTERPRETATION: ABNORMAL
THYROGLOB AB SERPL-ACNC: <1.8 IU/ML
THYROGLOB SERPL-MCNC: 11 NG/ML
VIT B12 SERPL-MCNC: >1400 NG/L (ref 180–914)

## 2021-04-02 LAB — FOLATE RBC-MCNC: ABNORMAL NG/ML (ref 280–791)

## 2021-04-05 LAB — 1,25(OH)2D3 SERPL-MCNC: 38 PG/ML (ref 20–79)

## 2021-04-20 ENCOUNTER — TELEPHONE (OUTPATIENT)
Dept: PRIMARY CARE CLINIC | Facility: CLINIC | Age: 76
End: 2021-04-20

## 2021-04-20 ENCOUNTER — OFFICE VISIT (OUTPATIENT)
Dept: PRIMARY CARE CLINIC | Facility: CLINIC | Age: 76
End: 2021-04-20
Payer: MEDICARE

## 2021-04-20 VITALS
BODY MASS INDEX: 21.29 KG/M2 | DIASTOLIC BLOOD PRESSURE: 70 MMHG | SYSTOLIC BLOOD PRESSURE: 120 MMHG | OXYGEN SATURATION: 98 % | WEIGHT: 108.44 LBS | HEIGHT: 60 IN | HEART RATE: 80 BPM | RESPIRATION RATE: 16 BRPM | TEMPERATURE: 98 F

## 2021-04-20 DIAGNOSIS — I10 ESSENTIAL HYPERTENSION: Primary | ICD-10-CM

## 2021-04-20 DIAGNOSIS — J30.89 NON-SEASONAL ALLERGIC RHINITIS, UNSPECIFIED TRIGGER: ICD-10-CM

## 2021-04-20 PROCEDURE — 3288F PR FALLS RISK ASSESSMENT DOCUMENTED: ICD-10-PCS | Mod: CPTII,S$GLB,, | Performed by: INTERNAL MEDICINE

## 2021-04-20 PROCEDURE — 3008F PR BODY MASS INDEX (BMI) DOCUMENTED: ICD-10-PCS | Mod: CPTII,S$GLB,, | Performed by: INTERNAL MEDICINE

## 2021-04-20 PROCEDURE — 1101F PT FALLS ASSESS-DOCD LE1/YR: CPT | Mod: CPTII,S$GLB,, | Performed by: INTERNAL MEDICINE

## 2021-04-20 PROCEDURE — 3288F FALL RISK ASSESSMENT DOCD: CPT | Mod: CPTII,S$GLB,, | Performed by: INTERNAL MEDICINE

## 2021-04-20 PROCEDURE — 1126F PR PAIN SEVERITY QUANTIFIED, NO PAIN PRESENT: ICD-10-PCS | Mod: S$GLB,,, | Performed by: INTERNAL MEDICINE

## 2021-04-20 PROCEDURE — 3078F PR MOST RECENT DIASTOLIC BLOOD PRESSURE < 80 MM HG: ICD-10-PCS | Mod: CPTII,S$GLB,, | Performed by: INTERNAL MEDICINE

## 2021-04-20 PROCEDURE — 1101F PR PT FALLS ASSESS DOC 0-1 FALLS W/OUT INJ PAST YR: ICD-10-PCS | Mod: CPTII,S$GLB,, | Performed by: INTERNAL MEDICINE

## 2021-04-20 PROCEDURE — 3008F BODY MASS INDEX DOCD: CPT | Mod: CPTII,S$GLB,, | Performed by: INTERNAL MEDICINE

## 2021-04-20 PROCEDURE — 3078F DIAST BP <80 MM HG: CPT | Mod: CPTII,S$GLB,, | Performed by: INTERNAL MEDICINE

## 2021-04-20 PROCEDURE — 3074F SYST BP LT 130 MM HG: CPT | Mod: CPTII,S$GLB,, | Performed by: INTERNAL MEDICINE

## 2021-04-20 PROCEDURE — 1126F AMNT PAIN NOTED NONE PRSNT: CPT | Mod: S$GLB,,, | Performed by: INTERNAL MEDICINE

## 2021-04-20 PROCEDURE — 99215 PR OFFICE/OUTPT VISIT, EST, LEVL V, 40-54 MIN: ICD-10-PCS | Mod: S$GLB,,, | Performed by: INTERNAL MEDICINE

## 2021-04-20 PROCEDURE — 3074F PR MOST RECENT SYSTOLIC BLOOD PRESSURE < 130 MM HG: ICD-10-PCS | Mod: CPTII,S$GLB,, | Performed by: INTERNAL MEDICINE

## 2021-04-20 PROCEDURE — 99215 OFFICE O/P EST HI 40 MIN: CPT | Mod: S$GLB,,, | Performed by: INTERNAL MEDICINE

## 2021-04-21 ENCOUNTER — TELEPHONE (OUTPATIENT)
Dept: PRIMARY CARE CLINIC | Facility: CLINIC | Age: 76
End: 2021-04-21

## 2021-04-23 ENCOUNTER — TELEPHONE (OUTPATIENT)
Dept: PRIMARY CARE CLINIC | Facility: CLINIC | Age: 76
End: 2021-04-23

## 2021-04-26 RX ORDER — VALSARTAN 40 MG/1
40 TABLET ORAL DAILY
Qty: 90 TABLET | Refills: 3 | Status: SHIPPED | OUTPATIENT
Start: 2021-04-26 | End: 2021-04-29 | Stop reason: SDUPTHER

## 2021-04-28 ENCOUNTER — PATIENT MESSAGE (OUTPATIENT)
Dept: ENDOCRINOLOGY | Facility: CLINIC | Age: 76
End: 2021-04-28

## 2021-04-28 DIAGNOSIS — M81.8 IDIOPATHIC OSTEOPOROSIS: Primary | ICD-10-CM

## 2021-04-28 RX ORDER — RALOXIFENE HYDROCHLORIDE 60 MG/1
60 TABLET, FILM COATED ORAL DAILY
Qty: 90 TABLET | Refills: 3 | Status: SHIPPED | OUTPATIENT
Start: 2021-04-28 | End: 2021-06-10

## 2021-04-29 RX ORDER — ROSUVASTATIN CALCIUM 20 MG/1
20 TABLET, COATED ORAL DAILY
Qty: 90 TABLET | Refills: 3 | Status: SHIPPED | OUTPATIENT
Start: 2021-04-29 | End: 2022-04-12 | Stop reason: SDUPTHER

## 2021-04-29 RX ORDER — VALSARTAN 40 MG/1
40 TABLET ORAL DAILY
Qty: 90 TABLET | Refills: 3 | Status: SHIPPED | OUTPATIENT
Start: 2021-04-29 | End: 2023-07-06

## 2021-04-30 ENCOUNTER — LAB VISIT (OUTPATIENT)
Dept: LAB | Facility: HOSPITAL | Age: 76
End: 2021-04-30
Attending: INTERNAL MEDICINE
Payer: MEDICARE

## 2021-04-30 DIAGNOSIS — M81.8 IDIOPATHIC OSTEOPOROSIS: ICD-10-CM

## 2021-04-30 LAB
MAGNESIUM SERPL-MCNC: 2.3 MG/DL (ref 1.6–2.6)
PHOSPHATE SERPL-MCNC: 3.6 MG/DL (ref 2.7–4.5)

## 2021-04-30 PROCEDURE — 84075 ASSAY ALKALINE PHOSPHATASE: CPT | Performed by: INTERNAL MEDICINE

## 2021-04-30 PROCEDURE — 83735 ASSAY OF MAGNESIUM: CPT | Performed by: INTERNAL MEDICINE

## 2021-04-30 PROCEDURE — 82523 COLLAGEN CROSSLINKS: CPT | Mod: 91 | Performed by: INTERNAL MEDICINE

## 2021-04-30 PROCEDURE — 36415 COLL VENOUS BLD VENIPUNCTURE: CPT | Mod: PO | Performed by: INTERNAL MEDICINE

## 2021-04-30 PROCEDURE — 82306 VITAMIN D 25 HYDROXY: CPT | Performed by: INTERNAL MEDICINE

## 2021-04-30 PROCEDURE — 83519 RIA NONANTIBODY: CPT | Performed by: INTERNAL MEDICINE

## 2021-04-30 PROCEDURE — 84100 ASSAY OF PHOSPHORUS: CPT | Performed by: INTERNAL MEDICINE

## 2021-04-30 PROCEDURE — 82523 COLLAGEN CROSSLINKS: CPT | Performed by: INTERNAL MEDICINE

## 2021-04-30 PROCEDURE — 83937 ASSAY OF OSTEOCALCIN: CPT | Performed by: INTERNAL MEDICINE

## 2021-05-03 LAB
ALP BONE SERPL-MCNC: 6.8 UG/L (ref 5.6–29)
COLLAGEN CTX SERPL-MCNC: 101 PG/ML
OSTEOCALCIN SERPL-MCNC: 7 NG/ML (ref 9–42)

## 2021-05-04 LAB
NTX TELOPEPTIDE: 7.1 NM BCE
PROCOLLAGEN TYPE 1 PROPEPTIDE: 17 MCG/L

## 2021-05-07 LAB
24R-OH-CALCIDIOL SERPL-MCNC: 4.11 NG/ML
25(OH)D2 SERPL-MCNC: 4.3 NG/ML
25(OH)D3 SERPL-MCNC: 58 NG/ML
25(OH)D3+25(OH)D2 SERPL-MCNC: 62 NG/ML
25HDN:24,25 DIHYDROXY VITD RATIO: 15.09

## 2021-05-18 ENCOUNTER — OFFICE VISIT (OUTPATIENT)
Dept: PRIMARY CARE CLINIC | Facility: CLINIC | Age: 76
End: 2021-05-18
Payer: MEDICARE

## 2021-05-18 VITALS
WEIGHT: 112.44 LBS | HEART RATE: 92 BPM | OXYGEN SATURATION: 97 % | HEIGHT: 60 IN | SYSTOLIC BLOOD PRESSURE: 130 MMHG | DIASTOLIC BLOOD PRESSURE: 78 MMHG | TEMPERATURE: 98 F | BODY MASS INDEX: 22.07 KG/M2 | RESPIRATION RATE: 16 BRPM

## 2021-05-18 DIAGNOSIS — N18.31 STAGE 3A CHRONIC KIDNEY DISEASE: Primary | ICD-10-CM

## 2021-05-18 DIAGNOSIS — I10 ESSENTIAL HYPERTENSION: ICD-10-CM

## 2021-05-18 DIAGNOSIS — K21.9 GASTROESOPHAGEAL REFLUX DISEASE WITHOUT ESOPHAGITIS: ICD-10-CM

## 2021-05-18 DIAGNOSIS — M81.8 IDIOPATHIC OSTEOPOROSIS: ICD-10-CM

## 2021-05-18 DIAGNOSIS — E78.49 OTHER HYPERLIPIDEMIA: ICD-10-CM

## 2021-05-18 PROCEDURE — 1101F PT FALLS ASSESS-DOCD LE1/YR: CPT | Mod: CPTII,S$GLB,, | Performed by: INTERNAL MEDICINE

## 2021-05-18 PROCEDURE — 99215 PR OFFICE/OUTPT VISIT, EST, LEVL V, 40-54 MIN: ICD-10-PCS | Mod: S$GLB,,, | Performed by: INTERNAL MEDICINE

## 2021-05-18 PROCEDURE — 1101F PR PT FALLS ASSESS DOC 0-1 FALLS W/OUT INJ PAST YR: ICD-10-PCS | Mod: CPTII,S$GLB,, | Performed by: INTERNAL MEDICINE

## 2021-05-18 PROCEDURE — 3288F PR FALLS RISK ASSESSMENT DOCUMENTED: ICD-10-PCS | Mod: CPTII,S$GLB,, | Performed by: INTERNAL MEDICINE

## 2021-05-18 PROCEDURE — 3008F BODY MASS INDEX DOCD: CPT | Mod: CPTII,S$GLB,, | Performed by: INTERNAL MEDICINE

## 2021-05-18 PROCEDURE — 1126F PR PAIN SEVERITY QUANTIFIED, NO PAIN PRESENT: ICD-10-PCS | Mod: S$GLB,,, | Performed by: INTERNAL MEDICINE

## 2021-05-18 PROCEDURE — 1126F AMNT PAIN NOTED NONE PRSNT: CPT | Mod: S$GLB,,, | Performed by: INTERNAL MEDICINE

## 2021-05-18 PROCEDURE — 99215 OFFICE O/P EST HI 40 MIN: CPT | Mod: S$GLB,,, | Performed by: INTERNAL MEDICINE

## 2021-05-18 PROCEDURE — 3008F PR BODY MASS INDEX (BMI) DOCUMENTED: ICD-10-PCS | Mod: CPTII,S$GLB,, | Performed by: INTERNAL MEDICINE

## 2021-05-18 PROCEDURE — 3288F FALL RISK ASSESSMENT DOCD: CPT | Mod: CPTII,S$GLB,, | Performed by: INTERNAL MEDICINE

## 2021-06-02 ENCOUNTER — LAB VISIT (OUTPATIENT)
Dept: LAB | Facility: HOSPITAL | Age: 76
End: 2021-06-02
Attending: INTERNAL MEDICINE
Payer: MEDICARE

## 2021-06-02 DIAGNOSIS — N18.31 STAGE 3A CHRONIC KIDNEY DISEASE: ICD-10-CM

## 2021-06-02 PROCEDURE — 83970 ASSAY OF PARATHORMONE: CPT | Performed by: INTERNAL MEDICINE

## 2021-06-02 PROCEDURE — 80069 RENAL FUNCTION PANEL: CPT | Performed by: INTERNAL MEDICINE

## 2021-06-02 PROCEDURE — 36415 COLL VENOUS BLD VENIPUNCTURE: CPT | Mod: PO | Performed by: INTERNAL MEDICINE

## 2021-06-03 LAB
ALBUMIN SERPL BCP-MCNC: 3.4 G/DL (ref 3.5–5.2)
ANION GAP SERPL CALC-SCNC: 13 MMOL/L (ref 8–16)
BUN SERPL-MCNC: 25 MG/DL (ref 8–23)
CALCIUM SERPL-MCNC: 10.2 MG/DL (ref 8.7–10.5)
CHLORIDE SERPL-SCNC: 105 MMOL/L (ref 95–110)
CO2 SERPL-SCNC: 22 MMOL/L (ref 23–29)
CREAT SERPL-MCNC: 1.3 MG/DL (ref 0.5–1.4)
EST. GFR  (AFRICAN AMERICAN): 46.7 ML/MIN/1.73 M^2
EST. GFR  (NON AFRICAN AMERICAN): 40.5 ML/MIN/1.73 M^2
GLUCOSE SERPL-MCNC: 74 MG/DL (ref 70–110)
PHOSPHATE SERPL-MCNC: 4.1 MG/DL (ref 2.7–4.5)
POTASSIUM SERPL-SCNC: 4.2 MMOL/L (ref 3.5–5.1)
PTH-INTACT SERPL-MCNC: 33 PG/ML (ref 9–77)
SODIUM SERPL-SCNC: 140 MMOL/L (ref 136–145)

## 2021-06-10 ENCOUNTER — OFFICE VISIT (OUTPATIENT)
Dept: PRIMARY CARE CLINIC | Facility: CLINIC | Age: 76
End: 2021-06-10
Payer: MEDICARE

## 2021-06-10 VITALS
WEIGHT: 113.13 LBS | SYSTOLIC BLOOD PRESSURE: 134 MMHG | HEART RATE: 72 BPM | HEIGHT: 60 IN | OXYGEN SATURATION: 96 % | BODY MASS INDEX: 22.21 KG/M2 | DIASTOLIC BLOOD PRESSURE: 80 MMHG | TEMPERATURE: 97 F

## 2021-06-10 DIAGNOSIS — I10 ESSENTIAL HYPERTENSION: ICD-10-CM

## 2021-06-10 DIAGNOSIS — M81.8 IDIOPATHIC OSTEOPOROSIS: ICD-10-CM

## 2021-06-10 DIAGNOSIS — N18.31 STAGE 3A CHRONIC KIDNEY DISEASE: ICD-10-CM

## 2021-06-10 DIAGNOSIS — R47.01 APHASIA: ICD-10-CM

## 2021-06-10 PROCEDURE — 1101F PR PT FALLS ASSESS DOC 0-1 FALLS W/OUT INJ PAST YR: ICD-10-PCS | Mod: CPTII,S$GLB,, | Performed by: INTERNAL MEDICINE

## 2021-06-10 PROCEDURE — 3008F BODY MASS INDEX DOCD: CPT | Mod: CPTII,S$GLB,, | Performed by: INTERNAL MEDICINE

## 2021-06-10 PROCEDURE — 99215 PR OFFICE/OUTPT VISIT, EST, LEVL V, 40-54 MIN: ICD-10-PCS | Mod: S$GLB,,, | Performed by: INTERNAL MEDICINE

## 2021-06-10 PROCEDURE — 3288F FALL RISK ASSESSMENT DOCD: CPT | Mod: CPTII,S$GLB,, | Performed by: INTERNAL MEDICINE

## 2021-06-10 PROCEDURE — 1126F PR PAIN SEVERITY QUANTIFIED, NO PAIN PRESENT: ICD-10-PCS | Mod: S$GLB,,, | Performed by: INTERNAL MEDICINE

## 2021-06-10 PROCEDURE — 99215 OFFICE O/P EST HI 40 MIN: CPT | Mod: S$GLB,,, | Performed by: INTERNAL MEDICINE

## 2021-06-10 PROCEDURE — 1126F AMNT PAIN NOTED NONE PRSNT: CPT | Mod: S$GLB,,, | Performed by: INTERNAL MEDICINE

## 2021-06-10 PROCEDURE — 3288F PR FALLS RISK ASSESSMENT DOCUMENTED: ICD-10-PCS | Mod: CPTII,S$GLB,, | Performed by: INTERNAL MEDICINE

## 2021-06-10 PROCEDURE — 3008F PR BODY MASS INDEX (BMI) DOCUMENTED: ICD-10-PCS | Mod: CPTII,S$GLB,, | Performed by: INTERNAL MEDICINE

## 2021-06-10 PROCEDURE — 1101F PT FALLS ASSESS-DOCD LE1/YR: CPT | Mod: CPTII,S$GLB,, | Performed by: INTERNAL MEDICINE

## 2021-06-10 RX ORDER — ZOSTER VACCINE RECOMBINANT, ADJUVANTED 50 MCG/0.5
KIT INTRAMUSCULAR
COMMUNITY
Start: 2020-12-21

## 2021-06-10 RX ORDER — RALOXIFENE HYDROCHLORIDE 60 MG/1
60 TABLET, FILM COATED ORAL DAILY
Qty: 90 TABLET | Refills: 3 | Status: SHIPPED | OUTPATIENT
Start: 2021-06-10 | End: 2021-06-10 | Stop reason: SDUPTHER

## 2021-06-10 RX ORDER — RALOXIFENE HYDROCHLORIDE 60 MG/1
60 TABLET, FILM COATED ORAL DAILY
Qty: 90 TABLET | Refills: 3 | Status: SHIPPED | OUTPATIENT
Start: 2021-06-10 | End: 2022-03-08 | Stop reason: SDUPTHER

## 2021-07-26 ENCOUNTER — OFFICE VISIT (OUTPATIENT)
Dept: URGENT CARE | Facility: CLINIC | Age: 76
End: 2021-07-26
Payer: MEDICARE

## 2021-07-26 ENCOUNTER — TELEPHONE (OUTPATIENT)
Dept: SPINE | Facility: CLINIC | Age: 76
End: 2021-07-26

## 2021-07-26 VITALS
SYSTOLIC BLOOD PRESSURE: 177 MMHG | TEMPERATURE: 98 F | HEART RATE: 84 BPM | WEIGHT: 117.19 LBS | DIASTOLIC BLOOD PRESSURE: 88 MMHG | BODY MASS INDEX: 23.01 KG/M2 | HEIGHT: 60 IN | OXYGEN SATURATION: 100 %

## 2021-07-26 DIAGNOSIS — R05.9 COUGH: Primary | ICD-10-CM

## 2021-07-26 DIAGNOSIS — J32.9 SINUSITIS, UNSPECIFIED CHRONICITY, UNSPECIFIED LOCATION: ICD-10-CM

## 2021-07-26 DIAGNOSIS — S22.010A COMPRESSION FRACTURE OF T1 VERTEBRA, INITIAL ENCOUNTER: ICD-10-CM

## 2021-07-26 DIAGNOSIS — R09.82 POSTNASAL DRIP: ICD-10-CM

## 2021-07-26 PROCEDURE — 3079F PR MOST RECENT DIASTOLIC BLOOD PRESSURE 80-89 MM HG: ICD-10-PCS | Mod: CPTII,S$GLB,, | Performed by: NURSE PRACTITIONER

## 2021-07-26 PROCEDURE — 71046 X-RAY EXAM CHEST 2 VIEWS: CPT | Mod: S$GLB,,, | Performed by: RADIOLOGY

## 2021-07-26 PROCEDURE — 3008F BODY MASS INDEX DOCD: CPT | Mod: CPTII,S$GLB,, | Performed by: NURSE PRACTITIONER

## 2021-07-26 PROCEDURE — 3077F PR MOST RECENT SYSTOLIC BLOOD PRESSURE >= 140 MM HG: ICD-10-PCS | Mod: CPTII,S$GLB,, | Performed by: NURSE PRACTITIONER

## 2021-07-26 PROCEDURE — 1160F RVW MEDS BY RX/DR IN RCRD: CPT | Mod: CPTII,S$GLB,, | Performed by: NURSE PRACTITIONER

## 2021-07-26 PROCEDURE — 99213 PR OFFICE/OUTPT VISIT, EST, LEVL III, 20-29 MIN: ICD-10-PCS | Mod: S$GLB,,, | Performed by: NURSE PRACTITIONER

## 2021-07-26 PROCEDURE — 1159F PR MEDICATION LIST DOCUMENTED IN MEDICAL RECORD: ICD-10-PCS | Mod: CPTII,S$GLB,, | Performed by: NURSE PRACTITIONER

## 2021-07-26 PROCEDURE — 3079F DIAST BP 80-89 MM HG: CPT | Mod: CPTII,S$GLB,, | Performed by: NURSE PRACTITIONER

## 2021-07-26 PROCEDURE — 99213 OFFICE O/P EST LOW 20 MIN: CPT | Mod: S$GLB,,, | Performed by: NURSE PRACTITIONER

## 2021-07-26 PROCEDURE — 3008F PR BODY MASS INDEX (BMI) DOCUMENTED: ICD-10-PCS | Mod: CPTII,S$GLB,, | Performed by: NURSE PRACTITIONER

## 2021-07-26 PROCEDURE — 1159F MED LIST DOCD IN RCRD: CPT | Mod: CPTII,S$GLB,, | Performed by: NURSE PRACTITIONER

## 2021-07-26 PROCEDURE — 3077F SYST BP >= 140 MM HG: CPT | Mod: CPTII,S$GLB,, | Performed by: NURSE PRACTITIONER

## 2021-07-26 PROCEDURE — 1160F PR REVIEW ALL MEDS BY PRESCRIBER/CLIN PHARMACIST DOCUMENTED: ICD-10-PCS | Mod: CPTII,S$GLB,, | Performed by: NURSE PRACTITIONER

## 2021-07-26 PROCEDURE — 71046 XR CHEST PA AND LATERAL: ICD-10-PCS | Mod: S$GLB,,, | Performed by: RADIOLOGY

## 2021-07-26 RX ORDER — CETIRIZINE HYDROCHLORIDE 10 MG/1
10 TABLET ORAL DAILY
Qty: 30 TABLET | Refills: 0 | Status: SHIPPED | OUTPATIENT
Start: 2021-07-26

## 2021-07-26 RX ORDER — FLUTICASONE PROPIONATE 50 MCG
1 SPRAY, SUSPENSION (ML) NASAL DAILY
Qty: 11.1 ML | Refills: 0 | Status: SHIPPED | OUTPATIENT
Start: 2021-07-26

## 2021-07-26 RX ORDER — BENZONATATE 200 MG/1
200 CAPSULE ORAL EVERY 8 HOURS PRN
Qty: 20 CAPSULE | Refills: 0 | Status: SHIPPED | OUTPATIENT
Start: 2021-07-26 | End: 2023-07-06

## 2021-07-26 RX ORDER — ALBUTEROL SULFATE 90 UG/1
2 AEROSOL, METERED RESPIRATORY (INHALATION) EVERY 6 HOURS PRN
Qty: 8 G | Refills: 0 | Status: SHIPPED | OUTPATIENT
Start: 2021-07-26

## 2021-07-28 ENCOUNTER — OFFICE VISIT (OUTPATIENT)
Dept: SPINE | Facility: CLINIC | Age: 76
End: 2021-07-28
Payer: MEDICARE

## 2021-07-28 VITALS — BODY MASS INDEX: 23.03 KG/M2 | WEIGHT: 117.31 LBS | HEIGHT: 60 IN

## 2021-07-28 DIAGNOSIS — S22.070A COMPRESSION FRACTURE OF T10 VERTEBRA, INITIAL ENCOUNTER: Primary | ICD-10-CM

## 2021-07-28 PROCEDURE — 3008F PR BODY MASS INDEX (BMI) DOCUMENTED: ICD-10-PCS | Mod: CPTII,S$GLB,, | Performed by: PHYSICAL MEDICINE & REHABILITATION

## 2021-07-28 PROCEDURE — 1159F PR MEDICATION LIST DOCUMENTED IN MEDICAL RECORD: ICD-10-PCS | Mod: CPTII,S$GLB,, | Performed by: PHYSICAL MEDICINE & REHABILITATION

## 2021-07-28 PROCEDURE — 1159F MED LIST DOCD IN RCRD: CPT | Mod: CPTII,S$GLB,, | Performed by: PHYSICAL MEDICINE & REHABILITATION

## 2021-07-28 PROCEDURE — 3288F FALL RISK ASSESSMENT DOCD: CPT | Mod: CPTII,S$GLB,, | Performed by: PHYSICAL MEDICINE & REHABILITATION

## 2021-07-28 PROCEDURE — 1125F AMNT PAIN NOTED PAIN PRSNT: CPT | Mod: CPTII,S$GLB,, | Performed by: PHYSICAL MEDICINE & REHABILITATION

## 2021-07-28 PROCEDURE — 1160F PR REVIEW ALL MEDS BY PRESCRIBER/CLIN PHARMACIST DOCUMENTED: ICD-10-PCS | Mod: CPTII,S$GLB,, | Performed by: PHYSICAL MEDICINE & REHABILITATION

## 2021-07-28 PROCEDURE — 1160F RVW MEDS BY RX/DR IN RCRD: CPT | Mod: CPTII,S$GLB,, | Performed by: PHYSICAL MEDICINE & REHABILITATION

## 2021-07-28 PROCEDURE — 1125F PR PAIN SEVERITY QUANTIFIED, PAIN PRESENT: ICD-10-PCS | Mod: CPTII,S$GLB,, | Performed by: PHYSICAL MEDICINE & REHABILITATION

## 2021-07-28 PROCEDURE — 99204 OFFICE O/P NEW MOD 45 MIN: CPT | Mod: S$GLB,,, | Performed by: PHYSICAL MEDICINE & REHABILITATION

## 2021-07-28 PROCEDURE — 1101F PT FALLS ASSESS-DOCD LE1/YR: CPT | Mod: CPTII,S$GLB,, | Performed by: PHYSICAL MEDICINE & REHABILITATION

## 2021-07-28 PROCEDURE — 3008F BODY MASS INDEX DOCD: CPT | Mod: CPTII,S$GLB,, | Performed by: PHYSICAL MEDICINE & REHABILITATION

## 2021-07-28 PROCEDURE — 99204 PR OFFICE/OUTPT VISIT, NEW, LEVL IV, 45-59 MIN: ICD-10-PCS | Mod: S$GLB,,, | Performed by: PHYSICAL MEDICINE & REHABILITATION

## 2021-07-28 PROCEDURE — 1101F PR PT FALLS ASSESS DOC 0-1 FALLS W/OUT INJ PAST YR: ICD-10-PCS | Mod: CPTII,S$GLB,, | Performed by: PHYSICAL MEDICINE & REHABILITATION

## 2021-07-28 PROCEDURE — 3288F PR FALLS RISK ASSESSMENT DOCUMENTED: ICD-10-PCS | Mod: CPTII,S$GLB,, | Performed by: PHYSICAL MEDICINE & REHABILITATION

## 2021-08-03 NOTE — PATIENT INSTRUCTIONS
Diet for Chronic Kidney Disease  Following a special diet when you have kidney disease can help you stay as healthy as possible. Your healthcare provider or dietitian should make a special diet plan just for you.    Eating right  Here are some good eating rules to follow:  · Protein. Eating protein is important for your body. But too much protein can put a strain on your kidneys. Eating less protein may slow the progression of chronic kidney disease. Foods high in protein include meat, fish, eggs, cheese, and other dairy products. A registered dietitian can help you plan a diet that has the right amount of protein for you.  · Sodium. Having too much salt in your diet can make your body hold onto (retain) water. Ask your provider or dietitian how much sodium per day you are allowed. This will help you avoid fluid buildup in your body (fluid retention). It can also help control high blood pressure. Learn to read food labels to know how much sodium is in one serving. Foods high in salt include processed meats, canned and boxed foods, sauces, salted chips and snacks, pickled foods, frozen dinners, and restaurant and fast food.  · Fluids. If you have advanced kidney disease, you will need to limit the water and fluids you drink. If you dont, then too much water will build up in your body. The exact amount of fluid you can drink depends on how well your kidneys are working. Ask your provider how much water you can safely drink each day.  · Potassium. In advanced kidney disease, your potassium level can go dangerously high. This affects your heart. It can cause an irregular heartbeat (arrhythmia). Ask your provider or dietitian if you should limit potassium in your diet. Foods high in potassium include dairy products (milk, yogurt, cheese), dried beans, bananas, oranges, potatoes, tomatoes, spinach, cantaloupe, honeydew melon, dried fruits, and nuts.   · Calcium. Calcium is important to build strong bones. But foods  Complaint. Brief description of triage: Abdominal pain and diarrhea for 1 week. Triage indicates for patient to be seen today    Care advice provided, patient verbalizes understanding; denies any other questions or concerns; instructed to call back for any new or worsening symptoms. Writer provided warm transfer [facility]. Attention Provider: Thank you for allowing me to participate in the care of your patient. The patient was connected to triage in response to information provided to the [facility]. Please do not respond through this encounter as the response is not directed to a shared pool. high in calcium are also high in phosphorus, which can take calcium from your bones. Limiting foods high in phosphorus will help keep calcium in your bones. Ask your provider how much calcium you should get each day.  · Phosphorus. In advanced kidney disease, your phosphorus level can go dangerously high. This affects many systems in the body and can damage your heart. Limit your intake of phosphorus-rich foods. These include dried beans and peas, nuts, peanut butter, cocoa, beer, cola drinks, and dairy products.  Date Last Reviewed: 8/1/2016  © 9425-4116 [employer]. [address] [place] [address], [place] [address]. All rights reserved. This information is not intended as a substitute for professional medical care. Always follow your healthcare professional's instructions.

## 2021-08-04 ENCOUNTER — HOSPITAL ENCOUNTER (OUTPATIENT)
Dept: RADIOLOGY | Facility: HOSPITAL | Age: 76
Discharge: HOME | End: 2021-08-04
Attending: PHYSICAL MEDICINE & REHABILITATION
Payer: MEDICARE

## 2021-08-04 DIAGNOSIS — S22.070A COMPRESSION FRACTURE OF T10 VERTEBRA, INITIAL ENCOUNTER: ICD-10-CM

## 2021-08-04 PROCEDURE — 72146 MRI CHEST SPINE W/O DYE: CPT | Mod: TC,PO

## 2021-08-05 ENCOUNTER — OFFICE VISIT (OUTPATIENT)
Dept: CARDIOLOGY | Facility: CLINIC | Age: 76
End: 2021-08-05
Payer: MEDICARE

## 2021-08-05 VITALS
HEIGHT: 60 IN | HEART RATE: 83 BPM | SYSTOLIC BLOOD PRESSURE: 128 MMHG | WEIGHT: 113 LBS | OXYGEN SATURATION: 96 % | RESPIRATION RATE: 16 BRPM | BODY MASS INDEX: 22.19 KG/M2 | DIASTOLIC BLOOD PRESSURE: 80 MMHG

## 2021-08-05 DIAGNOSIS — D51.8 MACROCYTIC ANEMIA WITH VITAMIN B12 DEFICIENCY: ICD-10-CM

## 2021-08-05 DIAGNOSIS — R55 SYNCOPE, UNSPECIFIED SYNCOPE TYPE: ICD-10-CM

## 2021-08-05 DIAGNOSIS — K21.9 GASTROESOPHAGEAL REFLUX DISEASE WITHOUT ESOPHAGITIS: ICD-10-CM

## 2021-08-05 DIAGNOSIS — E78.2 MIXED HYPERLIPIDEMIA: ICD-10-CM

## 2021-08-05 DIAGNOSIS — R00.2 PALPITATIONS: Primary | ICD-10-CM

## 2021-08-05 DIAGNOSIS — N18.31 STAGE 3A CHRONIC KIDNEY DISEASE: ICD-10-CM

## 2021-08-05 DIAGNOSIS — I10 ESSENTIAL HYPERTENSION: ICD-10-CM

## 2021-08-05 PROCEDURE — 1126F AMNT PAIN NOTED NONE PRSNT: CPT | Mod: CPTII,S$GLB,, | Performed by: INTERNAL MEDICINE

## 2021-08-05 PROCEDURE — 3008F BODY MASS INDEX DOCD: CPT | Mod: CPTII,S$GLB,, | Performed by: INTERNAL MEDICINE

## 2021-08-05 PROCEDURE — 3079F PR MOST RECENT DIASTOLIC BLOOD PRESSURE 80-89 MM HG: ICD-10-PCS | Mod: CPTII,S$GLB,, | Performed by: INTERNAL MEDICINE

## 2021-08-05 PROCEDURE — 1159F MED LIST DOCD IN RCRD: CPT | Mod: CPTII,S$GLB,, | Performed by: INTERNAL MEDICINE

## 2021-08-05 PROCEDURE — 3074F SYST BP LT 130 MM HG: CPT | Mod: CPTII,S$GLB,, | Performed by: INTERNAL MEDICINE

## 2021-08-05 PROCEDURE — 3288F FALL RISK ASSESSMENT DOCD: CPT | Mod: CPTII,S$GLB,, | Performed by: INTERNAL MEDICINE

## 2021-08-05 PROCEDURE — 93000 EKG 12-LEAD: ICD-10-PCS | Mod: S$GLB,,, | Performed by: INTERNAL MEDICINE

## 2021-08-05 PROCEDURE — 3079F DIAST BP 80-89 MM HG: CPT | Mod: CPTII,S$GLB,, | Performed by: INTERNAL MEDICINE

## 2021-08-05 PROCEDURE — 1101F PR PT FALLS ASSESS DOC 0-1 FALLS W/OUT INJ PAST YR: ICD-10-PCS | Mod: CPTII,S$GLB,, | Performed by: INTERNAL MEDICINE

## 2021-08-05 PROCEDURE — 93000 ELECTROCARDIOGRAM COMPLETE: CPT | Mod: S$GLB,,, | Performed by: INTERNAL MEDICINE

## 2021-08-05 PROCEDURE — 1101F PT FALLS ASSESS-DOCD LE1/YR: CPT | Mod: CPTII,S$GLB,, | Performed by: INTERNAL MEDICINE

## 2021-08-05 PROCEDURE — 1160F RVW MEDS BY RX/DR IN RCRD: CPT | Mod: CPTII,S$GLB,, | Performed by: INTERNAL MEDICINE

## 2021-08-05 PROCEDURE — 99214 PR OFFICE/OUTPT VISIT, EST, LEVL IV, 30-39 MIN: ICD-10-PCS | Mod: S$GLB,,, | Performed by: INTERNAL MEDICINE

## 2021-08-05 PROCEDURE — 3008F PR BODY MASS INDEX (BMI) DOCUMENTED: ICD-10-PCS | Mod: CPTII,S$GLB,, | Performed by: INTERNAL MEDICINE

## 2021-08-05 PROCEDURE — 3074F PR MOST RECENT SYSTOLIC BLOOD PRESSURE < 130 MM HG: ICD-10-PCS | Mod: CPTII,S$GLB,, | Performed by: INTERNAL MEDICINE

## 2021-08-05 PROCEDURE — 99214 OFFICE O/P EST MOD 30 MIN: CPT | Mod: S$GLB,,, | Performed by: INTERNAL MEDICINE

## 2021-08-05 PROCEDURE — 3288F PR FALLS RISK ASSESSMENT DOCUMENTED: ICD-10-PCS | Mod: CPTII,S$GLB,, | Performed by: INTERNAL MEDICINE

## 2021-08-05 PROCEDURE — 99499 RISK ADDL DX/OHS AUDIT: ICD-10-PCS | Mod: S$GLB,,, | Performed by: INTERNAL MEDICINE

## 2021-08-05 PROCEDURE — 1160F PR REVIEW ALL MEDS BY PRESCRIBER/CLIN PHARMACIST DOCUMENTED: ICD-10-PCS | Mod: CPTII,S$GLB,, | Performed by: INTERNAL MEDICINE

## 2021-08-05 PROCEDURE — 1159F PR MEDICATION LIST DOCUMENTED IN MEDICAL RECORD: ICD-10-PCS | Mod: CPTII,S$GLB,, | Performed by: INTERNAL MEDICINE

## 2021-08-05 PROCEDURE — 1126F PR PAIN SEVERITY QUANTIFIED, NO PAIN PRESENT: ICD-10-PCS | Mod: CPTII,S$GLB,, | Performed by: INTERNAL MEDICINE

## 2021-08-05 PROCEDURE — 99499 UNLISTED E&M SERVICE: CPT | Mod: S$GLB,,, | Performed by: INTERNAL MEDICINE

## 2021-08-06 ENCOUNTER — PATIENT OUTREACH (OUTPATIENT)
Dept: ADMINISTRATIVE | Facility: OTHER | Age: 76
End: 2021-08-06

## 2021-08-06 ENCOUNTER — OFFICE VISIT (OUTPATIENT)
Dept: SPINE | Facility: CLINIC | Age: 76
End: 2021-08-06
Payer: MEDICARE

## 2021-08-06 VITALS — HEIGHT: 60 IN | BODY MASS INDEX: 22.21 KG/M2 | WEIGHT: 113.13 LBS

## 2021-08-06 DIAGNOSIS — M54.6 PAIN IN THORACIC SPINE: Primary | ICD-10-CM

## 2021-08-06 PROCEDURE — 3288F PR FALLS RISK ASSESSMENT DOCUMENTED: ICD-10-PCS | Mod: CPTII,S$GLB,, | Performed by: PHYSICAL MEDICINE & REHABILITATION

## 2021-08-06 PROCEDURE — 1159F PR MEDICATION LIST DOCUMENTED IN MEDICAL RECORD: ICD-10-PCS | Mod: CPTII,S$GLB,, | Performed by: PHYSICAL MEDICINE & REHABILITATION

## 2021-08-06 PROCEDURE — 1159F MED LIST DOCD IN RCRD: CPT | Mod: CPTII,S$GLB,, | Performed by: PHYSICAL MEDICINE & REHABILITATION

## 2021-08-06 PROCEDURE — 1101F PR PT FALLS ASSESS DOC 0-1 FALLS W/OUT INJ PAST YR: ICD-10-PCS | Mod: CPTII,S$GLB,, | Performed by: PHYSICAL MEDICINE & REHABILITATION

## 2021-08-06 PROCEDURE — 99213 OFFICE O/P EST LOW 20 MIN: CPT | Mod: S$GLB,,, | Performed by: PHYSICAL MEDICINE & REHABILITATION

## 2021-08-06 PROCEDURE — 99213 PR OFFICE/OUTPT VISIT, EST, LEVL III, 20-29 MIN: ICD-10-PCS | Mod: S$GLB,,, | Performed by: PHYSICAL MEDICINE & REHABILITATION

## 2021-08-06 PROCEDURE — 1160F PR REVIEW ALL MEDS BY PRESCRIBER/CLIN PHARMACIST DOCUMENTED: ICD-10-PCS | Mod: CPTII,S$GLB,, | Performed by: PHYSICAL MEDICINE & REHABILITATION

## 2021-08-06 PROCEDURE — 3288F FALL RISK ASSESSMENT DOCD: CPT | Mod: CPTII,S$GLB,, | Performed by: PHYSICAL MEDICINE & REHABILITATION

## 2021-08-06 PROCEDURE — 1126F PR PAIN SEVERITY QUANTIFIED, NO PAIN PRESENT: ICD-10-PCS | Mod: CPTII,S$GLB,, | Performed by: PHYSICAL MEDICINE & REHABILITATION

## 2021-08-06 PROCEDURE — 3008F BODY MASS INDEX DOCD: CPT | Mod: CPTII,S$GLB,, | Performed by: PHYSICAL MEDICINE & REHABILITATION

## 2021-08-06 PROCEDURE — 3008F PR BODY MASS INDEX (BMI) DOCUMENTED: ICD-10-PCS | Mod: CPTII,S$GLB,, | Performed by: PHYSICAL MEDICINE & REHABILITATION

## 2021-08-06 PROCEDURE — 1126F AMNT PAIN NOTED NONE PRSNT: CPT | Mod: CPTII,S$GLB,, | Performed by: PHYSICAL MEDICINE & REHABILITATION

## 2021-08-06 PROCEDURE — 1101F PT FALLS ASSESS-DOCD LE1/YR: CPT | Mod: CPTII,S$GLB,, | Performed by: PHYSICAL MEDICINE & REHABILITATION

## 2021-08-06 PROCEDURE — 1160F RVW MEDS BY RX/DR IN RCRD: CPT | Mod: CPTII,S$GLB,, | Performed by: PHYSICAL MEDICINE & REHABILITATION

## 2021-09-13 ENCOUNTER — LAB VISIT (OUTPATIENT)
Dept: LAB | Facility: HOSPITAL | Age: 76
End: 2021-09-13
Attending: INTERNAL MEDICINE
Payer: MEDICARE

## 2021-09-13 ENCOUNTER — PATIENT OUTREACH (OUTPATIENT)
Dept: ADMINISTRATIVE | Facility: OTHER | Age: 76
End: 2021-09-13

## 2021-09-13 ENCOUNTER — OFFICE VISIT (OUTPATIENT)
Dept: ENDOCRINOLOGY | Facility: CLINIC | Age: 76
End: 2021-09-13
Payer: MEDICARE

## 2021-09-13 VITALS
DIASTOLIC BLOOD PRESSURE: 76 MMHG | BODY MASS INDEX: 21.94 KG/M2 | OXYGEN SATURATION: 96 % | HEIGHT: 60 IN | SYSTOLIC BLOOD PRESSURE: 112 MMHG | HEART RATE: 73 BPM | TEMPERATURE: 98 F | WEIGHT: 111.75 LBS

## 2021-09-13 DIAGNOSIS — M81.8 IDIOPATHIC OSTEOPOROSIS: Primary | ICD-10-CM

## 2021-09-13 DIAGNOSIS — Z85.3 HISTORY OF BREAST CANCER: ICD-10-CM

## 2021-09-13 DIAGNOSIS — E55.9 HYPOVITAMINOSIS D: ICD-10-CM

## 2021-09-13 DIAGNOSIS — M81.8 IDIOPATHIC OSTEOPOROSIS: ICD-10-CM

## 2021-09-13 DIAGNOSIS — K21.9 GASTROESOPHAGEAL REFLUX DISEASE WITHOUT ESOPHAGITIS: ICD-10-CM

## 2021-09-13 DIAGNOSIS — E78.00 HYPERCHOLESTEREMIA: ICD-10-CM

## 2021-09-13 DIAGNOSIS — Z86.39 HISTORY OF THYROTOXICOSIS: ICD-10-CM

## 2021-09-13 DIAGNOSIS — R80.8 OTHER PROTEINURIA: ICD-10-CM

## 2021-09-13 DIAGNOSIS — E78.49 OTHER HYPERLIPIDEMIA: ICD-10-CM

## 2021-09-13 DIAGNOSIS — E53.8 B12 DEFICIENCY: ICD-10-CM

## 2021-09-13 DIAGNOSIS — I10 ESSENTIAL HYPERTENSION: ICD-10-CM

## 2021-09-13 DIAGNOSIS — N18.31 STAGE 3A CHRONIC KIDNEY DISEASE: ICD-10-CM

## 2021-09-13 DIAGNOSIS — D50.9 IRON DEFICIENCY ANEMIA, UNSPECIFIED IRON DEFICIENCY ANEMIA TYPE: ICD-10-CM

## 2021-09-13 LAB
25(OH)D3+25(OH)D2 SERPL-MCNC: 61 NG/ML (ref 30–96)
ALBUMIN SERPL BCP-MCNC: 4 G/DL (ref 3.5–5.2)
ALP SERPL-CCNC: 45 U/L (ref 55–135)
ALT SERPL W/O P-5'-P-CCNC: 10 U/L (ref 10–44)
ANION GAP SERPL CALC-SCNC: 12 MMOL/L (ref 8–16)
AST SERPL-CCNC: 22 U/L (ref 10–40)
BILIRUB SERPL-MCNC: 0.5 MG/DL (ref 0.1–1)
BUN SERPL-MCNC: 26 MG/DL (ref 8–23)
CALCIUM SERPL-MCNC: 10 MG/DL (ref 8.7–10.5)
CHLORIDE SERPL-SCNC: 103 MMOL/L (ref 95–110)
CO2 SERPL-SCNC: 25 MMOL/L (ref 23–29)
CREAT SERPL-MCNC: 1.5 MG/DL (ref 0.5–1.4)
EST. GFR  (AFRICAN AMERICAN): 39.3 ML/MIN/1.73 M^2
EST. GFR  (NON AFRICAN AMERICAN): 34.1 ML/MIN/1.73 M^2
GLUCOSE SERPL-MCNC: 86 MG/DL (ref 70–110)
POTASSIUM SERPL-SCNC: 4.6 MMOL/L (ref 3.5–5.1)
PROT SERPL-MCNC: 8.1 G/DL (ref 6–8.4)
PTH-INTACT SERPL-MCNC: 74.8 PG/ML (ref 9–77)
SODIUM SERPL-SCNC: 140 MMOL/L (ref 136–145)
T3 SERPL-MCNC: 109 NG/DL (ref 60–180)
T4 FREE SERPL-MCNC: 0.9 NG/DL (ref 0.71–1.51)
TSH SERPL DL<=0.005 MIU/L-ACNC: 3.43 UIU/ML (ref 0.4–4)

## 2021-09-13 PROCEDURE — 1126F PR PAIN SEVERITY QUANTIFIED, NO PAIN PRESENT: ICD-10-PCS | Mod: CPTII,S$GLB,, | Performed by: INTERNAL MEDICINE

## 2021-09-13 PROCEDURE — 3066F PR DOCUMENTATION OF TREATMENT FOR NEPHROPATHY: ICD-10-PCS | Mod: CPTII,S$GLB,, | Performed by: INTERNAL MEDICINE

## 2021-09-13 PROCEDURE — 3288F PR FALLS RISK ASSESSMENT DOCUMENTED: ICD-10-PCS | Mod: CPTII,S$GLB,, | Performed by: INTERNAL MEDICINE

## 2021-09-13 PROCEDURE — 1160F RVW MEDS BY RX/DR IN RCRD: CPT | Mod: CPTII,S$GLB,, | Performed by: INTERNAL MEDICINE

## 2021-09-13 PROCEDURE — 82330 ASSAY OF CALCIUM: CPT | Performed by: INTERNAL MEDICINE

## 2021-09-13 PROCEDURE — 99214 PR OFFICE/OUTPT VISIT, EST, LEVL IV, 30-39 MIN: ICD-10-PCS | Mod: S$GLB,,, | Performed by: INTERNAL MEDICINE

## 2021-09-13 PROCEDURE — 1101F PT FALLS ASSESS-DOCD LE1/YR: CPT | Mod: CPTII,S$GLB,, | Performed by: INTERNAL MEDICINE

## 2021-09-13 PROCEDURE — 1159F PR MEDICATION LIST DOCUMENTED IN MEDICAL RECORD: ICD-10-PCS | Mod: CPTII,S$GLB,, | Performed by: INTERNAL MEDICINE

## 2021-09-13 PROCEDURE — 80053 COMPREHEN METABOLIC PANEL: CPT | Performed by: INTERNAL MEDICINE

## 2021-09-13 PROCEDURE — 82306 VITAMIN D 25 HYDROXY: CPT | Performed by: INTERNAL MEDICINE

## 2021-09-13 PROCEDURE — 84439 ASSAY OF FREE THYROXINE: CPT | Performed by: INTERNAL MEDICINE

## 2021-09-13 PROCEDURE — 84480 ASSAY TRIIODOTHYRONINE (T3): CPT | Performed by: INTERNAL MEDICINE

## 2021-09-13 PROCEDURE — 1126F AMNT PAIN NOTED NONE PRSNT: CPT | Mod: CPTII,S$GLB,, | Performed by: INTERNAL MEDICINE

## 2021-09-13 PROCEDURE — 3008F PR BODY MASS INDEX (BMI) DOCUMENTED: ICD-10-PCS | Mod: CPTII,S$GLB,, | Performed by: INTERNAL MEDICINE

## 2021-09-13 PROCEDURE — 3074F SYST BP LT 130 MM HG: CPT | Mod: CPTII,S$GLB,, | Performed by: INTERNAL MEDICINE

## 2021-09-13 PROCEDURE — 1159F MED LIST DOCD IN RCRD: CPT | Mod: CPTII,S$GLB,, | Performed by: INTERNAL MEDICINE

## 2021-09-13 PROCEDURE — 3061F PR NEG MICROALBUMINURIA RESULT DOCUMENTED/REVIEW: ICD-10-PCS | Mod: CPTII,S$GLB,, | Performed by: INTERNAL MEDICINE

## 2021-09-13 PROCEDURE — 3078F DIAST BP <80 MM HG: CPT | Mod: CPTII,S$GLB,, | Performed by: INTERNAL MEDICINE

## 2021-09-13 PROCEDURE — 3066F NEPHROPATHY DOC TX: CPT | Mod: CPTII,S$GLB,, | Performed by: INTERNAL MEDICINE

## 2021-09-13 PROCEDURE — 84443 ASSAY THYROID STIM HORMONE: CPT | Performed by: INTERNAL MEDICINE

## 2021-09-13 PROCEDURE — 3078F PR MOST RECENT DIASTOLIC BLOOD PRESSURE < 80 MM HG: ICD-10-PCS | Mod: CPTII,S$GLB,, | Performed by: INTERNAL MEDICINE

## 2021-09-13 PROCEDURE — 1160F PR REVIEW ALL MEDS BY PRESCRIBER/CLIN PHARMACIST DOCUMENTED: ICD-10-PCS | Mod: CPTII,S$GLB,, | Performed by: INTERNAL MEDICINE

## 2021-09-13 PROCEDURE — 4010F PR ACE/ARB THEARPY RXD/TAKEN: ICD-10-PCS | Mod: CPTII,S$GLB,, | Performed by: INTERNAL MEDICINE

## 2021-09-13 PROCEDURE — 99999 PR PBB SHADOW E&M-EST. PATIENT-LVL IV: ICD-10-PCS | Mod: PBBFAC,,, | Performed by: INTERNAL MEDICINE

## 2021-09-13 PROCEDURE — 3061F NEG MICROALBUMINURIA REV: CPT | Mod: CPTII,S$GLB,, | Performed by: INTERNAL MEDICINE

## 2021-09-13 PROCEDURE — 3008F BODY MASS INDEX DOCD: CPT | Mod: CPTII,S$GLB,, | Performed by: INTERNAL MEDICINE

## 2021-09-13 PROCEDURE — 83970 ASSAY OF PARATHORMONE: CPT | Performed by: INTERNAL MEDICINE

## 2021-09-13 PROCEDURE — 3074F PR MOST RECENT SYSTOLIC BLOOD PRESSURE < 130 MM HG: ICD-10-PCS | Mod: CPTII,S$GLB,, | Performed by: INTERNAL MEDICINE

## 2021-09-13 PROCEDURE — 4010F ACE/ARB THERAPY RXD/TAKEN: CPT | Mod: CPTII,S$GLB,, | Performed by: INTERNAL MEDICINE

## 2021-09-13 PROCEDURE — 99999 PR PBB SHADOW E&M-EST. PATIENT-LVL IV: CPT | Mod: PBBFAC,,, | Performed by: INTERNAL MEDICINE

## 2021-09-13 PROCEDURE — 1101F PR PT FALLS ASSESS DOC 0-1 FALLS W/OUT INJ PAST YR: ICD-10-PCS | Mod: CPTII,S$GLB,, | Performed by: INTERNAL MEDICINE

## 2021-09-13 PROCEDURE — 99214 OFFICE O/P EST MOD 30 MIN: CPT | Mod: S$GLB,,, | Performed by: INTERNAL MEDICINE

## 2021-09-13 PROCEDURE — 86800 THYROGLOBULIN ANTIBODY: CPT | Performed by: INTERNAL MEDICINE

## 2021-09-13 PROCEDURE — 3288F FALL RISK ASSESSMENT DOCD: CPT | Mod: CPTII,S$GLB,, | Performed by: INTERNAL MEDICINE

## 2021-09-14 DIAGNOSIS — R79.89 AZOTEMIA: Primary | ICD-10-CM

## 2021-09-14 LAB
CA-I BLDV-SCNC: 1.28 MMOL/L (ref 1.06–1.42)
CA-I BLDV-SCNC: 1.28 MMOL/L (ref 1.06–1.42)

## 2021-09-15 LAB
THRYOGLOBULIN INTERPRETATION: ABNORMAL
THYROGLOB AB SERPL-ACNC: <1.8 IU/ML
THYROGLOB SERPL-MCNC: 13 NG/ML

## 2021-09-17 NOTE — ASSESSMENT & PLAN NOTE
Monitor and transfuse if patient becomes hemodynamically unstable or H/H < 7/21. No evidence of active or acute bleeding noted on initial physical exam. Will trend daily CBC.   Lab Results   Component Value Date    XTPASCZN26 1116 (H) 05/06/2020            Burow's Graft Text: The defect edges were debeveled with a #15 scalpel blade.  Given the location of the defect, shape of the defect, the proximity to free margins and the presence of a standing cone deformity a Burow's skin graft was deemed most appropriate. The standing cone was removed and this tissue was then trimmed to the shape of the primary defect. The adipose tissue was also removed until only dermis and epidermis were left.  The skin margins of the secondary defect were undermined to an appropriate distance in all directions utilizing iris scissors.  The secondary defect was closed with interrupted buried subcutaneous sutures.  The skin edges were then re-apposed with running  sutures.  The skin graft was then placed in the primary defect and oriented appropriately.

## 2021-09-30 ENCOUNTER — TELEPHONE (OUTPATIENT)
Dept: SPINE | Facility: CLINIC | Age: 76
End: 2021-09-30

## 2021-12-29 ENCOUNTER — OFFICE VISIT (OUTPATIENT)
Dept: URGENT CARE | Facility: CLINIC | Age: 76
End: 2021-12-29
Payer: MEDICARE

## 2021-12-29 VITALS
TEMPERATURE: 99 F | BODY MASS INDEX: 22.78 KG/M2 | HEIGHT: 60 IN | DIASTOLIC BLOOD PRESSURE: 67 MMHG | HEART RATE: 84 BPM | RESPIRATION RATE: 16 BRPM | WEIGHT: 116 LBS | SYSTOLIC BLOOD PRESSURE: 115 MMHG | OXYGEN SATURATION: 97 %

## 2021-12-29 DIAGNOSIS — Z20.822 COVID-19 RULED OUT BY LABORATORY TESTING: Primary | ICD-10-CM

## 2021-12-29 DIAGNOSIS — Z20.822 EXPOSURE TO COVID-19 VIRUS: ICD-10-CM

## 2021-12-29 LAB
CTP QC/QA: YES
SARS-COV-2 AG RESP QL IA.RAPID: NEGATIVE

## 2021-12-29 PROCEDURE — 1160F RVW MEDS BY RX/DR IN RCRD: CPT | Mod: CPTII,S$GLB,, | Performed by: NURSE PRACTITIONER

## 2021-12-29 PROCEDURE — 3061F NEG MICROALBUMINURIA REV: CPT | Mod: CPTII,S$GLB,, | Performed by: NURSE PRACTITIONER

## 2021-12-29 PROCEDURE — 3074F SYST BP LT 130 MM HG: CPT | Mod: CPTII,S$GLB,, | Performed by: NURSE PRACTITIONER

## 2021-12-29 PROCEDURE — 3066F NEPHROPATHY DOC TX: CPT | Mod: CPTII,S$GLB,, | Performed by: NURSE PRACTITIONER

## 2021-12-29 PROCEDURE — 87811 SARS CORONAVIRUS 2 ANTIGEN POCT, MANUAL READ: ICD-10-PCS | Mod: S$GLB,,, | Performed by: NURSE PRACTITIONER

## 2021-12-29 PROCEDURE — 1159F PR MEDICATION LIST DOCUMENTED IN MEDICAL RECORD: ICD-10-PCS | Mod: CPTII,S$GLB,, | Performed by: NURSE PRACTITIONER

## 2021-12-29 PROCEDURE — 3008F PR BODY MASS INDEX (BMI) DOCUMENTED: ICD-10-PCS | Mod: CPTII,S$GLB,, | Performed by: NURSE PRACTITIONER

## 2021-12-29 PROCEDURE — 87811 SARS-COV-2 COVID19 W/OPTIC: CPT | Mod: S$GLB,,, | Performed by: NURSE PRACTITIONER

## 2021-12-29 PROCEDURE — 99213 PR OFFICE/OUTPT VISIT, EST, LEVL III, 20-29 MIN: ICD-10-PCS | Mod: S$GLB,,, | Performed by: NURSE PRACTITIONER

## 2021-12-29 PROCEDURE — 1160F PR REVIEW ALL MEDS BY PRESCRIBER/CLIN PHARMACIST DOCUMENTED: ICD-10-PCS | Mod: CPTII,S$GLB,, | Performed by: NURSE PRACTITIONER

## 2021-12-29 PROCEDURE — 99213 OFFICE O/P EST LOW 20 MIN: CPT | Mod: S$GLB,,, | Performed by: NURSE PRACTITIONER

## 2021-12-29 PROCEDURE — 3078F DIAST BP <80 MM HG: CPT | Mod: CPTII,S$GLB,, | Performed by: NURSE PRACTITIONER

## 2021-12-29 PROCEDURE — 3061F PR NEG MICROALBUMINURIA RESULT DOCUMENTED/REVIEW: ICD-10-PCS | Mod: CPTII,S$GLB,, | Performed by: NURSE PRACTITIONER

## 2021-12-29 PROCEDURE — 3074F PR MOST RECENT SYSTOLIC BLOOD PRESSURE < 130 MM HG: ICD-10-PCS | Mod: CPTII,S$GLB,, | Performed by: NURSE PRACTITIONER

## 2021-12-29 PROCEDURE — 3008F BODY MASS INDEX DOCD: CPT | Mod: CPTII,S$GLB,, | Performed by: NURSE PRACTITIONER

## 2021-12-29 PROCEDURE — 4010F PR ACE/ARB THEARPY RXD/TAKEN: ICD-10-PCS | Mod: CPTII,S$GLB,, | Performed by: NURSE PRACTITIONER

## 2021-12-29 PROCEDURE — 3078F PR MOST RECENT DIASTOLIC BLOOD PRESSURE < 80 MM HG: ICD-10-PCS | Mod: CPTII,S$GLB,, | Performed by: NURSE PRACTITIONER

## 2021-12-29 PROCEDURE — 3066F PR DOCUMENTATION OF TREATMENT FOR NEPHROPATHY: ICD-10-PCS | Mod: CPTII,S$GLB,, | Performed by: NURSE PRACTITIONER

## 2021-12-29 PROCEDURE — 4010F ACE/ARB THERAPY RXD/TAKEN: CPT | Mod: CPTII,S$GLB,, | Performed by: NURSE PRACTITIONER

## 2021-12-29 PROCEDURE — 1159F MED LIST DOCD IN RCRD: CPT | Mod: CPTII,S$GLB,, | Performed by: NURSE PRACTITIONER

## 2022-01-18 ENCOUNTER — TELEPHONE (OUTPATIENT)
Dept: PRIMARY CARE CLINIC | Facility: CLINIC | Age: 77
End: 2022-01-18
Payer: MEDICARE

## 2022-01-18 NOTE — TELEPHONE ENCOUNTER
Returned pt call, pt c/o left foot pain withsudden onset, 1/14 AM, Denies trauma.  Pt requesting number to podiatry, number given to Dr.s Lee

## 2022-01-21 ENCOUNTER — HOSPITAL ENCOUNTER (OUTPATIENT)
Dept: RADIOLOGY | Facility: CLINIC | Age: 77
Discharge: HOME | End: 2022-01-21
Attending: PODIATRIST
Payer: MEDICARE

## 2022-01-21 ENCOUNTER — OFFICE VISIT (OUTPATIENT)
Dept: PODIATRY | Facility: CLINIC | Age: 77
End: 2022-01-21
Payer: MEDICARE

## 2022-01-21 VITALS — WEIGHT: 120 LBS | HEIGHT: 60 IN | OXYGEN SATURATION: 99 % | HEART RATE: 64 BPM | BODY MASS INDEX: 23.56 KG/M2

## 2022-01-21 DIAGNOSIS — M72.2 PLANTAR FASCIITIS: Primary | ICD-10-CM

## 2022-01-21 DIAGNOSIS — M79.671 RIGHT FOOT PAIN: ICD-10-CM

## 2022-01-21 DIAGNOSIS — B35.1 ONYCHOMYCOSIS DUE TO DERMATOPHYTE: ICD-10-CM

## 2022-01-21 PROCEDURE — 1160F PR REVIEW ALL MEDS BY PRESCRIBER/CLIN PHARMACIST DOCUMENTED: ICD-10-PCS | Mod: CPTII,S$GLB,, | Performed by: PODIATRIST

## 2022-01-21 PROCEDURE — 99203 PR OFFICE/OUTPT VISIT, NEW, LEVL III, 30-44 MIN: ICD-10-PCS | Mod: S$GLB,,, | Performed by: PODIATRIST

## 2022-01-21 PROCEDURE — 1159F MED LIST DOCD IN RCRD: CPT | Mod: CPTII,S$GLB,, | Performed by: PODIATRIST

## 2022-01-21 PROCEDURE — 73630 X-RAY EXAM OF FOOT: CPT | Mod: RT,S$GLB,, | Performed by: RADIOLOGY

## 2022-01-21 PROCEDURE — 3008F PR BODY MASS INDEX (BMI) DOCUMENTED: ICD-10-PCS | Mod: CPTII,S$GLB,, | Performed by: PODIATRIST

## 2022-01-21 PROCEDURE — 1126F PR PAIN SEVERITY QUANTIFIED, NO PAIN PRESENT: ICD-10-PCS | Mod: CPTII,S$GLB,, | Performed by: PODIATRIST

## 2022-01-21 PROCEDURE — 3288F PR FALLS RISK ASSESSMENT DOCUMENTED: ICD-10-PCS | Mod: CPTII,S$GLB,, | Performed by: PODIATRIST

## 2022-01-21 PROCEDURE — 73630 XR FOOT COMPLETE 3 VIEW RIGHT: ICD-10-PCS | Mod: RT,S$GLB,, | Performed by: RADIOLOGY

## 2022-01-21 PROCEDURE — 1126F AMNT PAIN NOTED NONE PRSNT: CPT | Mod: CPTII,S$GLB,, | Performed by: PODIATRIST

## 2022-01-21 PROCEDURE — 1101F PR PT FALLS ASSESS DOC 0-1 FALLS W/OUT INJ PAST YR: ICD-10-PCS | Mod: CPTII,S$GLB,, | Performed by: PODIATRIST

## 2022-01-21 PROCEDURE — 3008F BODY MASS INDEX DOCD: CPT | Mod: CPTII,S$GLB,, | Performed by: PODIATRIST

## 2022-01-21 PROCEDURE — 1160F RVW MEDS BY RX/DR IN RCRD: CPT | Mod: CPTII,S$GLB,, | Performed by: PODIATRIST

## 2022-01-21 PROCEDURE — 1159F PR MEDICATION LIST DOCUMENTED IN MEDICAL RECORD: ICD-10-PCS | Mod: CPTII,S$GLB,, | Performed by: PODIATRIST

## 2022-01-21 PROCEDURE — 99203 OFFICE O/P NEW LOW 30 MIN: CPT | Mod: S$GLB,,, | Performed by: PODIATRIST

## 2022-01-21 PROCEDURE — 1101F PT FALLS ASSESS-DOCD LE1/YR: CPT | Mod: CPTII,S$GLB,, | Performed by: PODIATRIST

## 2022-01-21 PROCEDURE — 3288F FALL RISK ASSESSMENT DOCD: CPT | Mod: CPTII,S$GLB,, | Performed by: PODIATRIST

## 2022-01-21 RX ORDER — ROSUVASTATIN CALCIUM 20 MG/1
TABLET, COATED ORAL
COMMUNITY
End: 2022-02-10 | Stop reason: SDUPTHER

## 2022-01-21 RX ORDER — VIT A/VIT C/VIT E/ZINC/COPPER 4296-226
CAPSULE ORAL
COMMUNITY

## 2022-01-21 NOTE — PROGRESS NOTES
"  [address] [facility] [address]  [place] [address]  Phone: [phone]   [phone]    Patient's PCP:Nadia Sanderson MD  Referring Provider: Aaareferral Self    Subjective:      Chief Complaint:: Foot Pain (Right foot) and Nail Problem (Bilateral fungal nails)    HPI  Tyra Fernandes is a 76 y.o. female who presents with a complaint of discomfort in the bottom of right foot lasting for since 1/14/2022 . Onset of symptoms woke up and couldn't walk and reports no trauma.  Current symptoms include discomfort when walking sometimes the pain has mostly resolved.  Aggravating factors are none. Symptoms have improved. Treatment to date have included none.  Also presents with fungal nails bilateral. Patient has had since she can remember. Has not done any treatments.       Vitals:    01/21/22 0838   Pulse: 64   SpO2: 99%   Weight: 54.4 kg (120 lb)   Height: 5' (1.524 m)   PainSc: 0-No pain      Shoe Size: 5.5-6    Past Surgical History:   Procedure Laterality Date    BILATERAL MASTECTOMY  02/2000    BLADDER REPAIR      BREAST RECONSTRUCTION      CATARACT EXTRACTION      COLONOSCOPY N/A 1/4/2016    Procedure: COLONOSCOPY;  Surgeon: Kieran Krishnamurthy MD;  Location: [facility] ([address] [place] [address]);  Service: Endoscopy;  Laterality: N/A;    COLONOSCOPY N/A 2/24/2021    Procedure: COLONOSCOPY;  Surgeon: Gila Lopez MD;  Location: [facility];  Service: Endoscopy;  Laterality: N/A;    COLONOSCOPY W/ POLYPECTOMY      HYSTERECTOMY  1994    JODI, ovaries remain ("pain")    LASIK      MASTECTOMY  2000    Bilateral    OOPHORECTOMY  1994    PARTIAL NEPHRECTOMY      cyst from left kidney    Toes surgery      Hammer toe surgery    TONSILLECTOMY       Past Medical History:   Diagnosis Date    Abnormal Pap smear     colpo then hyst    Allergy     Breast cancer 1997, 2000    Colon polyp     Fever blister     GERD (gastroesophageal reflux disease)     Hyperlipidemia     Hypertension     Hyperthyroidism     Kidney " cysts     Osteoporosis, unspecified     Seasonal allergies      Family History   Problem Relation Age of Onset    Breast cancer Mother     Cancer Father         Lung Cancer    Osteoporosis Sister     Breast cancer Sister     Colon cancer Maternal Aunt     Cancer Maternal Aunt         colon    Colon cancer Paternal Aunt     Heart failure Paternal Aunt     Cancer Paternal Aunt         colon    Breast cancer Other     Breast cancer Cousin     Celiac disease Cousin     Breast cancer Cousin     Heart failure Paternal Uncle     Heart failure Paternal Grandmother     Diabetes Neg Hx     Eclampsia Neg Hx     Hypertension Neg Hx     Miscarriages / Stillbirths Neg Hx     Ovarian cancer Neg Hx      labor Neg Hx     Stroke Neg Hx     Melanoma Neg Hx     Cirrhosis Neg Hx     Crohn's disease Neg Hx     Ulcerative colitis Neg Hx     Stomach cancer Neg Hx     Rectal cancer Neg Hx     Liver cancer Neg Hx     Esophageal cancer Neg Hx     Irritable bowel syndrome Neg Hx         Social History:   Marital Status: Single  Alcohol History:  reports current alcohol use of about 1.0 standard drink of alcohol per week.  Tobacco History:  reports that she has never smoked. She has never used smokeless tobacco.  Drug History:  reports no history of drug use.    Review of patient's allergies indicates:   Allergen Reactions    Pcn [penicillins]     Penicillamine      Other reaction(s): Unknown    Propoxyphene n-acetaminophen      Other reaction(s): Unknown    Sulfa (sulfonamide antibiotics)        Current Outpatient Medications   Medication Sig Dispense Refill    albuterol (VENTOLIN HFA) 90 mcg/actuation inhaler Inhale 2 puffs into the lungs every 6 (six) hours as needed for Wheezing or Shortness of Breath. Rescue 8 g 0    ascorbic acid, vitamin C, (VITAMIN C) 500 MG tablet Take 500 mg by mouth once daily.      aspirin (ECOTRIN) 81 MG EC tablet Take 81 mg by mouth once daily.      benzonatate  (TESSALON) 200 MG capsule Take 1 capsule (200 mg total) by mouth every 8 (eight) hours as needed for Cough. 20 capsule 0    calcium-vitamin D 250-100 mg-unit per tablet Take 2 tablets by mouth once daily.      cetirizine (ZYRTEC) 10 MG tablet Take 1 tablet (10 mg total) by mouth once daily. 30 tablet 0    ergocalciferol (ERGOCALCIFEROL) 50,000 unit Cap Take 1 capsule (50,000 Units total) by mouth every 7 days. 12 capsule 0    ergocalciferol, vitamin D2, (VITAMIN D ORAL) Take by mouth.      fluocinonide 0.05% (LIDEX) 0.05 % cream Apply topically 2 (two) times daily. 45 g 0    fluticasone propionate (FLONASE) 50 mcg/actuation nasal spray 1 spray (50 mcg total) by Each Nostril route once daily. 11.1 mL 0    loratadine/pseudoephedrine (ALAVERT D-12 ALLERGY-SINUS ORAL) Take 1 tablet by mouth once daily.      magnesium oxide 84.5 mg mag (140 mg) Cap Take 125 tablets by mouth once daily.      raloxifene (EVISTA) 60 mg tablet Take 1 tablet (60 mg total) by mouth once daily. 90 tablet 3    rosuvastatin (CRESTOR) 20 MG tablet Take 1 tablet (20 mg total) by mouth once daily. 90 tablet 3    rosuvastatin (CRESTOR) 20 MG tablet 1 tablet      SHINGRIX, PF, 50 mcg/0.5 mL injection       valsartan (DIOVAN) 40 MG tablet Take 1 tablet (40 mg total) by mouth once daily. 90 tablet 3    vit C/E/Zn/coppr/lutein/zeaxan (PRESERVISION AREDS-2 ORAL) Take 1 capsule by mouth 2 (two) times daily. 1 every other day      vitamins  A,C,E-zinc-copper (PRESERVISION AREDS) 14,320-226-200 unit-mg-unit Cap as directed       No current facility-administered medications for this visit.       Review of Systems   Constitutional: Negative for chills, fatigue, fever and unexpected weight change.   HENT: Negative for hearing loss and trouble swallowing.    Eyes: Negative for photophobia and visual disturbance.   Respiratory: Negative for cough, shortness of breath and wheezing.    Cardiovascular: Negative for chest pain, palpitations and leg  swelling.   Gastrointestinal: Negative for abdominal pain and nausea.   Genitourinary: Negative for dysuria and frequency.   Musculoskeletal: Positive for back pain. Negative for arthralgias, gait problem, joint swelling and myalgias.   Skin: Negative for rash and wound.   Neurological: Positive for numbness. Negative for seizures, weakness and headaches.   Hematological: Bruises/bleeds easily.         Objective:        Physical Exam:   Foot Exam    General  General Appearance: appears stated age and healthy   Orientation: alert and oriented to person, place, and time   Affect: appropriate   Gait: unimpaired       Right Foot/Ankle     Inspection and Palpation  Ecchymosis: none  Tenderness: plantar fascia   Swelling: none   Arch: normal  Hammertoes: absent  Skin Exam: skin intact; no drainage and no erythema   Fungus Toenails: present    Neurovascular  Dorsalis pedis: 2+  Posterior tibial: 2+  Capillary Refill: 2+  Saphenous nerve sensation: normal  Tibial nerve sensation: normal  Superficial peroneal nerve sensation: normal  Deep peroneal nerve sensation: normal  Sural nerve sensation: normal    Muscle Strength  Ankle dorsiflexion: 5  Ankle plantar flexion: 5  Ankle inversion: 5  Ankle eversion: 5  Great toe extension: 5  Great toe flexion: 5    Range of Motion    Passive  Ankle dorsiflexion: 5      Tests  Anterior drawer: negative   Talar tilt: negative   PT Tinel's sign: negative    Paresthesia: negative  Comments  Pain on palpation of the central plantar heel. No pain present  with side to side compression of the calcaneus. Negative tinnel's sign  at the tarsal tunnel. Negative Humphreys's nerve pain. Negative Calcaneal nerve pain. No soft tissue masses. Pain absent  with dorsiflexion of the ankle. No edema, erythema, or ecchymosis noted.     Nails 1 through 5 show varying degrees of thickening discoloration and dystrophy      Left Foot/Ankle      Inspection and Palpation  Ecchymosis: none  Tenderness: none    Swelling: none   Arch: normal  Hammertoes: absent  Skin Exam: no drainage and no erythema   Fungus Toenails: present    Neurovascular  Dorsalis pedis: 2+  Posterior tibial: 2+  Capillary refill: 2+  Saphenous nerve sensation: normal  Tibial nerve sensation: normal  Superficial peroneal nerve sensation: normal  Deep peroneal nerve sensation: normal  Sural nerve sensation: normal    Muscle Strength  Ankle dorsiflexion: 5  Ankle plantar flexion: 5  Ankle inversion: 5  Ankle eversion: 5  Great toe extension: 5  Great toe flexion: 5    Range of Motion    Passive  Ankle dorsiflexion: 5      Tests  Anterior drawer: negative   Talar tilt: negative   PT Tinel's sign: negative  Paresthesia: negative  Comments  Nails 1 through 5 show varying degrees of thickening discoloration and dystrophy    Physical Exam  Cardiovascular:      Pulses:           Dorsalis pedis pulses are 2+ on the right side and 2+ on the left side.        Posterior tibial pulses are 2+ on the right side and 2+ on the left side.   Feet:      Right foot:      Skin integrity: No erythema.      Toenail Condition: Fungal disease present.     Left foot:      Skin integrity: No erythema.      Toenail Condition: Fungal disease present.        Imaging: X-Ray Foot Complete Right  Narrative: EXAMINATION:  XR FOOT COMPLETE 3 VIEW RIGHT    CLINICAL HISTORY:  . Pain in right foot    TECHNIQUE:  AP, lateral, and oblique views of the right foot were performed.    COMPARISON:  None    FINDINGS:  No fracture or dislocation.  There have been apparent prior osteotomies of the 3rd through 5th toes.  Impression: As above    Electronically signed by: Brayan Das MD  Date:    01/21/2022  Time:    09:44               Assessment:       1. Plantar fasciitis    2. Right foot pain    3. Onychomycosis due to dermatophyte      Plan:   Plantar fasciitis    Right foot pain  -     X-Ray Foot Complete Right    Onychomycosis due to dermatophyte      Follow up if symptoms worsen or fail  to improve.    Procedures        Plantar Fasciitis Level I Treatment:   Anti-inflammatory  No bare feet  Over the counter insert  Restrict activity level   Use running shoes at full time  No impact exercise and stretch gastrocnemius muscle      Counseling:     I provided patient education verbally regarding:   Patient diagnosis, treatment options, as well as alternatives, risks, and benefits.     Discussed different treatment options for heel pain. I gave written and verbal instructions on heel cord stretching and this was demonstrated for the patient. Patient expressed understanding. Discussed wearing appropriate shoe gear and avoiding flats, slippers, sandals, barefoot, and sockfeet. Recommended arch supports. My recommendation for OTC supports is Spenco polysorb replacement insoles or patient may elect more aggressive treatment with prescription arch supports. We also discussed cortisone injections and NSAID therapy.     Fungal infection of toenails explained. Treatment options including no treatment, periodic debridement, topical medications, oral medications, and removal of the nail were discussed, as well as success rates and risks of recurrence. We agreed on no treatment at this time      This note was created using Dragon voice recognition software that occasionally misinterpreted phrases or words.

## 2022-01-21 NOTE — PATIENT INSTRUCTIONS
"      Patient Education       Fungal Nail Infections   The Basics   Written by the doctors and editors at [facility]   What is a fungal nail infection? -- A fungal nail infection is an infection of the nail that makes it get thick or turn white, yellow, or brown. It is caused by a type of germ called a "fungus."  Fungal infections happen in the toenails more often than the fingernails. They usually start on the big toe, and can affect 1 or more nails. People who have a toenail infection might also have a condition known as "athlete's foot" (a fungal infection of the skin on the foot). That's because certain types of fungi (plural of fungus) can cause both of these problems.  Fungi like to grow in warm and moist places. People who swim or whose feet sweat a lot might have a higher chance of getting a fungal nail infection.  What are the symptoms of a fungal nail infection? -- A fungal infection can cause a nail to:  · Turn white, yellow, or brown  · Get thick, change shape, or lift up  · Break off easily  · Hurt  Fungal nail infections don't usually lead to serious long-term problems. But in some people they can. In people who have diabetes or whose bodies have trouble fighting infections, the nail infection can make them more likely to get other infections.  Is there a test for a fungal nail infection? -- Yes. Usually, your doctor or nurse can tell if you have a fungal nail infection by talking with you and doing an exam. But to make sure, they might take a small sample of the nail. They she might look at it under a microscope, or send it to a lab for another doctor to look at. Your doctor might also send the sample to a lab for tests that can show which type of fungus is causing the infection.  Can I treat my fungal nail infection on my own? -- You can buy over-the-counter creams or products, but they usually don't work.  How are fungal nail infections treated? -- Treatment depends, in part, on how severe the " "infection is, and how much it bothers you. If your infection is mild or doesn't bother you very much, you might choose not to treat it. An untreated nail infection probably won't go away, but it probably won't cause any long-term problems either.  When people need or choose to have treatment, it usually involves "antifungal" medicines that you get with a prescription from your doctor. These medicines are taken by mouth or put on the nail.  Treatment with pills usually lasts a few months. Some people who take these medicines need to have blood tests. That's because these medicines can affect the liver.  If you don't want to or can't take antifungal pills, your doctor will talk with you about other treatment options. These might include using an antifungal medicine on the nail or having surgery to remove your nail.  Before starting any of these treatments, you should know that:  · It can take many months for your nail to look normal again.  · There is a chance that the treatment won't work. The infection might not get better, or it might come back. If either of these things happen, your doctor can try another treatment or send you to a specialist.  Can fungal nail infections be prevented? -- Sometimes. To reduce your chance of getting one, you can:  · Keep your feet clean and dry.  · Avoid sharing nail tools, such as clippers and scissors.  · Wear flip-flops or other footwear in a gym shower or locker room.  What if I want to get pregnant? -- If you want to get pregnant, let your doctor or nurse know. They might recommend that you not take certain antifungal medicines during pregnancy.  All topics are updated as new evidence becomes available and our peer review process is complete.  This topic retrieved from [employer] on: Sep 21, 2021.  Topic 26486 Version 8.0  Release: 29.4.2 - C29.263  © 2021 UpToDate, Inc. and/or its affiliates. All rights reserved.  picture 1: Fungal nail infections     These are examples of " fungal nail infections.  Graphic 94787 Version 4.0    Consumer Information Use and Disclaimer   This information is not specific medical advice and does not replace information you receive from your health care provider. This is only a brief summary of general information. It does NOT include all information about conditions, illnesses, injuries, tests, procedures, treatments, therapies, discharge instructions or life-style choices that may apply to you. You must talk with your health care provider for complete information about your health and treatment options. This information should not be used to decide whether or not to accept your health care provider's advice, instructions or recommendations. Only your health care provider has the knowledge and training to provide advice that is right for you. The use of this information is governed by the [employer] End User License Agreement, available at [email or web address]://[email or web address].[employer]/en/solutions/Face-Me/[email or web address]/[email or web address].The use of [employer] content is governed by the [employer] Terms of Use. ©2021 UpToDate, Inc. All rights reserved.  Copyright   © 2021 UpToDate, Inc. and/or its affiliates. All rights reserved.  Understanding Plantar Fasciitis    Plantar fasciitis is a condition that causes foot and heel pain. The plantar fascia is a tough band of tissue that runs across the bottom of the foot from the heel to the toes. This tissue pulls on the heel bone. It supports the arch of the foot as it pushes off the ground. If the tissue becomes irritated or red and swollen (inflamed), it is called plantar fasciitis.  How to say it  PLAN-tuhr fa-see-IY-tis     What causes plantar fasciitis?  Plantar fasciitis most often occurs from overusing the plantar fascia. The tissue may become damaged from activities that put repeated stress on the heel and foot. Or it may wear down over time with age and ankle stiffness. You are more likely to have plantar fasciitis if you:  · Do activities that  require a lot of running, jumping, or dancing  · Have a job that requires being on your feet for long periods  · Are overweight or obese  · Have certain foot problems, such as a tight Achilles tendon, flat feet, or high arches  · Often wear poorly fitting shoes    Symptoms of plantar fasciitis  The condition most often causes pain in the heel and the bottom of the foot. The pain may occur when you take your first steps in the morning. It may get better as you walk throughout the day. But as you continue to put weight on the foot, the pain often returns. Pain may also occur after standing or sitting for long periods.    Treating plantar fasciitis  Treatments for plantar fasciitis include:  · Resting the foot. This involves limiting movements that make your foot hurt. You may also need to avoid certain sports and types of work for a time.  · Using cold packs. Put an ice pack on the heel and foot to help reduce pain and swelling.  · Taking pain medicines. Prescription and over-the-counter pain medicines can help relieve pain and swelling.  · Using heel cups or foot inserts (orthotics). These are placed in the shoes to help support the heel or arch and cushion the heel. You may also be told to buy proper-fitting shoes with good arch support and cushioned soles.  · Taping the foot. This supports the arch and limits the movement of the plantar fascia to help relieve symptoms.  · Wearing a night splint. This stretches the plantar fascia and leg muscles while you sleep. This may help relieve pain.  · Doing exercises and physical therapy. These stretch and strengthen the plantar fascia and the muscles in the leg that support the heel and foot.  · Getting shots of medicine into the foot. These may help relieve symptoms for a time.  · Having surgery. This may be needed if other treatments fail to relieve symptoms. During surgery, the surgeon may partially cut the plantar fascia to release tension.    Possible complications of  plantar fasciitis  Without proper care and treatment, healing may take longer than normal. Also, symptoms may continue or get worse. Over time, the plantar fascia may be damaged. This can make it hard to walk or even stand without pain.    When to call your healthcare provider  Call your healthcare provider right away if you have any of these:  · Fever of 100.4°F (38°C) or higher, or as directed  · Symptoms that dont get better with treatment, or get worse  · New symptoms, such as numbness, tingling, or weakness in the foot     Date Last Reviewed: 3/10/2016  © 1400-0855 [employer]. [address] [place] [address], [place] [address]. All rights reserved. This information is not intended as a substitute for professional medical care. Always follow your healthcare professional's instructions.

## 2022-02-10 ENCOUNTER — OFFICE VISIT (OUTPATIENT)
Dept: CARDIOLOGY | Facility: CLINIC | Age: 77
End: 2022-02-10
Payer: MEDICARE

## 2022-02-10 VITALS
RESPIRATION RATE: 16 BRPM | OXYGEN SATURATION: 98 % | WEIGHT: 115 LBS | BODY MASS INDEX: 22.58 KG/M2 | HEART RATE: 75 BPM | SYSTOLIC BLOOD PRESSURE: 132 MMHG | HEIGHT: 60 IN | DIASTOLIC BLOOD PRESSURE: 82 MMHG

## 2022-02-10 DIAGNOSIS — K21.9 GASTROESOPHAGEAL REFLUX DISEASE WITHOUT ESOPHAGITIS: ICD-10-CM

## 2022-02-10 DIAGNOSIS — R94.31 NONSPECIFIC ABNORMAL ELECTROCARDIOGRAM (ECG) (EKG): ICD-10-CM

## 2022-02-10 DIAGNOSIS — I10 ESSENTIAL HYPERTENSION: Primary | ICD-10-CM

## 2022-02-10 DIAGNOSIS — N18.31 STAGE 3A CHRONIC KIDNEY DISEASE: ICD-10-CM

## 2022-02-10 DIAGNOSIS — Z86.39 HISTORY OF THYROTOXICOSIS: ICD-10-CM

## 2022-02-10 DIAGNOSIS — E78.2 MIXED HYPERLIPIDEMIA: ICD-10-CM

## 2022-02-10 DIAGNOSIS — R00.2 PALPITATIONS: ICD-10-CM

## 2022-02-10 DIAGNOSIS — Z87.898 HISTORY OF SYNCOPE: ICD-10-CM

## 2022-02-10 PROCEDURE — 3079F DIAST BP 80-89 MM HG: CPT | Mod: CPTII,S$GLB,, | Performed by: INTERNAL MEDICINE

## 2022-02-10 PROCEDURE — 93000 EKG 12-LEAD: ICD-10-PCS | Mod: S$GLB,,, | Performed by: INTERNAL MEDICINE

## 2022-02-10 PROCEDURE — 3075F SYST BP GE 130 - 139MM HG: CPT | Mod: CPTII,S$GLB,, | Performed by: INTERNAL MEDICINE

## 2022-02-10 PROCEDURE — 1160F PR REVIEW ALL MEDS BY PRESCRIBER/CLIN PHARMACIST DOCUMENTED: ICD-10-PCS | Mod: CPTII,S$GLB,, | Performed by: INTERNAL MEDICINE

## 2022-02-10 PROCEDURE — 1126F PR PAIN SEVERITY QUANTIFIED, NO PAIN PRESENT: ICD-10-PCS | Mod: CPTII,S$GLB,, | Performed by: INTERNAL MEDICINE

## 2022-02-10 PROCEDURE — 3079F PR MOST RECENT DIASTOLIC BLOOD PRESSURE 80-89 MM HG: ICD-10-PCS | Mod: CPTII,S$GLB,, | Performed by: INTERNAL MEDICINE

## 2022-02-10 PROCEDURE — 3008F BODY MASS INDEX DOCD: CPT | Mod: CPTII,S$GLB,, | Performed by: INTERNAL MEDICINE

## 2022-02-10 PROCEDURE — 1101F PR PT FALLS ASSESS DOC 0-1 FALLS W/OUT INJ PAST YR: ICD-10-PCS | Mod: CPTII,S$GLB,, | Performed by: INTERNAL MEDICINE

## 2022-02-10 PROCEDURE — 99214 PR OFFICE/OUTPT VISIT, EST, LEVL IV, 30-39 MIN: ICD-10-PCS | Mod: S$GLB,,, | Performed by: INTERNAL MEDICINE

## 2022-02-10 PROCEDURE — 93000 ELECTROCARDIOGRAM COMPLETE: CPT | Mod: S$GLB,,, | Performed by: INTERNAL MEDICINE

## 2022-02-10 PROCEDURE — 1126F AMNT PAIN NOTED NONE PRSNT: CPT | Mod: CPTII,S$GLB,, | Performed by: INTERNAL MEDICINE

## 2022-02-10 PROCEDURE — 1159F PR MEDICATION LIST DOCUMENTED IN MEDICAL RECORD: ICD-10-PCS | Mod: CPTII,S$GLB,, | Performed by: INTERNAL MEDICINE

## 2022-02-10 PROCEDURE — 99214 OFFICE O/P EST MOD 30 MIN: CPT | Mod: S$GLB,,, | Performed by: INTERNAL MEDICINE

## 2022-02-10 PROCEDURE — 1160F RVW MEDS BY RX/DR IN RCRD: CPT | Mod: CPTII,S$GLB,, | Performed by: INTERNAL MEDICINE

## 2022-02-10 PROCEDURE — 3008F PR BODY MASS INDEX (BMI) DOCUMENTED: ICD-10-PCS | Mod: CPTII,S$GLB,, | Performed by: INTERNAL MEDICINE

## 2022-02-10 PROCEDURE — 1101F PT FALLS ASSESS-DOCD LE1/YR: CPT | Mod: CPTII,S$GLB,, | Performed by: INTERNAL MEDICINE

## 2022-02-10 PROCEDURE — 3075F PR MOST RECENT SYSTOLIC BLOOD PRESS GE 130-139MM HG: ICD-10-PCS | Mod: CPTII,S$GLB,, | Performed by: INTERNAL MEDICINE

## 2022-02-10 PROCEDURE — 3288F PR FALLS RISK ASSESSMENT DOCUMENTED: ICD-10-PCS | Mod: CPTII,S$GLB,, | Performed by: INTERNAL MEDICINE

## 2022-02-10 PROCEDURE — 3288F FALL RISK ASSESSMENT DOCD: CPT | Mod: CPTII,S$GLB,, | Performed by: INTERNAL MEDICINE

## 2022-02-10 PROCEDURE — 1159F MED LIST DOCD IN RCRD: CPT | Mod: CPTII,S$GLB,, | Performed by: INTERNAL MEDICINE

## 2022-02-10 NOTE — PROGRESS NOTES
"Subjective:    Patient ID:  Tyra Fernandes is a 76 y.o. female     Chief Complaint   Patient presents with    Hyperlipidemia    Hypertension       HPI:  Ms Tyra Fernandes is a 76 y.o. female here for follow-up.  Patient has been doing well no specific complaints at the present time.  Her breathing has been good denies any shortness of breath or difficulty in breathing denies any chest pain or tightness or heaviness denies any dizziness or lightheadedness or loss of consciousness falls or head injury.  Patient has been taking all her medications regularly.      Review of patient's allergies indicates:   Allergen Reactions    Pcn [penicillins]     Penicillamine      Other reaction(s): Unknown    Propoxyphene n-acetaminophen      Other reaction(s): Unknown    Sulfa (sulfonamide antibiotics)        Past Medical History:   Diagnosis Date    Abnormal Pap smear     colpo then hyst    Allergy     Breast cancer 1997, 2000    Colon polyp     Fever blister     GERD (gastroesophageal reflux disease)     Hyperlipidemia     Hypertension     Hyperthyroidism     Kidney cysts     Osteoporosis, unspecified     Seasonal allergies      Past Surgical History:   Procedure Laterality Date    BILATERAL MASTECTOMY  02/2000    BLADDER REPAIR      BREAST RECONSTRUCTION      CATARACT EXTRACTION      COLONOSCOPY N/A 1/4/2016    Procedure: COLONOSCOPY;  Surgeon: Kieran Krishnamurthy MD;  Location: [facility] ([address] [place] [address]);  Service: Endoscopy;  Laterality: N/A;    COLONOSCOPY N/A 2/24/2021    Procedure: COLONOSCOPY;  Surgeon: Gila Lopez MD;  Location: [facility];  Service: Endoscopy;  Laterality: N/A;    COLONOSCOPY W/ POLYPECTOMY      HYSTERECTOMY  1994    JODI, ovaries remain ("pain")    LASIK      MASTECTOMY  2000    Bilateral    OOPHORECTOMY  1994    PARTIAL NEPHRECTOMY      cyst from left kidney    Toes surgery      Hammer toe surgery    TONSILLECTOMY       Social History     Tobacco Use    Smoking status: " Never Smoker    Smokeless tobacco: Never Used   Substance Use Topics    Alcohol use: Yes     Alcohol/week: 1.0 standard drink     Types: 1 Glasses of wine per week     Comment: Rare    Drug use: No     Family History   Problem Relation Age of Onset    Breast cancer Mother     Cancer Father         Lung Cancer    Osteoporosis Sister     Breast cancer Sister     Colon cancer Maternal Aunt     Cancer Maternal Aunt         colon    Colon cancer Paternal Aunt     Heart failure Paternal Aunt     Cancer Paternal Aunt         colon    Breast cancer Other     Breast cancer Cousin     Celiac disease Cousin     Breast cancer Cousin     Heart failure Paternal Uncle     Heart failure Paternal Grandmother     Diabetes Neg Hx     Eclampsia Neg Hx     Hypertension Neg Hx     Miscarriages / Stillbirths Neg Hx     Ovarian cancer Neg Hx      labor Neg Hx     Stroke Neg Hx     Melanoma Neg Hx     Cirrhosis Neg Hx     Crohn's disease Neg Hx     Ulcerative colitis Neg Hx     Stomach cancer Neg Hx     Rectal cancer Neg Hx     Liver cancer Neg Hx     Esophageal cancer Neg Hx     Irritable bowel syndrome Neg Hx         Review of Systems:   Constitution: Negative for diaphoresis and fever.   HEENT: Negative for nosebleeds.    Cardiovascular: Negative for chest pain       No dyspnea on exertion       No leg swelling        No palpitations  Respiratory: Negative for shortness of breath and wheezing.    Hematologic/Lymphatic: Negative for bleeding problem. Does not bruise/bleed easily.   Skin: Negative for color change and rash.   Musculoskeletal: Negative for falls and myalgias.   Gastrointestinal: Negative for hematemesis and hematochezia.   Genitourinary: Negative for hematuria.   Neurological: Negative for dizziness and light-headedness.   Psychiatric/Behavioral: Negative for altered mental status and memory loss.          Objective:        Vitals:    02/10/22 1259   BP: 132/82   Pulse: 75   Resp: 16        Lab Results   Component Value Date    WBC 5.04 03/30/2021    HGB 10.5 (L) 03/30/2021    HCT 32.9 (L) 03/30/2021     03/30/2021    CHOL 166 03/30/2021    TRIG 172 (H) 03/30/2021    HDL 53 03/30/2021    ALT 10 09/13/2021    AST 22 09/13/2021     09/13/2021    K 4.6 09/13/2021     09/13/2021    CREATININE 1.5 (H) 09/13/2021    BUN 26 (H) 09/13/2021    CO2 25 09/13/2021    TSH 3.431 09/13/2021    HGBA1C 5.3 02/21/2018        ECHOCARDIOGRAM RESULTS  Results for orders placed during the hospital encounter of 05/05/20    Echo Color Flow Doppler? Yes; Bubble Contrast? Yes    Interpretation Summary  · The mean diastolic gradient across the mitral valve is 5 mmHg at a heart rate of bpm.  · Normal left ventricular systolic function. The estimated ejection fraction is 55%.  · Normal LV diastolic function.  · No wall motion abnormalities.  · Mild tricuspid regurgitation.  · Mild pulmonic regurgitation.  · Normal right ventricular systolic function.  · Normal central venous pressure (3 mmHg).  · The estimated PA systolic pressure is 34 mmHg.  · Mild mitral regurgitation.        CURRENT/PREVIOUS VISIT EKG  Results for orders placed or performed in visit on 08/05/21   IN OFFICE EKG 12-LEAD (to [place])    Collection Time: 08/05/21  4:19 PM    Narrative    Test Reason : R00.2,    Vent. Rate : 069 BPM     Atrial Rate : 069 BPM     P-R Int : 146 ms          QRS Dur : 076 ms      QT Int : 390 ms       P-R-T Axes : 089 086 067 degrees     QTc Int : 417 ms    Program found technically poor ECG  Normal sinus rhythm  Nonspecific ST and T wave abnormality  Sinus rhythm with occasional Premature ventricular complexes  When compared with ECG of 20-JAN-2021 16:06,  Premature ventricular complexes are now Present  Confirmed by Hua Hancock MD ([id number]) on 8/10/2021 11:11:22 AM    Referred By:             Confirmed By:Hua Hancock MD     No valid procedures specified.   No results found for this or any previous  visit.      Physical Exam:  CONSTITUTIONAL: No fever, no chills  HEENT: Normocephalic, atraumatic,pupils reactive to light                 NECK:  No JVD no carotid bruit  CVS: S1S2+, RRR, faint systolic murmurs,   LUNGS: Clear  ABDOMEN: Soft, NT, BS+  EXTREMITIES: No cyanosis, edema  : No bell catheter  NEURO: AAO X 3  PSY: Normal affect      Medication List with Changes/Refills   Current Medications    ALBUTEROL (VENTOLIN HFA) 90 MCG/ACTUATION INHALER    Inhale 2 puffs into the lungs every 6 (six) hours as needed for Wheezing or Shortness of Breath. Rescue    ASCORBIC ACID, VITAMIN C, (VITAMIN C) 500 MG TABLET    Take 500 mg by mouth once daily.    ASPIRIN (ECOTRIN) 81 MG EC TABLET    Take 81 mg by mouth once daily.    BENZONATATE (TESSALON) 200 MG CAPSULE    Take 1 capsule (200 mg total) by mouth every 8 (eight) hours as needed for Cough.    CALCIUM-VITAMIN D 250-100 MG-UNIT PER TABLET    Take 2 tablets by mouth once daily.    CETIRIZINE (ZYRTEC) 10 MG TABLET    Take 1 tablet (10 mg total) by mouth once daily.    ERGOCALCIFEROL (ERGOCALCIFEROL) 50,000 UNIT CAP    Take 1 capsule (50,000 Units total) by mouth every 7 days.    ERGOCALCIFEROL, VITAMIN D2, (VITAMIN D ORAL)    Take by mouth.    FLUOCINONIDE 0.05% (LIDEX) 0.05 % CREAM    Apply topically 2 (two) times daily.    FLUTICASONE PROPIONATE (FLONASE) 50 MCG/ACTUATION NASAL SPRAY    1 spray (50 mcg total) by Each Nostril route once daily.    LORATADINE/PSEUDOEPHEDRINE (ALAVERT D-12 ALLERGY-SINUS ORAL)    Take 1 tablet by mouth once daily.    MAGNESIUM OXIDE 84.5 MG MAG (140 MG) CAP    Take 125 tablets by mouth once daily.    RALOXIFENE (EVISTA) 60 MG TABLET    Take 1 tablet (60 mg total) by mouth once daily.    ROSUVASTATIN (CRESTOR) 20 MG TABLET    Take 1 tablet (20 mg total) by mouth once daily.    SHINGRIX, PF, 50 MCG/0.5 ML INJECTION        VALSARTAN (DIOVAN) 40 MG TABLET    Take 1 tablet (40 mg total) by mouth once daily.    VIT C/E/ZN/COPPR/LUTEIN/ZEAXAN  (PRESERVISION AREDS-2 ORAL)    Take 1 capsule by mouth 2 (two) times daily. 1 every other day    VITAMINS  A,C,E-ZINC-COPPER (PRESERVISION AREDS) 14,[id number] UNIT-MG-UNIT CAP    as directed   Discontinued Medications    ROSUVASTATIN (CRESTOR) 20 MG TABLET    1 tablet             Assessment:       1. Essential hypertension    2. Mixed hyperlipidemia    3. Gastroesophageal reflux disease without esophagitis    4. Nonspecific abnormal electrocardiogram (ECG) (EKG)    5. Stage 3a chronic kidney disease    6. History of syncope    7. History of thyrotoxicosis    8. Palpitations         Plan:   1. Patient is doing very well her blood pressure is well controlled is 132/82 and she is currently on valsartan 40 mg p.o. q.day continue the same.  2. Patient has hyperlipidemia she is on rosuvastatin 20 mg p.o. q.day continue the same and a primary physician is checking her cholesterol and liver function test.  3. Reviewed EKG independently patient is in normal sinus rhythm with heart rate of 75 beats per minute essentially normal intervals and within normal limits.  No significant change from the previous EKG.  4. Patient has been on albuterol inhaler only as needed and she has not used it in the recent past.  5. Patient is on multiple supplements continue the same.  6.  Will do a 2D echocardiogram for LV function ejection fraction and wall motion abnormality and valvular disease.  7. I will see her back in the office in 6 months time.        Problem List Items Addressed This Visit        Cardiac/Vascular    Essential hypertension - Primary    Palpitations    Mixed hyperlipidemia    Nonspecific abnormal electrocardiogram (ECG) (EKG)    Relevant Orders    IN OFFICE EKG 12-LEAD (to [place])       Renal/    Stage 3a chronic kidney disease       Endocrine    History of thyrotoxicosis    Overview     Seen in prior pcp notes from 2012   Hx of iodine induced hypothyroidism in 2014             GI    Gastroesophageal reflux disease  without esophagitis       Other    History of syncope          No follow-ups on file.

## 2022-02-22 ENCOUNTER — TELEPHONE (OUTPATIENT)
Dept: SPINE | Facility: CLINIC | Age: 77
End: 2022-02-22
Payer: MEDICARE

## 2022-02-22 DIAGNOSIS — M54.6 PAIN IN THORACIC SPINE: Primary | ICD-10-CM

## 2022-02-22 NOTE — TELEPHONE ENCOUNTER
Returned call to patient who reports she had a referral for PT in August but was afraid to start thearpy due to Covid. She is ready for therapy but insurance is requesting new referral. Advised would forward to provider and return call to her after response. Verbalizes understanding.

## 2022-02-22 NOTE — TELEPHONE ENCOUNTER
Spoke with patient to advise that new PT ordered paced and that the PT dept would contact her to schedule. Verbalizes understanding.

## 2022-02-22 NOTE — TELEPHONE ENCOUNTER
----- Message from Stacey M Lefort sent at 2/22/2022  8:37 AM CST -----  Pt left a vm for a callback at [phone].  Thank you.

## 2022-03-08 ENCOUNTER — CLINICAL SUPPORT (OUTPATIENT)
Dept: REHABILITATION | Facility: HOSPITAL | Age: 77
End: 2022-03-08
Attending: PHYSICAL MEDICINE & REHABILITATION
Payer: MEDICARE

## 2022-03-08 ENCOUNTER — HOSPITAL ENCOUNTER (OUTPATIENT)
Dept: RADIOLOGY | Facility: CLINIC | Age: 77
Discharge: HOME | End: 2022-03-08
Attending: INTERNAL MEDICINE
Payer: MEDICARE

## 2022-03-08 DIAGNOSIS — M53.84 DECREASED RANGE OF MOTION OF THORACIC SPINE: ICD-10-CM

## 2022-03-08 DIAGNOSIS — M81.8 IDIOPATHIC OSTEOPOROSIS: ICD-10-CM

## 2022-03-08 DIAGNOSIS — M54.6 PAIN IN THORACIC SPINE: ICD-10-CM

## 2022-03-08 DIAGNOSIS — E55.9 HYPOVITAMINOSIS D: ICD-10-CM

## 2022-03-08 PROCEDURE — 77080 DXA BONE DENSITY AXIAL: CPT | Mod: 26,,, | Performed by: RADIOLOGY

## 2022-03-08 PROCEDURE — 77080 DXA BONE DENSITY AXIAL: CPT | Mod: TC,PO

## 2022-03-08 PROCEDURE — 97161 PT EVAL LOW COMPLEX 20 MIN: CPT | Mod: PN

## 2022-03-08 PROCEDURE — 97110 THERAPEUTIC EXERCISES: CPT | Mod: PN

## 2022-03-08 PROCEDURE — 77080 DEXA BONE DENSITY SPINE HIP: ICD-10-PCS | Mod: 26,,, | Performed by: RADIOLOGY

## 2022-03-08 RX ORDER — RALOXIFENE HYDROCHLORIDE 60 MG/1
60 TABLET, FILM COATED ORAL DAILY
Qty: 90 TABLET | Refills: 3 | Status: SHIPPED | OUTPATIENT
Start: 2022-03-08 | End: 2022-04-12 | Stop reason: SDUPTHER

## 2022-03-10 PROBLEM — M54.6 CHRONIC THORACIC BACK PAIN: Status: ACTIVE | Noted: 2022-03-10

## 2022-03-10 PROBLEM — M53.84 DECREASED RANGE OF MOTION OF THORACIC SPINE: Status: ACTIVE | Noted: 2022-03-10

## 2022-03-10 PROBLEM — G89.29 CHRONIC THORACIC BACK PAIN: Status: ACTIVE | Noted: 2022-03-10

## 2022-03-10 NOTE — PLAN OF CARE
[facility]   Physical Therapy Initial Evaluation     Date: 3/8/2022   Name: Tyra Fernandes  [facility] Number: [id number]    Therapy Diagnosis:   Encounter Diagnoses   Name Primary?    Pain in thoracic spine     Decreased range of motion of thoracic spine       Physician: Artemio Garcia MD    Physician Orders: PT Eval and Treat   Medical Diagnosis from Referral: M54.6 (ICD-10-CM) - Pain in thoracic spine  Evaluation Date: 3/8/2022  Authorization Period Expiration: 02/22/2023  Plan of Care Expiration: 05/08/2022  Progress Note Due: 04/08/2022  Visit # / Visits authorized: 1/ 1   FOTO: 1/ 3     Return to MD date: prn    Precautions: Standard, hx of breast cancer, HTN, osteoporosis, chronic T10 compression fracture     Time In: 1500  Time Out: 1540  Total Appointment Time (timed & untimed codes): 40 minutes      SUBJECTIVE   Date of onset: 2021    History of current condition - Tyra reports: she began having back pain last year after volunteering at [facility]. She was standing folding patient gowns when her back began hurting. It got worse over the next few days which lead her to go get it checked out. She states it is still bothering her mostly if she is standing still for >/= 10 minutes. If she is walking or doing something she doesn't feel any discomfort. If she is hurting, lidocaine patches or a brace helps.    Falls: no falls recently     Imaging, MRI studies:   IMPRESSION:  1. Negative for acute thoracic compression fracture, or evidence of metastatic disease to the thoracic spine.  2. Chronic inferior endplate compression fracture of T10.    Prior Therapy: no  Social History:  lives alone, no steps to enter   Occupation: Volunteer at [employer]   Prior Level of Function: Prior to last year no issues with her back  Current Level of Function: standing still > 5-10 minutes, cooking, cleaning, washing dishes     Pain:  Current 1/10, worst 9/10, best 0/10   Location:  Mid-lower thoracic pain   "  Description: Aching and Dull, "piercing ache as it progresses"  Aggravating Factors: Standing, household chores cooking and cleaning  Easing Factors: lidocaine patches, and a back brace     Patients goals: "I want the pain to go away, I want to be able to clean my house and cook and clean my kitchen"     Medical History:   Past Medical History:   Diagnosis Date    Abnormal Pap smear     colpo then hyst    Allergy     Breast cancer 1997, 2000    Colon polyp     Fever blister     GERD (gastroesophageal reflux disease)     Hyperlipidemia     Hypertension     Hyperthyroidism     Kidney cysts     Osteoporosis, unspecified     Seasonal allergies        Surgical History:   Tyra Fernandes  has a past surgical history that includes Tonsillectomy; Cataract extraction; Partial nephrectomy; Toes surgery; Hysterectomy (1994); Oophorectomy (1994); LASIK; Bladder repair; Mastectomy (2000); Breast reconstruction; Colonoscopy (N/A, 1/4/2016); Colonoscopy w/ polypectomy; Bilateral mastectomy (02/2000); and Colonoscopy (N/A, 2/24/2021).    Medications:   Tyra has a current medication list which includes the following prescription(s): albuterol, ascorbic acid (vitamin c), aspirin, benzonatate, calcium-vitamin d, cetirizine, ergocalciferol, ergocalciferol (vitamin d2), fluocinonide 0.05%, fluticasone propionate, loratadine/pseudoephedrine, magnesium oxide, raloxifene, rosuvastatin, shingrix (pf), valsartan, vit c/e/zn/coppr/lutein/zeaxan, and preservision areds.    Allergies:   Review of patient's allergies indicates:   Allergen Reactions    Pcn [penicillins]     Penicillamine      Other reaction(s): Unknown    Propoxyphene n-acetaminophen      Other reaction(s): Unknown    Sulfa (sulfonamide antibiotics)           OBJECTIVE     Observation: Ambulates independently without AD    Posture:  Increased thoracic kyphosis          Myotomes Right Left Comments   L2 5/5 5/5     L3 5/5 5/5     L4 5/5 5/5     S2 5/5 5/5   "     Lumbar Range of Motion:    Limitations Pain Normal   Flexion 10%   no      40-50 degrees   Extension 50%   Yes mid thoracic pain      20 degrees   Left Side Bending 25% no      20 degrees   Right Side Bending 25% no      20 degrees      Hip Passive Range of Motion:    Right  Left  Normal   Flexion 95 95 120 degrees    Extension 5 5 20 degrees    Ext. Rotation 45 45 45-60 degrees   Int. Rotation 35 35 35 degrees        Lower Extremity Strength  Right LE  Left LE    Quadriceps: 5/5 Quadriceps: 5/5   Hamstrings: 4+/5 Hamstrings: 4+/5   Iliospoas: 4/5 Iliospoas: 4/5   Hip extension:  3-/5 Hip extension: 3-/5   Hip Abd:  4-/5 Hip Abd:  4-/5   Hip ER:  4-/5 Hip ER: 4-/5   Hip IR: 4+/5 Hip IR: 4+/5   Ankle dorsiflexion: 5/5 Ankle dorsiflexion: 5/5     Special Tests:   -SLUMP Test: plan to test at future visit   -SLR Test: negative    Joint Mobility:     - Segmental mobility testing: decreased segmental thoracic extension, decreased lumbar flexion segmental mobility in sidelying     Palpation: no TTP       CMS Impairment/Limitation/Restriction for FOTO Thoracic Spine Survey  Status Limitation G-Code CMS Severity Modifier  Intake 60% 40% Current Status CK - At least 40 percent but less than 60 percent  Predicted 66% 34% Goal Status+ CJ - At least 20 percent but less than 40 percent       TREATMENT     Total Treatment time (time-based codes) separate from Evaluation: 12 minutes      Tyra received the treatments listed below:      therapeutic exercises to develop strength, endurance, ROM and posture for 12 minutes including:    Seated thoracic extension over towel roll x10   Posterior pelvic tilt x10   Bridges x10       PATIENT EDUCATION AND HOME EXERCISES     Education provided:   - HEP to be performed twice daily     Written Home Exercises Provided: yes. Exercises were reviewed and Tyra was able to demonstrate them prior to the end of the session.  Tyra demonstrated good  understanding of the education provided. See EMR  under Patient Instructions for exercises provided during therapy sessions.    ASSESSMENT     Tyra is a 77 y.o. female referred to outpatient Physical Therapy with a medical diagnosis of pain in thoracic spine. Patient presents with complaints of mid thoracic pain around T5-6 and demonstrates decreased thoracic mobility, decreased lumbar mobility, and bilateral hip weakness. Her symptoms and deficits are limiting her ability to perform prolonged standing, household chores, and lifting/carrying activities.     Patient prognosis is Good.   Patientt will benefit from skilled outpatient Physical Therapy to address the deficits stated above and in the chart below, provide patient /family education, and to maximize patientt's level of independence.     Plan of care discussed with patient: Yes  Patient's spiritual, cultural and educational needs considered and patient is agreeable to the plan of care and goals as stated below:     Anticipated Barriers for therapy: chronicity of condition     Medical Necessity is demonstrated by the following  History  Co-morbidities and personal factors that may impact the plan of care Co-morbidities:   advanced age, HTN and history of breast cancer, osteoporosis, and chronic T10 compression fracture     Personal Factors:   no deficits     high   Examination  Body Structures and Functions, activity limitations and participation restrictions that may impact the plan of care Body Regions:   back  lower extremities  trunk    Body Systems:    ROM  strength  motor control    Participation Restrictions:   prolonged standing, household chores, and lifting/carrying activities.       Activity limitations:   Learning and applying knowledge  no deficits    General Tasks and Commands  no deficits    Communication  no deficits    Mobility  lifting and carrying objects  prolonged standing    Self care  no deficits    Domestic Life  doing house work (cleaning house, washing dishes,  laundry)    Interactions/Relationships  no deficits    Life Areas  no deficits    Community and Social Life  community life  recreation and leisure         low   Clinical Presentation stable and uncomplicated low   Decision Making/ Complexity Score: low     Goals:  Short Term Goals: 4 weeks   1. Pt will be IND with initial HEP to manage symptoms outside of PT.   2. Pt will report thoracic pain </= 6/10 at worst to demonstrate improved condition.   3. Pt will improve MMT of Bilateral hip extension to >/= 3/5 to improve tolerance for household chores.   4. Pt will improve lumbar extension ROM by >= 25% to improve tolerance for household chores.   5. Pt will report ability to stand for > 10mins without increase in thoracic pain.    Long Term Goals: 8 weeks   1. Pt will improve FOTO score to </= 34% limited to demonstrate improved functional mobility.  2. Pt will be IND with final HEP to maintain/improve strength and mobility gained in PT.   3. Pt will report thoracic pain </= 3/10 at worst to demonstrate improved condition.   4. Pt will improve MMT of Bilateral hips to >/= 4/ 5 to improve tolerance for household chores.   5. Pt will demonstrate pain free lumbar active range of motion to improve tolerance for cooking and cleaning.   6. Pt goal: Pt will report ability to cook and clean kitchen without increase in thoracic pain.      PLAN   Plan of care Certification: 3/8/2022 to 05/08/2022.    Outpatient Physical Therapy 1-2 times weekly for 8 weeks to include the following interventions: Manual Therapy, Neuromuscular Re-ed, Patient Education, Therapeutic Activities and Therapeutic Exercise.     Jah Landeros, PT      I CERTIFY THE NEED FOR THESE SERVICES FURNISHED UNDER THIS PLAN OF TREATMENT AND WHILE UNDER MY CARE   Physician's comments:     Physician's Signature: ___________________________________________________

## 2022-03-14 ENCOUNTER — CLINICAL SUPPORT (OUTPATIENT)
Dept: REHABILITATION | Facility: HOSPITAL | Age: 77
End: 2022-03-14
Attending: PHYSICAL MEDICINE & REHABILITATION
Payer: MEDICARE

## 2022-03-14 DIAGNOSIS — G89.29 CHRONIC MIDLINE THORACIC BACK PAIN: ICD-10-CM

## 2022-03-14 DIAGNOSIS — M53.84 DECREASED RANGE OF MOTION OF THORACIC SPINE: Primary | ICD-10-CM

## 2022-03-14 DIAGNOSIS — M54.6 CHRONIC MIDLINE THORACIC BACK PAIN: ICD-10-CM

## 2022-03-14 PROCEDURE — 97110 THERAPEUTIC EXERCISES: CPT | Mod: PN,CQ

## 2022-03-14 NOTE — PROGRESS NOTES
"  Physical Therapy Daily Treatment Note     Name: Tyra Fernandes  Clinic Number: [id number]    Therapy Diagnosis:        Encounter Diagnoses   Name Primary?    Pain in thoracic spine      Decreased range of motion of thoracic spine      Physician: Artemio Garcia MD    Visit Date: 3/14/2022  Physician Orders: PT Eval and Treat   Medical Diagnosis from Referral: M54.6 (ICD-10-CM) - Pain in thoracic spine  Evaluation Date: 3/8/2022  Authorization Period Expiration: 05/31/2022  Plan of Care Expiration: 05/08/2022  Progress Note Due: 04/08/2022  Visit # / Visits authorized: 1/ 24   FOTO: 1/ 3      Return to MD date: prn     Precautions: Standard, hx of breast cancer, HTN, osteoporosis, chronic T10 compression fracture        Time In: 1315  Time Out: 1401  Total Billable Time: 46 minutes  Subjective     Pt reports: Today she is not having any pain. Has been doing her HEP but needs clarification on performing PPT correctly.  She was compliant with home exercise program.  Response to previous treatment: positive  Functional change: First treat after eval     Pain: 0/10  Location: Across T5       Objective     therapeutic exercises to develop strength, endurance, ROM and posture for 46 minutes including:     Seated thoracic extension over towel roll 2 x 10   Posterior pelvic tilt 2 x 10   BKFO, 2 x 10 RTB  Bridges w RTB 2 x 10   Open books, x 10 5" holds B   Prone MT I, 2 x 10   Prone lower trap I, 2 x 10     Maybe next:  Shuttle:  Donkey kicks, 2 x 10 B 12#  Home Exercises Provided and Patient Education Provided     Education provided:   - Continue HEP     Written Home Exercises Provided: Patient instructed to cont prior HEP.  Exercises were reviewed and Tyra was able to demonstrate them prior to the end of the session.  Tyra demonstrated good  understanding of the education provided.     See EMR under Patient Instructions for exercises provided prior visit.    Assessment   Pt limited in thoracic extension and " rotation. Able to perform all therex with mod VC and TC. Would benefit from further periscapular mm strengthening and hip strengthening to offload her back. Pt with good tolerance to therapy session with no adverse effects reported.      Tyra Is progressing well towards her goals.   Pt prognosis is Good.     Pt will continue to benefit from skilled outpatient physical therapy to address the deficits listed in the problem list box on initial evaluation, provide pt/family education and to maximize pt's level of independence in the home and community environment.     Pt's spiritual, cultural and educational needs considered and pt agreeable to plan of care and goals.    Anticipated barriers to physical therapy: chronicity of condition   Goals:  Short Term Goals: 4 weeks   1. Pt will be IND with initial HEP to manage symptoms outside of PT.   2. Pt will report thoracic pain </= 6/10 at worst to demonstrate improved condition.   3. Pt will improve MMT of Bilateral hip extension to >/= 3/5 to improve tolerance for household chores.   4. Pt will improve lumbar extension ROM by >= 25% to improve tolerance for household chores.   5. Pt will report ability to stand for > 10mins without increase in thoracic pain.     Long Term Goals: 8 weeks   1. Pt will improve FOTO score to </= 34% limited to demonstrate improved functional mobility.  2. Pt will be IND with final HEP to maintain/improve strength and mobility gained in PT.   3. Pt will report thoracic pain </= 3/10 at worst to demonstrate improved condition.   4. Pt will improve MMT of Bilateral hips to >/= 4/ 5 to improve tolerance for household chores.   5. Pt will demonstrate pain free lumbar active range of motion to improve tolerance for cooking and cleaning.   6. Pt goal: Pt will report ability to cook and clean kitchen without increase in thoracic pain.         Plan     Continue to treat and progress per POC and pt tolerance       Justyn Garrett, CIRA

## 2022-03-18 ENCOUNTER — CLINICAL SUPPORT (OUTPATIENT)
Dept: REHABILITATION | Facility: HOSPITAL | Age: 77
End: 2022-03-18
Attending: PHYSICAL MEDICINE & REHABILITATION
Payer: MEDICARE

## 2022-03-18 DIAGNOSIS — M54.6 CHRONIC THORACIC BACK PAIN, UNSPECIFIED BACK PAIN LATERALITY: ICD-10-CM

## 2022-03-18 DIAGNOSIS — M53.84 DECREASED RANGE OF MOTION OF THORACIC SPINE: Primary | ICD-10-CM

## 2022-03-18 DIAGNOSIS — G89.29 CHRONIC THORACIC BACK PAIN, UNSPECIFIED BACK PAIN LATERALITY: ICD-10-CM

## 2022-03-18 PROCEDURE — 97110 THERAPEUTIC EXERCISES: CPT | Mod: PN,CQ

## 2022-03-18 NOTE — PROGRESS NOTES
"  Physical Therapy Daily Treatment Note     Name: Tyra Fernandes  Clinic Number: [id number]    Therapy Diagnosis:        Encounter Diagnoses   Name Primary?    Pain in thoracic spine      Decreased range of motion of thoracic spine      Physician: Artemio Garcia MD    Visit Date: 3/18/2022  Physician Orders: PT Eval and Treat   Medical Diagnosis from Referral: M54.6 (ICD-10-CM) - Pain in thoracic spine  Evaluation Date: 3/8/2022  Authorization Period Expiration: 05/31/2022  Plan of Care Expiration: 05/08/2022  Progress Note Due: 04/08/2022  Visit # / Visits authorized: 2/ 24   FOTO: 1/ 3      Return to MD date: prn     Precautions: Standard, hx of breast cancer, HTN, osteoporosis, chronic T10 compression fracture        Time In: 1701   Time Out: 1750   Total Billable Time: 49 minutes  Subjective     Pt reports: States she has had maybe 2 incidences of thoracic pain since the initial eval that she would rate 2/10 at most. She has not noticed much thoracic pain but today she complains of some B calf pain.   She was compliant with home exercise program.  Response to previous treatment: positive  Functional change: First treat after eval     Pain: 0/10  Location: Across T5       Objective     therapeutic exercises to develop strength, endurance, ROM and posture for  49 minutes including:     Seated thoracic extension over towel roll 2 x 10   Posterior pelvic tilt 2 x 10   BKFO, 2 x 10 RTB  Bridges w RTB 2 x 10   Open books, x 10 5" holds B   Prone hip extension, 2 x 12 B  Prone MT I, 2 x 10   Prone lower trap I, 2 x 10     Shuttle:  Donkey kicks, x 12 B 12#  Home Exercises Provided and Patient Education Provided     Education provided:   - Continue HEP     Written Home Exercises Provided: Patient instructed to cont prior HEP.  Exercises were reviewed and Tyra was able to demonstrate them prior to the end of the session.  Tyra demonstrated good  understanding of the education provided.     See EMR under Patient " Instructions for exercises provided prior visit.    Assessment   Pt with no pain level upon arrival but had a slight pain in her thoracic spine with bridging but pain was eliminated with posterior pelvic tilt prior to bridging. Pt able to progress in glute strengthening with the addition of shuttle exercise. Pt demo compensation of trunk flexion rather than isolating glute max and required verbal cues to correct. Pt with good tolerance to therapy session with no adverse effects reported.       Tyra Is progressing well towards her goals.   Pt prognosis is Good.     Pt will continue to benefit from skilled outpatient physical therapy to address the deficits listed in the problem list box on initial evaluation, provide pt/family education and to maximize pt's level of independence in the home and community environment.     Pt's spiritual, cultural and educational needs considered and pt agreeable to plan of care and goals.    Anticipated barriers to physical therapy: chronicity of condition   Goals:  Short Term Goals: 4 weeks   1. Pt will be IND with initial HEP to manage symptoms outside of PT.   2. Pt will report thoracic pain </= 6/10 at worst to demonstrate improved condition.   3. Pt will improve MMT of Bilateral hip extension to >/= 3/5 to improve tolerance for household chores.   4. Pt will improve lumbar extension ROM by >= 25% to improve tolerance for household chores.   5. Pt will report ability to stand for > 10mins without increase in thoracic pain.     Long Term Goals: 8 weeks   1. Pt will improve FOTO score to </= 34% limited to demonstrate improved functional mobility.  2. Pt will be IND with final HEP to maintain/improve strength and mobility gained in PT.   3. Pt will report thoracic pain </= 3/10 at worst to demonstrate improved condition.   4. Pt will improve MMT of Bilateral hips to >/= 4/ 5 to improve tolerance for household chores.   5. Pt will demonstrate pain free lumbar active range of motion  to improve tolerance for cooking and cleaning.   6. Pt goal: Pt will report ability to cook and clean kitchen without increase in thoracic pain.         Plan     Continue to treat and progress per POC and pt tolerance       Justyn Garrett, CIRA

## 2022-03-21 ENCOUNTER — CLINICAL SUPPORT (OUTPATIENT)
Dept: REHABILITATION | Facility: HOSPITAL | Age: 77
End: 2022-03-21
Attending: PHYSICAL MEDICINE & REHABILITATION
Payer: MEDICARE

## 2022-03-21 DIAGNOSIS — G89.29 CHRONIC THORACIC BACK PAIN, UNSPECIFIED BACK PAIN LATERALITY: ICD-10-CM

## 2022-03-21 DIAGNOSIS — M54.6 CHRONIC THORACIC BACK PAIN, UNSPECIFIED BACK PAIN LATERALITY: ICD-10-CM

## 2022-03-21 DIAGNOSIS — M53.84 DECREASED RANGE OF MOTION OF THORACIC SPINE: Primary | ICD-10-CM

## 2022-03-21 PROCEDURE — 97110 THERAPEUTIC EXERCISES: CPT | Mod: PN,CQ

## 2022-03-21 NOTE — PROGRESS NOTES
"  Physical Therapy Daily Treatment Note     Name: Tyra Fernandes  Clinic Number: [id number]    Therapy Diagnosis:        Encounter Diagnoses   Name Primary?    Pain in thoracic spine      Decreased range of motion of thoracic spine      Physician: Artemio Garcia MD    Visit Date: 3/21/2022  Physician Orders: PT Eval and Treat   Medical Diagnosis from Referral: M54.6 (ICD-10-CM) - Pain in thoracic spine  Evaluation Date: 3/8/2022  Authorization Period Expiration: 05/31/2022  Plan of Care Expiration: 05/08/2022  Progress Note Due: 04/08/2022  Visit # / Visits authorized: 3/ 24   FOTO: 1/ 3      Return to MD date: prn     Precautions: Standard, hx of breast cancer, HTN, osteoporosis, chronic T10 compression fracture        Time In: 1233  Time Out: 1315   Total Billable Time: 42 minutes  Subjective     Pt reports: No thoracic pain today   She was compliant with home exercise program.  Response to previous treatment: positive  Functional change: First treat after eval     Pain: 0/10  Location: Across T5       Objective     therapeutic exercises to develop strength, endurance, ROM and posture for  42 minutes including:     Seated thoracic extension over towel roll 2 x 10   Posterior pelvic tilt 3 x 10   BKFO, 2 x 12 RTB  Bridges w RTB 3 x 10   Open books, x 10 5" holds B   Prone hip extension, 2 x 12 B  Prone MT I, 2 x 10   Prone lower trap I, 2 x 10     Standing lower trap lift offs, 2 x 10   Thread the needle, x 10 B     Thera tree:  - Rows, 3 x 10 RTB  -Shoulder extension, 3 x 10 RTB    Shuttle:  Donkey kicks, x 12 B 12#  Home Exercises Provided and Patient Education Provided     Education provided:   - Continue HEP     Written Home Exercises Provided: Patient instructed to cont prior HEP.  Exercises were reviewed and Tyra was able to demonstrate them prior to the end of the session.  Tyra demonstrated good  understanding of the education provided.     See EMR under Patient Instructions for exercises provided " prior visit.    Assessment   Pt progressed to periscapular strengthening with no c/o increased pain throughout session. Additional strengthening required in order for patient to keep correct posture while doing crafts at home with decreased pain .Pt with good tolerance to therapy session with no adverse effects reported.      Tyra Is progressing well towards her goals.   Pt prognosis is Good.     Pt will continue to benefit from skilled outpatient physical therapy to address the deficits listed in the problem list box on initial evaluation, provide pt/family education and to maximize pt's level of independence in the home and community environment.     Pt's spiritual, cultural and educational needs considered and pt agreeable to plan of care and goals.    Anticipated barriers to physical therapy: chronicity of condition   Goals:  Short Term Goals: 4 weeks   1. Pt will be IND with initial HEP to manage symptoms outside of PT.   2. Pt will report thoracic pain </= 6/10 at worst to demonstrate improved condition.   3. Pt will improve MMT of Bilateral hip extension to >/= 3/5 to improve tolerance for household chores.   4. Pt will improve lumbar extension ROM by >= 25% to improve tolerance for household chores.   5. Pt will report ability to stand for > 10mins without increase in thoracic pain.     Long Term Goals: 8 weeks   1. Pt will improve FOTO score to </= 34% limited to demonstrate improved functional mobility.  2. Pt will be IND with final HEP to maintain/improve strength and mobility gained in PT.   3. Pt will report thoracic pain </= 3/10 at worst to demonstrate improved condition.   4. Pt will improve MMT of Bilateral hips to >/= 4/ 5 to improve tolerance for household chores.   5. Pt will demonstrate pain free lumbar active range of motion to improve tolerance for cooking and cleaning.   6. Pt goal: Pt will report ability to cook and clean kitchen without increase in thoracic pain.         Plan     Continue  to treat and progress per POC and pt tolerance       Justyn Garrett PTA

## 2022-03-24 ENCOUNTER — CLINICAL SUPPORT (OUTPATIENT)
Dept: REHABILITATION | Facility: HOSPITAL | Age: 77
End: 2022-03-24
Attending: PHYSICAL MEDICINE & REHABILITATION
Payer: MEDICARE

## 2022-03-24 DIAGNOSIS — G89.29 CHRONIC THORACIC BACK PAIN, UNSPECIFIED BACK PAIN LATERALITY: ICD-10-CM

## 2022-03-24 DIAGNOSIS — M54.6 CHRONIC THORACIC BACK PAIN, UNSPECIFIED BACK PAIN LATERALITY: ICD-10-CM

## 2022-03-24 DIAGNOSIS — M53.84 DECREASED RANGE OF MOTION OF THORACIC SPINE: Primary | ICD-10-CM

## 2022-03-24 PROCEDURE — 97110 THERAPEUTIC EXERCISES: CPT | Mod: PN,CQ

## 2022-03-24 NOTE — PROGRESS NOTES
"  Physical Therapy Daily Treatment Note     Name: Tyra Fernandes  [facility] Number: [id number]    Therapy Diagnosis:        Encounter Diagnoses   Name Primary?    Pain in thoracic spine      Decreased range of motion of thoracic spine      Physician: Artemio Garcia MD    Visit Date: 3/24/2022  Physician Orders: PT Eval and Treat   Medical Diagnosis from Referral: M54.6 (ICD-10-CM) - Pain in thoracic spine  Evaluation Date: 3/8/2022  Authorization Period Expiration: 05/31/2022  Plan of Care Expiration: 05/08/2022  Progress Note Due: 04/08/2022  Visit # / Visits authorized: 4/ 24   FOTO: 1/ 3      Return to MD date: prn     Precautions: Standard, hx of breast cancer, HTN, osteoporosis, chronic T10 compression fracture        Time In: 1103  Time Out: 1145   Total Billable Time: 42 minutes  Subjective     Pt reports: States she was 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 She took a couple tylenol and that helped. States she was good after last session until volunteering.     She was compliant with home exercise program.  Response to previous treatment: positive  Functional change:none stated      Pain: 2/10  Location: Across T5       Objective     therapeutic exercises to develop strength, endurance, ROM and posture for 42 minutes including:     Seated thoracic extension over towel roll 2 x 10   Posterior pelvic tilt 2 x 10   BKFO, 2 x 12 RTB  Bridges w RTB 3 x 10   Open books, x 10 5" holds B   Prone hip extension, 2 x 12 B    Shuttle:  Donkey kicks, x 12 B 12#  DL press, 3 x 10       Not performed seconary to time 2* to time  Prone MT II, 2 x 10   Prone lower trap II, 2 x 10   Standing lower trap lift offs, 2 x 10   Thread the needle, x 10 B   Thera tree:  - Rows, 3 x 10 RTB  -Shoulder extension, 3 x 10 RTB      Home Exercises Provided and Patient Education Provided     Education provided:   - Continue HEP     Written Home Exercises " Provided: Patient instructed to cont prior HEP.  Exercises were reviewed and Tyra was able to demonstrate them prior to the end of the session.  Tyra demonstrated good  understanding of the education provided.     See EMR under Patient Instructions for exercises provided prior visit.    Assessment   Pt progressed in LE strengthening without complaints of pain. Patient educated on importance of controlling descent into chair when sitting.  Pt with good tolerance to therapy session with no adverse effects reported.        Tyra Is progressing well towards her goals.   Pt prognosis is Good.     Pt will continue to benefit from skilled outpatient physical therapy to address the deficits listed in the problem list box on initial evaluation, provide pt/family education and to maximize pt's level of independence in the home and community environment.     Pt's spiritual, cultural and educational needs considered and pt agreeable to plan of care and goals.    Anticipated barriers to physical therapy: chronicity of condition   Goals:  Short Term Goals: 4 weeks   1. Pt will be IND with initial HEP to manage symptoms outside of PT.   2. Pt will report thoracic pain </= 6/10 at worst to demonstrate improved condition.   3. Pt will improve MMT of Bilateral hip extension to >/= 3/5 to improve tolerance for household chores.   4. Pt will improve lumbar extension ROM by >= 25% to improve tolerance for household chores.   5. Pt will report ability to stand for > 10mins without increase in thoracic pain.     Long Term Goals: 8 weeks   1. Pt will improve FOTO score to </= 34% limited to demonstrate improved functional mobility.  2. Pt will be IND with final HEP to maintain/improve strength and mobility gained in PT.   3. Pt will report thoracic pain </= 3/10 at worst to demonstrate improved condition.   4. Pt will improve MMT of Bilateral hips to >/= 4/ 5 to improve tolerance for household chores.   5. Pt will demonstrate pain free  lumbar active range of motion to improve tolerance for cooking and cleaning.   6. Pt goal: Pt will report ability to cook and clean kitchen without increase in thoracic pain.         Plan     Continue to treat and progress per POC and pt tolerance       Justyn Garrett PTA

## 2022-03-29 ENCOUNTER — CLINICAL SUPPORT (OUTPATIENT)
Dept: REHABILITATION | Facility: HOSPITAL | Age: 77
End: 2022-03-29
Attending: PHYSICAL MEDICINE & REHABILITATION
Payer: MEDICARE

## 2022-03-29 DIAGNOSIS — G89.29 CHRONIC MIDLINE THORACIC BACK PAIN: ICD-10-CM

## 2022-03-29 DIAGNOSIS — M53.84 DECREASED RANGE OF MOTION OF THORACIC SPINE: Primary | ICD-10-CM

## 2022-03-29 DIAGNOSIS — M54.6 CHRONIC MIDLINE THORACIC BACK PAIN: ICD-10-CM

## 2022-03-29 PROCEDURE — 97110 THERAPEUTIC EXERCISES: CPT | Mod: PN

## 2022-03-29 NOTE — PROGRESS NOTES
Physical Therapy Daily Treatment Note/Re-Assessment     Name: Tyra Fernandes  [facility] Number: [id number]    Therapy Diagnosis:        Encounter Diagnoses   Name Primary?    Pain in thoracic spine      Decreased range of motion of thoracic spine      Physician: Artemio Garcia MD    Visit Date: 3/29/2022  Physician Orders: PT Eval and Treat   Medical Diagnosis from Referral: M54.6 (ICD-10-CM) - Pain in thoracic spine  Evaluation Date: 3/8/2022  Authorization Period Expiration: 05/31/2022  Plan of Care Expiration: 05/08/2022  Progress Note Due: 04/08/2022  Visit # / Visits authorized: 5/24   FOTO: 1/ 3      Return to MD date: prn     Precautions: Standard, hx of breast cancer, HTN, osteoporosis, chronic T10 compression fracture      Time In: 1153  Time Out: 1233  Total Billable Time: 40 minutes    Subjective     Pt reports: she hasn't noticed any pain since before last time she was here     She was compliant with home exercise program.  Response to previous treatment: positive  Functional change: improved tolerance to activity     Pain: 2/10  Location: Across T5       Objective     Slump Test: Negative     Negative for pain with lumbar and thoracic range of motion    Mid and lower trap manual muscle testing: 3-/5 bilaterally    CMS Impairment/Limitation/Restriction for FOTO Thoracic Spine Survey  Status Limitation G-Code CMS Severity Modifier  Intake 60% 40%  Predicted 66% 34% Goal Status+ CJ - At least 20 percent but less than 40 percent  3/29/2022 63% 37% Current Status CJ - At least 20 percent but less than 40 percent    therapeutic exercises to develop strength, endurance, ROM and posture for 40 minutes including:     Assessment as above   Seated thoracic extension over towel roll x20 with 3s hold   Open books x15 each side   Posterior pelvic tilt 2 x 10   Prone lower trap level I 2x10   Prone middle trap level II 2x10   BKFO, 3x10 RTB  Bridges w RTB 3 x 10   Touchdowns level II 3x10     Not today:   Prone  hip extension, 2 x 12 B  Shuttle:  Donkey kicks, x 12 B 12#  DL press, 3 x 10   Standing lower trap lift offs, 2 x 10   Thread the needle, x 10 B   Thera tree:  - Rows, 3 x 10 RTB  -Shoulder extension, 3 x 10 RTB      Home Exercises Provided and Patient Education Provided     Education provided:   - Continue HEP     Written Home Exercises Provided: Patient instructed to cont prior HEP.  Exercises were reviewed and Tyra was able to demonstrate them prior to the end of the session.  Tyra demonstrated good  understanding of the education provided.     See EMR under Patient Instructions for exercises provided prior visit.    Assessment   Tyra has been seen for 5 visits over the past 3 weeks and demonstrates improvements in lumbar and thoracic range of motion, overall irritability, and FOTO score. She continues with lower extremity weakness and weakness of scapular stabilizers and remains a good candidate for rehab to address these deficits.     Tyra Is progressing well towards her goals.   Pt prognosis is Good.     Pt will continue to benefit from skilled outpatient physical therapy to address the deficits listed in the problem list box on initial evaluation, provide pt/family education and to maximize pt's level of independence in the home and community environment.     Pt's spiritual, cultural and educational needs considered and pt agreeable to plan of care and goals.    Anticipated barriers to physical therapy: chronicity of condition   Goals:  Short Term Goals: 4 weeks   1. Pt will be IND with initial HEP to manage symptoms outside of PT. MET  2. Pt will report thoracic pain </= 6/10 at worst to demonstrate improved condition. MET  3. Pt will improve MMT of Bilateral hip extension to >/= 3/5 to improve tolerance for household chores. MET  4. Pt will improve lumbar extension ROM by >= 25% to improve tolerance for household chores. progressing  5. Pt will report ability to stand for > 10mins without increase in  thoracic pain. MET     Long Term Goals: 8 weeks   1. Pt will improve FOTO score to </= 34% limited to demonstrate improved functional mobility.  2. Pt will be IND with final HEP to maintain/improve strength and mobility gained in PT.   3. Pt will report thoracic pain </= 3/10 at worst to demonstrate improved condition.   4. Pt will improve MMT of Bilateral hips to >/= 4/ 5 to improve tolerance for household chores.   5. Pt will demonstrate pain free lumbar active range of motion to improve tolerance for cooking and cleaning.   6. Pt goal: Pt will report ability to cook and clean kitchen without increase in thoracic pain.       PLAN   Plan of care Certification: 3/8/2022 to 05/08/2022.     Outpatient Physical Therapy 1-2 times weekly for 8 weeks to include the following interventions: Manual Therapy, Neuromuscular Re-ed, Patient Education, Therapeutic Activities and Therapeutic Exercise.         Jah Landeros, PT

## 2022-04-05 ENCOUNTER — CLINICAL SUPPORT (OUTPATIENT)
Dept: REHABILITATION | Facility: HOSPITAL | Age: 77
End: 2022-04-05
Attending: PHYSICAL MEDICINE & REHABILITATION
Payer: MEDICARE

## 2022-04-05 DIAGNOSIS — G89.29 CHRONIC MIDLINE THORACIC BACK PAIN: ICD-10-CM

## 2022-04-05 DIAGNOSIS — M53.84 DECREASED RANGE OF MOTION OF THORACIC SPINE: Primary | ICD-10-CM

## 2022-04-05 DIAGNOSIS — M54.6 CHRONIC MIDLINE THORACIC BACK PAIN: ICD-10-CM

## 2022-04-05 PROCEDURE — 97110 THERAPEUTIC EXERCISES: CPT | Mod: PN

## 2022-04-05 NOTE — PROGRESS NOTES
"  Physical Therapy Daily Treatment Note/Re-Assessment     Name: Tyra Fernandes  [facility] Number: [id number]    Therapy Diagnosis:        Encounter Diagnoses   Name Primary?    Pain in thoracic spine      Decreased range of motion of thoracic spine      Physician: Artemio Garcia MD    Visit Date: 4/5/2022  Physician Orders: PT Eval and Treat   Medical Diagnosis from Referral: M54.6 (ICD-10-CM) - Pain in thoracic spine  Evaluation Date: 3/8/2022  Authorization Period Expiration: 05/31/2022  Plan of Care Expiration: 05/08/2022  Progress Note Due: 04/08/2022  Visit # / Visits authorized: 6/24   FOTO: 2/ 3      Return to MD date: prn     Precautions: Standard, hx of breast cancer, HTN, osteoporosis, chronic T10 compression fracture      Time In: 10:31  Time Out: 11:11  Total Billable Time: 40 minutes    Subjective     Pt reports: doing well today. She was able to do housework over the weekend and only felt a slight pain in her back.     She was compliant with home exercise program.  Response to previous treatment: positive  Functional change: improved tolerance to activity     Pain: 2/10  Location: Across T5       Objective     therapeutic exercises to develop strength, endurance, ROM and posture for 40 minutes including:     Wall Slides 2x12  Seated thoracic extension over towel roll x20 with 3s hold   Open books x20 each side   Posterior pelvic tilt 2 x 10   Prone lower trap level I 2x10   Prone middle trap level II 2x10   Seated horizontal abd red TB 2x12  BKFO, 3x10 RTB  Bridges w RTB 2x10x5"   Touchdowns level II 3x10   Clams 2x12 each    Not today:   Prone hip extension, 2 x 12 B  Shuttle:  Donkey kicks, x 12 B 12#  DL press, 3 x 10   Standing lower trap lift offs, 2 x 10   Thread the needle, x 10 B   Thera tree:  - Rows, 3 x 10 RTB  -Shoulder extension, 3 x 10 RTB      Home Exercises Provided and Patient Education Provided     Education provided:   - Continue HEP     Written Home Exercises Provided: Patient " instructed to cont prior HEP.  Exercises were reviewed and Tyra was able to demonstrate them prior to the end of the session.  Tyra demonstrated good  understanding of the education provided.     See EMR under Patient Instructions for exercises provided prior visit.    Assessment   Tyra is doing well today and had no pain at rest. Still feels some weakness but no pain following sessions. Added wall slides and horizontal abd today. Continue to progress exercises for lower and upper extremity as tolerated.     Tyra Is progressing well towards her goals.   Pt prognosis is Good.     Pt will continue to benefit from skilled outpatient physical therapy to address the deficits listed in the problem list box on initial evaluation, provide pt/family education and to maximize pt's level of independence in the home and community environment.     Pt's spiritual, cultural and educational needs considered and pt agreeable to plan of care and goals.    Anticipated barriers to physical therapy: chronicity of condition   Goals:  Short Term Goals: 4 weeks   1. Pt will be IND with initial HEP to manage symptoms outside of PT. MET  2. Pt will report thoracic pain </= 6/10 at worst to demonstrate improved condition. MET  3. Pt will improve MMT of Bilateral hip extension to >/= 3/5 to improve tolerance for household chores. MET  4. Pt will improve lumbar extension ROM by >= 25% to improve tolerance for household chores. progressing  5. Pt will report ability to stand for > 10mins without increase in thoracic pain. MET     Long Term Goals: 8 weeks   1. Pt will improve FOTO score to </= 34% limited to demonstrate improved functional mobility.  2. Pt will be IND with final HEP to maintain/improve strength and mobility gained in PT.   3. Pt will report thoracic pain </= 3/10 at worst to demonstrate improved condition.   4. Pt will improve MMT of Bilateral hips to >/= 4/ 5 to improve tolerance for household chores.   5. Pt will demonstrate  pain free lumbar active range of motion to improve tolerance for cooking and cleaning.   6. Pt goal: Pt will report ability to cook and clean kitchen without increase in thoracic pain.       PLAN   Plan of care Certification: 3/8/2022 to 05/08/2022.    Continue thoracic mobility, scapular strengthening, lower extremity strengthening, and posture correction.     Kelton Charles, PT

## 2022-04-07 ENCOUNTER — IMMUNIZATION (OUTPATIENT)
Dept: PRIMARY CARE CLINIC | Facility: CLINIC | Age: 77
End: 2022-04-07
Payer: MEDICARE

## 2022-04-07 DIAGNOSIS — Z23 NEED FOR VACCINATION: Primary | ICD-10-CM

## 2022-04-07 PROCEDURE — 0054A COVID-19, MRNA, LNP-S, PF, 30 MCG/0.3 ML DOSE VACCINE (PFIZER): CPT | Mod: S$GLB,,, | Performed by: FAMILY MEDICINE

## 2022-04-07 PROCEDURE — 91305 COVID-19, MRNA, LNP-S, PF, 30 MCG/0.3 ML DOSE VACCINE (PFIZER): CPT | Mod: S$GLB,,, | Performed by: FAMILY MEDICINE

## 2022-04-07 PROCEDURE — 0054A COVID-19, MRNA, LNP-S, PF, 30 MCG/0.3 ML DOSE VACCINE (PFIZER): ICD-10-PCS | Mod: S$GLB,,, | Performed by: FAMILY MEDICINE

## 2022-04-07 PROCEDURE — 91305 COVID-19, MRNA, LNP-S, PF, 30 MCG/0.3 ML DOSE VACCINE (PFIZER): ICD-10-PCS | Mod: S$GLB,,, | Performed by: FAMILY MEDICINE

## 2022-04-12 ENCOUNTER — OFFICE VISIT (OUTPATIENT)
Dept: ENDOCRINOLOGY | Facility: CLINIC | Age: 77
End: 2022-04-12
Payer: MEDICARE

## 2022-04-12 ENCOUNTER — LAB VISIT (OUTPATIENT)
Dept: LAB | Facility: HOSPITAL | Age: 77
End: 2022-04-12
Attending: INTERNAL MEDICINE
Payer: MEDICARE

## 2022-04-12 VITALS
BODY MASS INDEX: 22.9 KG/M2 | SYSTOLIC BLOOD PRESSURE: 130 MMHG | TEMPERATURE: 98 F | WEIGHT: 116.63 LBS | HEIGHT: 60 IN | RESPIRATION RATE: 16 BRPM | DIASTOLIC BLOOD PRESSURE: 70 MMHG | HEART RATE: 70 BPM | OXYGEN SATURATION: 98 %

## 2022-04-12 DIAGNOSIS — M81.8 IDIOPATHIC OSTEOPOROSIS: ICD-10-CM

## 2022-04-12 DIAGNOSIS — E78.00 HYPERCHOLESTEREMIA: ICD-10-CM

## 2022-04-12 DIAGNOSIS — Z85.3 HISTORY OF BREAST CANCER: ICD-10-CM

## 2022-04-12 DIAGNOSIS — E55.9 HYPOVITAMINOSIS D: ICD-10-CM

## 2022-04-12 DIAGNOSIS — Z86.39 HISTORY OF THYROTOXICOSIS: ICD-10-CM

## 2022-04-12 DIAGNOSIS — E78.2 MIXED HYPERLIPIDEMIA: ICD-10-CM

## 2022-04-12 DIAGNOSIS — K21.9 GASTROESOPHAGEAL REFLUX DISEASE WITHOUT ESOPHAGITIS: ICD-10-CM

## 2022-04-12 DIAGNOSIS — M81.8 IDIOPATHIC OSTEOPOROSIS: Primary | ICD-10-CM

## 2022-04-12 DIAGNOSIS — I10 ESSENTIAL HYPERTENSION: ICD-10-CM

## 2022-04-12 DIAGNOSIS — R80.8 OTHER PROTEINURIA: ICD-10-CM

## 2022-04-12 DIAGNOSIS — Z78.0 POSTMENOPAUSAL: ICD-10-CM

## 2022-04-12 DIAGNOSIS — R73.09 DYSGLYCEMIA: ICD-10-CM

## 2022-04-12 DIAGNOSIS — N18.31 STAGE 3A CHRONIC KIDNEY DISEASE: ICD-10-CM

## 2022-04-12 DIAGNOSIS — D68.9 COAGULOPATHY: ICD-10-CM

## 2022-04-12 DIAGNOSIS — D51.8 MACROCYTIC ANEMIA WITH VITAMIN B12 DEFICIENCY: ICD-10-CM

## 2022-04-12 DIAGNOSIS — D50.9 IRON DEFICIENCY ANEMIA, UNSPECIFIED IRON DEFICIENCY ANEMIA TYPE: ICD-10-CM

## 2022-04-12 DIAGNOSIS — R31.9 HEMATURIA, UNSPECIFIED TYPE: Primary | ICD-10-CM

## 2022-04-12 LAB
MAGNESIUM SERPL-MCNC: 2.1 MG/DL (ref 1.6–2.6)
PHOSPHATE SERPL-MCNC: 4.2 MG/DL (ref 2.7–4.5)
T3 SERPL-MCNC: 101 NG/DL (ref 60–180)
T4 FREE SERPL-MCNC: 0.81 NG/DL (ref 0.71–1.51)
TSH SERPL DL<=0.005 MIU/L-ACNC: 2.23 UIU/ML (ref 0.4–4)

## 2022-04-12 PROCEDURE — 84100 ASSAY OF PHOSPHORUS: CPT | Performed by: INTERNAL MEDICINE

## 2022-04-12 PROCEDURE — 3288F PR FALLS RISK ASSESSMENT DOCUMENTED: ICD-10-PCS | Mod: CPTII,S$GLB,, | Performed by: INTERNAL MEDICINE

## 2022-04-12 PROCEDURE — 85730 THROMBOPLASTIN TIME PARTIAL: CPT | Performed by: INTERNAL MEDICINE

## 2022-04-12 PROCEDURE — 3078F DIAST BP <80 MM HG: CPT | Mod: CPTII,S$GLB,, | Performed by: INTERNAL MEDICINE

## 2022-04-12 PROCEDURE — 86800 THYROGLOBULIN ANTIBODY: CPT | Performed by: INTERNAL MEDICINE

## 2022-04-12 PROCEDURE — 84439 ASSAY OF FREE THYROXINE: CPT | Performed by: INTERNAL MEDICINE

## 2022-04-12 PROCEDURE — 3078F PR MOST RECENT DIASTOLIC BLOOD PRESSURE < 80 MM HG: ICD-10-PCS | Mod: CPTII,S$GLB,, | Performed by: INTERNAL MEDICINE

## 2022-04-12 PROCEDURE — 1126F PR PAIN SEVERITY QUANTIFIED, NO PAIN PRESENT: ICD-10-PCS | Mod: CPTII,S$GLB,, | Performed by: INTERNAL MEDICINE

## 2022-04-12 PROCEDURE — 1160F RVW MEDS BY RX/DR IN RCRD: CPT | Mod: CPTII,S$GLB,, | Performed by: INTERNAL MEDICINE

## 2022-04-12 PROCEDURE — 1159F MED LIST DOCD IN RCRD: CPT | Mod: CPTII,S$GLB,, | Performed by: INTERNAL MEDICINE

## 2022-04-12 PROCEDURE — 84480 ASSAY TRIIODOTHYRONINE (T3): CPT | Performed by: INTERNAL MEDICINE

## 2022-04-12 PROCEDURE — 99214 PR OFFICE/OUTPT VISIT, EST, LEVL IV, 30-39 MIN: ICD-10-PCS | Mod: S$GLB,,, | Performed by: INTERNAL MEDICINE

## 2022-04-12 PROCEDURE — 36415 COLL VENOUS BLD VENIPUNCTURE: CPT | Mod: PO | Performed by: INTERNAL MEDICINE

## 2022-04-12 PROCEDURE — 99499 UNLISTED E&M SERVICE: CPT | Mod: S$GLB,,, | Performed by: INTERNAL MEDICINE

## 2022-04-12 PROCEDURE — 82652 VIT D 1 25-DIHYDROXY: CPT | Performed by: INTERNAL MEDICINE

## 2022-04-12 PROCEDURE — 1160F PR REVIEW ALL MEDS BY PRESCRIBER/CLIN PHARMACIST DOCUMENTED: ICD-10-PCS | Mod: CPTII,S$GLB,, | Performed by: INTERNAL MEDICINE

## 2022-04-12 PROCEDURE — 3075F PR MOST RECENT SYSTOLIC BLOOD PRESS GE 130-139MM HG: ICD-10-PCS | Mod: CPTII,S$GLB,, | Performed by: INTERNAL MEDICINE

## 2022-04-12 PROCEDURE — 99499 RISK ADDL DX/OHS AUDIT: ICD-10-PCS | Mod: S$GLB,,, | Performed by: INTERNAL MEDICINE

## 2022-04-12 PROCEDURE — 83735 ASSAY OF MAGNESIUM: CPT | Performed by: INTERNAL MEDICINE

## 2022-04-12 PROCEDURE — 84443 ASSAY THYROID STIM HORMONE: CPT | Performed by: INTERNAL MEDICINE

## 2022-04-12 PROCEDURE — 82607 VITAMIN B-12: CPT | Performed by: INTERNAL MEDICINE

## 2022-04-12 PROCEDURE — 1126F AMNT PAIN NOTED NONE PRSNT: CPT | Mod: CPTII,S$GLB,, | Performed by: INTERNAL MEDICINE

## 2022-04-12 PROCEDURE — 1101F PT FALLS ASSESS-DOCD LE1/YR: CPT | Mod: CPTII,S$GLB,, | Performed by: INTERNAL MEDICINE

## 2022-04-12 PROCEDURE — 85610 PROTHROMBIN TIME: CPT | Performed by: INTERNAL MEDICINE

## 2022-04-12 PROCEDURE — 99214 OFFICE O/P EST MOD 30 MIN: CPT | Mod: S$GLB,,, | Performed by: INTERNAL MEDICINE

## 2022-04-12 PROCEDURE — 99999 PR PBB SHADOW E&M-EST. PATIENT-LVL V: CPT | Mod: PBBFAC,,, | Performed by: INTERNAL MEDICINE

## 2022-04-12 PROCEDURE — 1159F PR MEDICATION LIST DOCUMENTED IN MEDICAL RECORD: ICD-10-PCS | Mod: CPTII,S$GLB,, | Performed by: INTERNAL MEDICINE

## 2022-04-12 PROCEDURE — 3288F FALL RISK ASSESSMENT DOCD: CPT | Mod: CPTII,S$GLB,, | Performed by: INTERNAL MEDICINE

## 2022-04-12 PROCEDURE — 3075F SYST BP GE 130 - 139MM HG: CPT | Mod: CPTII,S$GLB,, | Performed by: INTERNAL MEDICINE

## 2022-04-12 PROCEDURE — 99999 PR PBB SHADOW E&M-EST. PATIENT-LVL V: ICD-10-PCS | Mod: PBBFAC,,, | Performed by: INTERNAL MEDICINE

## 2022-04-12 PROCEDURE — 1101F PR PT FALLS ASSESS DOC 0-1 FALLS W/OUT INJ PAST YR: ICD-10-PCS | Mod: CPTII,S$GLB,, | Performed by: INTERNAL MEDICINE

## 2022-04-12 RX ORDER — RALOXIFENE HYDROCHLORIDE 60 MG/1
60 TABLET, FILM COATED ORAL DAILY
Qty: 90 TABLET | Refills: 3 | Status: SHIPPED | OUTPATIENT
Start: 2022-04-12 | End: 2022-10-24 | Stop reason: SDUPTHER

## 2022-04-12 RX ORDER — ROSUVASTATIN CALCIUM 20 MG/1
20 TABLET, COATED ORAL DAILY
Qty: 90 TABLET | Refills: 3 | Status: SHIPPED | OUTPATIENT
Start: 2022-04-12

## 2022-04-12 NOTE — PROGRESS NOTES
"Subjective:      Patient ID: Tyra Riley is a 77 y.o. female.    Chief Complaint:  Follow-up    Patient is a 77 postmenopausal lady with osteoporosis seen in [facility]  today.     History of Present Illness    The patient, Ms Riley  Is a 77 yr old postmenopausal lady with osteoporosis seen in [facility] today with me. She had previously been seen and ffed at [facility] where she had seen Dr Frazier and Dr Ordoñez.  She was first diagnosed~ 14 years ago.  Patients baseline G[place]edd score is 7.     Denies falls or  fractures since last visit. Denies loss of height. Has back pain but occurs with prolonged standing. ("in the kitchen and cooking for awhile or doing house work.").      There is a +ve family history of osteopoosis in patients younger sister who is now .  Patient  Had a short term history of thyrotoxicosis which was medically treated for a few months. Did not require ablation nor surgery.     Previous medication:  Alendronate  - .  reclast , ,  and   Prolia: 2018, 3/2018,  , 2020 (this was the latest received @ [facility])  Her most recent DEXA from 2022 continued to show osteoporosis of the hip and spine but with significant improvement in bone strenght compared to 2 yrs prior. Her next DEXA should be for ~ . She completed a 6 course program of prolia and the last injection she received was from 2020. Since then she has been on evista as bridge therapy.     Supplementations:  Calcium: citra Niraj + D one tablet daily   Vitamin D: 2000 IUs daily     Exercise: line dance once a week  Not very active, volunteers at [employer] [place]     Stopped taking prilosec more than one year ago. Occasionally takes gas-X after a meal.      Her background comorbidities are detailed below;     1. Idiopathic osteoporosis      2. History of breast cancer      3. History of thyrotoxicosis      4. Gastroesophageal reflux disease without esophagitis      5. B12 deficiency      6. " Essential hypertension      7. Other hyperlipidemia      8. Hypercholesteremia      9. Postmenopausal      10. Hypovitaminosis D         Patient had bilateral mastectomy on account of breast ca (2000) ?? Hormone status and receptor status.  Patient has received her COVID vaccinations and has also received the flu vaccinations.  She has not had any falls or fractures since her last visit.           Patient has received her next prolia injection (# 5); 11/9/2020.  She is on Evista 60mg Qd presently and is tolerating it  Well.  She has no fresh complaints today. She has not had any falls since her last visit.         Review of Systems   Constitutional: Negative for chills, fever and unexpected weight change.   HENT: Negative for congestion, sinus pressure, trouble swallowing and voice change.    Eyes: Negative for visual disturbance.   Respiratory: Negative for chest tightness and shortness of breath.    Cardiovascular: Negative for chest pain, palpitations and leg swelling.   Gastrointestinal: Negative for abdominal pain and vomiting.   Genitourinary: Negative for dysuria.   Musculoskeletal: Negative for arthralgias and myalgias.   Skin: Negative for rash.   Neurological: Negative for weakness, numbness and headaches.   Hematological: Does not bruise/bleed easily.   Psychiatric/Behavioral: Negative for sleep disturbance.       Objective:  /70 (BP Location: Right arm, Patient Position: Sitting, BP Method: Medium (Manual))   Pulse 70   Temp 98.3 °F (36.8 °C) (Oral)   Resp 16   Ht 5' (1.524 m)   Wt 52.9 kg (116 lb 10 oz)   SpO2 98%   BMI 22.78 kg/m²  Body surface area is 1.5 meters squared.             Physical Exam  Vitals reviewed.   Constitutional:       Appearance: Normal appearance.      Comments: Pleasant elderly lady with petite build. She is not pale, anicteric and afebrile. Well hydrated.  Clinically comfortable. Not in any active distress. Patient looks significantly younger than stated chronologic  age.   HENT:      Head: Normocephalic.      Mouth/Throat:      Mouth: Mucous membranes are moist.      Pharynx: No posterior oropharyngeal erythema.   Eyes:      General: No scleral icterus.     Conjunctiva/sclera: Conjunctivae normal.      Pupils: Pupils are equal, round, and reactive to light.   Neck:      Vascular: No carotid bruit.   Cardiovascular:      Rate and Rhythm: Normal rate and regular rhythm.      Pulses: Normal pulses.      Heart sounds: Normal heart sounds. No murmur heard.  Pulmonary:      Effort: Pulmonary effort is normal. No respiratory distress.      Breath sounds: Normal breath sounds. No stridor. No wheezing, rhonchi or rales.   Abdominal:      General: Abdomen is flat. Bowel sounds are normal. There is no distension.      Tenderness: There is no abdominal tenderness.   Musculoskeletal:         General: No swelling. Normal range of motion.      Cervical back: Normal range of motion and neck supple. No rigidity. No muscular tenderness.   Lymphadenopathy:      Cervical: No cervical adenopathy.   Skin:     General: Skin is warm and dry.      Coloration: Skin is not jaundiced or pale.      Findings: No bruising or rash.   Neurological:      General: No focal deficit present.      Mental Status: She is alert and oriented to person, place, and time.      Cranial Nerves: No cranial nerve deficit.   Psychiatric:         Mood and Affect: Mood normal.         Behavior: Behavior normal.         Thought Content: Thought content normal.         Lab Review:     Results for JOANN DURAN (MRN [id number]) as of 9/13/2021 15:11   Ref. Range 6/2/2021 12:09 7/26/2021 13:58 8/4/2021 16:34 8/5/2021 16:19   Sodium Latest Ref Range: 136 - 145 mmol/L 140      Potassium Latest Ref Range: 3.5 - 5.1 mmol/L 4.2      Chloride Latest Ref Range: 95 - 110 mmol/L 105      CO2 Latest Ref Range: 23 - 29 mmol/L 22 (L)      Anion Gap Latest Ref Range: 8 - 16 mmol/L 13      BUN Latest Ref Range: 8 - 23 mg/dL 25 (H)      Creatinine  Latest Ref Range: 0.5 - 1.4 mg/dL 1.3      eGFR if non African American Latest Ref Range: >60 mL/min/1.73 m^2 40.5 (A)      eGFR if African American Latest Ref Range: >60 mL/min/1.73 m^2 46.7 (A)      Glucose Latest Ref Range: 70 - 110 mg/dL 74      Calcium Latest Ref Range: 8.7 - 10.5 mg/dL 10.2      Phosphorus Latest Ref Range: 2.7 - 4.5 mg/dL 4.1      Albumin Latest Ref Range: 3.5 - 5.2 g/dL 3.4 (L)      PTH Latest Ref Range: 9.0 - 77.0 pg/mL 33.0          Assessment:     1. Idiopathic osteoporosis  DXA Bone Density Spine And Hip    Magnesium    Phosphorus    Calcium, Random Urine    Phosphorus, Random Urine    Creatinine, Random Urine    Calcitriol    raloxifene (EVISTA) 60 mg tablet   2. Postmenopausal  CBC Auto Differential    Urinalysis    Microalbumin/Creatinine Ratio, Urine   3. Other proteinuria  Comprehensive Metabolic Panel    Urinalysis    Microalbumin/Creatinine Ratio, Urine   4. Stage 3a chronic kidney disease     5. History of breast cancer     6. Iron deficiency anemia, unspecified iron deficiency anemia type  Transferrin    Iron and TIBC    Ferritin   7. Macrocytic anemia with vitamin B12 deficiency  Folate    Vitamin B12    Vitamin B12 Deficiency Panel   8. History of thyrotoxicosis  T4, Free    T3    Thyroglobulin    TSH   9. Hypovitaminosis D  CBC Auto Differential    Calcium, Ionized    Vitamin D    PTH, Intact    Calcitriol   10. Gastroesophageal reflux disease without esophagitis  CBC Auto Differential    PTH, Intact   11. Mixed hyperlipidemia  Lipid Panel    Uric Acid   12. Essential hypertension  Comprehensive Metabolic Panel    Lipid Panel    Uric Acid   13. Hypercholesteremia  Comprehensive Metabolic Panel   14. Dysglycemia  Hemoglobin A1C   15. Coagulopathy  Protime-INR    APTT        1. Idiopathic osteoporosis  Risk factors include: family history of osteoporosis (sister), PPI use in the past, JODI with BSO at age 49 years used estrogen for three years, diagnosed with breast cancer  (first surgery: lobectomy, second surgery three years later: mastectomy b/l, radiation only)     To continue Evista as before for now. In 11/22 will decide if to continue on Evista or restart Prolia injections. For ffup DEXA ~ 03/24.     Patient has no had any recent falls and had no recent fractures.     To continue calcium and vitamin supplementation as before.      2. Vitamin D deficiency     To check 25 OH Vit D levels and replete as needed     3. Regarding GERD; clinically stable.      4.  Regarding essential hypertension; BP well controlled. No change to present antihypertensive regimen. To continue serial ambulatory BP and pulse profile tracking.     Regarding hyperlipidemia; To check current lipid panel and for now to continue present antilipidemic regimen.      5. Regarding history of breast cancer; apparently cured.      6. Regarding history of thyrotoxicosis; now resolved. Most recent TFTs were normal.    Plan:       FFup in 6mths

## 2022-04-13 LAB
APTT BLDCRRT: 27.9 SEC (ref 21–32)
INR PPP: 1 (ref 0.8–1.2)
PROTHROMBIN TIME: 10.7 SEC (ref 9–12.5)

## 2022-04-14 ENCOUNTER — CLINICAL SUPPORT (OUTPATIENT)
Dept: REHABILITATION | Facility: HOSPITAL | Age: 77
End: 2022-04-14
Attending: PHYSICAL MEDICINE & REHABILITATION
Payer: MEDICARE

## 2022-04-14 ENCOUNTER — LAB VISIT (OUTPATIENT)
Dept: LAB | Facility: HOSPITAL | Age: 77
End: 2022-04-14
Attending: INTERNAL MEDICINE
Payer: MEDICARE

## 2022-04-14 DIAGNOSIS — I10 ESSENTIAL HYPERTENSION: ICD-10-CM

## 2022-04-14 DIAGNOSIS — G89.29 CHRONIC MIDLINE THORACIC BACK PAIN: ICD-10-CM

## 2022-04-14 DIAGNOSIS — K21.9 GASTROESOPHAGEAL REFLUX DISEASE WITHOUT ESOPHAGITIS: ICD-10-CM

## 2022-04-14 DIAGNOSIS — E78.2 MIXED HYPERLIPIDEMIA: ICD-10-CM

## 2022-04-14 DIAGNOSIS — R73.09 DYSGLYCEMIA: ICD-10-CM

## 2022-04-14 DIAGNOSIS — M54.6 CHRONIC MIDLINE THORACIC BACK PAIN: ICD-10-CM

## 2022-04-14 DIAGNOSIS — Z78.0 POSTMENOPAUSAL: ICD-10-CM

## 2022-04-14 DIAGNOSIS — D50.9 IRON DEFICIENCY ANEMIA, UNSPECIFIED IRON DEFICIENCY ANEMIA TYPE: ICD-10-CM

## 2022-04-14 DIAGNOSIS — E55.9 HYPOVITAMINOSIS D: ICD-10-CM

## 2022-04-14 DIAGNOSIS — M53.84 DECREASED RANGE OF MOTION OF THORACIC SPINE: Primary | ICD-10-CM

## 2022-04-14 DIAGNOSIS — R80.8 OTHER PROTEINURIA: ICD-10-CM

## 2022-04-14 DIAGNOSIS — E78.00 HYPERCHOLESTEREMIA: ICD-10-CM

## 2022-04-14 DIAGNOSIS — D51.8 MACROCYTIC ANEMIA WITH VITAMIN B12 DEFICIENCY: ICD-10-CM

## 2022-04-14 LAB
ALBUMIN SERPL BCP-MCNC: 3.5 G/DL (ref 3.5–5.2)
ALP SERPL-CCNC: 38 U/L (ref 55–135)
ALT SERPL W/O P-5'-P-CCNC: 8 U/L (ref 10–44)
ANION GAP SERPL CALC-SCNC: 8 MMOL/L (ref 8–16)
AST SERPL-CCNC: 21 U/L (ref 10–40)
BASOPHILS # BLD AUTO: 0.02 K/UL (ref 0–0.2)
BASOPHILS NFR BLD: 0.4 % (ref 0–1.9)
BILIRUB SERPL-MCNC: 0.3 MG/DL (ref 0.1–1)
BUN SERPL-MCNC: 28 MG/DL (ref 8–23)
CA-I BLDV-SCNC: 1.31 MMOL/L (ref 1.06–1.42)
CALCIUM SERPL-MCNC: 9.7 MG/DL (ref 8.7–10.5)
CHLORIDE SERPL-SCNC: 107 MMOL/L (ref 95–110)
CHOLEST SERPL-MCNC: 165 MG/DL (ref 120–199)
CHOLEST/HDLC SERPL: 2.7 {RATIO} (ref 2–5)
CO2 SERPL-SCNC: 27 MMOL/L (ref 23–29)
CREAT SERPL-MCNC: 1.4 MG/DL (ref 0.5–1.4)
DIFFERENTIAL METHOD: ABNORMAL
EOSINOPHIL # BLD AUTO: 0.1 K/UL (ref 0–0.5)
EOSINOPHIL NFR BLD: 2.1 % (ref 0–8)
ERYTHROCYTE [DISTWIDTH] IN BLOOD BY AUTOMATED COUNT: 12.2 % (ref 11.5–14.5)
EST. GFR  (AFRICAN AMERICAN): 42.4 ML/MIN/1.73 M^2
EST. GFR  (NON AFRICAN AMERICAN): 36.8 ML/MIN/1.73 M^2
ESTIMATED AVG GLUCOSE: 117 MG/DL (ref 68–131)
FERRITIN SERPL-MCNC: 69 NG/ML (ref 20–300)
GLUCOSE SERPL-MCNC: 122 MG/DL (ref 70–110)
HBA1C MFR BLD: 5.7 % (ref 4–5.6)
HCT VFR BLD AUTO: 33 % (ref 37–48.5)
HDLC SERPL-MCNC: 61 MG/DL (ref 40–75)
HDLC SERPL: 37 % (ref 20–50)
HGB BLD-MCNC: 10.3 G/DL (ref 12–16)
IMM GRANULOCYTES # BLD AUTO: 0.02 K/UL (ref 0–0.04)
IMM GRANULOCYTES NFR BLD AUTO: 0.4 % (ref 0–0.5)
IRON SERPL-MCNC: 74 UG/DL (ref 30–160)
LDLC SERPL CALC-MCNC: 84.8 MG/DL (ref 63–159)
LYMPHOCYTES # BLD AUTO: 1.5 K/UL (ref 1–4.8)
LYMPHOCYTES NFR BLD: 26.1 % (ref 18–48)
MCH RBC QN AUTO: 31.1 PG (ref 27–31)
MCHC RBC AUTO-ENTMCNC: 31.2 G/DL (ref 32–36)
MCV RBC AUTO: 100 FL (ref 82–98)
MONOCYTES # BLD AUTO: 0.4 K/UL (ref 0.3–1)
MONOCYTES NFR BLD: 6.4 % (ref 4–15)
NEUTROPHILS # BLD AUTO: 3.6 K/UL (ref 1.8–7.7)
NEUTROPHILS NFR BLD: 64.6 % (ref 38–73)
NONHDLC SERPL-MCNC: 104 MG/DL
NRBC BLD-RTO: 0 /100 WBC
PLATELET # BLD AUTO: 212 K/UL (ref 150–450)
PMV BLD AUTO: 10.9 FL (ref 9.2–12.9)
POTASSIUM SERPL-SCNC: 4.1 MMOL/L (ref 3.5–5.1)
PROT SERPL-MCNC: 7.2 G/DL (ref 6–8.4)
PTH-INTACT SERPL-MCNC: 43.1 PG/ML (ref 9–77)
RBC # BLD AUTO: 3.31 M/UL (ref 4–5.4)
SATURATED IRON: 22 % (ref 20–50)
SODIUM SERPL-SCNC: 142 MMOL/L (ref 136–145)
THRYOGLOBULIN INTERPRETATION: ABNORMAL
THYROGLOB AB SERPL-ACNC: 2 IU/ML
THYROGLOB SERPL-MCNC: 12 NG/ML
TOTAL IRON BINDING CAPACITY: 337 UG/DL (ref 250–450)
TRANSFERRIN SERPL-MCNC: 228 MG/DL (ref 200–375)
TRANSFERRIN SERPL-MCNC: 228 MG/DL (ref 200–375)
TRIGL SERPL-MCNC: 96 MG/DL (ref 30–150)
URATE SERPL-MCNC: 6.1 MG/DL (ref 2.4–5.7)
VIT B12 SERPL-MCNC: 1116 NG/L (ref 180–914)
WBC # BLD AUTO: 5.59 K/UL (ref 3.9–12.7)

## 2022-04-14 PROCEDURE — 80053 COMPREHEN METABOLIC PANEL: CPT | Performed by: INTERNAL MEDICINE

## 2022-04-14 PROCEDURE — 82306 VITAMIN D 25 HYDROXY: CPT | Performed by: INTERNAL MEDICINE

## 2022-04-14 PROCEDURE — 83036 HEMOGLOBIN GLYCOSYLATED A1C: CPT | Performed by: INTERNAL MEDICINE

## 2022-04-14 PROCEDURE — 83970 ASSAY OF PARATHORMONE: CPT | Performed by: INTERNAL MEDICINE

## 2022-04-14 PROCEDURE — 84550 ASSAY OF BLOOD/URIC ACID: CPT | Performed by: INTERNAL MEDICINE

## 2022-04-14 PROCEDURE — 82728 ASSAY OF FERRITIN: CPT | Performed by: INTERNAL MEDICINE

## 2022-04-14 PROCEDURE — 80061 LIPID PANEL: CPT | Performed by: INTERNAL MEDICINE

## 2022-04-14 PROCEDURE — 82330 ASSAY OF CALCIUM: CPT | Performed by: INTERNAL MEDICINE

## 2022-04-14 PROCEDURE — 84466 ASSAY OF TRANSFERRIN: CPT | Performed by: INTERNAL MEDICINE

## 2022-04-14 PROCEDURE — 82746 ASSAY OF FOLIC ACID SERUM: CPT | Performed by: INTERNAL MEDICINE

## 2022-04-14 PROCEDURE — 85025 COMPLETE CBC W/AUTO DIFF WBC: CPT | Performed by: INTERNAL MEDICINE

## 2022-04-14 PROCEDURE — 97110 THERAPEUTIC EXERCISES: CPT | Mod: PN,CQ

## 2022-04-14 PROCEDURE — 36415 COLL VENOUS BLD VENIPUNCTURE: CPT | Mod: PO | Performed by: INTERNAL MEDICINE

## 2022-04-14 PROCEDURE — 82607 VITAMIN B-12: CPT | Performed by: INTERNAL MEDICINE

## 2022-04-14 NOTE — PROGRESS NOTES
"'    Physical Therapy Daily Treatment Note/Re-Assessment     Name: Tyra Fernandes  [facility] Number: [id number]    Therapy Diagnosis:        Encounter Diagnoses   Name Primary?    Pain in thoracic spine      Decreased range of motion of thoracic spine      Physician: Artemio Garcia MD    Visit Date: 4/14/2022  Physician Orders: PT Eval and Treat   Medical Diagnosis from Referral: M54.6 (ICD-10-CM) - Pain in thoracic spine  Evaluation Date: 3/8/2022  Authorization Period Expiration: 05/31/2022  Plan of Care Expiration: 05/08/2022  Progress Note Due: 04/08/2022  Visit # / Visits authorized: 7/24   FOTO: 2/ 3      Return to MD date: prn     Precautions: Standard, hx of breast cancer, HTN, osteoporosis, chronic T10 compression fracture      Time In: 1100   Time Out: 1145   Total Billable Time: 45 minutes    Subjective     Pt reports: States she has been doing much better and able to do more at home. States she was on her feet a lot last Friday and hosted friends and reported some pain following a long day. Nopain upon arrival and is happy with her progress.     She was compliant with home exercise program.  Response to previous treatment: positive  Functional change: improved tolerance to activity     Pain: 0/10  Location: Across T5       Objective     therapeutic exercises to develop strength, endurance, ROM and posture for 45 minutes including:       Seated thoracic extension over towel roll x20 with 3s hold   Open books x20 each side   Clams 2x12 each  Posterior pelvic tilt 2 x 10   Prone lower trap level I 2x10   Prone middle trap level II 2x10   Seated horizontal abd red TB 2x12  BKFO, 3x10 RTB  Bridges w RTB 2x10x5"   Touchdowns level II 3x10       Wall Slides 2x12      Home Exercises Provided and Patient Education Provided     Education provided:   - Continue HEP     Written Home Exercises Provided: Patient instructed to cont prior HEP.  Exercises were reviewed and Tyra was able to demonstrate them prior to " the end of the session.  Tyra demonstrated good  understanding of the education provided.     See EMR under Patient Instructions for exercises provided prior visit.    Assessment   Tyra is doing well today and had no pain today throughout session. Continued periscapular strengthening to improve posture and strength deficits. Pt with good tolerance to therapy session with no adverse effects reported.         Tyra Is progressing well towards her goals.   Pt prognosis is Good.     Pt will continue to benefit from skilled outpatient physical therapy to address the deficits listed in the problem list box on initial evaluation, provide pt/family education and to maximize pt's level of independence in the home and community environment.     Pt's spiritual, cultural and educational needs considered and pt agreeable to plan of care and goals.    Anticipated barriers to physical therapy: chronicity of condition   Goals:  Short Term Goals: 4 weeks   1. Pt will be IND with initial HEP to manage symptoms outside of PT. MET  2. Pt will report thoracic pain </= 6/10 at worst to demonstrate improved condition. MET  3. Pt will improve MMT of Bilateral hip extension to >/= 3/5 to improve tolerance for household chores. MET  4. Pt will improve lumbar extension ROM by >= 25% to improve tolerance for household chores. progressing  5. Pt will report ability to stand for > 10mins without increase in thoracic pain. MET     Long Term Goals: 8 weeks   1. Pt will improve FOTO score to </= 34% limited to demonstrate improved functional mobility.  2. Pt will be IND with final HEP to maintain/improve strength and mobility gained in PT.   3. Pt will report thoracic pain </= 3/10 at worst to demonstrate improved condition.   4. Pt will improve MMT of Bilateral hips to >/= 4/ 5 to improve tolerance for household chores.   5. Pt will demonstrate pain free lumbar active range of motion to improve tolerance for cooking and cleaning.   6. Pt goal: Pt  will report ability to cook and clean kitchen without increase in thoracic pain.       PLAN   Plan of care Certification: 3/8/2022 to 05/08/2022.    Continue thoracic mobility, scapular strengthening, lower extremity strengthening, and posture correction.     Justyn Garrett, PTA

## 2022-04-15 PROBLEM — R73.03 PREDIABETES: Status: ACTIVE | Noted: 2022-04-15

## 2022-04-15 LAB
25(OH)D3+25(OH)D2 SERPL-MCNC: 55 NG/ML (ref 30–96)
FOLATE SERPL-MCNC: 32.2 NG/ML (ref 4–24)
VIT B12 SERPL-MCNC: 1335 PG/ML (ref 210–950)

## 2022-04-18 LAB — 1,25(OH)2D3 SERPL-MCNC: 36 PG/ML (ref 20–79)

## 2022-04-19 ENCOUNTER — DOCUMENTATION ONLY (OUTPATIENT)
Dept: REHABILITATION | Facility: HOSPITAL | Age: 77
End: 2022-04-19
Payer: MEDICARE

## 2022-04-19 ENCOUNTER — CLINICAL SUPPORT (OUTPATIENT)
Dept: REHABILITATION | Facility: HOSPITAL | Age: 77
End: 2022-04-19
Attending: PHYSICAL MEDICINE & REHABILITATION
Payer: MEDICARE

## 2022-04-19 DIAGNOSIS — G89.29 CHRONIC THORACIC BACK PAIN, UNSPECIFIED BACK PAIN LATERALITY: ICD-10-CM

## 2022-04-19 DIAGNOSIS — M53.84 DECREASED RANGE OF MOTION OF THORACIC SPINE: Primary | ICD-10-CM

## 2022-04-19 DIAGNOSIS — M54.6 CHRONIC THORACIC BACK PAIN, UNSPECIFIED BACK PAIN LATERALITY: ICD-10-CM

## 2022-04-19 PROCEDURE — 97110 THERAPEUTIC EXERCISES: CPT | Mod: PN,CQ

## 2022-04-19 NOTE — PROGRESS NOTES
"'    Physical Therapy Daily Treatment Note/Re-Assessment     Name: Tyra Fernandes  [facility] Number: [id number]    Therapy Diagnosis:        Encounter Diagnoses   Name Primary?    Pain in thoracic spine      Decreased range of motion of thoracic spine      Physician: Artemio Garcia MD    Visit Date: 4/19/2022  Physician Orders: PT Eval and Treat   Medical Diagnosis from Referral: M54.6 (ICD-10-CM) - Pain in thoracic spine  Evaluation Date: 3/8/2022  Authorization Period Expiration: 05/31/2022  Plan of Care Expiration: 05/08/2022  Progress Note Due: 04/08/2022  Visit # / Visits authorized: 8/24   FOTO: 2/ 3      Return to MD date: prn     Precautions: Standard, hx of breast cancer, HTN, osteoporosis, chronic T10 compression fracture      Time In: 0930   Time Out: 1011  Total Billable Time: 41 minutes    Subjective     Pt reports: Continues to have no mid back pain and was able to go all weekend without experiencing mid back pain      She was compliant with home exercise program.  Response to previous treatment: positive  Functional change: improved tolerance to activity     Pain: 0/10  Location: Across T5       Objective     therapeutic exercises to develop strength, endurance, ROM and posture for 41 minutes including:       Seated thoracic extension over towel roll x20 with 3s hold   Open books x20 each side   Clams 2x12 each  Posterior pelvic tilt 2 x 10   Prone lower trap level I 2x10   Prone middle trap level II 2x10   Seated horizontal abd red TB 2x12  BKFO, 3x10 RTB  Bridges w RTB 3x10x5"   Touchdowns level II 3x10       Wall Slides 2x12      Home Exercises Provided and Patient Education Provided     Education provided:   - Continue HEP     Written Home Exercises Provided: Patient instructed to cont prior HEP.  Exercises were reviewed and Tyra was able to demonstrate them prior to the end of the session.  Tyra demonstrated good  understanding of the education provided.     See EMR under Patient Instructions " for exercises provided prior visit.    Assessment   Patient with improved tolerance to prone periscapular strengthening exercises and could be progressed next session to prone low trap II and mid trap III. Progressing well towards discharge. Pt with good tolerance to therapy session with no adverse effects reported.        Tyra Is progressing well towards her goals.   Pt prognosis is Good.     Pt will continue to benefit from skilled outpatient physical therapy to address the deficits listed in the problem list box on initial evaluation, provide pt/family education and to maximize pt's level of independence in the home and community environment.     Pt's spiritual, cultural and educational needs considered and pt agreeable to plan of care and goals.    Anticipated barriers to physical therapy: chronicity of condition   Goals:  Short Term Goals: 4 weeks   1. Pt will be IND with initial HEP to manage symptoms outside of PT. MET  2. Pt will report thoracic pain </= 6/10 at worst to demonstrate improved condition. MET  3. Pt will improve MMT of Bilateral hip extension to >/= 3/5 to improve tolerance for household chores. MET  4. Pt will improve lumbar extension ROM by >= 25% to improve tolerance for household chores. progressing  5. Pt will report ability to stand for > 10mins without increase in thoracic pain. MET     Long Term Goals: 8 weeks   1. Pt will improve FOTO score to </= 34% limited to demonstrate improved functional mobility.  2. Pt will be IND with final HEP to maintain/improve strength and mobility gained in PT.   3. Pt will report thoracic pain </= 3/10 at worst to demonstrate improved condition.   4. Pt will improve MMT of Bilateral hips to >/= 4/ 5 to improve tolerance for household chores.   5. Pt will demonstrate pain free lumbar active range of motion to improve tolerance for cooking and cleaning.   6. Pt goal: Pt will report ability to cook and clean kitchen without increase in thoracic pain.        PLAN   Plan of care Certification: 3/8/2022 to 05/08/2022.    Continue thoracic mobility, scapular strengthening, lower extremity strengthening, and posture correction.     Justyn Garrett, PTA

## 2022-04-19 NOTE — PROGRESS NOTES
30 day PT-PTA face to face conference with Jah Landeros Physical therapist,  PTA re: pt status, POC, and plan for progression completed Justyn Garrett, PTA

## 2022-04-21 ENCOUNTER — HOSPITAL ENCOUNTER (OUTPATIENT)
Dept: RADIOLOGY | Facility: HOSPITAL | Age: 77
Discharge: HOME | End: 2022-04-21
Attending: INTERNAL MEDICINE
Payer: MEDICARE

## 2022-04-21 DIAGNOSIS — R31.9 HEMATURIA, UNSPECIFIED TYPE: ICD-10-CM

## 2022-04-21 PROBLEM — N28.1 KIDNEY CYSTS: Status: ACTIVE | Noted: 2022-04-21

## 2022-04-21 PROCEDURE — 76770 US RETROPERITONEAL COMPLETE: ICD-10-PCS | Mod: 26,,, | Performed by: RADIOLOGY

## 2022-04-21 PROCEDURE — 76770 US EXAM ABDO BACK WALL COMP: CPT | Mod: TC

## 2022-04-21 PROCEDURE — 76770 US EXAM ABDO BACK WALL COMP: CPT | Mod: 26,,, | Performed by: RADIOLOGY

## 2022-04-25 ENCOUNTER — TELEPHONE (OUTPATIENT)
Dept: DERMATOLOGY | Facility: CLINIC | Age: 77
End: 2022-04-25
Payer: MEDICARE

## 2022-04-26 ENCOUNTER — CLINICAL SUPPORT (OUTPATIENT)
Dept: REHABILITATION | Facility: HOSPITAL | Age: 77
End: 2022-04-26
Attending: PHYSICAL MEDICINE & REHABILITATION
Payer: MEDICARE

## 2022-04-26 DIAGNOSIS — M54.6 CHRONIC LEFT-SIDED THORACIC BACK PAIN: ICD-10-CM

## 2022-04-26 DIAGNOSIS — G89.29 CHRONIC LEFT-SIDED THORACIC BACK PAIN: ICD-10-CM

## 2022-04-26 DIAGNOSIS — M53.84 DECREASED RANGE OF MOTION OF THORACIC SPINE: Primary | ICD-10-CM

## 2022-04-26 PROCEDURE — 97110 THERAPEUTIC EXERCISES: CPT | Mod: PN

## 2022-04-26 NOTE — PROGRESS NOTES
'    Physical Therapy Daily Treatment Note/Discharge Summary     Name: Tyra Fernandes  [facility] Number: [id number]    Therapy Diagnosis:        Encounter Diagnoses   Name Primary?    Pain in thoracic spine      Decreased range of motion of thoracic spine      Physician: Artemio Garcia MD    Visit Date: 2022  Physician Orders: PT Eval and Treat   Medical Diagnosis from Referral: M54.6 (ICD-10-CM) - Pain in thoracic spine  Evaluation Date: 3/8/2022  Authorization Period Expiration: 2022  Plan of Care Expiration: 2022  Progress Note Due: 2022  Visit # / Visits authorized:    FOTO: 2/ 3      Return to MD date: prn     Precautions: Standard, hx of breast cancer, HTN, osteoporosis, chronic T10 compression fracture      Time In: 1030  Time Out: 1110  Total Billable Time: 40 minutes    Subjective     Pt reports: she hasn't really had any pain except for a couple of days where she did too much and even those days weren't near as bad as they were prior to starting PT.      She was compliant with home exercise program.  Response to previous treatment: positive  Functional change: improved tolerance to activity     Pain: 0/10  Location: Across T5       Objective   Slump Test: Negative      Negative for pain with lumbar and thoracic range of motion     Hip extension: 4/5 Hip extension 4/5   Hip Abd:  4/5 Hip Abd:  4/5   Hip ER:  4/5 Hip ER: 4/5   Hip IR: 4+/5 Hip IR: 4+/5       Mid and lower trap manual muscle testin-/5 bilaterally     CMS Impairment/Limitation/Restriction for FOTO Thoracic Spine Survey  Status Limitation G-Code CMS Severity Modifier  Intake 60% 40%  Predicted 66% 34% Goal Status+ CJ - At least 20 percent but less than 40 percent  3/29/2022 63% 37%  2022 79% 21% Current Status CJ - At least 20 percent but less than 40 percent    therapeutic exercises to develop strength, endurance, ROM and posture for 40 minutes including:     Assessment as above   Seated thoracic  "extension over towel roll x20 with 3s hold   Open books x20 each side   Prone lower trap level I 2x10   Prone middle trap level II 3x10   BKFO, 3x12 Red Theraband each side   Bridges w RTB 3x10x5"   Clams 3x12 each side with Red Theraband       Not today:   Seated horizontal abd red TB 2x12  Touchdowns level II 3x10   Wall Slides 2x12    Posterior pelvic tilt 2 x 10     Home Exercises Provided and Patient Education Provided     Education provided:   - Continue HEP     Written Home Exercises Provided: Patient instructed to cont prior HEP.  Exercises were reviewed and Tyra was able to demonstrate them prior to the end of the session.  Tyra demonstrated good  understanding of the education provided.     See EMR under Patient Instructions for exercises provided prior visit.    Assessment   Tyra tolerated final treatment well. She has met all of her goals and reports she is having much less pain. She is appropriate for discharge from skilled outpatient PT.     Tyra Is progressing well towards her goals.   Pt prognosis is Good.     Pt will continue to benefit from skilled outpatient physical therapy to address the deficits listed in the problem list box on initial evaluation, provide pt/family education and to maximize pt's level of independence in the home and community environment.     Pt's spiritual, cultural and educational needs considered and pt agreeable to plan of care and goals.    Anticipated barriers to physical therapy: chronicity of condition   Goals:  Short Term Goals: 4 weeks   1. Pt will be IND with initial HEP to manage symptoms outside of PT. MET  2. Pt will report thoracic pain </= 6/10 at worst to demonstrate improved condition. MET  3. Pt will improve MMT of Bilateral hip extension to >/= 3/5 to improve tolerance for household chores. MET  4. Pt will improve lumbar extension ROM by >= 25% to improve tolerance for household chores. progressing  5. Pt will report ability to stand for > 10mins without " increase in thoracic pain. MET     Long Term Goals: 8 weeks   1. Pt will improve FOTO score to </= 34% limited to demonstrate improved functional mobility.  2. Pt will be IND with final HEP to maintain/improve strength and mobility gained in PT. MET  3. Pt will report thoracic pain </= 3/10 at worst to demonstrate improved condition. MET  4. Pt will improve MMT of Bilateral hips to >/= 4/ 5 to improve tolerance for household chores. MET  5. Pt will demonstrate pain free lumbar active range of motion to improve tolerance for cooking and cleaning. MET  6. Pt goal: Pt will report ability to cook and clean kitchen without increase in thoracic pain. MET       Discharge Summary     Date of Last visit: 04/26/2022  Total Visits Received: 9      Assessment    Goals: MET    Discharge reason: Patient has met all of his/her goals and Patient has reached the maximum rehab potential for the present time    Plan   This patient is discharged from Physical Therapy and is to cont with their HEP and MD follow up PRN.        Jah Landeros, PT

## 2022-04-28 ENCOUNTER — OFFICE VISIT (OUTPATIENT)
Dept: DERMATOLOGY | Facility: CLINIC | Age: 77
End: 2022-04-28
Payer: MEDICARE

## 2022-04-28 DIAGNOSIS — Z76.89 ENCOUNTER FOR SKIN CARE: ICD-10-CM

## 2022-04-28 DIAGNOSIS — L81.1 MELASMA: Primary | ICD-10-CM

## 2022-04-28 PROCEDURE — 1126F PR PAIN SEVERITY QUANTIFIED, NO PAIN PRESENT: ICD-10-PCS | Mod: CPTII,S$GLB,, | Performed by: DERMATOLOGY

## 2022-04-28 PROCEDURE — 3044F PR MOST RECENT HEMOGLOBIN A1C LEVEL <7.0%: ICD-10-PCS | Mod: CPTII,S$GLB,, | Performed by: DERMATOLOGY

## 2022-04-28 PROCEDURE — 1101F PR PT FALLS ASSESS DOC 0-1 FALLS W/OUT INJ PAST YR: ICD-10-PCS | Mod: CPTII,S$GLB,, | Performed by: DERMATOLOGY

## 2022-04-28 PROCEDURE — 99999 PR PBB SHADOW E&M-EST. PATIENT-LVL III: ICD-10-PCS | Mod: PBBFAC,,, | Performed by: DERMATOLOGY

## 2022-04-28 PROCEDURE — 99214 PR OFFICE/OUTPT VISIT, EST, LEVL IV, 30-39 MIN: ICD-10-PCS | Mod: S$GLB,,, | Performed by: DERMATOLOGY

## 2022-04-28 PROCEDURE — 3044F HG A1C LEVEL LT 7.0%: CPT | Mod: CPTII,S$GLB,, | Performed by: DERMATOLOGY

## 2022-04-28 PROCEDURE — 3066F PR DOCUMENTATION OF TREATMENT FOR NEPHROPATHY: ICD-10-PCS | Mod: CPTII,S$GLB,, | Performed by: DERMATOLOGY

## 2022-04-28 PROCEDURE — 1159F MED LIST DOCD IN RCRD: CPT | Mod: CPTII,S$GLB,, | Performed by: DERMATOLOGY

## 2022-04-28 PROCEDURE — 1126F AMNT PAIN NOTED NONE PRSNT: CPT | Mod: CPTII,S$GLB,, | Performed by: DERMATOLOGY

## 2022-04-28 PROCEDURE — 1101F PT FALLS ASSESS-DOCD LE1/YR: CPT | Mod: CPTII,S$GLB,, | Performed by: DERMATOLOGY

## 2022-04-28 PROCEDURE — 3288F PR FALLS RISK ASSESSMENT DOCUMENTED: ICD-10-PCS | Mod: CPTII,S$GLB,, | Performed by: DERMATOLOGY

## 2022-04-28 PROCEDURE — 1159F PR MEDICATION LIST DOCUMENTED IN MEDICAL RECORD: ICD-10-PCS | Mod: CPTII,S$GLB,, | Performed by: DERMATOLOGY

## 2022-04-28 PROCEDURE — 99999 PR PBB SHADOW E&M-EST. PATIENT-LVL III: CPT | Mod: PBBFAC,,, | Performed by: DERMATOLOGY

## 2022-04-28 PROCEDURE — 3288F FALL RISK ASSESSMENT DOCD: CPT | Mod: CPTII,S$GLB,, | Performed by: DERMATOLOGY

## 2022-04-28 PROCEDURE — 3066F NEPHROPATHY DOC TX: CPT | Mod: CPTII,S$GLB,, | Performed by: DERMATOLOGY

## 2022-04-28 PROCEDURE — 3061F NEG MICROALBUMINURIA REV: CPT | Mod: CPTII,S$GLB,, | Performed by: DERMATOLOGY

## 2022-04-28 PROCEDURE — 3061F PR NEG MICROALBUMINURIA RESULT DOCUMENTED/REVIEW: ICD-10-PCS | Mod: CPTII,S$GLB,, | Performed by: DERMATOLOGY

## 2022-04-28 PROCEDURE — 99214 OFFICE O/P EST MOD 30 MIN: CPT | Mod: S$GLB,,, | Performed by: DERMATOLOGY

## 2022-04-28 RX ORDER — HYDROQUINONE 40 MG/G
CREAM TOPICAL 2 TIMES DAILY
Qty: 46 G | Refills: 2 | Status: SHIPPED | OUTPATIENT
Start: 2022-04-28 | End: 2022-05-28

## 2022-04-28 NOTE — PATIENT INSTRUCTIONS
Patient instructed in importance in daily sun protection. Sun avoidance and topical protection discussed.     Patient encouraged to wear hat for all outdoor exposure.     Also discussed sun protective clothing.     Recommend sun block with plain zinc oxide or sun block products with some zinc or titanium as their base with an spf of at least 30 to be applied every 2-3 hours of outdoor exposure.

## 2022-04-28 NOTE — PROGRESS NOTES
Subjective:       Patient ID:  Tyra Fernandes is a 77 y.o. female who presents for   Chief Complaint   Patient presents with    Skin Discoloration     Face, x few yrs, hyperpigmentation, no tx      Skin Discoloration - Initial  Affected locations: face  Signs / symptoms: asymptomatic  Severity: mild  Timing: constant  Aggravated by: nothing  Relieving factors/Treatments tried: nothing        Review of Systems   Constitutional: Negative for fever and chills.   HENT: Negative for sore throat.    Respiratory: Negative for cough.         Objective:    Physical Exam   Constitutional: She appears well-developed and well-nourished.   Eyes: No conjunctival no injection.   Neurological: She is alert and oriented to person, place, and time.   Psychiatric: She has a normal mood and affect.   Skin:   Areas Examined (abnormalities noted in diagram):   Head / Face Inspection Performed              Diagram Legend     Erythematous scaling macule/papule c/w actinic keratosis       Vascular papule c/w angioma      Pigmented verrucoid papule/plaque c/w seborrheic keratosis      Yellow umbilicated papule c/w sebaceous hyperplasia      Irregularly shaped tan macule c/w lentigo     1-2 mm smooth white papules consistent with Milia      Movable subcutaneous cyst with punctum c/w epidermal inclusion cyst      Subcutaneous movable cyst c/w pilar cyst      Firm pink to brown papule c/w dermatofibroma      Pedunculated fleshy papule(s) c/w skin tag(s)      Evenly pigmented macule c/w junctional nevus     Mildly variegated pigmented, slightly irregular-bordered macule c/w mildly atypical nevus      Flesh colored to evenly pigmented papule c/w intradermal nevus       Pink pearly papule/plaque c/w basal cell carcinoma      Erythematous hyperkeratotic cursted plaque c/w SCC      Surgical scar with no sign of skin cancer recurrence      Open and closed comedones      Inflammatory papules and pustules      Verrucoid papule consistent consistent  with wart     Erythematous eczematous patches and plaques     Dystrophic onycholytic nail with subungual debris c/w onychomycosis     Umbilicated papule    Erythematous-base heme-crusted tan verrucoid plaque consistent with inflamed seborrheic keratosis     Erythematous Silvery Scaling Plaque c/w Psoriasis     See annotation      Assessment / Plan:        Melasma  -     hydroquinone 4 % Crea; Apply topically 2 (two) times daily. Patient to start 4% HQ carefully every other day for a week or so before attempting daily.  Later can go to twice daily, morning and night.  Dispense: 46 g; Refill: 2  Discussed with patient the etiology and pathogenesis of the disease or skin lesion(s) and possible treatments and aggravators.    Reviewed with patient different treatment options and associated risks.  Proper application of medications and or care for affected area(s) and condition(s) reviewed.  Patient to start 4% HQ carefully every other day for a week or so before attempting daily.  Later can go to twice daily, morning and night.  Watch out for irritation, which can cause more darkening, which is not what we want.  If dryness occurs, take a break and apply coconut oil.  If irritation occurs, take a break and apply OTC hydrocortisone.  Discussed with patient the benign nature of these lesions and that no treatment is indicated.    Chronic nature of this condition discussed with patient.    Encounter for skin care  Patient instructed in importance in daily sun protection. Sun avoidance and topical protection discussed.     Patient encouraged to wear hat for all outdoor exposure.     Also discussed sun protective clothing.  Recommend sun block with plain zinc oxide or sun block products with some zinc or titanium as their base with an spf of at least 30 to be applied every 2-3 hours of outdoor exposure.  Discussed with patient the need to wear hats and keep covered from the sun, even on partly estefany days.  Reviewed how  ongoing sun exposure propagates actinic keratoses.             Follow up if symptoms worsen or fail to improve.

## 2022-05-20 ENCOUNTER — PES CALL (OUTPATIENT)
Dept: ADMINISTRATIVE | Facility: CLINIC | Age: 77
End: 2022-05-20
Payer: MEDICARE

## 2022-07-05 ENCOUNTER — LAB VISIT (OUTPATIENT)
Dept: LAB | Facility: HOSPITAL | Age: 77
End: 2022-07-05
Attending: PSYCHIATRY & NEUROLOGY
Payer: MEDICARE

## 2022-07-05 DIAGNOSIS — R41.3 MEMORY LOSS: Primary | ICD-10-CM

## 2022-07-05 LAB
ALBUMIN SERPL BCP-MCNC: 3.7 G/DL (ref 3.5–5.2)
ALP SERPL-CCNC: 43 U/L (ref 55–135)
ALT SERPL W/O P-5'-P-CCNC: 9 U/L (ref 10–44)
ANION GAP SERPL CALC-SCNC: 9 MMOL/L (ref 8–16)
AST SERPL-CCNC: 17 U/L (ref 10–40)
BILIRUB SERPL-MCNC: 0.2 MG/DL (ref 0.1–1)
BUN SERPL-MCNC: 20 MG/DL (ref 8–23)
CALCIUM SERPL-MCNC: 9.6 MG/DL (ref 8.7–10.5)
CHLORIDE SERPL-SCNC: 106 MMOL/L (ref 95–110)
CO2 SERPL-SCNC: 25 MMOL/L (ref 23–29)
CREAT SERPL-MCNC: 1.5 MG/DL (ref 0.5–1.4)
EST. GFR  (AFRICAN AMERICAN): 39 ML/MIN/1.73 M^2
EST. GFR  (NON AFRICAN AMERICAN): 33.8 ML/MIN/1.73 M^2
GLUCOSE SERPL-MCNC: 82 MG/DL (ref 70–110)
POTASSIUM SERPL-SCNC: 4.6 MMOL/L (ref 3.5–5.1)
PROT SERPL-MCNC: 7.7 G/DL (ref 6–8.4)
SODIUM SERPL-SCNC: 140 MMOL/L (ref 136–145)
TSH SERPL DL<=0.005 MIU/L-ACNC: 2.14 UIU/ML (ref 0.4–4)
VIT B12 SERPL-MCNC: 1393 PG/ML (ref 210–950)

## 2022-07-05 PROCEDURE — 85025 COMPLETE CBC W/AUTO DIFF WBC: CPT | Performed by: PSYCHIATRY & NEUROLOGY

## 2022-07-05 PROCEDURE — 36415 COLL VENOUS BLD VENIPUNCTURE: CPT | Mod: PO | Performed by: PSYCHIATRY & NEUROLOGY

## 2022-07-05 PROCEDURE — 84425 ASSAY OF VITAMIN B-1: CPT | Performed by: PSYCHIATRY & NEUROLOGY

## 2022-07-05 PROCEDURE — 82140 ASSAY OF AMMONIA: CPT | Performed by: PSYCHIATRY & NEUROLOGY

## 2022-07-05 PROCEDURE — 80053 COMPREHEN METABOLIC PANEL: CPT | Performed by: PSYCHIATRY & NEUROLOGY

## 2022-07-05 PROCEDURE — 82607 VITAMIN B-12: CPT | Performed by: PSYCHIATRY & NEUROLOGY

## 2022-07-05 PROCEDURE — 84443 ASSAY THYROID STIM HORMONE: CPT | Performed by: PSYCHIATRY & NEUROLOGY

## 2022-07-05 PROCEDURE — 86592 SYPHILIS TEST NON-TREP QUAL: CPT | Performed by: PSYCHIATRY & NEUROLOGY

## 2022-07-06 LAB
AMMONIA PLAS-SCNC: 15 UMOL/L (ref 10–50)
BASOPHILS # BLD AUTO: 0.02 K/UL (ref 0–0.2)
BASOPHILS NFR BLD: 0.3 % (ref 0–1.9)
DIFFERENTIAL METHOD: ABNORMAL
EOSINOPHIL # BLD AUTO: 0.1 K/UL (ref 0–0.5)
EOSINOPHIL NFR BLD: 1.7 % (ref 0–8)
ERYTHROCYTE [DISTWIDTH] IN BLOOD BY AUTOMATED COUNT: 12.6 % (ref 11.5–14.5)
HCT VFR BLD AUTO: 35.6 % (ref 37–48.5)
HGB BLD-MCNC: 11.4 G/DL (ref 12–16)
IMM GRANULOCYTES # BLD AUTO: 0.03 K/UL (ref 0–0.04)
IMM GRANULOCYTES NFR BLD AUTO: 0.5 % (ref 0–0.5)
LYMPHOCYTES # BLD AUTO: 1.2 K/UL (ref 1–4.8)
LYMPHOCYTES NFR BLD: 19.5 % (ref 18–48)
MCH RBC QN AUTO: 31.5 PG (ref 27–31)
MCHC RBC AUTO-ENTMCNC: 32 G/DL (ref 32–36)
MCV RBC AUTO: 98 FL (ref 82–98)
MONOCYTES # BLD AUTO: 0.4 K/UL (ref 0.3–1)
MONOCYTES NFR BLD: 7 % (ref 4–15)
NEUTROPHILS # BLD AUTO: 4.3 K/UL (ref 1.8–7.7)
NEUTROPHILS NFR BLD: 71 % (ref 38–73)
NRBC BLD-RTO: 0 /100 WBC
PLATELET # BLD AUTO: 242 K/UL (ref 150–450)
PMV BLD AUTO: 10.7 FL (ref 9.2–12.9)
RBC # BLD AUTO: 3.62 M/UL (ref 4–5.4)
RPR SER QL: NORMAL
WBC # BLD AUTO: 5.99 K/UL (ref 3.9–12.7)

## 2022-07-09 LAB — VIT B1 BLD-MCNC: 60 UG/L (ref 38–122)

## 2022-08-03 ENCOUNTER — HOSPITAL ENCOUNTER (OUTPATIENT)
Dept: CARDIOLOGY | Facility: HOSPITAL | Age: 77
Discharge: HOME | End: 2022-08-03
Attending: INTERNAL MEDICINE
Payer: MEDICARE

## 2022-08-03 VITALS — HEIGHT: 60 IN | WEIGHT: 116 LBS | BODY MASS INDEX: 22.78 KG/M2

## 2022-08-03 DIAGNOSIS — Z86.39 HISTORY OF THYROTOXICOSIS: ICD-10-CM

## 2022-08-03 DIAGNOSIS — N18.31 STAGE 3A CHRONIC KIDNEY DISEASE: ICD-10-CM

## 2022-08-03 DIAGNOSIS — E78.2 MIXED HYPERLIPIDEMIA: ICD-10-CM

## 2022-08-03 DIAGNOSIS — Z87.898 HISTORY OF SYNCOPE: ICD-10-CM

## 2022-08-03 DIAGNOSIS — R00.2 PALPITATIONS: ICD-10-CM

## 2022-08-03 DIAGNOSIS — R94.31 NONSPECIFIC ABNORMAL ELECTROCARDIOGRAM (ECG) (EKG): ICD-10-CM

## 2022-08-03 DIAGNOSIS — I10 ESSENTIAL HYPERTENSION: ICD-10-CM

## 2022-08-03 DIAGNOSIS — K21.9 GASTROESOPHAGEAL REFLUX DISEASE WITHOUT ESOPHAGITIS: ICD-10-CM

## 2022-08-03 LAB
AORTIC VALVE CUSP SEPERATION: 1.7 CM
AV INDEX (PROSTH): 0.85
AV MEAN GRADIENT: 11 MMHG
AV PEAK GRADIENT: 11 MMHG
AV VALVE AREA: 2.68 CM2
AV VELOCITY RATIO: 0.36
BSA FOR ECHO PROCEDURE: 1.49 M2
CV ECHO LV RWT: 2.23 CM
DOP CALC AO PEAK VEL: 1.64 M/S
DOP CALC AO VTI: 29.6 CM
DOP CALC LVOT AREA: 3.1 CM2
DOP CALC LVOT DIAMETER: 2 CM
DOP CALC LVOT PEAK VEL: 0.59 M/S
DOP CALC LVOT STROKE VOLUME: 79.44 CM3
DOP CALCLVOT PEAK VEL VTI: 25.3 CM
E WAVE DECELERATION TIME: 206 MS
ECHO LV POSTERIOR WALL: 1.28 CM (ref 0.6–1.1)
EJECTION FRACTION: 50 %
FRACTIONAL SHORTENING: -172 % (ref 28–44)
INTERVENTRICULAR SEPTUM: 2.29 CM (ref 0.6–1.1)
IVRT: 84 MS
LEFT ATRIUM SIZE: 3.6 CM
LEFT INTERNAL DIMENSION IN SYSTOLE: 3.13 CM (ref 2.1–4)
LEFT VENTRICLE MASS INDEX: 59 G/M2
LEFT VENTRICULAR INTERNAL DIMENSION IN DIASTOLE: 1.15 CM (ref 3.5–6)
LEFT VENTRICULAR MASS: 86.82 G
MV PEAK A VEL: 0.83 M/S
MV STENOSIS PRESSURE HALF TIME: 61 MS
MV VALVE AREA P 1/2 METHOD: 3.61 CM2
PISA TR MAX VEL: 2.53 M/S
RIGHT VENTRICULAR END-DIASTOLIC DIMENSION: 1.89 CM
TDI LATERAL: 0.06 M/S
TDI SEPTAL: 0.06 M/S
TDI: 0.06 M/S
TR MAX PG: 26 MMHG

## 2022-08-03 PROCEDURE — 93306 TTE W/DOPPLER COMPLETE: CPT

## 2022-08-03 PROCEDURE — 93306 ECHO (CUPID ONLY): ICD-10-PCS | Mod: 26,,, | Performed by: INTERNAL MEDICINE

## 2022-08-03 PROCEDURE — 93306 TTE W/DOPPLER COMPLETE: CPT | Mod: 26,,, | Performed by: INTERNAL MEDICINE

## 2022-08-16 ENCOUNTER — TELEPHONE (OUTPATIENT)
Dept: CARDIOLOGY | Facility: CLINIC | Age: 77
End: 2022-08-16
Payer: MEDICARE

## 2022-08-16 NOTE — TELEPHONE ENCOUNTER
We had to reschedule her appointment for tomorrow. I will show a provider her echo. If she needs to come in sooner I will call her back sooner,

## 2022-09-20 ENCOUNTER — TELEPHONE (OUTPATIENT)
Dept: ADMINISTRATIVE | Facility: CLINIC | Age: 77
End: 2022-09-20
Payer: MEDICARE

## 2022-09-22 ENCOUNTER — OFFICE VISIT (OUTPATIENT)
Dept: FAMILY MEDICINE | Facility: CLINIC | Age: 77
End: 2022-09-22
Payer: MEDICARE

## 2022-09-22 VITALS
WEIGHT: 113.56 LBS | TEMPERATURE: 98 F | OXYGEN SATURATION: 96 % | HEART RATE: 68 BPM | BODY MASS INDEX: 22.29 KG/M2 | DIASTOLIC BLOOD PRESSURE: 74 MMHG | HEIGHT: 60 IN | SYSTOLIC BLOOD PRESSURE: 132 MMHG

## 2022-09-22 DIAGNOSIS — R26.9 ABNORMALITY OF GAIT AND MOBILITY: ICD-10-CM

## 2022-09-22 DIAGNOSIS — I10 ESSENTIAL HYPERTENSION: ICD-10-CM

## 2022-09-22 DIAGNOSIS — Z00.00 ENCOUNTER FOR PREVENTIVE HEALTH EXAMINATION: Primary | ICD-10-CM

## 2022-09-22 DIAGNOSIS — N18.32 STAGE 3B CHRONIC KIDNEY DISEASE: ICD-10-CM

## 2022-09-22 PROCEDURE — 1101F PT FALLS ASSESS-DOCD LE1/YR: CPT | Mod: CPTII,S$GLB,, | Performed by: NURSE PRACTITIONER

## 2022-09-22 PROCEDURE — 3044F PR MOST RECENT HEMOGLOBIN A1C LEVEL <7.0%: ICD-10-PCS | Mod: CPTII,S$GLB,, | Performed by: NURSE PRACTITIONER

## 2022-09-22 PROCEDURE — 3061F NEG MICROALBUMINURIA REV: CPT | Mod: CPTII,S$GLB,, | Performed by: NURSE PRACTITIONER

## 2022-09-22 PROCEDURE — 1126F PR PAIN SEVERITY QUANTIFIED, NO PAIN PRESENT: ICD-10-PCS | Mod: CPTII,S$GLB,, | Performed by: NURSE PRACTITIONER

## 2022-09-22 PROCEDURE — 1160F PR REVIEW ALL MEDS BY PRESCRIBER/CLIN PHARMACIST DOCUMENTED: ICD-10-PCS | Mod: CPTII,S$GLB,, | Performed by: NURSE PRACTITIONER

## 2022-09-22 PROCEDURE — 1160F RVW MEDS BY RX/DR IN RCRD: CPT | Mod: CPTII,S$GLB,, | Performed by: NURSE PRACTITIONER

## 2022-09-22 PROCEDURE — 99999 PR PBB SHADOW E&M-EST. PATIENT-LVL V: ICD-10-PCS | Mod: PBBFAC,,, | Performed by: NURSE PRACTITIONER

## 2022-09-22 PROCEDURE — 3078F DIAST BP <80 MM HG: CPT | Mod: CPTII,S$GLB,, | Performed by: NURSE PRACTITIONER

## 2022-09-22 PROCEDURE — 1159F MED LIST DOCD IN RCRD: CPT | Mod: CPTII,S$GLB,, | Performed by: NURSE PRACTITIONER

## 2022-09-22 PROCEDURE — 4010F PR ACE/ARB THEARPY RXD/TAKEN: ICD-10-PCS | Mod: CPTII,S$GLB,, | Performed by: NURSE PRACTITIONER

## 2022-09-22 PROCEDURE — 1170F FXNL STATUS ASSESSED: CPT | Mod: CPTII,S$GLB,, | Performed by: NURSE PRACTITIONER

## 2022-09-22 PROCEDURE — G0439 PR MEDICARE ANNUAL WELLNESS SUBSEQUENT VISIT: ICD-10-PCS | Mod: S$GLB,,, | Performed by: NURSE PRACTITIONER

## 2022-09-22 PROCEDURE — 4010F ACE/ARB THERAPY RXD/TAKEN: CPT | Mod: CPTII,S$GLB,, | Performed by: NURSE PRACTITIONER

## 2022-09-22 PROCEDURE — 3078F PR MOST RECENT DIASTOLIC BLOOD PRESSURE < 80 MM HG: ICD-10-PCS | Mod: CPTII,S$GLB,, | Performed by: NURSE PRACTITIONER

## 2022-09-22 PROCEDURE — 3075F PR MOST RECENT SYSTOLIC BLOOD PRESS GE 130-139MM HG: ICD-10-PCS | Mod: CPTII,S$GLB,, | Performed by: NURSE PRACTITIONER

## 2022-09-22 PROCEDURE — 3075F SYST BP GE 130 - 139MM HG: CPT | Mod: CPTII,S$GLB,, | Performed by: NURSE PRACTITIONER

## 2022-09-22 PROCEDURE — 3288F FALL RISK ASSESSMENT DOCD: CPT | Mod: CPTII,S$GLB,, | Performed by: NURSE PRACTITIONER

## 2022-09-22 PROCEDURE — 3066F NEPHROPATHY DOC TX: CPT | Mod: CPTII,S$GLB,, | Performed by: NURSE PRACTITIONER

## 2022-09-22 PROCEDURE — 1170F PR FUNCTIONAL STATUS ASSESSED: ICD-10-PCS | Mod: CPTII,S$GLB,, | Performed by: NURSE PRACTITIONER

## 2022-09-22 PROCEDURE — 1101F PR PT FALLS ASSESS DOC 0-1 FALLS W/OUT INJ PAST YR: ICD-10-PCS | Mod: CPTII,S$GLB,, | Performed by: NURSE PRACTITIONER

## 2022-09-22 PROCEDURE — 1126F AMNT PAIN NOTED NONE PRSNT: CPT | Mod: CPTII,S$GLB,, | Performed by: NURSE PRACTITIONER

## 2022-09-22 PROCEDURE — 3044F HG A1C LEVEL LT 7.0%: CPT | Mod: CPTII,S$GLB,, | Performed by: NURSE PRACTITIONER

## 2022-09-22 PROCEDURE — 99999 PR PBB SHADOW E&M-EST. PATIENT-LVL V: CPT | Mod: PBBFAC,,, | Performed by: NURSE PRACTITIONER

## 2022-09-22 PROCEDURE — G0439 PPPS, SUBSEQ VISIT: HCPCS | Mod: S$GLB,,, | Performed by: NURSE PRACTITIONER

## 2022-09-22 PROCEDURE — 1159F PR MEDICATION LIST DOCUMENTED IN MEDICAL RECORD: ICD-10-PCS | Mod: CPTII,S$GLB,, | Performed by: NURSE PRACTITIONER

## 2022-09-22 PROCEDURE — 3061F PR NEG MICROALBUMINURIA RESULT DOCUMENTED/REVIEW: ICD-10-PCS | Mod: CPTII,S$GLB,, | Performed by: NURSE PRACTITIONER

## 2022-09-22 PROCEDURE — 3288F PR FALLS RISK ASSESSMENT DOCUMENTED: ICD-10-PCS | Mod: CPTII,S$GLB,, | Performed by: NURSE PRACTITIONER

## 2022-09-22 PROCEDURE — 3066F PR DOCUMENTATION OF TREATMENT FOR NEPHROPATHY: ICD-10-PCS | Mod: CPTII,S$GLB,, | Performed by: NURSE PRACTITIONER

## 2022-09-22 RX ORDER — TRIAMCINOLONE ACETONIDE 1 MG/G
CREAM TOPICAL
COMMUNITY
Start: 2022-06-23 | End: 2023-07-06

## 2022-09-22 NOTE — PROGRESS NOTES
Tyra Fernandes presented for a  Medicare AWV and comprehensive Health Risk Assessment today. The following components were reviewed and updated:    Medical history  Family History  Social history  Allergies and Current Medications  Health Risk Assessment  Health Maintenance  Care Team         ** See Completed Assessments for Annual Wellness Visit within the encounter summary.**         The following assessments were completed:  Living Situation  CAGE  Depression Screening  Timed Get Up and Go  Whisper Test  Cognitive Function Screening  Nutrition Screening  ADL Screening  PAQ Screening          Vitals:    09/22/22 1111   BP: 132/74   Pulse: 68   Temp: 98.2 °F (36.8 °C)   TempSrc: Oral   SpO2: 96%   Weight: 51.5 kg (113 lb 8.6 oz)   Height: 5' (1.524 m)     Body mass index is 22.17 kg/m².  Physical Exam  Constitutional:       Appearance: She is well-developed.   HENT:      Head: Normocephalic and atraumatic.      Nose: Nose normal.   Eyes:      General: Lids are normal.      Conjunctiva/sclera: Conjunctivae normal.      Pupils: Pupils are equal, round, and reactive to light.   Cardiovascular:      Rate and Rhythm: Normal rate.   Pulmonary:      Effort: Pulmonary effort is normal.   Musculoskeletal:      Cervical back: Full passive range of motion without pain.   Skin:     General: Skin is warm and dry.   Neurological:      Mental Status: She is alert and oriented to person, place, and time.   Psychiatric:         Speech: Speech normal.         Behavior: Behavior normal.             Diagnoses and health risks identified today and associated recommendations/orders:    1. Encounter for preventive health examination  Discussed health maintenance guidelines appropriate for age.        2. Stage 3b chronic kidney disease  Stable, continue to monitor   Followed by pcp    3. Essential hypertension  Controlled, continue current medication regimen  Low salt diet  Increase physical activity  Followed by pcp      4. Abnormality  of gait and mobility  [facility]      Provided Tyra with a 5-10 year written screening schedule and personal prevention plan. Recommendations were developed using the USPSTF age appropriate recommendations. Education, counseling, and referrals were provided as needed. After Visit Summary printed and given to patient which includes a list of additional screenings\tests needed.    Follow up for One year for Annual Wellness Visit.    Mackenzie Byers, NP      I offered to discuss advanced care planning, including how to pick a person who would make decisions for you if you were unable to make them for yourself, called a health care power of , and what kind of decisions you might make such as use of life sustaining treatments such as ventilators and tube feeding when faced with a life limiting illness recorded on a living will that they will need to know. (How you want to be cared for as you near the end of your natural life)     X Patient is interested in learning more about how to make advanced directives.  I provided them paperwork and offered to discuss this with them.

## 2022-09-22 NOTE — PATIENT INSTRUCTIONS
Counseling and Referral of Other Preventative  (Italic type indicates deductible and co-insurance are waived)    Patient Name: Tyra Fernandes  Today's Date: 9/22/2022    Health Maintenance       Date Due Completion Date    TETANUS VACCINE 06/23/2022 6/23/2012    Influenza Vaccine (1) 09/01/2022 9/17/2020    DEXA Scan 03/08/2025 3/8/2022    Lipid Panel 04/14/2027 4/14/2022        No orders of the defined types were placed in this encounter.    The following information is provided to all patients.  This information is to help you find resources for any of the problems found today that may be affecting your health:                Living healthy guide: www.[facility].[place].gov      Understanding Diabetes: www.diabetes.org      Eating healthy: www.cdc.gov/healthyweight      CDC home safety checklist: www.cdc.gov/steadi/patient.html      Agency on Aging: www.goea.[place].[facility]      Alcoholics anonymous (AA): www.aa.org      Physical Activity: www.deidra.nih.gov/sw7xkpn      Tobacco use: www.quitwithusla.org

## 2022-10-11 ENCOUNTER — OFFICE VISIT (OUTPATIENT)
Dept: CARDIOLOGY | Facility: CLINIC | Age: 77
End: 2022-10-11
Payer: MEDICARE

## 2022-10-11 VITALS
BODY MASS INDEX: 22.38 KG/M2 | HEART RATE: 71 BPM | DIASTOLIC BLOOD PRESSURE: 78 MMHG | WEIGHT: 114 LBS | SYSTOLIC BLOOD PRESSURE: 128 MMHG | OXYGEN SATURATION: 98 % | HEIGHT: 60 IN | RESPIRATION RATE: 16 BRPM

## 2022-10-11 DIAGNOSIS — Z86.39 HISTORY OF THYROTOXICOSIS: ICD-10-CM

## 2022-10-11 DIAGNOSIS — E78.2 MIXED HYPERLIPIDEMIA: ICD-10-CM

## 2022-10-11 DIAGNOSIS — I10 ESSENTIAL HYPERTENSION: Primary | ICD-10-CM

## 2022-10-11 DIAGNOSIS — R94.31 NONSPECIFIC ABNORMAL ELECTROCARDIOGRAM (ECG) (EKG): ICD-10-CM

## 2022-10-11 DIAGNOSIS — Z87.898 HISTORY OF SYNCOPE: ICD-10-CM

## 2022-10-11 DIAGNOSIS — R00.2 PALPITATIONS: ICD-10-CM

## 2022-10-11 DIAGNOSIS — N18.31 STAGE 3A CHRONIC KIDNEY DISEASE: ICD-10-CM

## 2022-10-11 PROCEDURE — 3074F PR MOST RECENT SYSTOLIC BLOOD PRESSURE < 130 MM HG: ICD-10-PCS | Mod: CPTII,S$GLB,, | Performed by: INTERNAL MEDICINE

## 2022-10-11 PROCEDURE — 3078F DIAST BP <80 MM HG: CPT | Mod: CPTII,S$GLB,, | Performed by: INTERNAL MEDICINE

## 2022-10-11 PROCEDURE — 3288F PR FALLS RISK ASSESSMENT DOCUMENTED: ICD-10-PCS | Mod: CPTII,S$GLB,, | Performed by: INTERNAL MEDICINE

## 2022-10-11 PROCEDURE — 3061F PR NEG MICROALBUMINURIA RESULT DOCUMENTED/REVIEW: ICD-10-PCS | Mod: CPTII,S$GLB,, | Performed by: INTERNAL MEDICINE

## 2022-10-11 PROCEDURE — 99214 OFFICE O/P EST MOD 30 MIN: CPT | Mod: S$GLB,,, | Performed by: INTERNAL MEDICINE

## 2022-10-11 PROCEDURE — 3044F PR MOST RECENT HEMOGLOBIN A1C LEVEL <7.0%: ICD-10-PCS | Mod: CPTII,S$GLB,, | Performed by: INTERNAL MEDICINE

## 2022-10-11 PROCEDURE — 1101F PT FALLS ASSESS-DOCD LE1/YR: CPT | Mod: CPTII,S$GLB,, | Performed by: INTERNAL MEDICINE

## 2022-10-11 PROCEDURE — 1126F PR PAIN SEVERITY QUANTIFIED, NO PAIN PRESENT: ICD-10-PCS | Mod: CPTII,S$GLB,, | Performed by: INTERNAL MEDICINE

## 2022-10-11 PROCEDURE — 93000 EKG 12-LEAD: ICD-10-PCS | Mod: S$GLB,,, | Performed by: SPECIALIST

## 2022-10-11 PROCEDURE — 1126F AMNT PAIN NOTED NONE PRSNT: CPT | Mod: CPTII,S$GLB,, | Performed by: INTERNAL MEDICINE

## 2022-10-11 PROCEDURE — 4010F PR ACE/ARB THEARPY RXD/TAKEN: ICD-10-PCS | Mod: CPTII,S$GLB,, | Performed by: INTERNAL MEDICINE

## 2022-10-11 PROCEDURE — 93000 ELECTROCARDIOGRAM COMPLETE: CPT | Mod: S$GLB,,, | Performed by: SPECIALIST

## 2022-10-11 PROCEDURE — 3078F PR MOST RECENT DIASTOLIC BLOOD PRESSURE < 80 MM HG: ICD-10-PCS | Mod: CPTII,S$GLB,, | Performed by: INTERNAL MEDICINE

## 2022-10-11 PROCEDURE — 4010F ACE/ARB THERAPY RXD/TAKEN: CPT | Mod: CPTII,S$GLB,, | Performed by: INTERNAL MEDICINE

## 2022-10-11 PROCEDURE — 1159F PR MEDICATION LIST DOCUMENTED IN MEDICAL RECORD: ICD-10-PCS | Mod: CPTII,S$GLB,, | Performed by: INTERNAL MEDICINE

## 2022-10-11 PROCEDURE — 99214 PR OFFICE/OUTPT VISIT, EST, LEVL IV, 30-39 MIN: ICD-10-PCS | Mod: S$GLB,,, | Performed by: INTERNAL MEDICINE

## 2022-10-11 PROCEDURE — 1160F RVW MEDS BY RX/DR IN RCRD: CPT | Mod: CPTII,S$GLB,, | Performed by: INTERNAL MEDICINE

## 2022-10-11 PROCEDURE — 3288F FALL RISK ASSESSMENT DOCD: CPT | Mod: CPTII,S$GLB,, | Performed by: INTERNAL MEDICINE

## 2022-10-11 PROCEDURE — 1159F MED LIST DOCD IN RCRD: CPT | Mod: CPTII,S$GLB,, | Performed by: INTERNAL MEDICINE

## 2022-10-11 PROCEDURE — 1101F PR PT FALLS ASSESS DOC 0-1 FALLS W/OUT INJ PAST YR: ICD-10-PCS | Mod: CPTII,S$GLB,, | Performed by: INTERNAL MEDICINE

## 2022-10-11 PROCEDURE — 3066F PR DOCUMENTATION OF TREATMENT FOR NEPHROPATHY: ICD-10-PCS | Mod: CPTII,S$GLB,, | Performed by: INTERNAL MEDICINE

## 2022-10-11 PROCEDURE — 3044F HG A1C LEVEL LT 7.0%: CPT | Mod: CPTII,S$GLB,, | Performed by: INTERNAL MEDICINE

## 2022-10-11 PROCEDURE — 1160F PR REVIEW ALL MEDS BY PRESCRIBER/CLIN PHARMACIST DOCUMENTED: ICD-10-PCS | Mod: CPTII,S$GLB,, | Performed by: INTERNAL MEDICINE

## 2022-10-11 PROCEDURE — 3061F NEG MICROALBUMINURIA REV: CPT | Mod: CPTII,S$GLB,, | Performed by: INTERNAL MEDICINE

## 2022-10-11 PROCEDURE — 3066F NEPHROPATHY DOC TX: CPT | Mod: CPTII,S$GLB,, | Performed by: INTERNAL MEDICINE

## 2022-10-11 PROCEDURE — 3074F SYST BP LT 130 MM HG: CPT | Mod: CPTII,S$GLB,, | Performed by: INTERNAL MEDICINE

## 2022-10-11 NOTE — PROGRESS NOTES
"Subjective:    Patient ID:  Tyra Fernandes is a 77 y.o. female     Chief Complaint   Patient presents with    Results       HPI:  Ms Tyra Fernandes is a 77 y.o. female is here for follow-up.    Patient recently had an echocardiogram done and is here for the results.    Her breathing is good denies any shortness of breath or difficulty in breathing denies any chest pain or tightness heaviness denies any dizziness or lightheadedness denies any loss of consciousness falls or head injury.  Denies any palpitations.  She has been taking all her medications regularly.        Review of patient's allergies indicates:   Allergen Reactions    Pcn [penicillins]     Penicillamine      Other reaction(s): Unknown    Propoxyphene n-acetaminophen      Other reaction(s): Unknown    Sulfa (sulfonamide antibiotics)        Past Medical History:   Diagnosis Date    Abnormal Pap smear     colpo then hyst    Allergy     Breast cancer 1997, 2000    Colon polyp     Fever blister     GERD (gastroesophageal reflux disease)     Hyperlipidemia     Hypertension     Hyperthyroidism     Kidney cysts     Osteoporosis, unspecified     Seasonal allergies      Past Surgical History:   Procedure Laterality Date    BILATERAL MASTECTOMY  02/2000    BLADDER REPAIR      BREAST RECONSTRUCTION      CATARACT EXTRACTION      COLONOSCOPY N/A 1/4/2016    Procedure: COLONOSCOPY;  Surgeon: Kieran Krishnamurthy MD;  Location: [facility] ([address] [place] [address]);  Service: Endoscopy;  Laterality: N/A;    COLONOSCOPY N/A 2/24/2021    Procedure: COLONOSCOPY;  Surgeon: Gila Lopez MD;  Location: [facility];  Service: Endoscopy;  Laterality: N/A;    COLONOSCOPY W/ POLYPECTOMY      HYSTERECTOMY  1994    JODI, ovaries remain ("pain")    LASIK      MASTECTOMY  2000    Bilateral    OOPHORECTOMY  1994    PARTIAL NEPHRECTOMY      cyst from left kidney    Toes surgery      Hammer toe surgery    TONSILLECTOMY       Social History     Tobacco Use    Smoking status: Never    Smokeless " tobacco: Never   Substance Use Topics    Alcohol use: Yes     Alcohol/week: 1.0 standard drink     Types: 1 Glasses of wine per week     Comment: Rare    Drug use: No     Family History   Problem Relation Age of Onset    Cancer Mother     Breast cancer Mother     Cancer Father         Lung Cancer    Cancer Sister     Osteoporosis Sister     Breast cancer Sister     Cancer Sister     Colon cancer Maternal Aunt     Cancer Maternal Aunt         colon    Colon cancer Paternal Aunt     Heart failure Paternal Aunt     Cancer Paternal Aunt         colon    Heart failure Paternal Uncle     Heart failure Paternal Grandmother     Breast cancer Cousin     Celiac disease Cousin     Breast cancer Cousin     Breast cancer Other     Diabetes Neg Hx     Eclampsia Neg Hx     Hypertension Neg Hx     Miscarriages / Stillbirths Neg Hx     Ovarian cancer Neg Hx      labor Neg Hx     Stroke Neg Hx     Melanoma Neg Hx     Cirrhosis Neg Hx     Crohn's disease Neg Hx     Ulcerative colitis Neg Hx     Stomach cancer Neg Hx     Rectal cancer Neg Hx     Liver cancer Neg Hx     Esophageal cancer Neg Hx     Irritable bowel syndrome Neg Hx         Review of Systems:   Constitution: Negative for diaphoresis and fever.   HEENT: Negative for nosebleeds.    Cardiovascular: Negative for chest pain       No dyspnea on exertion       No leg swelling        No palpitations  Respiratory: Negative for shortness of breath and wheezing.    Hematologic/Lymphatic: Negative for bleeding problem. Does not bruise/bleed easily.   Skin: Negative for color change and rash.   Musculoskeletal: Negative for falls and myalgias.   Gastrointestinal: Negative for hematemesis and hematochezia.   Genitourinary: Negative for hematuria.   Neurological: Negative for dizziness and light-headedness.   Psychiatric/Behavioral: Negative for altered mental status and memory loss.          Objective:        Vitals:    10/11/22 1414   BP: 128/78   Pulse: 71   Resp: 16       Lab  Results   Component Value Date    WBC 5.99 07/05/2022    HGB 11.4 (L) 07/05/2022    HCT 35.6 (L) 07/05/2022     07/05/2022    CHOL 165 04/14/2022    TRIG 96 04/14/2022    HDL 61 04/14/2022    ALT 9 (L) 07/05/2022    AST 17 07/05/2022     07/05/2022    K 4.6 07/05/2022     07/05/2022    CREATININE 1.5 (H) 07/05/2022    BUN 20 07/05/2022    CO2 25 07/05/2022    TSH 2.140 07/05/2022    INR 1.0 04/12/2022    HGBA1C 5.7 (H) 04/14/2022        ECHOCARDIOGRAM RESULTS  Results for orders placed during the hospital encounter of 08/03/22    Echo    Interpretation Summary  · The left ventricle is normal in size with concentric remodeling and low normal systolic function.  · The estimated ejection fraction is 50%.  · Normal left ventricular diastolic function.  · Normal right ventricular size with normal right ventricular systolic function.  · Mild tricuspid regurgitation.        CURRENT/PREVIOUS VISIT EKG  Results for orders placed or performed in visit on 02/10/22   IN OFFICE EKG 12-LEAD (to [place])    Collection Time: 02/10/22  1:06 PM    Narrative    Test Reason : R94.31,    Vent. Rate : 075 BPM     Atrial Rate : 075 BPM     P-R Int : 142 ms          QRS Dur : 082 ms      QT Int : 372 ms       P-R-T Axes : 078 080 039 degrees     QTc Int : 415 ms    Normal sinus rhythm  Normal ECG  When compared with ECG of 05-AUG-2021 16:19,  Premature ventricular complexes are no longer Present  Confirmed by Hua Hancock MD ([id number]) on 2/27/2022 9:36:43 PM    Referred By: [email or web address]   SELF           Confirmed By:Hua Hancock MD     No valid procedures specified.   No results found for this or any previous visit.      Physical Exam:  CONSTITUTIONAL: No fever, no chills  HEENT: Normocephalic, atraumatic,pupils reactive to light                 NECK:  No JVD no carotid bruit  CVS: S1S2+, RRR, systolic murmurs,   LUNGS: Clear  ABDOMEN: Soft, NT, BS+  EXTREMITIES: No cyanosis, edema  : No bell catheter  NEURO: AAO X 3  PSY:  Normal affect      Medication List with Changes/Refills   Current Medications    ALBUTEROL (VENTOLIN HFA) 90 MCG/ACTUATION INHALER    Inhale 2 puffs into the lungs every 6 (six) hours as needed for Wheezing or Shortness of Breath. Rescue    ASCORBIC ACID, VITAMIN C, (VITAMIN C) 500 MG TABLET    Take 500 mg by mouth once daily.    ASCORBIC ACID/MULTIVIT-MIN (EMERGEN-C ORAL)    Take by mouth.    ASPIRIN (ECOTRIN) 81 MG EC TABLET    Take 81 mg by mouth once daily.    BENZONATATE (TESSALON) 200 MG CAPSULE    Take 1 capsule (200 mg total) by mouth every 8 (eight) hours as needed for Cough.    CALCIUM-VITAMIN D 250-100 MG-UNIT PER TABLET    Take 2 tablets by mouth once daily.    CETIRIZINE (ZYRTEC) 10 MG TABLET    Take 1 tablet (10 mg total) by mouth once daily.    ERGOCALCIFEROL (ERGOCALCIFEROL) 50,000 UNIT CAP    Take 1 capsule (50,000 Units total) by mouth every 7 days.    ERGOCALCIFEROL, VITAMIN D2, (VITAMIN D ORAL)    Take by mouth.    FLUOCINONIDE 0.05% (LIDEX) 0.05 % CREAM    Apply topically 2 (two) times daily.    FLUTICASONE PROPIONATE (FLONASE) 50 MCG/ACTUATION NASAL SPRAY    1 spray (50 mcg total) by Each Nostril route once daily.    LORATADINE/PSEUDOEPHEDRINE (ALAVERT D-12 ALLERGY-SINUS ORAL)    Take 1 tablet by mouth once daily.    MAGNESIUM OXIDE 84.5 MG MAG (140 MG) CAP    Take 125 tablets by mouth once daily.    RALOXIFENE (EVISTA) 60 MG TABLET    Take 1 tablet (60 mg total) by mouth once daily.    ROSUVASTATIN (CRESTOR) 20 MG TABLET    Take 1 tablet (20 mg total) by mouth once daily.    SHINGRIX, PF, 50 MCG/0.5 ML INJECTION        TRIAMCINOLONE ACETONIDE 0.1% (KENALOG) 0.1 % CREAM        VALSARTAN (DIOVAN) 40 MG TABLET    Take 1 tablet (40 mg total) by mouth once daily.    VIT C/E/ZN/COPPR/LUTEIN/ZEAXAN (PRESERVISION AREDS-2 ORAL)    Take 1 capsule by mouth 2 (two) times daily. 1 every other day    VITAMINS  A,C,E-ZINC-COPPER (PRESERVISION AREDS) [phone] UNIT-MG-UNIT CAP    as directed              Assessment:       1. Essential hypertension    2. Stage 3a chronic kidney disease    3. History of syncope    4. History of thyrotoxicosis    5. Palpitations    6. Mixed hyperlipidemia    7. Nonspecific abnormal electrocardiogram (ECG) (EKG)         Plan:   1. Essential hypertension   Patient's blood pressure is well controlled is 128/78 she is currently on valsartan 40 mg p.o. q.day continue the same.  2. History of syncope.    Patient has not had any further episodes of syncope or near syncope.    Patient also would benefit from wearing compression stockings.    Patient to keep herself adequately hydrated.    3. Stage IIIA chronic kidney disease   At the present time patient is stable and patient to keep herself adequately hydrated and avoid dehydration.    4. Echocardiogram  Reviewed the echocardiogram and she has normal size and low normal systolic function with an left ventricle ejection fraction of 50%.  Normal right ventricle size and function and she has mild tricuspid regurgitation.    5. EKG  Reviewed EKG independently patient is in normal sinus rhythm with heart rate of 71 beats per minute and poor R-wave progression anterior precordial leads borderline nonspecific ST T-wave changes.  6. Patient to continue current management and I will see her back in the office in 6 months time.            Problem List Items Addressed This Visit          Cardiac/Vascular    Essential hypertension - Primary    Palpitations    Mixed hyperlipidemia    Nonspecific abnormal electrocardiogram (ECG) (EKG)       Renal/    Stage 3a chronic kidney disease       Endocrine    History of thyrotoxicosis    Overview     Seen in prior pcp notes from 2012   Hx of iodine induced hypothyroidism in 2014             Other    History of syncope       No follow-ups on file.

## 2022-10-24 ENCOUNTER — OFFICE VISIT (OUTPATIENT)
Dept: ENDOCRINOLOGY | Facility: CLINIC | Age: 77
End: 2022-10-24
Payer: MEDICARE

## 2022-10-24 VITALS
SYSTOLIC BLOOD PRESSURE: 130 MMHG | HEIGHT: 60 IN | OXYGEN SATURATION: 99 % | BODY MASS INDEX: 22.58 KG/M2 | HEART RATE: 71 BPM | WEIGHT: 115 LBS | TEMPERATURE: 98 F | DIASTOLIC BLOOD PRESSURE: 60 MMHG

## 2022-10-24 DIAGNOSIS — N18.31 STAGE 3A CHRONIC KIDNEY DISEASE: ICD-10-CM

## 2022-10-24 DIAGNOSIS — Z90.13 HISTORY OF BILATERAL MASTECTOMY: ICD-10-CM

## 2022-10-24 DIAGNOSIS — K21.9 GASTROESOPHAGEAL REFLUX DISEASE WITHOUT ESOPHAGITIS: ICD-10-CM

## 2022-10-24 DIAGNOSIS — R80.8 OTHER PROTEINURIA: ICD-10-CM

## 2022-10-24 DIAGNOSIS — R73.09 DYSGLYCEMIA: ICD-10-CM

## 2022-10-24 DIAGNOSIS — D50.9 IRON DEFICIENCY ANEMIA, UNSPECIFIED IRON DEFICIENCY ANEMIA TYPE: ICD-10-CM

## 2022-10-24 DIAGNOSIS — E78.49 OTHER HYPERLIPIDEMIA: ICD-10-CM

## 2022-10-24 DIAGNOSIS — E78.00 HYPERCHOLESTEREMIA: ICD-10-CM

## 2022-10-24 DIAGNOSIS — Z78.0 POSTMENOPAUSAL: ICD-10-CM

## 2022-10-24 DIAGNOSIS — N28.1 KIDNEY CYSTS: ICD-10-CM

## 2022-10-24 DIAGNOSIS — I10 ESSENTIAL HYPERTENSION: ICD-10-CM

## 2022-10-24 DIAGNOSIS — Z86.39 HISTORY OF THYROTOXICOSIS: ICD-10-CM

## 2022-10-24 DIAGNOSIS — E53.8 B12 DEFICIENCY: ICD-10-CM

## 2022-10-24 DIAGNOSIS — E55.9 HYPOVITAMINOSIS D: ICD-10-CM

## 2022-10-24 DIAGNOSIS — Z85.3 HISTORY OF BREAST CANCER: ICD-10-CM

## 2022-10-24 DIAGNOSIS — R73.03 PREDIABETES: ICD-10-CM

## 2022-10-24 DIAGNOSIS — Z86.010 HISTORY OF COLON POLYPS: ICD-10-CM

## 2022-10-24 DIAGNOSIS — M81.8 IDIOPATHIC OSTEOPOROSIS: Primary | ICD-10-CM

## 2022-10-24 PROCEDURE — 3288F PR FALLS RISK ASSESSMENT DOCUMENTED: ICD-10-PCS | Mod: CPTII,S$GLB,, | Performed by: INTERNAL MEDICINE

## 2022-10-24 PROCEDURE — 3075F SYST BP GE 130 - 139MM HG: CPT | Mod: CPTII,S$GLB,, | Performed by: INTERNAL MEDICINE

## 2022-10-24 PROCEDURE — 1126F AMNT PAIN NOTED NONE PRSNT: CPT | Mod: CPTII,S$GLB,, | Performed by: INTERNAL MEDICINE

## 2022-10-24 PROCEDURE — 99214 PR OFFICE/OUTPT VISIT, EST, LEVL IV, 30-39 MIN: ICD-10-PCS | Mod: S$GLB,,, | Performed by: INTERNAL MEDICINE

## 2022-10-24 PROCEDURE — 3044F PR MOST RECENT HEMOGLOBIN A1C LEVEL <7.0%: ICD-10-PCS | Mod: CPTII,S$GLB,, | Performed by: INTERNAL MEDICINE

## 2022-10-24 PROCEDURE — 99214 OFFICE O/P EST MOD 30 MIN: CPT | Mod: S$GLB,,, | Performed by: INTERNAL MEDICINE

## 2022-10-24 PROCEDURE — 1159F PR MEDICATION LIST DOCUMENTED IN MEDICAL RECORD: ICD-10-PCS | Mod: CPTII,S$GLB,, | Performed by: INTERNAL MEDICINE

## 2022-10-24 PROCEDURE — 3066F PR DOCUMENTATION OF TREATMENT FOR NEPHROPATHY: ICD-10-PCS | Mod: CPTII,S$GLB,, | Performed by: INTERNAL MEDICINE

## 2022-10-24 PROCEDURE — 4010F ACE/ARB THERAPY RXD/TAKEN: CPT | Mod: CPTII,S$GLB,, | Performed by: INTERNAL MEDICINE

## 2022-10-24 PROCEDURE — 1159F MED LIST DOCD IN RCRD: CPT | Mod: CPTII,S$GLB,, | Performed by: INTERNAL MEDICINE

## 2022-10-24 PROCEDURE — 1160F PR REVIEW ALL MEDS BY PRESCRIBER/CLIN PHARMACIST DOCUMENTED: ICD-10-PCS | Mod: CPTII,S$GLB,, | Performed by: INTERNAL MEDICINE

## 2022-10-24 PROCEDURE — 3288F FALL RISK ASSESSMENT DOCD: CPT | Mod: CPTII,S$GLB,, | Performed by: INTERNAL MEDICINE

## 2022-10-24 PROCEDURE — 1101F PR PT FALLS ASSESS DOC 0-1 FALLS W/OUT INJ PAST YR: ICD-10-PCS | Mod: CPTII,S$GLB,, | Performed by: INTERNAL MEDICINE

## 2022-10-24 PROCEDURE — 3078F DIAST BP <80 MM HG: CPT | Mod: CPTII,S$GLB,, | Performed by: INTERNAL MEDICINE

## 2022-10-24 PROCEDURE — 1160F RVW MEDS BY RX/DR IN RCRD: CPT | Mod: CPTII,S$GLB,, | Performed by: INTERNAL MEDICINE

## 2022-10-24 PROCEDURE — 3044F HG A1C LEVEL LT 7.0%: CPT | Mod: CPTII,S$GLB,, | Performed by: INTERNAL MEDICINE

## 2022-10-24 PROCEDURE — 3078F PR MOST RECENT DIASTOLIC BLOOD PRESSURE < 80 MM HG: ICD-10-PCS | Mod: CPTII,S$GLB,, | Performed by: INTERNAL MEDICINE

## 2022-10-24 PROCEDURE — 4010F PR ACE/ARB THEARPY RXD/TAKEN: ICD-10-PCS | Mod: CPTII,S$GLB,, | Performed by: INTERNAL MEDICINE

## 2022-10-24 PROCEDURE — 3061F PR NEG MICROALBUMINURIA RESULT DOCUMENTED/REVIEW: ICD-10-PCS | Mod: CPTII,S$GLB,, | Performed by: INTERNAL MEDICINE

## 2022-10-24 PROCEDURE — 3075F PR MOST RECENT SYSTOLIC BLOOD PRESS GE 130-139MM HG: ICD-10-PCS | Mod: CPTII,S$GLB,, | Performed by: INTERNAL MEDICINE

## 2022-10-24 PROCEDURE — 1101F PT FALLS ASSESS-DOCD LE1/YR: CPT | Mod: CPTII,S$GLB,, | Performed by: INTERNAL MEDICINE

## 2022-10-24 PROCEDURE — 1126F PR PAIN SEVERITY QUANTIFIED, NO PAIN PRESENT: ICD-10-PCS | Mod: CPTII,S$GLB,, | Performed by: INTERNAL MEDICINE

## 2022-10-24 PROCEDURE — 99999 PR PBB SHADOW E&M-EST. PATIENT-LVL IV: CPT | Mod: PBBFAC,,, | Performed by: INTERNAL MEDICINE

## 2022-10-24 PROCEDURE — 3061F NEG MICROALBUMINURIA REV: CPT | Mod: CPTII,S$GLB,, | Performed by: INTERNAL MEDICINE

## 2022-10-24 PROCEDURE — 3066F NEPHROPATHY DOC TX: CPT | Mod: CPTII,S$GLB,, | Performed by: INTERNAL MEDICINE

## 2022-10-24 PROCEDURE — 99999 PR PBB SHADOW E&M-EST. PATIENT-LVL IV: ICD-10-PCS | Mod: PBBFAC,,, | Performed by: INTERNAL MEDICINE

## 2022-10-24 RX ORDER — RALOXIFENE HYDROCHLORIDE 60 MG/1
60 TABLET, FILM COATED ORAL DAILY
Qty: 90 TABLET | Refills: 3 | Status: SHIPPED | OUTPATIENT
Start: 2022-10-24 | End: 2023-01-11 | Stop reason: SDUPTHER

## 2022-10-24 NOTE — PROGRESS NOTES
"Subjective:      Patient ID: Tyra Riley is a 77 y.o. female.    Chief Complaint:      Patient is a 77 postmenopausal lady with osteoporosis seen in [facility]  today.     History of Present Illness    The patient, Ms Riley  Is a 77 yr old postmenopausal lady with osteoporosis seen in [facility] today with me. She had previously been seen and ffed at [facility] where she had seen Dr Frazier and Dr Ordoñez.  She was first diagnosed~ 14 years ago.  Patients baseline Yale score is 7.     Denies falls or  fractures since last visit. Denies loss of height. Has back pain but occurs with prolonged standing. ("in the kitchen and cooking for awhile or doing house work.").      There is a +ve family history of osteopoosis in patients younger sister who is now .  Patient  Had a short term history of thyrotoxicosis which was medically treated for a few months. Did not require ablation nor surgery.     Previous medication:  Alendronate  - .  reclast , ,  and   Prolia: 2018, 3/2018,  , 2020 (this was the latest received @ [facility])  Her most recent DEXA from 2022 continued to show osteoporosis of the hip and spine but with significant improvement in bone strength compared to 2 yrs prior. Her next DEXA should be for ~ . She completed a 6 course program of prolia and the last injection she received was from 2020. Since then she has been on evista as bridge therapy.     Supplementations:  Calcium: citra Niraj + D one tablet daily   Vitamin D: 2000 IUs daily     Exercise: line dance once a week  Not very active, volunteers at [employer]     Stopped taking prilosec more than one year ago. Occasionally takes gas-X after a meal.      Her background comorbidities are detailed below;     1. Idiopathic osteoporosis      2. History of breast cancer      3. History of thyrotoxicosis      4. Gastroesophageal reflux disease without esophagitis      5. B12 deficiency      6. Essential " hypertension      7. Other hyperlipidemia      8. Hypercholesteremia      9. Postmenopausal      10. Hypovitaminosis D         Patient had bilateral mastectomy on account of breast ca (2000) ?? Hormone status and receptor status.  Patient has received her COVID vaccinations and has also received the flu vaccinations.  She has not had any falls or fractures since her last visit.           Patient has received her next prolia injection (# 5); 11/9/2020.  She is on Evista 60mg Qd presently and is tolerating it  Well.  Patient has not had any falls since her last visit.  She has no fresh complaints today. She has not had any falls since her last visit.         Review of Systems   Constitutional:  Negative for chills, fever and unexpected weight change.   HENT:  Negative for congestion, sinus pressure, trouble swallowing and voice change.    Eyes:  Negative for visual disturbance.   Respiratory:  Negative for chest tightness and shortness of breath.    Cardiovascular:  Negative for chest pain, palpitations and leg swelling.   Gastrointestinal:  Negative for abdominal pain and vomiting.   Genitourinary:  Negative for dysuria.   Musculoskeletal:  Negative for arthralgias and myalgias.   Skin:  Negative for rash.   Neurological:  Negative for weakness, numbness and headaches.   Hematological:  Does not bruise/bleed easily.   Psychiatric/Behavioral:  Negative for sleep disturbance.      Objective:    /60 (BP Location: Left arm, Patient Position: Sitting, BP Method: Medium (Manual))   Pulse 71   Temp 98.1 °F (36.7 °C) (Oral)   Ht 5' (1.524 m)   Wt 52.2 kg (114 lb 15.5 oz)   SpO2 99%   BMI 22.45 kg/m² Body surface area is 1.49 meters squared. Her weight is stable thuis far this year.               Physical Exam  Vitals reviewed.   Constitutional:       Appearance: Normal appearance.      Comments: Pleasant elderly lady with petite build. She is not pale, anicteric and afebrile. Well hydrated.  Clinically  comfortable. Not in any active distress. Patient looks significantly younger than stated chronologic age.   HENT:      Head: Normocephalic.      Mouth/Throat:      Mouth: Mucous membranes are moist.      Pharynx: No posterior oropharyngeal erythema.   Eyes:      General: No scleral icterus.     Conjunctiva/sclera: Conjunctivae normal.      Pupils: Pupils are equal, round, and reactive to light.   Neck:      Vascular: No carotid bruit.   Cardiovascular:      Rate and Rhythm: Normal rate and regular rhythm.      Pulses: Normal pulses.      Heart sounds: Normal heart sounds. No murmur heard.  Pulmonary:      Effort: Pulmonary effort is normal. No respiratory distress.      Breath sounds: Normal breath sounds. No stridor. No wheezing, rhonchi or rales.   Abdominal:      General: Abdomen is flat. Bowel sounds are normal. There is no distension.      Tenderness: There is no abdominal tenderness.   Musculoskeletal:         General: No swelling. Normal range of motion.      Cervical back: Normal range of motion and neck supple. No rigidity. No muscular tenderness.   Lymphadenopathy:      Cervical: No cervical adenopathy.   Skin:     General: Skin is warm and dry.      Coloration: Skin is not jaundiced or pale.      Findings: No bruising or rash.   Neurological:      General: No focal deficit present.      Mental Status: She is alert and oriented to person, place, and time.      Cranial Nerves: No cranial nerve deficit.   Psychiatric:         Mood and Affect: Mood normal.         Behavior: Behavior normal.         Thought Content: Thought content normal.       Lab Review:      Latest Reference Range & Units 04/12/22 15:09 04/12/22 15:18 04/14/22 13:46 07/05/22 16:25   WBC 3.90 - 12.70 K/uL   5.59 5.99   RBC 4.00 - 5.40 M/uL   3.31 (L) 3.62 (L)   Hemoglobin 12.0 - 16.0 g/dL   10.3 (L) 11.4 (L)   Hematocrit 37.0 - 48.5 %   33.0 (L) 35.6 (L)   MCV 82 - 98 fL   100 (H) 98   MCH 27.0 - 31.0 pg   31.1 (H) 31.5 (H)   MCHC 32.0 -  36.0 g/dL   31.2 (L) 32.0   RDW 11.5 - 14.5 %   12.2 12.6   Platelets 150 - 450 K/uL   212 242   MPV 9.2 - 12.9 fL   10.9 10.7   Gran % 38.0 - 73.0 %   64.6 71.0   Lymph % 18.0 - 48.0 %   26.1 19.5   Mono % 4.0 - 15.0 %   6.4 7.0   Eosinophil % 0.0 - 8.0 %   2.1 1.7   Basophil % 0.0 - 1.9 %   0.4 0.3   Immature Granulocytes 0.0 - 0.5 %   0.4 0.5   Gran # (ANC) 1.8 - 7.7 K/uL   3.6 4.3   Lymph # 1.0 - 4.8 K/uL   1.5 1.2   Mono # 0.3 - 1.0 K/uL   0.4 0.4   Eos # 0.0 - 0.5 K/uL   0.1 0.1   Baso # 0.00 - 0.20 K/uL   0.02 0.02   Immature Grans (Abs) 0.00 - 0.04 K/uL   0.02 0.03   nRBC 0 /100 WBC   0 0   Differential Method    Automated Automated   Iron 30 - 160 ug/dL   74    TIBC 250 - 450 ug/dL   337    Saturated Iron 20 - 50 %   22    Transferrin 200 - 375 mg/dL  200 - 375 mg/dL   228  228    Ferritin 20.0 - 300.0 ng/mL   69    Folate 4.0 - 24.0 ng/mL   32.2 (H)    Vitamin B-12 210 - 950 pg/mL  1116 (H) 1335 (H) 1393 (H)   Protime 9.0 - 12.5 sec  10.7     INR 0.8 - 1.2   1.0     aPTT 21.0 - 32.0 sec  27.9     Sodium 136 - 145 mmol/L   142 140   Potassium 3.5 - 5.1 mmol/L   4.1 4.6   Chloride 95 - 110 mmol/L   107 106   CO2 23 - 29 mmol/L   27 25   Anion Gap 8 - 16 mmol/L   8 9   BUN 8 - 23 mg/dL   28 (H) 20   Creatinine 0.5 - 1.4 mg/dL   1.4 1.5 (H)   eGFR if non African American >60 mL/min/1.73 m^2   36.8 ! 33.8 !   eGFR if African American >60 mL/min/1.73 m^2   42.4 ! 39.0 !   Glucose 70 - 110 mg/dL   122 (H) 82   Calcium 8.7 - 10.5 mg/dL   9.7 9.6   Ionized Calcium 1.06 - 1.42 mmol/L   1.31    Phosphorus 2.7 - 4.5 mg/dL  4.2     Magnesium 1.6 - 2.6 mg/dL  2.1     Alkaline Phosphatase 55 - 135 U/L   38 (L) 43 (L)   PROTEIN TOTAL 6.0 - 8.4 g/dL   7.2 7.7   Albumin 3.5 - 5.2 g/dL   3.5 3.7   Uric Acid 2.4 - 5.7 mg/dL   6.1 (H)    BILIRUBIN TOTAL 0.1 - 1.0 mg/dL   0.3 0.2   AST 10 - 40 U/L   21 17   ALT 10 - 44 U/L   8 (L) 9 (L)   Ammonia 10 - 50 umol/L    15   Cholesterol 120 - 199 mg/dL   165    HDL 40 - 75 mg/dL   61     HDL/Cholesterol Ratio 20.0 - 50.0 %   37.0    LDL Cholesterol External 63.0 - 159.0 mg/dL   84.8    Non-HDL Cholesterol mg/dL   104    Total Cholesterol/HDL Ratio 2.0 - 5.0    2.7    Triglycerides 30 - 150 mg/dL   96    Thiamine 38 - 122 ug/L    60   Vit D, 1,25-Dihydroxy 20 - 79 pg/mL  36     Vit D, 25-Hydroxy 30 - 96 ng/mL   55    Hemoglobin A1C External 4.0 - 5.6 %   5.7 (H)    Estimated Avg Glucose 68 - 131 mg/dL   117    TSH 0.400 - 4.000 uIU/mL  2.230  2.140   T3, Total 60 - 180 ng/dL  101     Free T4 0.71 - 1.51 ng/dL  0.81     Thyroglobulin Interpretation   SEE BELOW     Thyroglobulin Antibody Screen <1.8 IU/mL  2.0 (H)     Thyroglobulin, Tumor Marker ng/mL  12 (H)     PTH 9.0 - 77.0 pg/mL   43.1    RPR Non-reactive     Non-reactive   Specimen UA  Urine, Clean Catch      Color, UA Yellow, Straw, Riana  Yellow      Appearance, UA Clear  Hazy !      Specific Gravity, UA 1.005 - 1.030  1.020      pH, UA 5.0 - 8.0  5.0      Protein, UA Negative  Negative      Glucose, UA Negative  Negative      Ketones, UA Negative  Negative      Occult Blood UA Negative  Negative      NITRITE UA Negative  Negative      Bilirubin (UA) Negative  Negative      Leukocytes, UA Negative  2+ !      RBC, UA 0 - 4 /hpf 14 (H)      WBC, UA 0 - 5 /hpf 13 (H)      Bacteria, UA None-Occ /hpf Few !      Squam Epithel, UA /hpf 8      Microscopic Comment  SEE COMMENT      Calcium, Urine 0.0 - 15.0 mg/dL 5.3      Creatinine, Urine 15.0 - 325.0 mg/dL  15.0 - 325.0 mg/dL 149.0  149.0      Microalbumin, Urine ug/mL 29.0      Phosphorus, Ur Not established mg/dL 92.0      MICROALB/CREAT RATIO 0.0 - 30.0 ug/mg 19.5      (L): Data is abnormally low  (H): Data is abnormally high  !: Data is abnormal      Assessment:     1. Idiopathic osteoporosis  raloxifene (EVISTA) 60 mg tablet    Comprehensive Metabolic Panel    Calcium, Ionized    PTH, Intact    Vitamin D      2. Hypovitaminosis D  Calcium, Ionized    PTH, Intact    Vitamin D      3. History of  thyrotoxicosis        4. B12 deficiency        5. Gastroesophageal reflux disease without esophagitis        6. History of colon polyps        7. Prediabetes  Hemoglobin A1C      8. Iron deficiency anemia, unspecified iron deficiency anemia type        9. History of breast cancer        10. Stage 3a chronic kidney disease        11. Other proteinuria        12. Postmenopausal        13. History of bilateral mastectomy        14. Kidney cysts        15. Essential hypertension        16. Other hyperlipidemia        17. Hypercholesteremia        18. Dysglycemia  Hemoglobin A1C             1. Idiopathic osteoporosis  Risk factors include: family history of osteoporosis (sister), PPI use in the past, JODI with BSO at age 49 years used estrogen for three years, diagnosed with breast cancer (first surgery: lobectomy, second surgery three years later: mastectomy b/l, radiation only)     To continue Evista as before for now. As patients recent DEXA has shown significant substantive improvement in bone strength patient would prefer to continue  Evista rather than restart Prolia injections. For ffup DEXA ~ 03/24. Will plan to obtain bone turnover markers at next visit.     Patient has no had any recent falls and had no recent fractures.     To continue calcium and vitamin supplementation as before.      2. Vitamin D deficiency     To check 25 OH Vit D levels and replete as needed     3. Regarding GERD; clinically stable.      4.  Regarding essential hypertension; BP well controlled. No change to present antihypertensive regimen. To continue serial ambulatory BP and pulse profile tracking.     Regarding hyperlipidemia; To check current lipid panel and for now to continue present antilipidemic regimen.      5. Regarding history of breast cancer; apparently cured.      6. Regarding history of thyrotoxicosis; now resolved. Most recent TFTs were normal.    Plan:       FFup in 6mths

## 2023-01-11 DIAGNOSIS — M81.8 IDIOPATHIC OSTEOPOROSIS: ICD-10-CM

## 2023-01-11 RX ORDER — RALOXIFENE HYDROCHLORIDE 60 MG/1
60 TABLET, FILM COATED ORAL DAILY
Qty: 90 TABLET | Refills: 3 | Status: SHIPPED | OUTPATIENT
Start: 2023-01-11 | End: 2023-01-13 | Stop reason: SDUPTHER

## 2023-01-11 NOTE — TELEPHONE ENCOUNTER
Additional Information: pt is calling the office stating she is completely out of raloxifene (EVISTA) 60 mg tablet and mail order would take another 7-10 days. She would like some called in to [facility]. Please call back and advise

## 2023-01-12 DIAGNOSIS — M81.8 IDIOPATHIC OSTEOPOROSIS: ICD-10-CM

## 2023-01-12 NOTE — TELEPHONE ENCOUNTER
Refill request for raloxifene sent by fax from [facility] pharmacy. Refill sent in on 1/11/23

## 2023-01-13 DIAGNOSIS — M81.8 IDIOPATHIC OSTEOPOROSIS: ICD-10-CM

## 2023-01-13 RX ORDER — RALOXIFENE HYDROCHLORIDE 60 MG/1
60 TABLET, FILM COATED ORAL DAILY
Qty: 90 TABLET | Refills: 3 | Status: SHIPPED | OUTPATIENT
Start: 2023-01-13 | End: 2023-07-06

## 2023-05-15 ENCOUNTER — LAB VISIT (OUTPATIENT)
Dept: LAB | Facility: HOSPITAL | Age: 78
End: 2023-05-15
Attending: INTERNAL MEDICINE
Payer: MEDICARE

## 2023-05-15 DIAGNOSIS — M81.8 IDIOPATHIC OSTEOPOROSIS: ICD-10-CM

## 2023-05-15 DIAGNOSIS — R73.03 PREDIABETES: ICD-10-CM

## 2023-05-15 DIAGNOSIS — R73.09 DYSGLYCEMIA: ICD-10-CM

## 2023-05-15 DIAGNOSIS — E55.9 HYPOVITAMINOSIS D: ICD-10-CM

## 2023-05-15 LAB
25(OH)D3+25(OH)D2 SERPL-MCNC: 47 NG/ML (ref 30–96)
ALBUMIN SERPL BCP-MCNC: 3.6 G/DL (ref 3.5–5.2)
ALP SERPL-CCNC: 40 U/L (ref 55–135)
ALT SERPL W/O P-5'-P-CCNC: 9 U/L (ref 10–44)
ANION GAP SERPL CALC-SCNC: 9 MMOL/L (ref 8–16)
AST SERPL-CCNC: 18 U/L (ref 10–40)
BILIRUB SERPL-MCNC: 0.4 MG/DL (ref 0.1–1)
BUN SERPL-MCNC: 29 MG/DL (ref 8–23)
CA-I BLDV-SCNC: 1.27 MMOL/L (ref 1.06–1.42)
CALCIUM SERPL-MCNC: 9.3 MG/DL (ref 8.7–10.5)
CHLORIDE SERPL-SCNC: 107 MMOL/L (ref 95–110)
CO2 SERPL-SCNC: 25 MMOL/L (ref 23–29)
CREAT SERPL-MCNC: 1.6 MG/DL (ref 0.5–1.4)
EST. GFR  (NO RACE VARIABLE): 33.2 ML/MIN/1.73 M^2
ESTIMATED AVG GLUCOSE: 105 MG/DL (ref 68–131)
GLUCOSE SERPL-MCNC: 92 MG/DL (ref 70–110)
HBA1C MFR BLD: 5.3 % (ref 4–5.6)
POTASSIUM SERPL-SCNC: 4.6 MMOL/L (ref 3.5–5.1)
PROT SERPL-MCNC: 7.1 G/DL (ref 6–8.4)
PTH-INTACT SERPL-MCNC: 123.8 PG/ML (ref 9–77)
SODIUM SERPL-SCNC: 141 MMOL/L (ref 136–145)

## 2023-05-15 PROCEDURE — 83036 HEMOGLOBIN GLYCOSYLATED A1C: CPT | Performed by: INTERNAL MEDICINE

## 2023-05-15 PROCEDURE — 80053 COMPREHEN METABOLIC PANEL: CPT | Performed by: INTERNAL MEDICINE

## 2023-05-15 PROCEDURE — 83970 ASSAY OF PARATHORMONE: CPT | Performed by: INTERNAL MEDICINE

## 2023-05-15 PROCEDURE — 82330 ASSAY OF CALCIUM: CPT | Performed by: INTERNAL MEDICINE

## 2023-05-15 PROCEDURE — 82306 VITAMIN D 25 HYDROXY: CPT | Performed by: INTERNAL MEDICINE

## 2023-05-15 PROCEDURE — 36415 COLL VENOUS BLD VENIPUNCTURE: CPT | Mod: PO | Performed by: INTERNAL MEDICINE

## 2023-05-22 ENCOUNTER — LAB VISIT (OUTPATIENT)
Dept: LAB | Facility: HOSPITAL | Age: 78
End: 2023-05-22
Attending: INTERNAL MEDICINE
Payer: MEDICARE

## 2023-05-22 ENCOUNTER — OFFICE VISIT (OUTPATIENT)
Dept: ENDOCRINOLOGY | Facility: CLINIC | Age: 78
End: 2023-05-22
Payer: MEDICARE

## 2023-05-22 VITALS
HEIGHT: 60 IN | OXYGEN SATURATION: 99 % | WEIGHT: 110.44 LBS | DIASTOLIC BLOOD PRESSURE: 84 MMHG | SYSTOLIC BLOOD PRESSURE: 130 MMHG | TEMPERATURE: 98 F | HEART RATE: 69 BPM | BODY MASS INDEX: 21.68 KG/M2

## 2023-05-22 DIAGNOSIS — I10 ESSENTIAL HYPERTENSION: ICD-10-CM

## 2023-05-22 DIAGNOSIS — N18.31 STAGE 3A CHRONIC KIDNEY DISEASE: ICD-10-CM

## 2023-05-22 DIAGNOSIS — Z86.010 HISTORY OF COLON POLYPS: ICD-10-CM

## 2023-05-22 DIAGNOSIS — D51.8 MACROCYTIC ANEMIA WITH VITAMIN B12 DEFICIENCY: ICD-10-CM

## 2023-05-22 DIAGNOSIS — E53.8 B12 DEFICIENCY: ICD-10-CM

## 2023-05-22 DIAGNOSIS — D50.9 IRON DEFICIENCY ANEMIA, UNSPECIFIED IRON DEFICIENCY ANEMIA TYPE: ICD-10-CM

## 2023-05-22 DIAGNOSIS — Z85.3 HISTORY OF BREAST CANCER: ICD-10-CM

## 2023-05-22 DIAGNOSIS — R94.6 THYROID FUNCTION TEST ABNORMAL: ICD-10-CM

## 2023-05-22 DIAGNOSIS — E55.9 HYPOVITAMINOSIS D: ICD-10-CM

## 2023-05-22 DIAGNOSIS — E05.90 HYPERTHYROIDISM: ICD-10-CM

## 2023-05-22 DIAGNOSIS — E78.49 OTHER HYPERLIPIDEMIA: ICD-10-CM

## 2023-05-22 DIAGNOSIS — Z78.0 POSTMENOPAUSAL: ICD-10-CM

## 2023-05-22 DIAGNOSIS — N28.1 KIDNEY CYSTS: ICD-10-CM

## 2023-05-22 DIAGNOSIS — M81.8 IDIOPATHIC OSTEOPOROSIS: ICD-10-CM

## 2023-05-22 DIAGNOSIS — Z86.39 HISTORY OF THYROTOXICOSIS: ICD-10-CM

## 2023-05-22 DIAGNOSIS — Z90.13 HISTORY OF BILATERAL MASTECTOMY: ICD-10-CM

## 2023-05-22 DIAGNOSIS — M81.8 IDIOPATHIC OSTEOPOROSIS: Primary | ICD-10-CM

## 2023-05-22 DIAGNOSIS — K21.9 GASTROESOPHAGEAL REFLUX DISEASE WITHOUT ESOPHAGITIS: ICD-10-CM

## 2023-05-22 DIAGNOSIS — R73.03 PREDIABETES: ICD-10-CM

## 2023-05-22 DIAGNOSIS — R80.8 OTHER PROTEINURIA: ICD-10-CM

## 2023-05-22 PROCEDURE — 99214 OFFICE O/P EST MOD 30 MIN: CPT | Mod: PO | Performed by: INTERNAL MEDICINE

## 2023-05-22 PROCEDURE — 3288F FALL RISK ASSESSMENT DOCD: CPT | Mod: CPTII,S$GLB,, | Performed by: INTERNAL MEDICINE

## 2023-05-22 PROCEDURE — 1160F PR REVIEW ALL MEDS BY PRESCRIBER/CLIN PHARMACIST DOCUMENTED: ICD-10-PCS | Mod: CPTII,S$GLB,, | Performed by: INTERNAL MEDICINE

## 2023-05-22 PROCEDURE — 84443 ASSAY THYROID STIM HORMONE: CPT | Performed by: INTERNAL MEDICINE

## 2023-05-22 PROCEDURE — 1126F PR PAIN SEVERITY QUANTIFIED, NO PAIN PRESENT: ICD-10-PCS | Mod: CPTII,S$GLB,, | Performed by: INTERNAL MEDICINE

## 2023-05-22 PROCEDURE — 99999 PR PBB SHADOW E&M-EST. PATIENT-LVL IV: CPT | Mod: PBBFAC,,, | Performed by: INTERNAL MEDICINE

## 2023-05-22 PROCEDURE — 1160F RVW MEDS BY RX/DR IN RCRD: CPT | Mod: CPTII,S$GLB,, | Performed by: INTERNAL MEDICINE

## 2023-05-22 PROCEDURE — 3075F PR MOST RECENT SYSTOLIC BLOOD PRESS GE 130-139MM HG: ICD-10-PCS | Mod: CPTII,S$GLB,, | Performed by: INTERNAL MEDICINE

## 2023-05-22 PROCEDURE — 82607 VITAMIN B-12: CPT | Mod: 91 | Performed by: INTERNAL MEDICINE

## 2023-05-22 PROCEDURE — 99999 PR PBB SHADOW E&M-EST. PATIENT-LVL IV: ICD-10-PCS | Mod: PBBFAC,,, | Performed by: INTERNAL MEDICINE

## 2023-05-22 PROCEDURE — 84100 ASSAY OF PHOSPHORUS: CPT | Performed by: INTERNAL MEDICINE

## 2023-05-22 PROCEDURE — 36415 COLL VENOUS BLD VENIPUNCTURE: CPT | Mod: PO | Performed by: INTERNAL MEDICINE

## 2023-05-22 PROCEDURE — 86800 THYROGLOBULIN ANTIBODY: CPT | Performed by: INTERNAL MEDICINE

## 2023-05-22 PROCEDURE — 1101F PT FALLS ASSESS-DOCD LE1/YR: CPT | Mod: CPTII,S$GLB,, | Performed by: INTERNAL MEDICINE

## 2023-05-22 PROCEDURE — 85025 COMPLETE CBC W/AUTO DIFF WBC: CPT | Performed by: INTERNAL MEDICINE

## 2023-05-22 PROCEDURE — 1126F AMNT PAIN NOTED NONE PRSNT: CPT | Mod: CPTII,S$GLB,, | Performed by: INTERNAL MEDICINE

## 2023-05-22 PROCEDURE — 3079F DIAST BP 80-89 MM HG: CPT | Mod: CPTII,S$GLB,, | Performed by: INTERNAL MEDICINE

## 2023-05-22 PROCEDURE — 3075F SYST BP GE 130 - 139MM HG: CPT | Mod: CPTII,S$GLB,, | Performed by: INTERNAL MEDICINE

## 2023-05-22 PROCEDURE — 82746 ASSAY OF FOLIC ACID SERUM: CPT | Performed by: INTERNAL MEDICINE

## 2023-05-22 PROCEDURE — 84550 ASSAY OF BLOOD/URIC ACID: CPT | Performed by: INTERNAL MEDICINE

## 2023-05-22 PROCEDURE — 83735 ASSAY OF MAGNESIUM: CPT | Performed by: INTERNAL MEDICINE

## 2023-05-22 PROCEDURE — 3288F PR FALLS RISK ASSESSMENT DOCUMENTED: ICD-10-PCS | Mod: CPTII,S$GLB,, | Performed by: INTERNAL MEDICINE

## 2023-05-22 PROCEDURE — 1159F MED LIST DOCD IN RCRD: CPT | Mod: CPTII,S$GLB,, | Performed by: INTERNAL MEDICINE

## 2023-05-22 PROCEDURE — 82728 ASSAY OF FERRITIN: CPT | Performed by: INTERNAL MEDICINE

## 2023-05-22 PROCEDURE — 82607 VITAMIN B-12: CPT | Performed by: INTERNAL MEDICINE

## 2023-05-22 PROCEDURE — 99214 OFFICE O/P EST MOD 30 MIN: CPT | Mod: S$GLB,,, | Performed by: INTERNAL MEDICINE

## 2023-05-22 PROCEDURE — 99214 PR OFFICE/OUTPT VISIT, EST, LEVL IV, 30-39 MIN: ICD-10-PCS | Mod: S$GLB,,, | Performed by: INTERNAL MEDICINE

## 2023-05-22 PROCEDURE — 1101F PR PT FALLS ASSESS DOC 0-1 FALLS W/OUT INJ PAST YR: ICD-10-PCS | Mod: CPTII,S$GLB,, | Performed by: INTERNAL MEDICINE

## 2023-05-22 PROCEDURE — 3079F PR MOST RECENT DIASTOLIC BLOOD PRESSURE 80-89 MM HG: ICD-10-PCS | Mod: CPTII,S$GLB,, | Performed by: INTERNAL MEDICINE

## 2023-05-22 PROCEDURE — 84439 ASSAY OF FREE THYROXINE: CPT | Performed by: INTERNAL MEDICINE

## 2023-05-22 PROCEDURE — 84466 ASSAY OF TRANSFERRIN: CPT | Performed by: INTERNAL MEDICINE

## 2023-05-22 PROCEDURE — 84480 ASSAY TRIIODOTHYRONINE (T3): CPT | Performed by: INTERNAL MEDICINE

## 2023-05-22 PROCEDURE — 1159F PR MEDICATION LIST DOCUMENTED IN MEDICAL RECORD: ICD-10-PCS | Mod: CPTII,S$GLB,, | Performed by: INTERNAL MEDICINE

## 2023-05-22 RX ORDER — VITAMIN B COMPLEX
1 CAPSULE ORAL DAILY
COMMUNITY
End: 2023-07-06

## 2023-05-22 NOTE — PROGRESS NOTES
"Subjective:      Patient ID: Tyra Riley is a 78 y.o. female.    Chief Complaint:      Patient is a 78 postmenopausal lady with osteoporosis seen in [facility]  today.     History of Present Illness    The patient, Ms Riley  Is a 78 yr old postmenopausal lady with osteoporosis seen in [facility] today with me. She had previously been seen and ffed at [facility] where she had seen Dr Frazier and Dr Ordoñez.  She was first diagnosed~ 14 years ago.  Patients baseline Dover score was 7.     Denies falls or  fractures since last visit. Denies loss of height. Has back pain but occurs with prolonged standing. ("in the kitchen and cooking for awhile or doing house work.").      There is a +ve family history of osteopoosis in patients younger sister who is now .  Patient  Had a short term history of thyrotoxicosis which was medically treated for a few months. Did not require ablation nor surgery.     Previous medication:  Alendronate  - .  reclast , ,  and   Prolia: 2018, 3/2018,  , 2020 (this was the latest received @ [facility])  Her most recent DEXA from 2022 continued to show osteoporosis of the hip and spine but with significant improvement in bone strength compared to 2 yrs prior. Her next DEXA should be for ~ . She completed a 6 course program of prolia and the last injection she received was from 2020. Since then she has been on evista as bridge therapy.     Supplementations:  Calcium: citra Niraj + D one tablet daily   Vitamin D: 2000 IUs daily     Exercise: line dance once a week  Not very active, volunteers at [employer]     Stopped taking prilosec more than one year ago. Occasionally takes gas-X after a meal.      Her background comorbidities are detailed below;     1. Idiopathic osteoporosis      2. History of breast cancer      3. History of thyrotoxicosis      4. Gastroesophageal reflux disease without esophagitis      5. B12 deficiency      6. Essential " hypertension      7. Other hyperlipidemia      8. Hypercholesteremia      9. Postmenopausal      10. Hypovitaminosis D         Patient had bilateral mastectomy on account of breast ca (2000) ?? Hormone status and receptor status.  Patient has received her COVID vaccinations and has also received the flu vaccinations.  She has not had any falls or fractures since her last visit.           Patient has received her next prolia injection (# 5); 11/9/2020.  She is on Evista 60mg Qd presently and is tolerating it  Well.  Patient has not had any falls since her last visit.  She has no fresh complaints today. She has not had any falls since her last visit.         Review of Systems   Constitutional:  Negative for chills, fever and unexpected weight change.   HENT:  Negative for congestion, sinus pressure, trouble swallowing and voice change.    Eyes:  Negative for visual disturbance.   Respiratory:  Negative for chest tightness and shortness of breath.    Cardiovascular:  Negative for chest pain, palpitations and leg swelling.   Gastrointestinal:  Negative for abdominal pain and vomiting.   Genitourinary:  Negative for dysuria.   Musculoskeletal:  Negative for arthralgias and myalgias.   Skin:  Negative for rash.   Neurological:  Negative for weakness, numbness and headaches.   Hematological:  Does not bruise/bleed easily.   Psychiatric/Behavioral:  Negative for sleep disturbance.      Objective:      /84 (BP Location: Left arm, Patient Position: Sitting, BP Method: Medium (Manual))   Pulse 69   Temp 97.8 °F (36.6 °C) (Oral)   Ht 5' (1.524 m)   Wt 50.1 kg (110 lb 7.2 oz)   SpO2 99%   BMI 21.57 kg/m² Body surface area is 1.46 meters squared.                   Physical Exam  Vitals reviewed.   Constitutional:       Appearance: Normal appearance.      Comments: Pleasant elderly lady with petite build. She is not pale, anicteric and afebrile. Well hydrated.  Clinically comfortable. Not in any active distress.  Patient looks significantly younger than stated chronologic age.   HENT:      Head: Normocephalic.      Mouth/Throat:      Mouth: Mucous membranes are moist.      Pharynx: No posterior oropharyngeal erythema.   Eyes:      General: No scleral icterus.     Conjunctiva/sclera: Conjunctivae normal.      Pupils: Pupils are equal, round, and reactive to light.   Neck:      Vascular: No carotid bruit.   Cardiovascular:      Rate and Rhythm: Normal rate and regular rhythm.      Pulses: Normal pulses.      Heart sounds: Normal heart sounds. No murmur heard.  Pulmonary:      Effort: Pulmonary effort is normal. No respiratory distress.      Breath sounds: Normal breath sounds. No stridor. No wheezing, rhonchi or rales.   Abdominal:      General: Abdomen is flat. Bowel sounds are normal. There is no distension.      Tenderness: There is no abdominal tenderness.   Musculoskeletal:         General: No swelling. Normal range of motion.      Cervical back: Normal range of motion and neck supple. No rigidity. No muscular tenderness.   Lymphadenopathy:      Cervical: No cervical adenopathy.   Skin:     General: Skin is warm and dry.      Coloration: Skin is not jaundiced or pale.      Findings: No bruising or rash.   Neurological:      General: No focal deficit present.      Mental Status: She is alert and oriented to person, place, and time.      Cranial Nerves: No cranial nerve deficit.   Psychiatric:         Mood and Affect: Mood normal.         Behavior: Behavior normal.         Thought Content: Thought content normal.       Lab Review:        Latest Reference Range & Units 04/12/22 15:09 04/12/22 15:18 04/14/22 13:46 07/05/22 16:25 05/15/23 09:02   WBC 3.90 - 12.70 K/uL   5.59 5.99    RBC 4.00 - 5.40 M/uL   3.31 (L) 3.62 (L)    Hemoglobin 12.0 - 16.0 g/dL   10.3 (L) 11.4 (L)    Hematocrit 37.0 - 48.5 %   33.0 (L) 35.6 (L)    MCV 82 - 98 fL   100 (H) 98    MCH 27.0 - 31.0 pg   31.1 (H) 31.5 (H)    MCHC 32.0 - 36.0 g/dL   31.2 (L)  32.0    RDW 11.5 - 14.5 %   12.2 12.6    Platelets 150 - 450 K/uL   212 242    MPV 9.2 - 12.9 fL   10.9 10.7    Gran % 38.0 - 73.0 %   64.6 71.0    Lymph % 18.0 - 48.0 %   26.1 19.5    Mono % 4.0 - 15.0 %   6.4 7.0    Eosinophil % 0.0 - 8.0 %   2.1 1.7    Basophil % 0.0 - 1.9 %   0.4 0.3    Immature Granulocytes 0.0 - 0.5 %   0.4 0.5    Gran # (ANC) 1.8 - 7.7 K/uL   3.6 4.3    Lymph # 1.0 - 4.8 K/uL   1.5 1.2    Mono # 0.3 - 1.0 K/uL   0.4 0.4    Eos # 0.0 - 0.5 K/uL   0.1 0.1    Baso # 0.00 - 0.20 K/uL   0.02 0.02    Immature Grans (Abs) 0.00 - 0.04 K/uL   0.02 0.03    nRBC 0 /100 WBC   0 0    Differential Method    Automated Automated    Iron 30 - 160 ug/dL   74     TIBC 250 - 450 ug/dL   337     Saturated Iron 20 - 50 %   22     Transferrin 200 - 375 mg/dL  200 - 375 mg/dL   228  228     Ferritin 20.0 - 300.0 ng/mL   69     Folate 4.0 - 24.0 ng/mL   32.2 (H)     Vitamin B-12 210 - 950 pg/mL  1116 (H) 1335 (H) 1393 (H)    Protime 9.0 - 12.5 sec  10.7      INR 0.8 - 1.2   1.0      aPTT 21.0 - 32.0 sec  27.9      Sodium 136 - 145 mmol/L   142 140 141   Potassium 3.5 - 5.1 mmol/L   4.1 4.6 4.6   Chloride 95 - 110 mmol/L   107 106 107   CO2 23 - 29 mmol/L   27 25 25   Anion Gap 8 - 16 mmol/L   8 9 9   BUN 8 - 23 mg/dL   28 (H) 20 29 (H)   Creatinine 0.5 - 1.4 mg/dL   1.4 1.5 (H) 1.6 (H)   eGFR >60 mL/min/1.73 m^2     33.2 !   eGFR if non African American >60 mL/min/1.73 m^2   36.8 ! 33.8 !    eGFR if African American >60 mL/min/1.73 m^2   42.4 ! 39.0 !    Glucose 70 - 110 mg/dL   122 (H) 82 92   Calcium 8.7 - 10.5 mg/dL   9.7 9.6 9.3   Ionized Calcium 1.06 - 1.42 mmol/L   1.31  1.27   Phosphorus 2.7 - 4.5 mg/dL  4.2      Magnesium 1.6 - 2.6 mg/dL  2.1      Alkaline Phosphatase 55 - 135 U/L   38 (L) 43 (L) 40 (L)   PROTEIN TOTAL 6.0 - 8.4 g/dL   7.2 7.7 7.1   Albumin 3.5 - 5.2 g/dL   3.5 3.7 3.6   Uric Acid 2.4 - 5.7 mg/dL   6.1 (H)     BILIRUBIN TOTAL 0.1 - 1.0 mg/dL   0.3 0.2 0.4   AST 10 - 40 U/L   21 17 18   ALT 10  - 44 U/L   8 (L) 9 (L) 9 (L)   Ammonia 10 - 50 umol/L    15    Cholesterol 120 - 199 mg/dL   165     HDL 40 - 75 mg/dL   61     HDL/Cholesterol Ratio 20.0 - 50.0 %   37.0     LDL Cholesterol External 63.0 - 159.0 mg/dL   84.8     Non-HDL Cholesterol mg/dL   104     Total Cholesterol/HDL Ratio 2.0 - 5.0    2.7     Triglycerides 30 - 150 mg/dL   96     Thiamine 38 - 122 ug/L    60    Vit D, 1,25-Dihydroxy 20 - 79 pg/mL  36      Vit D, 25-Hydroxy 30 - 96 ng/mL   55  47   Hemoglobin A1C External 4.0 - 5.6 %   5.7 (H)  5.3   Estimated Avg Glucose 68 - 131 mg/dL   117  105   TSH 0.400 - 4.000 uIU/mL  2.230  2.140    T3, Total 60 - 180 ng/dL  101      Free T4 0.71 - 1.51 ng/dL  0.81      Thyroglobulin Interpretation   SEE BELOW      Thyroglobulin Antibody Screen <1.8 IU/mL  2.0 (H)      Thyroglobulin, Tumor Marker ng/mL  12 (H)      PTH 9.0 - 77.0 pg/mL   43.1  123.8 (H)   RPR Non-reactive     Non-reactive    Specimen UA  Urine, Clean Catch       Color, UA Yellow, Straw, Riana  Yellow       Appearance, UA Clear  Hazy !       Specific Gravity, UA 1.005 - 1.030  1.020       pH, UA 5.0 - 8.0  5.0       Protein, UA Negative  Negative       Glucose, UA Negative  Negative       Ketones, UA Negative  Negative       Occult Blood UA Negative  Negative       NITRITE UA Negative  Negative       Bilirubin (UA) Negative  Negative       Leukocytes, UA Negative  2+ !       RBC, UA 0 - 4 /hpf 14 (H)       WBC, UA 0 - 5 /hpf 13 (H)       Bacteria, UA None-Occ /hpf Few !       Squam Epithel, UA /hpf 8       Microscopic Comment  SEE COMMENT       Calcium, Urine 0.0 - 15.0 mg/dL 5.3       Creatinine, Urine 15.0 - 325.0 mg/dL  15.0 - 325.0 mg/dL 149.0  149.0       Urine Microalbumin ug/mL 29.0       Phosphorus, Ur Not established mg/dL 92.0       MICROALB/CREAT RATIO 0.0 - 30.0 ug/mg 19.5       (L): Data is abnormally low  (H): Data is abnormally high  !: Data is abnormal  Assessment:     1. Idiopathic osteoporosis  Magnesium    Phosphorus     Microalbumin/Creatinine Ratio, Urine    Urinalysis    Calcium, Random Urine    Creatinine, Random Urine      2. Prediabetes        3. Hyperthyroidism  T4, Free    T3    TSH      4. History of thyrotoxicosis  T4, Free    T3    TSH      5. B12 deficiency  Vitamin B12    Vitamin B12 Deficiency Panel    Folate      6. Hypovitaminosis D  CBC Auto Differential      7. History of breast cancer        8. Iron deficiency anemia, unspecified iron deficiency anemia type  CBC Auto Differential      9. Macrocytic anemia with vitamin B12 deficiency  CBC Auto Differential    Ferritin    Iron and TIBC    Transferrin      10. Gastroesophageal reflux disease without esophagitis        11. History of colon polyps        12. Postmenopausal        13. History of bilateral mastectomy        14. Other proteinuria  Microalbumin/Creatinine Ratio, Urine    Urinalysis      15. Stage 3a chronic kidney disease        16. Kidney cysts        17. Essential hypertension  Uric Acid      18. Other hyperlipidemia        19. Thyroid function test abnormal  Thyroglobulin               1. Idiopathic osteoporosis  Risk factors include: family history of osteoporosis (sister), PPI use in the past, JODI with BSO at age 49 years used estrogen for three years, diagnosed with breast cancer (first surgery: lobectomy, second surgery three years later: mastectomy b/l, radiation only)     To continue Evista as before for now. As patients recent DEXA has shown significant substantive improvement in bone strength patient would prefer to continue  Evista rather than restart Prolia injections. For [facility] DEXA ~ 03/24. Will plan to obtain bone turnover markers at next visit.     Patient has no had any recent falls and had no recent fractures.     To continue calcium and vitamin supplementation as before.      2. Vitamin D deficiency     To check 25 OH Vit D levels and replete as needed     3. Regarding GERD; clinically stable.      4.  Regarding essential hypertension;  BP well controlled. No change to present antihypertensive regimen. To continue serial ambulatory BP and pulse profile tracking.     Regarding hyperlipidemia; To check current lipid panel and for now to continue present antilipidemic regimen.      5. Regarding history of breast cancer; apparently cured.      6. Regarding history of thyrotoxicosis; now resolved. Most recent TFTs were normal.    Plan:       FFup in ~ 1 yrs; she will decide if she wants this here on the [facility] with Kusum Mejias or @ [facility]..

## 2023-05-23 LAB
BASOPHILS # BLD AUTO: 0.02 K/UL (ref 0–0.2)
BASOPHILS NFR BLD: 0.4 % (ref 0–1.9)
DIFFERENTIAL METHOD: ABNORMAL
EOSINOPHIL # BLD AUTO: 0.1 K/UL (ref 0–0.5)
EOSINOPHIL NFR BLD: 1.6 % (ref 0–8)
ERYTHROCYTE [DISTWIDTH] IN BLOOD BY AUTOMATED COUNT: 12.7 % (ref 11.5–14.5)
FERRITIN SERPL-MCNC: 57 NG/ML (ref 20–300)
FOLATE SERPL-MCNC: 14.5 NG/ML (ref 4–24)
HCT VFR BLD AUTO: 35.4 % (ref 37–48.5)
HGB BLD-MCNC: 11 G/DL (ref 12–16)
IMM GRANULOCYTES # BLD AUTO: 0.01 K/UL (ref 0–0.04)
IMM GRANULOCYTES NFR BLD AUTO: 0.2 % (ref 0–0.5)
IRON SERPL-MCNC: 99 UG/DL (ref 30–160)
LYMPHOCYTES # BLD AUTO: 1.4 K/UL (ref 1–4.8)
LYMPHOCYTES NFR BLD: 28.4 % (ref 18–48)
MAGNESIUM SERPL-MCNC: 2.2 MG/DL (ref 1.6–2.6)
MCH RBC QN AUTO: 31.3 PG (ref 27–31)
MCHC RBC AUTO-ENTMCNC: 31.1 G/DL (ref 32–36)
MCV RBC AUTO: 101 FL (ref 82–98)
MONOCYTES # BLD AUTO: 0.3 K/UL (ref 0.3–1)
MONOCYTES NFR BLD: 6.2 % (ref 4–15)
NEUTROPHILS # BLD AUTO: 3.2 K/UL (ref 1.8–7.7)
NEUTROPHILS NFR BLD: 63.2 % (ref 38–73)
NRBC BLD-RTO: 0 /100 WBC
PHOSPHATE SERPL-MCNC: 3.4 MG/DL (ref 2.7–4.5)
PLATELET # BLD AUTO: 201 K/UL (ref 150–450)
PMV BLD AUTO: 11.2 FL (ref 9.2–12.9)
RBC # BLD AUTO: 3.51 M/UL (ref 4–5.4)
SATURATED IRON: 26 % (ref 20–50)
T3 SERPL-MCNC: 93 NG/DL (ref 60–180)
T4 FREE SERPL-MCNC: 0.86 NG/DL (ref 0.71–1.51)
TOTAL IRON BINDING CAPACITY: 380 UG/DL (ref 250–450)
TRANSFERRIN SERPL-MCNC: 257 MG/DL (ref 200–375)
TRANSFERRIN SERPL-MCNC: 257 MG/DL (ref 200–375)
TSH SERPL DL<=0.005 MIU/L-ACNC: 1.85 UIU/ML (ref 0.4–4)
URATE SERPL-MCNC: 6.4 MG/DL (ref 2.4–5.7)
VIT B12 SERPL-MCNC: 1180 PG/ML (ref 210–950)
WBC # BLD AUTO: 5.03 K/UL (ref 3.9–12.7)

## 2023-05-24 LAB
THRYOGLOBULIN INTERPRETATION: ABNORMAL
THYROGLOB AB SERPL-ACNC: <1.8 IU/ML
THYROGLOB SERPL-MCNC: 11 NG/ML
VIT B12 SERPL-MCNC: 765 NG/L (ref 180–914)

## 2023-07-06 ENCOUNTER — OFFICE VISIT (OUTPATIENT)
Dept: URGENT CARE | Facility: CLINIC | Age: 78
End: 2023-07-06
Payer: MEDICARE

## 2023-07-06 VITALS
BODY MASS INDEX: 22.19 KG/M2 | SYSTOLIC BLOOD PRESSURE: 149 MMHG | OXYGEN SATURATION: 99 % | DIASTOLIC BLOOD PRESSURE: 71 MMHG | RESPIRATION RATE: 20 BRPM | HEART RATE: 67 BPM | TEMPERATURE: 97 F | WEIGHT: 113 LBS | HEIGHT: 60 IN

## 2023-07-06 DIAGNOSIS — L30.9 DERMATITIS: Primary | ICD-10-CM

## 2023-07-06 PROCEDURE — 96372 THER/PROPH/DIAG INJ SC/IM: CPT | Mod: S$GLB,,, | Performed by: NURSE PRACTITIONER

## 2023-07-06 PROCEDURE — 99213 OFFICE O/P EST LOW 20 MIN: CPT | Mod: 25,S$GLB,, | Performed by: NURSE PRACTITIONER

## 2023-07-06 PROCEDURE — 99213 PR OFFICE/OUTPT VISIT, EST, LEVL III, 20-29 MIN: ICD-10-PCS | Mod: 25,S$GLB,, | Performed by: NURSE PRACTITIONER

## 2023-07-06 PROCEDURE — 96372 PR INJECTION,THERAP/PROPH/DIAG2ST, IM OR SUBCUT: ICD-10-PCS | Mod: S$GLB,,, | Performed by: NURSE PRACTITIONER

## 2023-07-06 RX ORDER — TRIAMCINOLONE ACETONIDE 1 MG/G
CREAM TOPICAL 2 TIMES DAILY
Qty: 80 G | Refills: 1 | Status: SHIPPED | OUTPATIENT
Start: 2023-07-06 | End: 2023-07-13

## 2023-07-06 RX ORDER — FEXOFENADINE HCL 60 MG
TABLET ORAL
COMMUNITY
Start: 2023-06-22

## 2023-07-06 RX ORDER — PREDNISONE 20 MG/1
20 TABLET ORAL 2 TIMES DAILY
Qty: 6 TABLET | Refills: 0 | Status: SHIPPED | OUTPATIENT
Start: 2023-07-07 | End: 2023-07-10

## 2023-07-06 RX ORDER — DEXAMETHASONE SODIUM PHOSPHATE 4 MG/ML
8 INJECTION, SOLUTION INTRA-ARTICULAR; INTRALESIONAL; INTRAMUSCULAR; INTRAVENOUS; SOFT TISSUE
Status: COMPLETED | OUTPATIENT
Start: 2023-07-06 | End: 2023-07-06

## 2023-07-06 RX ORDER — CLARITHROMYCIN 500 MG/1
500 TABLET, FILM COATED ORAL EVERY 12 HOURS
COMMUNITY
Start: 2023-02-21 | End: 2023-07-06

## 2023-07-06 RX ORDER — HYDROQUINONE 40 MG/G
1 CREAM TOPICAL
COMMUNITY
Start: 2023-06-22 | End: 2023-07-25 | Stop reason: SDUPTHER

## 2023-07-06 RX ADMIN — DEXAMETHASONE SODIUM PHOSPHATE 8 MG: 4 INJECTION, SOLUTION INTRA-ARTICULAR; INTRALESIONAL; INTRAMUSCULAR; INTRAVENOUS; SOFT TISSUE at 02:07

## 2023-07-06 NOTE — PROGRESS NOTES
"Subjective:      Patient ID: Tyra Fernandes is a 78 y.o. female.    Vitals:  height is 5' (1.524 m) and weight is 51.3 kg (113 lb). Her oral temperature is 97 °F (36.1 °C). Her blood pressure is 149/71 (abnormal) and her pulse is 67. Her respiration is 20 and oxygen saturation is 99%.     Chief Complaint: Rash    Reoccuring hives.  Last flare up approx 1 month ago.  Last big flare was approx 1 yr ago in [place], this was the last time with systemic steroids.   Has had eval by 2 different dermatologists and told to have a " type of eczema."    Rash  This is a new problem. The current episode started 1 to 4 weeks ago (june 24). The affected locations include the left shoulder, right shoulder and neck. The rash is characterized by itchiness and redness. It is unknown if there was an exposure to a precipitant. Pertinent negatives include no cough, fever, shortness of breath or sore throat. Treatments tried: benadryl creams allegra. The treatment provided moderate relief.     Constitution: Negative for chills and fever.   HENT:  Negative for sore throat and trouble swallowing.    Respiratory:  Negative for chest tightness, cough, shortness of breath and wheezing.    Skin:  Positive for rash, erythema and hives (reoccuring).   Allergic/Immunologic: Positive for hives (reoccuring) and itching.        Reoccuring    Objective:     Physical Exam   Constitutional: She is oriented to person, place, and time.  Non-toxic appearance. She does not appear ill. No distress.   HENT:   Head: Normocephalic and atraumatic.   Nose: Nose normal.   Mouth/Throat: Mucous membranes are moist.   Eyes: Conjunctivae are normal.   Cardiovascular: Normal rate.   Pulmonary/Chest: Effort normal. No respiratory distress.   Abdominal: Normal appearance.   Neurological: no focal deficit. She is alert and oriented to person, place, and time.   Skin: Skin is warm, dry and rash. Capillary refill takes 2 to 3 seconds. erythema No lesion         Comments: " Urticaria posterior neck, right and left front shoulder.  See attached photos   Psychiatric: Her behavior is normal. Mood normal.   Nursing note and vitals reviewed.        Assessment:     1. Dermatitis        Plan:       Dermatitis  -     dexAMETHasone injection 8 mg  -     predniSONE (DELTASONE) 20 MG tablet; Take 1 tablet (20 mg total) by mouth 2 (two) times daily. for 3 days  Dispense: 6 tablet; Refill: 0  -     triamcinolone acetonide 0.1% (KENALOG) 0.1 % cream; Apply topically 2 (two) times daily. for 7 days  Dispense: 80 g; Refill: 1

## 2023-07-06 NOTE — PATIENT INSTRUCTIONS
Start prednisone pills tomorrow and take twice a day for 3 days, or twice a day for 2 days and once day for the remainder.  Triamcinolone cream twice a day for 7 days  Follow up with PCP or dermatology

## 2023-07-12 ENCOUNTER — TELEPHONE (OUTPATIENT)
Dept: DERMATOLOGY | Facility: CLINIC | Age: 78
End: 2023-07-12
Payer: MEDICARE

## 2023-07-12 NOTE — TELEPHONE ENCOUNTER
----- Message from Catherine Kelly LPN sent at 7/11/2023  3:27 PM CDT -----  Contact: pt/[phone]    ----- Message -----  From: Aleyda Zamora  Sent: 7/11/2023  12:53 PM CDT  To: [facility] Staff    Type: Appointment Request    Caller is requesting a sooner appointment.  Caller declined first available appointment listed below.  Caller will not accept being placed on the waitlist and is requesting a message be sent to doctor.  Name of Caller: pt   When is the first available appointment? No  dates given   Symptoms: pt is requesting  a follow  up  regarding  medication  usage   Would the patient rather a call back or a response via [facility]? Call   Best Call Back Number:[phone]  Additional Information:  please  call  pt  regarding  scheduling  an  appointment  and  regarding  medication refill  hydroquinone 4 % Crea pt Is currently  out.

## 2023-07-14 ENCOUNTER — TELEPHONE (OUTPATIENT)
Dept: DERMATOLOGY | Facility: CLINIC | Age: 78
End: 2023-07-14
Payer: MEDICARE

## 2023-07-14 NOTE — TELEPHONE ENCOUNTER
----- Message from Jenny Lala sent at 7/12/2023  2:10 PM CDT -----  Regarding: Call Back  Contact: [phone]  Pt is returning a call please call

## 2023-07-25 ENCOUNTER — OFFICE VISIT (OUTPATIENT)
Dept: DERMATOLOGY | Facility: CLINIC | Age: 78
End: 2023-07-25
Payer: MEDICARE

## 2023-07-25 ENCOUNTER — TELEPHONE (OUTPATIENT)
Dept: FAMILY MEDICINE | Facility: CLINIC | Age: 78
End: 2023-07-25
Payer: MEDICARE

## 2023-07-25 VITALS — HEART RATE: 73 BPM | DIASTOLIC BLOOD PRESSURE: 72 MMHG | SYSTOLIC BLOOD PRESSURE: 177 MMHG

## 2023-07-25 DIAGNOSIS — Z76.89 ENCOUNTER FOR SKIN CARE: ICD-10-CM

## 2023-07-25 DIAGNOSIS — L81.1 MELASMA: Primary | ICD-10-CM

## 2023-07-25 PROCEDURE — 1126F AMNT PAIN NOTED NONE PRSNT: CPT | Mod: CPTII,S$GLB,, | Performed by: DERMATOLOGY

## 2023-07-25 PROCEDURE — 99999 PR PBB SHADOW E&M-EST. PATIENT-LVL IV: ICD-10-PCS | Mod: PBBFAC,,, | Performed by: DERMATOLOGY

## 2023-07-25 PROCEDURE — 3078F DIAST BP <80 MM HG: CPT | Mod: CPTII,S$GLB,, | Performed by: DERMATOLOGY

## 2023-07-25 PROCEDURE — 1160F PR REVIEW ALL MEDS BY PRESCRIBER/CLIN PHARMACIST DOCUMENTED: ICD-10-PCS | Mod: CPTII,S$GLB,, | Performed by: DERMATOLOGY

## 2023-07-25 PROCEDURE — 1159F MED LIST DOCD IN RCRD: CPT | Mod: CPTII,S$GLB,, | Performed by: DERMATOLOGY

## 2023-07-25 PROCEDURE — 99213 OFFICE O/P EST LOW 20 MIN: CPT | Mod: S$GLB,,, | Performed by: DERMATOLOGY

## 2023-07-25 PROCEDURE — 99999 PR PBB SHADOW E&M-EST. PATIENT-LVL IV: CPT | Mod: PBBFAC,,, | Performed by: DERMATOLOGY

## 2023-07-25 PROCEDURE — 1101F PT FALLS ASSESS-DOCD LE1/YR: CPT | Mod: CPTII,S$GLB,, | Performed by: DERMATOLOGY

## 2023-07-25 PROCEDURE — 3077F PR MOST RECENT SYSTOLIC BLOOD PRESSURE >= 140 MM HG: ICD-10-PCS | Mod: CPTII,S$GLB,, | Performed by: DERMATOLOGY

## 2023-07-25 PROCEDURE — 1159F PR MEDICATION LIST DOCUMENTED IN MEDICAL RECORD: ICD-10-PCS | Mod: CPTII,S$GLB,, | Performed by: DERMATOLOGY

## 2023-07-25 PROCEDURE — 1101F PR PT FALLS ASSESS DOC 0-1 FALLS W/OUT INJ PAST YR: ICD-10-PCS | Mod: CPTII,S$GLB,, | Performed by: DERMATOLOGY

## 2023-07-25 PROCEDURE — 3288F FALL RISK ASSESSMENT DOCD: CPT | Mod: CPTII,S$GLB,, | Performed by: DERMATOLOGY

## 2023-07-25 PROCEDURE — 1126F PR PAIN SEVERITY QUANTIFIED, NO PAIN PRESENT: ICD-10-PCS | Mod: CPTII,S$GLB,, | Performed by: DERMATOLOGY

## 2023-07-25 PROCEDURE — 1160F RVW MEDS BY RX/DR IN RCRD: CPT | Mod: CPTII,S$GLB,, | Performed by: DERMATOLOGY

## 2023-07-25 PROCEDURE — 3288F PR FALLS RISK ASSESSMENT DOCUMENTED: ICD-10-PCS | Mod: CPTII,S$GLB,, | Performed by: DERMATOLOGY

## 2023-07-25 PROCEDURE — 3078F PR MOST RECENT DIASTOLIC BLOOD PRESSURE < 80 MM HG: ICD-10-PCS | Mod: CPTII,S$GLB,, | Performed by: DERMATOLOGY

## 2023-07-25 PROCEDURE — 99213 PR OFFICE/OUTPT VISIT, EST, LEVL III, 20-29 MIN: ICD-10-PCS | Mod: S$GLB,,, | Performed by: DERMATOLOGY

## 2023-07-25 PROCEDURE — 3077F SYST BP >= 140 MM HG: CPT | Mod: CPTII,S$GLB,, | Performed by: DERMATOLOGY

## 2023-07-25 RX ORDER — HYDROQUINONE 40 MG/G
1 CREAM TOPICAL 2 TIMES DAILY
Qty: 46 G | Refills: 3 | Status: SHIPPED | OUTPATIENT
Start: 2023-07-25

## 2023-07-25 NOTE — PROGRESS NOTES
Subjective:      Patient ID:  Tyra Fernandes is a 78 y.o. female who presents for   Chief Complaint   Patient presents with    Dry Skin     Follow up     Dry Skin - Follow-up  Symptom course: improving  Affected locations: face  Signs / symptoms: dryness (Discoloration)  Severity: mild    Review of Systems   Constitutional:  Negative for fever and chills.   HENT:  Negative for sore throat.    Respiratory:  Negative for cough.    Skin:  Positive for dry skin.     Objective:   Physical Exam   Constitutional: She appears well-developed and well-nourished.   Neurological: She is alert and oriented to person, place, and time.   Psychiatric: She has a normal mood and affect.      Diagram Legend     Erythematous scaling macule/papule c/w actinic keratosis       Vascular papule c/w angioma      Pigmented verrucoid papule/plaque c/w seborrheic keratosis      Yellow umbilicated papule c/w sebaceous hyperplasia      Irregularly shaped tan macule c/w lentigo     1-2 mm smooth white papules consistent with Milia      Movable subcutaneous cyst with punctum c/w epidermal inclusion cyst      Subcutaneous movable cyst c/w pilar cyst      Firm pink to brown papule c/w dermatofibroma      Pedunculated fleshy papule(s) c/w skin tag(s)      Evenly pigmented macule c/w junctional nevus     Mildly variegated pigmented, slightly irregular-bordered macule c/w mildly atypical nevus      Flesh colored to evenly pigmented papule c/w intradermal nevus       Pink pearly papule/plaque c/w basal cell carcinoma      Erythematous hyperkeratotic cursted plaque c/w SCC      Surgical scar with no sign of skin cancer recurrence      Open and closed comedones      Inflammatory papules and pustules      Verrucoid papule consistent consistent with wart     Erythematous eczematous patches and plaques     Dystrophic onycholytic nail with subungual debris c/w onychomycosis     Umbilicated papule    Erythematous-base heme-crusted tan verrucoid plaque  consistent with inflamed seborrheic keratosis     Erythematous Silvery Scaling Plaque c/w Psoriasis     See annotation                Assessment / Plan:        Melasma  -     hydroquinone 4 % Crea; Apply 1 application  topically 2 (two) times daily. Patient to start 4% HQ carefully every other day for a week or so before attempting daily.  Later can go to twice daily, morning and night.Watch out for irritation, which can cause more darkening, which is not what we want.If dryness occurs, take a break and apply coconut oil.If irritation occurs, take a break and apply OTC hydrocortisone.  Dispense: 46 g; Refill: 3  Doing better per pt.  Proper application of medications and or care for affected area(s) and condition(s) reviewed.  Patient and or guardian to monitor this area/lesion or these areas/lesions for changes or worsening or darkening (for moles and freckles).  Patient and or guardian to contact us if any changes are noted for such.  Refill hq today.    Encounter for skin care  Patient instructed in importance in daily sun protection. Sun avoidance and topical protection discussed.     Patient encouraged to wear hat for all outdoor exposure.     Also discussed sun protective clothing.  Discussed with patient to use organic coconut oil or pure shea butter at least daily for moisturization for the body and organic jojoba oil at least daily for the face.  Sandals or slippers at home as not to slide around and risk fall on non carpeted floors if applied to the soles.             Follow up in about 1 year (around 7/25/2024).

## 2023-08-07 ENCOUNTER — PATIENT OUTREACH (OUTPATIENT)
Dept: ADMINISTRATIVE | Facility: HOSPITAL | Age: 78
End: 2023-08-07
Payer: MEDICARE

## 2023-08-07 NOTE — PROGRESS NOTES
Population Health Chart Review & Patient Outreach Details:     Reason for Outreach Encounter:     []  Non-Compliant Report   []  Payor Report ([employer], PHN, BCBS, MSSP, MCIP, UHC, etc.)   []  Pre-Visit Chart Review     Updates Requested / Reviewed:     [x]  Care Everywhere    [x]     []  External Sources ([facility], [facility], DIS, etc.)   []  Care Team Updated    Patient Outreach Method:    [x]  Telephone Outreach Completed   [x] Successful   [] Left Voicemail   [] Unable to Contact (wrong number, no voicemail)  []  [employer] [facility] Outreach Sent  []  Letter Outreach Mailed  []  Fax Sent for External Records  []  External Records Upload    Health Maintenance Topics Addressed and Outreach Outcomes / Actions Taken:        []      Breast Cancer Screening []  Mammo Scheduled      []  External Records Requested     []  Added Reminder to Complete to Upcoming Primary Care Appt Notes     []  Patient Declined     []  Patient Will Call Back to Schedule     []  Patient Will Schedule with External Provider / Order Routed if Applicable             []       Cervical Cancer Screening []  Pap Scheduled      []  External Records Requested     []  Added Reminder to Complete to Upcoming Primary Care Appt Notes     []  Patient Declined     []  Patient Will Call Back to Schedule     []  Patient Will Schedule with External Provider               []          Colorectal Cancer Screening []  Colonoscopy Case Request or Referral Placed     []  External Records Requested     []  Added Reminder to Complete to Upcoming Primary Care Appt Notes     []  Patient Declined     []  Patient Will Call Back to Schedule     []  Patient Will Schedule with External Provider     []  Fit Kit Mailed (add the SmartPhrase under additional notes)     []  Reminded Patient to Complete Home Test             []      Diabetic Eye Exam []  Eye Camera Scheduled or Optometry Referral Placed     []  External Records Requested     []  Added Reminder to Complete to  Upcoming Primary Care Appt Notes     []  Patient Declined     []  Patient Will Call Back to Schedule     []  Patient Will Schedule with External Provider             []      Blood Pressure Control []  Primary Care Follow Up Visit Scheduled     []  Remote Blood Pressure Reading Captured     []  Added Reminder to Complete to Upcoming Primary Care Appt Notes     []  Patient Declined     []  Patient Will Call Back / Patient Will Send Portal Message with Reading     []  Patient Will Call Back to Schedule Provider Visit             []       HbA1c & Other Labs []  Lab Appt Scheduled for Due Labs     []  Primary Care Follow Up Visit Scheduled      []  Reminded Patient to Complete Home Test     []  Added Reminder to Complete to Upcoming Primary Care Appt Notes     []  Patient Declined     []  Patient Will Call Back to Schedule     []  Patient Will Schedule with External Provider / Order Routed if Applicable           []    Schedule Primary Care Appt []  Primary Care Appt Scheduled     []  Patient Declined     []  Patient Will Call Back to Schedule     [x]  Pt Established with External Provider & Updated Care Team             []      Medication Adherence []  Primary Care Appointment Scheduled     []  Added Reminder to Upcoming Primary Care Appt Notes     []  Patient Reminded to  Prescription     []  Patient Declined, Provider Notified if Needed     []  Sent Provider Message to Review and/or Add Exclusion to Problem List             []      Osteoporosis Screening []  DXA Appointment Scheduled     []  External Records Requested     []  Added Reminder to Complete to Upcoming Primary Care Appt Notes     []  Patient Declined     []  Patient Will Call Back to Schedule     []  Patient Will Schedule with External Provider / Order Routed if Applicable     Additional Care Coordinator Notes:     Pt sees Dr. Sanderson at [facility]. Her las visit was on 7/3/23.    Further Action Needed If Patient Returns Outreach:

## 2023-10-14 ENCOUNTER — OFFICE VISIT (OUTPATIENT)
Dept: URGENT CARE | Facility: CLINIC | Age: 78
End: 2023-10-14
Payer: MEDICARE

## 2023-10-14 ENCOUNTER — IMMUNIZATION (OUTPATIENT)
Dept: URGENT CARE | Facility: CLINIC | Age: 78
End: 2023-10-14
Payer: MEDICARE

## 2023-10-14 VITALS
WEIGHT: 114.19 LBS | TEMPERATURE: 98 F | HEART RATE: 96 BPM | RESPIRATION RATE: 16 BRPM | DIASTOLIC BLOOD PRESSURE: 74 MMHG | BODY MASS INDEX: 22.3 KG/M2 | OXYGEN SATURATION: 97 % | SYSTOLIC BLOOD PRESSURE: 137 MMHG

## 2023-10-14 DIAGNOSIS — Z23 NEED FOR PROPHYLACTIC VACCINATION AND INOCULATION AGAINST INFLUENZA: Primary | ICD-10-CM

## 2023-10-14 DIAGNOSIS — T78.40XA ALLERGIC REACTION, INITIAL ENCOUNTER: Primary | ICD-10-CM

## 2023-10-14 PROCEDURE — G0008 ADMIN INFLUENZA VIRUS VAC: HCPCS | Mod: S$GLB,,, | Performed by: NURSE PRACTITIONER

## 2023-10-14 PROCEDURE — 99213 OFFICE O/P EST LOW 20 MIN: CPT | Mod: S$GLB,,, | Performed by: NURSE PRACTITIONER

## 2023-10-14 PROCEDURE — 90694 PR FLU VACCINE, QAIIV, INACTIV, NO PRSV, 0.5 ML, IM: ICD-10-PCS | Mod: S$GLB,,, | Performed by: NURSE PRACTITIONER

## 2023-10-14 PROCEDURE — G0008 PR ADMIN INFLUENZA VIRUS VAC: ICD-10-PCS | Mod: S$GLB,,, | Performed by: NURSE PRACTITIONER

## 2023-10-14 PROCEDURE — 99213 PR OFFICE/OUTPT VISIT, EST, LEVL III, 20-29 MIN: ICD-10-PCS | Mod: S$GLB,,, | Performed by: NURSE PRACTITIONER

## 2023-10-14 PROCEDURE — 90694 VACC AIIV4 NO PRSRV 0.5ML IM: CPT | Mod: S$GLB,,, | Performed by: NURSE PRACTITIONER

## 2023-10-14 RX ORDER — DEXAMETHASONE SODIUM PHOSPHATE 4 MG/ML
8 INJECTION, SOLUTION INTRA-ARTICULAR; INTRALESIONAL; INTRAMUSCULAR; INTRAVENOUS; SOFT TISSUE
Status: COMPLETED | OUTPATIENT
Start: 2023-10-14 | End: 2023-10-14

## 2023-10-14 RX ORDER — HYDROXYZINE PAMOATE 25 MG/1
25 CAPSULE ORAL EVERY 8 HOURS PRN
Qty: 20 CAPSULE | Refills: 0 | Status: SHIPPED | OUTPATIENT
Start: 2023-10-14

## 2023-10-14 RX ADMIN — DEXAMETHASONE SODIUM PHOSPHATE 8 MG: 4 INJECTION, SOLUTION INTRA-ARTICULAR; INTRALESIONAL; INTRAMUSCULAR; INTRAVENOUS; SOFT TISSUE at 01:10

## 2023-10-14 NOTE — PROGRESS NOTES
Subjective:      Patient ID: Tyra Fernandes is a 78 y.o. female.    Vitals:  weight is 51.8 kg (114 lb 3.2 oz). Her oral temperature is 98.1 °F (36.7 °C). Her blood pressure is 137/74 and her pulse is 96. Her respiration is 16 and oxygen saturation is 97%.     Chief Complaint: Rash (Pt would like to also receive her flu shot/)    Patient has a rash on her neck since yesterday, no exposure to any new allergens. She says she has been having ongoing issues with rashes, has seen allergy, they are having difficulty determining cause. Extremely itchy, has taken old hydroxyzine that she had.     Rash  This is a new (onset Friday morning) problem. The current episode started yesterday. The problem has been gradually worsening since onset. The affected locations include the neck. The rash is characterized by redness and itchiness. She was exposed to nothing. Pertinent negatives include no anorexia, congestion, cough, diarrhea, eye pain, facial edema, fatigue, fever, joint pain, nail changes, rhinorrhea, shortness of breath, sore throat or vomiting. Treatments tried: Kenalog cream and Benedryl. The treatment provided no relief. Her past medical history is significant for allergies and eczema. There is no history of asthma or varicella.       Constitution: Negative for fatigue and fever.   HENT:  Negative for congestion, sore throat, trouble swallowing and voice change.    Eyes:  Negative for eye pain.   Respiratory:  Negative for cough and shortness of breath.    Gastrointestinal:  Negative for vomiting and diarrhea.   Skin:  Positive for rash.      Objective:     Physical Exam   Constitutional: She is oriented to person, place, and time.   HENT:   Head: Normocephalic and atraumatic.   Cardiovascular: Normal rate.   Pulmonary/Chest: Effort normal and breath sounds normal. No respiratory distress. She has no wheezes.   Abdominal: Normal appearance.   Neurological: She is alert and oriented to person, place, and time.   Skin:         Psychiatric: Her behavior is normal. Mood normal.       Assessment:     1. Allergic reaction, initial encounter        Plan:     Follow up with PCP if symptoms are not resolving in 48-72 hours, follow up immediately for new or worsening symptoms, ED precautions discussed.    Allergic reaction, initial encounter  -     dexAMETHasone injection 8 mg  -     hydrOXYzine pamoate (VISTARIL) 25 MG Cap; Take 1 capsule (25 mg total) by mouth every 8 (eight) hours as needed (itching).  Dispense: 20 capsule; Refill: 0

## 2024-01-26 ENCOUNTER — OFFICE VISIT (OUTPATIENT)
Dept: URGENT CARE | Facility: CLINIC | Age: 79
End: 2024-01-26
Payer: MEDICARE

## 2024-01-26 VITALS
OXYGEN SATURATION: 100 % | WEIGHT: 115.19 LBS | HEIGHT: 60 IN | HEART RATE: 84 BPM | BODY MASS INDEX: 22.62 KG/M2 | RESPIRATION RATE: 20 BRPM | SYSTOLIC BLOOD PRESSURE: 156 MMHG | TEMPERATURE: 99 F | DIASTOLIC BLOOD PRESSURE: 78 MMHG

## 2024-01-26 DIAGNOSIS — U07.1 COVID-19 VIRUS DETECTED: ICD-10-CM

## 2024-01-26 DIAGNOSIS — R03.0 ELEVATED BLOOD PRESSURE READING: ICD-10-CM

## 2024-01-26 DIAGNOSIS — R52 BODY ACHES: ICD-10-CM

## 2024-01-26 DIAGNOSIS — U07.1 COVID-19: Primary | ICD-10-CM

## 2024-01-26 LAB
CTP QC/QA: YES
CTP QC/QA: YES
FLUAV AG NPH QL: NEGATIVE
FLUBV AG NPH QL: NEGATIVE
SARS-COV-2 AG RESP QL IA.RAPID: POSITIVE

## 2024-01-26 PROCEDURE — 87804 INFLUENZA ASSAY W/OPTIC: CPT | Mod: 59,QW,, | Performed by: NURSE PRACTITIONER

## 2024-01-26 PROCEDURE — 99214 OFFICE O/P EST MOD 30 MIN: CPT | Mod: 25,S$GLB,, | Performed by: NURSE PRACTITIONER

## 2024-01-26 PROCEDURE — 87811 SARS-COV-2 COVID19 W/OPTIC: CPT | Mod: QW,S$GLB,, | Performed by: NURSE PRACTITIONER

## 2024-01-26 NOTE — PROGRESS NOTES
"Subjective:      Patient ID: Tyra Fernandes is a 78 y.o. female.    Vitals:  height is 5' (1.524 m) and weight is 52.3 kg (115 lb 3.2 oz). Her oral temperature is 99.3 °F (37.4 °C). Her blood pressure is 156/78 (abnormal) and her pulse is 84. Her respiration is 20 and oxygen saturation is 100%.     Chief Complaint: Generalized Body Aches    Pt states "Body aches, chills, sore throat, cough, sneezing, runny nose x's 5 days; tried Theraflu, Emergen C with no relief."      ROS   Objective:     Physical Exam   Constitutional:  Non-toxic appearance. She does not appear ill. No distress.   HENT:   Head: Normocephalic and atraumatic.   Ears:   Right Ear: Tympanic membrane, external ear and ear canal normal.   Left Ear: Tympanic membrane, external ear and ear canal normal.   Mouth/Throat:      Comments: Oropharyngeal exam not performed due to risk of viral transmission during global pandemic-- risks outweigh benefits of exam     Eyes: Extraocular movement intact   Pulmonary/Chest: Effort normal and breath sounds normal. No stridor. No respiratory distress. She has no wheezes. She has no rhonchi. She has no rales. She exhibits no tenderness.   Abdominal: Normal appearance.   Neurological: no focal deficit. She is alert.   Skin: Skin is not diaphoretic. Capillary refill takes less than 2 seconds.   Nursing note and vitals reviewed.    Results for orders placed or performed in visit on 01/26/24   SARS Coronavirus 2 Antigen, POCT Manual Read   Result Value Ref Range    SARS Coronavirus 2 Antigen Positive (A) Negative     Acceptable Yes    POCT Influenza A/B Rapid Antigen   Result Value Ref Range    Rapid Influenza A Ag Negative Negative    Rapid Influenza B Ag Negative Negative     Acceptable Yes       Assessment:     1. COVID-19    2. Body aches        Plan:     Vitals stable. No evidence of respiratory distress noted, able to speak in complete sentences without pause.    Requesting COVID-19 " testing post exposure and given symptoms.   Tested for COVID-19 today. Rapid test was Positive. Educated on COVID-19 and COVID-19 testing. Advised on COVID-19 precautions. Discussed supportive care and OTC meds for symptom relief. Advised on return/follow-up precautions. Advised on ER precautions. Answered all patient questions. Patient verbalized understanding and voiced agreement with current treatment plan.         COVID-19    Body aches  -     SARS Coronavirus 2 Antigen, POCT Manual Read  -     POCT Influenza A/B Rapid Antigen             Patient Instructions     PLEASE READ YOUR DISCHARGE INSTRUCTIONS ENTIRELY AS IT CONTAINS IMPORTANT INFORMATION.    Patient had covid testing done today.  Discussed corona virus precautions and reviewed CDC FAC; printed a copy for patient.  I discussed to continue to monitor their symptoms. Discussed that if their symptoms persist or worsen to seek re-evaluation. Clinic vs. ER precautions were given.  Patient verbalized understanding and agreed with the entire plan of care.    If Negative and no direct exposure: symptom free without fever reducing meds in 24 hours - can go back to work in 24 hours with surgical mask for 10-14 days.    If Positive and significantly symptomatic: improved symptoms, no fever without fever reducing meds for 24 hours- have to be out for a minimum of 10 days passed from + test  with improvements in symptoms. MAY COME OUT OF QUARANTINE ON DAY 11.      If you tested positive and have symptoms, you must isolate for 5 days starting on the day of your first symptoms  OR day of the positive test if you are asymptomatic. After 5 days (ON DAY #6), if your symptoms have improved and you have not had fever on day 5, you can return to the community on day #6- NO TESTING REQUIRED to return to community! If no fever without fever reducing meds for 24 hours, before coming out of quarantine. After your 5 days of isolation are completed, the CDC recommends strict mask  use for the first 5 days (day #6-10) that you come out of isolation.  MAY COME OUT OF QUARANTINE ON DAY#6 DATE** BUT CONTINUE STRICT MASKS FOR ANOTHER 5 DAYS. QUARANTINE START DATE**    This is the most important part, both the CDC and the [facility] emphasize that you do not test out of isolation. In fact, we do not retest if you were positive in the last 90 days.           - Reviewed radiographs and all diagnostic testing with patient/family.    - Rest.  Drink plenty of fluids.    - Tylenol OR anti-inflammatory (NSAIDs, ibuprofen, aleve, motrin) as directed as needed for fever/pain.  For Tylenol, do not exceed 3000 mg/ day. If no contraindication or allergies.    - continue albuterol inhaler as needed for shortness of breath/wheezing  - do not operate machinery when taking cough syrup or pain meds as they may cause drowsiness.  - take Tessalon as needed for cough suppression. Take cough syrup as needed for cough during at night.     - If you were prescribed antibiotics, please take them to completion. Please supplement with OTC probiotics and yogurt.  Contact clinic if develop profuse diarrhea and weakness.  - If you are female and on birth control pills - please use additional methods of contraception to prevent pregnancy while on antibiotics and for one cycle after.     -Below are suggestions for symptomatic relief:              -Salt water gargles to soothe throat pain.              -Chloroseptic spray also helps to numb throat pain.              -Nasal saline spray reduces inflammation and dryness.              -Warm face compresses to help with facial sinus pain/pressure.              -Vicks vapor rub at night.              -Astelin NASAL SPRAY twice day for nasal/sinus congestion   **may also supplement with 2nd OTC nasal spray to help with inflammation and congestion. Wean to off when you nose becomes to dry or bleed. Also use nasal saline twice a day to help with dryness.               -Flonase OTC or Nasacort  OTC  once a day for nasal/sinus congestion. DON'T USE IF YOU HAVE GLAUCOMA. CHECK WITH YOUR PHARMACIST/PHYSICIAN.              -Simple foods like chicken noodle soup.              -Mucinex DM (ANY COUGH EXPECTORANT) for cough or chest congestion with mucus during the day time. Delsym or robitussin (ANY COUGH SUPPRESSANT) helps with coughing at night. Mucinex-D if you have chest congestion or sputum (caution if history of high blood pressure or palpitations).              -Zyrtec/Claritin/xyzal during the day time  & Benadryl at night (only if severe runny nose) may help with allergies and runny nose. Add decongestant if you have nasal/sinus congestion/sinus pressure/ear fullness sensation. (see below)              -may take OTC meclizine as needed for dizziness or nausea.     Caution with use of Decongestant meds:  -If you DO NOT have Hypertension or any history of palpitations, it is ok to take over the counter Sudafed or Mucinex D or Allegra-D or Claritin-D or Zyrtec-D.  -If you do take one of the above, it is ok to combine that with plain over the counter Mucinex or Allegra or Claritin or Zyrtec. If, for example, you are taking Zyrtec -D, you can combine that with Mucinex, but not Mucinex-D.  If you are taking Mucinex-D, you can combine that with plain Allegra or Claritin or Zyrtec.     -Do not combine pseudophed or phenylephrine with any other brand allergy-D for DECONGESTANT.   -Or vice versa, you can you take plain allergy medications (allegra/claritin/zyrtec with NO Decongestant) and ADD OTC pseudophed or phenelyphrine 2-3 times a day. Avoid taking decongestant late at night or with caffeine as it can keep you up or cause jittery feeling.    -If you DO have Hypertension , anxiety, or palpitations, it is safe to take Coricidin HBP for relief of sinus symptoms.      For your GI symptoms:  -Use gatorade/pedialyte or rehydration packets to help stay hydrated. Vitamin water and plain water do not contain  rehydrating electrolytes.  -Increase clear liquids (water, gatorade, pedialyte, broths, jello, etc) Hold off on solids for 12-18 hours. Then advance to BRAT diet (banana, rice, applesauce, tea, toast/crackers), then advance further as tolerated. Avoid spicy or fatty foods.   -May take Emitrol OTC as needed for nausea.   -Use Peptobismol or Immodium to help alleviate your diarrhea symptoms.   -Take mylanta or simethicone for bloating or gas pain.   -Take pepcid or omeprazole if you have heartburn or reflux sensation.  -Avoid imodium unless you have more than 6 loose stools in 24 hours. Take 1 dose and monitor to see if you can repeat AS IT WILL CAUSE CONSTIPATION.  -Wash hands frequently while sick. Avoid ibuprofen or other NSAIDS until you are well.   -Please go to the ER if you experience worsening abdominal pain, blood in your vomit or stool, high fever, dizziness, fainting, swelling of your abdomen, inability to pass gas or stool, or inability to urinate.         -You must understand that you've received an Urgent Care treatment only and that you may be released before all your medical problems are known or treated. You, the patient, will arrange for follow up care as instructed. Please arrange follow up with your primary medical clinic within 2-5 days if your signs and symptoms have not resolved or worsen.     - Follow up with your PCP or specialty clinic as directed.  You can call [phone] or [phone] to schedule an appointment with the appropriate provider.  Schedule [facility] is open Mon-Friday 8-5pm (excluded holidays).    - If your condition worsens or fails to improve we recommend that you receive another evaluation at the emergency room immediately or contact your primary medical clinic to discuss your concerns.            Prevention steps for patients with confirmed or suspected COVID-19  Stay home and stay away from family members and friends. The CDC says, you can leave home after these three  things have happened: 1) You have had no fever for at least 24 hours (that is one full day of no fever without the use of medicine that reduces fevers) 2) AND other symptoms have improved (for example, when your cough or shortness of breath have improved) 3) AND at least 10 days have passed since your symptoms first appeared OR after 7-10 days passed from first positive test.  Separate yourself from other people and animals in your home.  Call ahead before visiting your doctor.  Wear a facemask.  Cover your coughs and sneezes.  Wash your hands often with soap and water; hand  can be used, too.  Avoid sharing personal household items.  Wipe down surfaces used daily.  Monitor your symptoms. Seek prompt medical attention if your illness is worsening (e.g., difficulty breathing).   Before seeking care, call your healthcare provider.  If you have a medical emergency and need to call 911, notify the dispatch personnel that you have, or are being evaluated for COVID-19. If possible, put on a facemask before emergency medical services arrive.        Recommended precautions for household members, intimate partners, and caregivers in a home setting of a patient with symptomatic laboratory-confirmed COVID-19 or a patient under investigation.  Household members, intimate partners, and caregivers in the home setting awaiting tests results have close contact with a person with symptomatic, laboratory-confirmed COVID-19 or a person under investigation. Close contacts should monitor their health; they should call their provider right away if they develop symptoms suggestive of COVID-19 (e.g., fever, cough, shortness of breath).    Close contacts should also follow these recommendations:  Make sure that you understand and can help the patient follow their provider's instructions for medication(s) and care. You should help the patient with basic needs in the home and provide support for getting groceries, prescriptions, and  other personal needs.  Monitor the patient's symptoms. If the patient is getting sicker, call his or her healthcare provider and tell them that the patient has laboratory-confirmed COVID-19. If the patient has a medical emergency and you need to call 911, notify the dispatch personnel that the patient has, or is being evaluated for COVID-19.  Household members should stay in another room or be  from the patient. Household members should use a separate bedroom and bathroom, if available.  Prohibit visitors.  Household members should care for any pets in the home.  Make sure that shared spaces in the home have good air flow, such as by an air conditioner or an opened window, weather permitting.  Perform hand hygiene frequently. Wash your hands often with soap and water for at least 20 seconds or use an alcohol-based hand  (that contains > 60% alcohol) covering all surfaces of your hands and rubbing them together until they feel dry. Soap and water should be used preferentially.  Avoid touching your eyes, nose, and mouth.  The patient should wear a facemask. If the patient is not able to wear a facemask (for example, because it causes trouble breathing), caregivers should wear a mask when they are in the same room as the patient.  Wear a disposable facemask and gloves when you touch or have contact with the patient's blood, stool, or body fluids, such as saliva, sputum, nasal mucus, vomit, urine.  Throw out disposable facemasks and gloves after using them. Do not reuse.  When removing personal protective equipment, first remove and dispose of gloves. Then, immediately clean your hands with soap and water or alcohol-based hand . Next, remove and dispose of facemask, and immediately clean your hands again with soap and water or alcohol-based hand .  You should not share dishes, drinking glasses, cups, eating utensils, towels, bedding, or other items with the patient. After the patient  uses these items, you should wash them thoroughly (see below Wash laundry thoroughly).  Clean all high-touch surfaces, such as counters, tabletops, doorknobs, bathroom fixtures, toilets, phones, keyboards, tablets, and bedside tables, every day. Also, clean any surfaces that may have blood, stool, or body fluids on them.  Use a household cleaning spray or wipe, according to the label instructions. Labels contain instructions for safe and effective use of the cleaning product including precautions you should take when applying the product, such as wearing gloves and making sure you have good ventilation during use of the product.  Wash laundry thoroughly.  Immediately remove and wash clothes or bedding that have blood, stool, or body fluids on them.  Wear disposable gloves while handling soiled items and keep soiled items away from your body. Clean your hands (with soap and water or an alcohol-based hand ) immediately after removing your gloves.  Read and follow directions on labels of laundry or clothing items and detergent. In general, using a normal laundry detergent according to washing machine instructions and dry thoroughly using the warmest temperatures recommended on the clothing label.  Place all used disposable gloves, facemasks, and other contaminated items in a lined container before disposing of them with other household waste. Clean your hands (with soap and water or an alcohol-based hand ) immediately after handling these items. Soap and water should be used preferentially if hands are visibly dirty.  Discuss any additional questions with your state or local health department or healthcare provider. Check available hours when contacting your local health department.    For more information see CDC link below.      https://www.cdc.gov/coronavirus/2019-ncov/hcp/guidance-prevent-spread.html#precautions        Sources:  [facility], [facility] and  [facility]          Instructions for Home Care of Patients and Caretakers with Coronavirus Disease 2019  Limit visitors to the home.  Older persons and those that have chronic medical conditions such as diabetes, lung and heart disease are at increased risk for illness.   If possible, patients should use a separate bedroom while recovering. Caregivers and household members should avoid prolonged contact with the patient which means to stay 6 feet away and avoid contact with cough droplets.  When close contact is necessary, wash your hands before and immediately after contact.   Perform hand hygiene frequently. Wash your hands often with soap and water for at least 20 seconds or use an alcohol-based hand , covering all surfaces of your hands and rubbing them together until they feel dry.   Avoid touching your eyes, nose, and mouth with unwashed hands.  Avoid sharing household items with the patient. You should not share dishes, drinking glasses, cups, eating utensils, towels, bedding, or other items. After the patient uses these items, you should wash them thoroughly.  Wash laundry thoroughly.   Immediately remove and wash clothes or bedding that have blood, stool, or body fluids on them.  Clean all high-touch surfaces, such as counters, tabletops, doorknobs, bathroom fixtures, toilets, phones, keyboards, tablets, and bedside tables, every day.   Use a household cleaning spray or wipe, according to the label instructions. Labels contain instructions for safe and effective use of the cleaning product including precautions you should take when applying the product, such as wearing gloves and making sure you have good ventilation during use of the product.    For more information see CDC link below.      https://www.cdc.gov/coronavirus/2019-ncov/hcp/guidance-prevent-spread.html#precautions               If your symptoms worsen or if you have any other concerns, please contact [facility] at  [phone].

## 2024-01-26 NOTE — PATIENT INSTRUCTIONS
PLEASE READ YOUR DISCHARGE INSTRUCTIONS ENTIRELY AS IT CONTAINS IMPORTANT INFORMATION.    Patient had covid testing done today.  Discussed corona virus precautions and reviewed CDC FAC; printed a copy for patient.  I discussed to continue to monitor their symptoms. Discussed that if their symptoms persist or worsen to seek re-evaluation. Clinic vs. ER precautions were given.  Patient verbalized understanding and agreed with the entire plan of care.    If Negative and no direct exposure: symptom free without fever reducing meds in 24 hours - can go back to work in 24 hours with surgical mask for 10-14 days.    If Positive and significantly symptomatic: improved symptoms, no fever without fever reducing meds for 24 hours- have to be out for a minimum of 10 days passed from + test  with improvements in symptoms. MAY COME OUT OF QUARANTINE ON DAY 11.      If you tested positive and have symptoms, you must isolate for 5 days starting on the day of your first symptoms  OR day of the positive test if you are asymptomatic. After 5 days (ON DAY #6), if your symptoms have improved and you have not had fever on day 5, you can return to the community on day #6- NO TESTING REQUIRED to return to community! If no fever without fever reducing meds for 24 hours, before coming out of quarantine. After your 5 days of isolation are completed, the CDC recommends strict mask use for the first 5 days (day #6-10) that you come out of isolation.  MAY COME OUT OF QUARANTINE ON DAY#6 DATE** BUT CONTINUE STRICT MASKS FOR ANOTHER 5 DAYS. QUARANTINE START DATE**    This is the most important part, both the CDC and the [facility] emphasize that you do not test out of isolation. In fact, we do not retest if you were positive in the last 90 days.           - Reviewed radiographs and all diagnostic testing with patient/family.    - Rest.  Drink plenty of fluids.    - Tylenol OR anti-inflammatory (NSAIDs, ibuprofen, aleve, motrin) as directed as needed  for fever/pain.  For Tylenol, do not exceed 3000 mg/ day. If no contraindication or allergies.    - continue albuterol inhaler as needed for shortness of breath/wheezing  - do not operate machinery when taking cough syrup or pain meds as they may cause drowsiness.  - take Tessalon as needed for cough suppression. Take cough syrup as needed for cough during at night.     - If you were prescribed antibiotics, please take them to completion. Please supplement with OTC probiotics and yogurt.  Contact clinic if develop profuse diarrhea and weakness.  - If you are female and on birth control pills - please use additional methods of contraception to prevent pregnancy while on antibiotics and for one cycle after.     -Below are suggestions for symptomatic relief:              -Salt water gargles to soothe throat pain.              -Chloroseptic spray also helps to numb throat pain.              -Nasal saline spray reduces inflammation and dryness.              -Warm face compresses to help with facial sinus pain/pressure.              -Vicks vapor rub at night.              -Astelin NASAL SPRAY twice day for nasal/sinus congestion   **may also supplement with 2nd OTC nasal spray to help with inflammation and congestion. Wean to off when you nose becomes to dry or bleed. Also use nasal saline twice a day to help with dryness.               -Flonase OTC or Nasacort OTC  once a day for nasal/sinus congestion. DON'T USE IF YOU HAVE GLAUCOMA. CHECK WITH YOUR PHARMACIST/PHYSICIAN.              -Simple foods like chicken noodle soup.              -Mucinex DM (ANY COUGH EXPECTORANT) for cough or chest congestion with mucus during the day time. Delsym or robitussin (ANY COUGH SUPPRESSANT) helps with coughing at night. Mucinex-D if you have chest congestion or sputum (caution if history of high blood pressure or palpitations).              -Zyrtec/Claritin/xyzal during the day time  & Benadryl at night (only if severe runny nose) may  help with allergies and runny nose. Add decongestant if you have nasal/sinus congestion/sinus pressure/ear fullness sensation. (see below)              -may take OTC meclizine as needed for dizziness or nausea.     Caution with use of Decongestant meds:  -If you DO NOT have Hypertension or any history of palpitations, it is ok to take over the counter Sudafed or Mucinex D or Allegra-D or Claritin-D or Zyrtec-D.  -If you do take one of the above, it is ok to combine that with plain over the counter Mucinex or Allegra or Claritin or Zyrtec. If, for example, you are taking Zyrtec -D, you can combine that with Mucinex, but not Mucinex-D.  If you are taking Mucinex-D, you can combine that with plain Allegra or Claritin or Zyrtec.     -Do not combine pseudophed or phenylephrine with any other brand allergy-D for DECONGESTANT.   -Or vice versa, you can you take plain allergy medications (allegra/claritin/zyrtec with NO Decongestant) and ADD OTC pseudophed or phenelyphrine 2-3 times a day. Avoid taking decongestant late at night or with caffeine as it can keep you up or cause jittery feeling.    -If you DO have Hypertension , anxiety, or palpitations, it is safe to take Coricidin HBP for relief of sinus symptoms.      For your GI symptoms:  -Use gatorade/pedialyte or rehydration packets to help stay hydrated. Vitamin water and plain water do not contain rehydrating electrolytes.  -Increase clear liquids (water, gatorade, pedialyte, broths, jello, etc) Hold off on solids for 12-18 hours. Then advance to BRAT diet (banana, rice, applesauce, tea, toast/crackers), then advance further as tolerated. Avoid spicy or fatty foods.   -May take Emitrol OTC as needed for nausea.   -Use Peptobismol or Immodium to help alleviate your diarrhea symptoms.   -Take mylanta or simethicone for bloating or gas pain.   -Take pepcid or omeprazole if you have heartburn or reflux sensation.  -Avoid imodium unless you have more than 6 loose stools in  24 hours. Take 1 dose and monitor to see if you can repeat AS IT WILL CAUSE CONSTIPATION.  -Wash hands frequently while sick. Avoid ibuprofen or other NSAIDS until you are well.   -Please go to the ER if you experience worsening abdominal pain, blood in your vomit or stool, high fever, dizziness, fainting, swelling of your abdomen, inability to pass gas or stool, or inability to urinate.         -You must understand that you've received an Urgent Care treatment only and that you may be released before all your medical problems are known or treated. You, the patient, will arrange for follow up care as instructed. Please arrange follow up with your primary medical clinic within 2-5 days if your signs and symptoms have not resolved or worsen.     - Follow up with your PCP or specialty clinic as directed.  You can call [phone] or [phone] to schedule an appointment with the appropriate provider.  Schedule CENTER is open Mon-Friday 8-5pm (excluded holidays).    - If your condition worsens or fails to improve we recommend that you receive another evaluation at the emergency room immediately or contact your primary medical clinic to discuss your concerns.            Prevention steps for patients with confirmed or suspected COVID-19  Stay home and stay away from family members and friends. The CDC says, you can leave home after these three things have happened: 1) You have had no fever for at least 24 hours (that is one full day of no fever without the use of medicine that reduces fevers) 2) AND other symptoms have improved (for example, when your cough or shortness of breath have improved) 3) AND at least 10 days have passed since your symptoms first appeared OR after 7-10 days passed from first positive test.  Separate yourself from other people and animals in your home.  Call ahead before visiting your doctor.  Wear a facemask.  Cover your coughs and sneezes.  Wash your hands often with soap and water; hand   can be used, too.  Avoid sharing personal household items.  Wipe down surfaces used daily.  Monitor your symptoms. Seek prompt medical attention if your illness is worsening (e.g., difficulty breathing).   Before seeking care, call your healthcare provider.  If you have a medical emergency and need to call 911, notify the dispatch personnel that you have, or are being evaluated for COVID-19. If possible, put on a facemask before emergency medical services arrive.        Recommended precautions for household members, intimate partners, and caregivers in a home setting of a patient with symptomatic laboratory-confirmed COVID-19 or a patient under investigation.  Household members, intimate partners, and caregivers in the home setting awaiting tests results have close contact with a person with symptomatic, laboratory-confirmed COVID-19 or a person under investigation. Close contacts should monitor their health; they should call their provider right away if they develop symptoms suggestive of COVID-19 (e.g., fever, cough, shortness of breath).    Close contacts should also follow these recommendations:  Make sure that you understand and can help the patient follow their provider's instructions for medication(s) and care. You should help the patient with basic needs in the home and provide support for getting groceries, prescriptions, and other personal needs.  Monitor the patient's symptoms. If the patient is getting sicker, call his or her healthcare provider and tell them that the patient has laboratory-confirmed COVID-19. If the patient has a medical emergency and you need to call 911, notify the dispatch personnel that the patient has, or is being evaluated for COVID-19.  Household members should stay in another room or be  from the patient. Household members should use a separate bedroom and bathroom, if available.  Prohibit visitors.  Household members should care for any pets in the  home.  Make sure that shared spaces in the home have good air flow, such as by an air conditioner or an opened window, weather permitting.  Perform hand hygiene frequently. Wash your hands often with soap and water for at least 20 seconds or use an alcohol-based hand  (that contains > 60% alcohol) covering all surfaces of your hands and rubbing them together until they feel dry. Soap and water should be used preferentially.  Avoid touching your eyes, nose, and mouth.  The patient should wear a facemask. If the patient is not able to wear a facemask (for example, because it causes trouble breathing), caregivers should wear a mask when they are in the same room as the patient.  Wear a disposable facemask and gloves when you touch or have contact with the patient's blood, stool, or body fluids, such as saliva, sputum, nasal mucus, vomit, urine.  Throw out disposable facemasks and gloves after using them. Do not reuse.  When removing personal protective equipment, first remove and dispose of gloves. Then, immediately clean your hands with soap and water or alcohol-based hand . Next, remove and dispose of facemask, and immediately clean your hands again with soap and water or alcohol-based hand .  You should not share dishes, drinking glasses, cups, eating utensils, towels, bedding, or other items with the patient. After the patient uses these items, you should wash them thoroughly (see below Wash laundry thoroughly).  Clean all high-touch surfaces, such as counters, tabletops, doorknobs, bathroom fixtures, toilets, phones, keyboards, tablets, and bedside tables, every day. Also, clean any surfaces that may have blood, stool, or body fluids on them.  Use a household cleaning spray or wipe, according to the label instructions. Labels contain instructions for safe and effective use of the cleaning product including precautions you should take when applying the product, such as wearing gloves  and making sure you have good ventilation during use of the product.  Wash laundry thoroughly.  Immediately remove and wash clothes or bedding that have blood, stool, or body fluids on them.  Wear disposable gloves while handling soiled items and keep soiled items away from your body. Clean your hands (with soap and water or an alcohol-based hand ) immediately after removing your gloves.  Read and follow directions on labels of laundry or clothing items and detergent. In general, using a normal laundry detergent according to washing machine instructions and dry thoroughly using the warmest temperatures recommended on the clothing label.  Place all used disposable gloves, facemasks, and other contaminated items in a lined container before disposing of them with other household waste. Clean your hands (with soap and water or an alcohol-based hand ) immediately after handling these items. Soap and water should be used preferentially if hands are visibly dirty.  Discuss any additional questions with your state or local health department or healthcare provider. Check available hours when contacting your local health department.    For more information see CDC link below.      https://www.cdc.gov/coronavirus/2019-ncov/hcp/guidance-prevent-spread.html#precautions        Sources:  [facility], [place] [facility]          Instructions for Home Care of Patients and Caretakers with Coronavirus Disease 2019  Limit visitors to the home.  Older persons and those that have chronic medical conditions such as diabetes, lung and heart disease are at increased risk for illness.   If possible, patients should use a separate bedroom while recovering. Caregivers and household members should avoid prolonged contact with the patient which means to stay 6 feet away and avoid contact with cough droplets.  When close contact is necessary, wash your hands before and immediately after contact.   Perform hand  hygiene frequently. Wash your hands often with soap and water for at least 20 seconds or use an alcohol-based hand , covering all surfaces of your hands and rubbing them together until they feel dry.   Avoid touching your eyes, nose, and mouth with unwashed hands.  Avoid sharing household items with the patient. You should not share dishes, drinking glasses, cups, eating utensils, towels, bedding, or other items. After the patient uses these items, you should wash them thoroughly.  Wash laundry thoroughly.   Immediately remove and wash clothes or bedding that have blood, stool, or body fluids on them.  Clean all high-touch surfaces, such as counters, tabletops, doorknobs, bathroom fixtures, toilets, phones, keyboards, tablets, and bedside tables, every day.   Use a household cleaning spray or wipe, according to the label instructions. Labels contain instructions for safe and effective use of the cleaning product including precautions you should take when applying the product, such as wearing gloves and making sure you have good ventilation during use of the product.    For more information see CDC link below.      https://www.cdc.gov/coronavirus/2019-ncov/hcp/guidance-prevent-spread.html#precautions               If your symptoms worsen or if you have any other concerns, please contact [facility] at [phone].

## 2024-02-21 ENCOUNTER — LAB VISIT (OUTPATIENT)
Dept: LAB | Facility: HOSPITAL | Age: 79
End: 2024-02-21
Attending: INTERNAL MEDICINE
Payer: MEDICARE

## 2024-02-21 DIAGNOSIS — N18.9 CHRONIC KIDNEY DISEASE, UNSPECIFIED: ICD-10-CM

## 2024-02-21 DIAGNOSIS — N18.30 CHRONIC KIDNEY DISEASE, STAGE III (MODERATE): Primary | ICD-10-CM

## 2024-02-21 DIAGNOSIS — N25.81 SECONDARY HYPERPARATHYROIDISM OF RENAL ORIGIN: ICD-10-CM

## 2024-02-21 DIAGNOSIS — I10 ESSENTIAL HYPERTENSION, MALIGNANT: ICD-10-CM

## 2024-02-21 LAB
ALBUMIN SERPL BCP-MCNC: 3.6 G/DL (ref 3.5–5.2)
ANION GAP SERPL CALC-SCNC: 17 MMOL/L (ref 8–16)
BACTERIA #/AREA URNS AUTO: ABNORMAL /HPF
BASOPHILS # BLD AUTO: 0.03 K/UL (ref 0–0.2)
BASOPHILS NFR BLD: 0.5 % (ref 0–1.9)
BILIRUB UR QL STRIP: NEGATIVE
BUN SERPL-MCNC: 44 MG/DL (ref 8–23)
CALCIUM SERPL-MCNC: 9.8 MG/DL (ref 8.7–10.5)
CHLORIDE SERPL-SCNC: 104 MMOL/L (ref 95–110)
CLARITY UR REFRACT.AUTO: CLEAR
CO2 SERPL-SCNC: 21 MMOL/L (ref 23–29)
COLOR UR AUTO: YELLOW
CREAT SERPL-MCNC: 2.6 MG/DL (ref 0.5–1.4)
CREAT UR-MCNC: 371 MG/DL (ref 15–325)
DIFFERENTIAL METHOD BLD: ABNORMAL
EOSINOPHIL # BLD AUTO: 0.1 K/UL (ref 0–0.5)
EOSINOPHIL NFR BLD: 2 % (ref 0–8)
ERYTHROCYTE [DISTWIDTH] IN BLOOD BY AUTOMATED COUNT: 13.3 % (ref 11.5–14.5)
EST. GFR  (NO RACE VARIABLE): 18.4 ML/MIN/1.73 M^2
FERRITIN SERPL-MCNC: 65 NG/ML (ref 20–300)
GLUCOSE SERPL-MCNC: 117 MG/DL (ref 70–110)
GLUCOSE UR QL STRIP: NEGATIVE
HCT VFR BLD AUTO: 34.5 % (ref 37–48.5)
HGB BLD-MCNC: 11 G/DL (ref 12–16)
HGB UR QL STRIP: NEGATIVE
HYALINE CASTS UR QL AUTO: 10 /LPF
IMM GRANULOCYTES # BLD AUTO: 0.02 K/UL (ref 0–0.04)
IMM GRANULOCYTES NFR BLD AUTO: 0.4 % (ref 0–0.5)
IRON SERPL-MCNC: 69 UG/DL (ref 30–160)
KETONES UR QL STRIP: NEGATIVE
LEUKOCYTE ESTERASE UR QL STRIP: ABNORMAL
LYMPHOCYTES # BLD AUTO: 1.4 K/UL (ref 1–4.8)
LYMPHOCYTES NFR BLD: 24.3 % (ref 18–48)
MAGNESIUM SERPL-MCNC: 2.2 MG/DL (ref 1.6–2.6)
MCH RBC QN AUTO: 32.2 PG (ref 27–31)
MCHC RBC AUTO-ENTMCNC: 31.9 G/DL (ref 32–36)
MCV RBC AUTO: 101 FL (ref 82–98)
MICROSCOPIC COMMENT: ABNORMAL
MONOCYTES # BLD AUTO: 0.3 K/UL (ref 0.3–1)
MONOCYTES NFR BLD: 6.1 % (ref 4–15)
NEUTROPHILS # BLD AUTO: 3.7 K/UL (ref 1.8–7.7)
NEUTROPHILS NFR BLD: 66.7 % (ref 38–73)
NITRITE UR QL STRIP: NEGATIVE
NRBC BLD-RTO: 0 /100 WBC
PH UR STRIP: 5 [PH] (ref 5–8)
PHOSPHATE SERPL-MCNC: 5.3 MG/DL (ref 2.7–4.5)
PLATELET # BLD AUTO: 200 K/UL (ref 150–450)
PMV BLD AUTO: 11.7 FL (ref 9.2–12.9)
POTASSIUM SERPL-SCNC: 4.5 MMOL/L (ref 3.5–5.1)
PROT UR QL STRIP: ABNORMAL
PROT UR-MCNC: 23 MG/DL (ref 0–15)
PROT/CREAT UR: 0.06 MG/G{CREAT} (ref 0–0.2)
PTH-INTACT SERPL-MCNC: 158.8 PG/ML (ref 9–77)
RBC # BLD AUTO: 3.42 M/UL (ref 4–5.4)
RBC #/AREA URNS AUTO: 3 /HPF (ref 0–4)
SATURATED IRON: 19 % (ref 20–50)
SODIUM SERPL-SCNC: 142 MMOL/L (ref 136–145)
SP GR UR STRIP: 1.02 (ref 1–1.03)
SQUAMOUS #/AREA URNS AUTO: 3 /HPF
TOTAL IRON BINDING CAPACITY: 364 UG/DL (ref 250–450)
TRANSFERRIN SERPL-MCNC: 246 MG/DL (ref 200–375)
URATE SERPL-MCNC: 7.3 MG/DL (ref 2.4–5.7)
URN SPEC COLLECT METH UR: ABNORMAL
WBC # BLD AUTO: 5.6 K/UL (ref 3.9–12.7)
WBC #/AREA URNS AUTO: 8 /HPF (ref 0–5)

## 2024-02-21 PROCEDURE — 84156 ASSAY OF PROTEIN URINE: CPT | Performed by: INTERNAL MEDICINE

## 2024-02-21 PROCEDURE — 83735 ASSAY OF MAGNESIUM: CPT | Performed by: INTERNAL MEDICINE

## 2024-02-21 PROCEDURE — 80069 RENAL FUNCTION PANEL: CPT | Performed by: INTERNAL MEDICINE

## 2024-02-21 PROCEDURE — 83970 ASSAY OF PARATHORMONE: CPT | Performed by: INTERNAL MEDICINE

## 2024-02-21 PROCEDURE — 84550 ASSAY OF BLOOD/URIC ACID: CPT | Performed by: INTERNAL MEDICINE

## 2024-02-21 PROCEDURE — 83540 ASSAY OF IRON: CPT | Performed by: INTERNAL MEDICINE

## 2024-02-21 PROCEDURE — 82728 ASSAY OF FERRITIN: CPT | Performed by: INTERNAL MEDICINE

## 2024-02-21 PROCEDURE — 81001 URINALYSIS AUTO W/SCOPE: CPT | Performed by: INTERNAL MEDICINE

## 2024-02-21 PROCEDURE — 82306 VITAMIN D 25 HYDROXY: CPT | Performed by: INTERNAL MEDICINE

## 2024-02-21 PROCEDURE — 85025 COMPLETE CBC W/AUTO DIFF WBC: CPT | Performed by: INTERNAL MEDICINE

## 2024-02-22 LAB — 25(OH)D3+25(OH)D2 SERPL-MCNC: 97 NG/ML (ref 30–96)

## 2024-02-26 ENCOUNTER — LAB VISIT (OUTPATIENT)
Dept: LAB | Facility: HOSPITAL | Age: 79
End: 2024-02-26
Attending: INTERNAL MEDICINE
Payer: MEDICARE

## 2024-02-26 DIAGNOSIS — N18.30 CHRONIC KIDNEY DISEASE, STAGE III (MODERATE): Primary | ICD-10-CM

## 2024-02-26 LAB
ALBUMIN SERPL BCP-MCNC: 3.8 G/DL (ref 3.5–5.2)
ANION GAP SERPL CALC-SCNC: 4 MMOL/L (ref 8–16)
BUN SERPL-MCNC: 45 MG/DL (ref 8–23)
CALCIUM SERPL-MCNC: 9.5 MG/DL (ref 8.7–10.5)
CHLORIDE SERPL-SCNC: 106 MMOL/L (ref 95–110)
CO2 SERPL-SCNC: 29 MMOL/L (ref 23–29)
CREAT SERPL-MCNC: 2 MG/DL (ref 0.5–1.4)
EST. GFR  (NO RACE VARIABLE): 25.3 ML/MIN/1.73 M^2
GLUCOSE SERPL-MCNC: 93 MG/DL (ref 70–110)
PHOSPHATE SERPL-MCNC: 3.3 MG/DL (ref 2.7–4.5)
POTASSIUM SERPL-SCNC: 4.5 MMOL/L (ref 3.5–5.1)
SODIUM SERPL-SCNC: 139 MMOL/L (ref 136–145)

## 2024-02-26 PROCEDURE — 36415 COLL VENOUS BLD VENIPUNCTURE: CPT | Performed by: INTERNAL MEDICINE

## 2024-02-26 PROCEDURE — 80069 RENAL FUNCTION PANEL: CPT | Performed by: INTERNAL MEDICINE

## 2024-04-16 ENCOUNTER — LAB VISIT (OUTPATIENT)
Dept: LAB | Facility: HOSPITAL | Age: 79
End: 2024-04-16
Attending: INTERNAL MEDICINE
Payer: MEDICARE

## 2024-04-16 DIAGNOSIS — N18.30 CHRONIC KIDNEY DISEASE, STAGE III (MODERATE): Primary | ICD-10-CM

## 2024-04-16 DIAGNOSIS — N25.81 SECONDARY HYPERPARATHYROIDISM OF RENAL ORIGIN: ICD-10-CM

## 2024-04-16 LAB
ALBUMIN SERPL BCP-MCNC: 3.8 G/DL (ref 3.5–5.2)
ANION GAP SERPL CALC-SCNC: 5 MMOL/L (ref 8–16)
BUN SERPL-MCNC: 36 MG/DL (ref 8–23)
CALCIUM SERPL-MCNC: 9.8 MG/DL (ref 8.7–10.5)
CHLORIDE SERPL-SCNC: 104 MMOL/L (ref 95–110)
CO2 SERPL-SCNC: 28 MMOL/L (ref 23–29)
CREAT SERPL-MCNC: 1.9 MG/DL (ref 0.5–1.4)
CREAT UR-MCNC: 108.2 MG/DL (ref 15–325)
EST. GFR  (NO RACE VARIABLE): 26.9 ML/MIN/1.73 M^2
GLUCOSE SERPL-MCNC: 84 MG/DL (ref 70–110)
MICROSCOPIC COMMENT: ABNORMAL
PHOSPHATE SERPL-MCNC: 3.9 MG/DL (ref 2.7–4.5)
POTASSIUM SERPL-SCNC: 5 MMOL/L (ref 3.5–5.1)
PROT UR-MCNC: 12 MG/DL (ref 6–15)
PTH-INTACT SERPL-MCNC: 13.9 PG/ML (ref 9–77)
RBC #/AREA URNS HPF: 1 /HPF (ref 0–4)
SODIUM SERPL-SCNC: 137 MMOL/L (ref 136–145)
SQUAMOUS #/AREA URNS HPF: 1 /HPF
WBC #/AREA URNS HPF: 3 /HPF (ref 0–5)
YEAST URNS QL MICRO: ABNORMAL

## 2024-04-16 PROCEDURE — 36415 COLL VENOUS BLD VENIPUNCTURE: CPT | Performed by: INTERNAL MEDICINE

## 2024-04-16 PROCEDURE — 81001 URINALYSIS AUTO W/SCOPE: CPT | Performed by: INTERNAL MEDICINE

## 2024-04-16 PROCEDURE — 80069 RENAL FUNCTION PANEL: CPT | Performed by: INTERNAL MEDICINE

## 2024-04-16 PROCEDURE — 82570 ASSAY OF URINE CREATININE: CPT | Performed by: INTERNAL MEDICINE

## 2024-04-16 PROCEDURE — 84156 ASSAY OF PROTEIN URINE: CPT | Performed by: INTERNAL MEDICINE

## 2024-04-16 PROCEDURE — 83970 ASSAY OF PARATHORMONE: CPT | Performed by: INTERNAL MEDICINE

## 2024-05-08 ENCOUNTER — OFFICE VISIT (OUTPATIENT)
Dept: ENDOCRINOLOGY | Facility: CLINIC | Age: 79
End: 2024-05-08
Payer: MEDICARE

## 2024-05-08 VITALS
OXYGEN SATURATION: 99 % | WEIGHT: 114.75 LBS | DIASTOLIC BLOOD PRESSURE: 72 MMHG | HEART RATE: 77 BPM | SYSTOLIC BLOOD PRESSURE: 124 MMHG | BODY MASS INDEX: 22.53 KG/M2 | HEIGHT: 60 IN

## 2024-05-08 DIAGNOSIS — N18.4 CKD (CHRONIC KIDNEY DISEASE) STAGE 4, GFR 15-29 ML/MIN: ICD-10-CM

## 2024-05-08 DIAGNOSIS — I10 BENIGN ESSENTIAL HTN: ICD-10-CM

## 2024-05-08 DIAGNOSIS — M81.0 OSTEOPOROSIS, UNSPECIFIED OSTEOPOROSIS TYPE, UNSPECIFIED PATHOLOGICAL FRACTURE PRESENCE: Primary | ICD-10-CM

## 2024-05-08 PROCEDURE — 3074F SYST BP LT 130 MM HG: CPT | Mod: CPTII,S$GLB,, | Performed by: INTERNAL MEDICINE

## 2024-05-08 PROCEDURE — 3078F DIAST BP <80 MM HG: CPT | Mod: CPTII,S$GLB,, | Performed by: INTERNAL MEDICINE

## 2024-05-08 PROCEDURE — 3288F FALL RISK ASSESSMENT DOCD: CPT | Mod: CPTII,S$GLB,, | Performed by: INTERNAL MEDICINE

## 2024-05-08 PROCEDURE — 99999 PR PBB SHADOW E&M-EST. PATIENT-LVL IV: CPT | Mod: PBBFAC,,, | Performed by: INTERNAL MEDICINE

## 2024-05-08 PROCEDURE — 1101F PT FALLS ASSESS-DOCD LE1/YR: CPT | Mod: CPTII,S$GLB,, | Performed by: INTERNAL MEDICINE

## 2024-05-08 PROCEDURE — 1159F MED LIST DOCD IN RCRD: CPT | Mod: CPTII,S$GLB,, | Performed by: INTERNAL MEDICINE

## 2024-05-08 PROCEDURE — 1160F RVW MEDS BY RX/DR IN RCRD: CPT | Mod: CPTII,S$GLB,, | Performed by: INTERNAL MEDICINE

## 2024-05-08 PROCEDURE — 99214 OFFICE O/P EST MOD 30 MIN: CPT | Mod: S$GLB,,, | Performed by: INTERNAL MEDICINE

## 2024-05-08 PROCEDURE — 1126F AMNT PAIN NOTED NONE PRSNT: CPT | Mod: CPTII,S$GLB,, | Performed by: INTERNAL MEDICINE

## 2024-05-08 RX ORDER — BUTALB/ACETAMINOPHEN/CAFFEINE 50-325-40
1 TABLET ORAL 2 TIMES DAILY
COMMUNITY

## 2024-05-08 NOTE — PATIENT INSTRUCTIONS
Osteoporosis Treatment    Regardless if you are on a prescription medication for osteoporosis or osteopenia, one should still do all of the following. All of these things combined help to reduce your fracture risk. The goal of all these treatments is to reduce your risk of fracture.     Take Calcium and Vitamin D- It is important to get a minimum amount of 1200 mg of calcium daily. That can be in the diet or in the form of a supplement. Also a minimum of 800 IU of Vitamin D should be taken a day. Taking a prescription medication does not take the place of taking Calcium plus vitamin D. Some trials show a reduced fracture risk with taking calcium and vitamin D.   Weight bearing exercise- this is extremely important to reduce fracture risk. Trials have shown that weight bearing exercise can reduce the risk of a hip fracture. It may also help with your bone density. At minimum one should do 30 minutes of weight bearing exercise 3 times a week. Yoga and [employer] can often also help with balance.   Fall Precautions- the 1st goal in preventing a fracture is not falling. Always wear good shoes and avoid heals. Make sure you check your house to make sure there is nothing that could be a fall risk (tianna, cords). Make sure if you get up at night that there is some lighting. Be mindful with pets as they can be common reasons for a fall. We often times feel safe in our own home, but that is a common area that one can have a fracture. If you usually use a walker or a cane, make sure you use it in the home as well.     Bone Density- once a prescription medication is started, we check your bone density every 2 years. It usually does not need to be checked more than that as your bone changes very slowly. Always keep in mind the goal of therapy is to reduce your fracture risk and not normalize your bone density. Sometimes you may see a slight improvement in your bone density with treatment or no change at all. That is ok. One of the  main reasons to do a bone density is to make sure there is not a decrease in your bone density    Drug Holidays- this does not apply to Prolia. We only do drug holidays for Fosamax, Reclast, Actonel and Boniva. Stopping or delaying Prolia can cause a rebound loss of bone density and in rare cases a compression fracture.     Risks of Medications for Osteoporosis  Most patients tolerate these medications without any problems. The following do not include all the side effects of these medications  Rarely patients can develop pains with these medications. They usually will go away. However, if they are severe you should let us know immediately  Osteonecrosis of the Jaw (ONJ)- this is a rare complication of many bone medications. It is diagnosed by a dentist or oral surgeon. If you develop pain in your jaw let us know and we have you see your dentist for an evaluation. Most cases are in patients with cancer who get much larger doses of these medications. The overall risk is 1 in 10,000 to 1 in 100,000 patient years. It is always important to get regular dental cleanings. If you are planning an invasive dental procedure we may ask that you complete that prior to starting treatment.   Atypical Femur Fractures- This is also a rare side effect of these medications. The risk increases the longer you are on these medications. This is one reason we sometimes do drug holidays. This can usually present with thigh or groin pain. The overall risk is 3.2 to 50 cases per 100,000 patient years

## 2024-05-08 NOTE — PROGRESS NOTES
CHIEF COMPLAINT: Osteoporosis  79 y.o. old being seen as a new patient to me. Was seeing DR. Sanchez.  Appears that she was on alendronate from 9318-3593.  Then she received 4 doses of Reclast in 2011, 2013, 2014 in 2016.  Subsequently received Prolia from approximately 2854-7426.  At that time she was placed on Evista and is on that currently. No fractures. Taking Ca + D. Was on HRT until age 52 and then stopped due to breast Ca. No steroids in the past. States that was treated for hyperthyroidism many years ago. Has not had a recurrence since. She does exercise- dancing, walking.    Also states getting periodic BP spikes. No assoc HA, sweating, or anxiety.       PAST MEDICAL HISTORY/PAST SURGICAL HISTORY:  Reviewed in [facility]    SOCIAL HISTORY: Reviewed in [facility]    FAMILY HISTORY:  No known thyroid disease. No DM. NO known osteoporosis. Son with kidney stones.     MEDICATIONS/ALLERGIES: The patient's MedCard has been updated and reviewed.            PE:    GENERAL: Well developed, well nourished.  NECK: Supple, trachea midline, no palpable thyroid nodules  CHEST: Resp even and unlabored, CTA bilateral.  CARDIAC: RRR, S1, S2 heard, no murmurs, rubs, S3, or S4    LABS/Radiology     Latest Reference Range & Units 01/23/24 12:56   TSH 0.50 - 5.00 uIU/mL 2.33 (E)   (E): External lab result    ASSESSMENT/PLAN:  1.  Osteoporosis-no fracture history.  She is taking calcium and vitamin-D.  Also doing weight-bearing exercise.  Previously she has been on oral bisphosphonates, Reclast and Prolia.  Now on Evista.  Prolia give last in 2020.  Has had a normal secondary workup in the past.  Check bone density.  If no worsening can continue current therapy.  If there is some worsening, may need to use Prolia.  Because of her CKD she would be unable to get Reclast.  Discussed if Prolia were to be restarted at some point, it would need to be given indefinitely.  There is some risk of rebound bone loss if Prolia is stopped.    2. CKD  4-currently seeing nephrology.    3.  Hypertension-currently controlled.  However, has been having fluctuations.  See workup below.      FOLLOWUP  Check DEXA  8 AM Aldosterone, renin, metanephrines

## 2024-05-16 ENCOUNTER — HOSPITAL ENCOUNTER (OUTPATIENT)
Dept: RADIOLOGY | Facility: CLINIC | Age: 79
Discharge: HOME | End: 2024-05-16
Attending: INTERNAL MEDICINE
Payer: MEDICARE

## 2024-05-16 DIAGNOSIS — I10 BENIGN ESSENTIAL HTN: ICD-10-CM

## 2024-05-16 DIAGNOSIS — M81.0 OSTEOPOROSIS, UNSPECIFIED OSTEOPOROSIS TYPE, UNSPECIFIED PATHOLOGICAL FRACTURE PRESENCE: ICD-10-CM

## 2024-05-16 PROCEDURE — 77080 DXA BONE DENSITY AXIAL: CPT | Mod: TC,PO

## 2024-05-16 PROCEDURE — 77080 DXA BONE DENSITY AXIAL: CPT | Mod: 26,,, | Performed by: RADIOLOGY

## 2024-06-06 DIAGNOSIS — M81.8 IDIOPATHIC OSTEOPOROSIS: ICD-10-CM

## 2024-06-06 RX ORDER — RALOXIFENE HYDROCHLORIDE 60 MG/1
60 TABLET, FILM COATED ORAL DAILY
Qty: 90 TABLET | Refills: 3 | Status: SHIPPED | OUTPATIENT
Start: 2024-06-06 | End: 2025-06-01

## 2024-06-06 NOTE — TELEPHONE ENCOUNTER
----- Message from Joyce Faria sent at 6/5/2024  4:17 PM CDT -----  Regarding: refill  Contact: pt  Type:  RX Refill Request    Who Called: pt  Refill or New Rx:refill  RX Name and Strength:raloxifene (EVISTA) 60 mg tablet  How is the patient currently taking it? (ex. 1XDay):Take 1 tablet (60 mg total) by mouth once daily  Is this a 30 day or 90 day RX:90  Preferred Pharmacy with phone number:  [facility] [address] [place] [address] [address]  [address] [facility] [address]  Phone: [phone] Fax: [phone]  Local or Mail Order:local  Ordering Provider:Real  Would the patient rather a call back or a response via [facility]? Call back  Best Call Back Number:[phone]    Additional Information: sts she needs a refill

## 2024-07-03 ENCOUNTER — TELEPHONE (OUTPATIENT)
Dept: ENDOCRINOLOGY | Facility: CLINIC | Age: 79
End: 2024-07-03
Payer: MEDICARE

## 2024-07-03 NOTE — TELEPHONE ENCOUNTER
----- Message from Meche Jauregui sent at 7/3/2024  3:01 PM CDT -----  Type:  RX Refill Request    Who Called:  pt  Refill or New Rx:  REFILL  RX Name and Strength:  raloxifene (EVISTA) 60 mg tablet  How is the patient currently taking it? (ex. 1XDay):  As directed  Is this a 30 day or 90 day RX:  30  Preferred Pharmacy with phone number:    [facility] - [place], [place] - [address]  [address] [facility] [address]  Phone: [phone] Fax: [phone]        Local or Mail Order: LOCAL    Ordering Provider:  OFELIA Rivera Call Back Number: [phone]  Additional Information: HAVE TRIED TO REACH OUT MULTIPLE TIME  Please call back to advise. Thanks.

## 2024-07-24 ENCOUNTER — LAB VISIT (OUTPATIENT)
Dept: LAB | Facility: HOSPITAL | Age: 79
End: 2024-07-24
Attending: INTERNAL MEDICINE
Payer: MEDICARE

## 2024-07-24 DIAGNOSIS — N18.30 CHRONIC KIDNEY DISEASE, STAGE III (MODERATE): Primary | ICD-10-CM

## 2024-07-24 DIAGNOSIS — I10 ESSENTIAL HYPERTENSION, MALIGNANT: ICD-10-CM

## 2024-07-24 LAB
ALBUMIN SERPL BCP-MCNC: 3.8 G/DL (ref 3.5–5.2)
ALBUMIN/CREAT UR: 16 UG/MG (ref 0–30)
ANION GAP SERPL CALC-SCNC: 7 MMOL/L (ref 8–16)
BACTERIA #/AREA URNS HPF: ABNORMAL /HPF
BILIRUB UR QL STRIP: NEGATIVE
BUN SERPL-MCNC: 37 MG/DL (ref 8–23)
CALCIUM SERPL-MCNC: 8.8 MG/DL (ref 8.7–10.5)
CHLORIDE SERPL-SCNC: 107 MMOL/L (ref 95–110)
CLARITY UR: CLEAR
CO2 SERPL-SCNC: 26 MMOL/L (ref 23–29)
COLOR UR: YELLOW
CREAT SERPL-MCNC: 1.9 MG/DL (ref 0.5–1.4)
CREAT UR-MCNC: 182.3 MG/DL (ref 15–325)
EST. GFR  (NO RACE VARIABLE): 26.9 ML/MIN/1.73 M^2
GLUCOSE SERPL-MCNC: 92 MG/DL (ref 70–110)
GLUCOSE UR QL STRIP: NEGATIVE
HGB UR QL STRIP: NEGATIVE
KETONES UR QL STRIP: NEGATIVE
LEUKOCYTE ESTERASE UR QL STRIP: ABNORMAL
MAGNESIUM SERPL-MCNC: 2.1 MG/DL (ref 1.6–2.6)
MICROALBUMIN UR DL<=1MG/L-MCNC: 29.2 UG/ML
MICROSCOPIC COMMENT: ABNORMAL
NITRITE UR QL STRIP: NEGATIVE
PH UR STRIP: 6 [PH] (ref 5–8)
PHOSPHATE SERPL-MCNC: 3.1 MG/DL (ref 2.7–4.5)
POTASSIUM SERPL-SCNC: 3.6 MMOL/L (ref 3.5–5.1)
PROT UR QL STRIP: NEGATIVE
PROT UR-MCNC: 23 MG/DL (ref 6–15)
PTH-INTACT SERPL-MCNC: 83.7 PG/ML (ref 9–77)
RBC #/AREA URNS HPF: 1 /HPF (ref 0–4)
SODIUM SERPL-SCNC: 140 MMOL/L (ref 136–145)
SP GR UR STRIP: 1.02 (ref 1–1.03)
SQUAMOUS #/AREA URNS HPF: 1 /HPF
URN SPEC COLLECT METH UR: ABNORMAL
UROBILINOGEN UR STRIP-ACNC: NEGATIVE EU/DL
WBC #/AREA URNS HPF: 6 /HPF (ref 0–5)

## 2024-07-24 PROCEDURE — 36415 COLL VENOUS BLD VENIPUNCTURE: CPT | Performed by: INTERNAL MEDICINE

## 2024-07-24 PROCEDURE — 84156 ASSAY OF PROTEIN URINE: CPT | Performed by: INTERNAL MEDICINE

## 2024-07-24 PROCEDURE — 80069 RENAL FUNCTION PANEL: CPT | Performed by: INTERNAL MEDICINE

## 2024-07-24 PROCEDURE — 82570 ASSAY OF URINE CREATININE: CPT | Performed by: INTERNAL MEDICINE

## 2024-07-24 PROCEDURE — 81001 URINALYSIS AUTO W/SCOPE: CPT | Performed by: INTERNAL MEDICINE

## 2024-07-24 PROCEDURE — 83735 ASSAY OF MAGNESIUM: CPT | Performed by: INTERNAL MEDICINE

## 2024-07-24 PROCEDURE — 83970 ASSAY OF PARATHORMONE: CPT | Performed by: INTERNAL MEDICINE

## 2024-10-29 ENCOUNTER — LAB VISIT (OUTPATIENT)
Dept: LAB | Facility: HOSPITAL | Age: 79
End: 2024-10-29
Attending: INTERNAL MEDICINE
Payer: MEDICARE

## 2024-10-29 ENCOUNTER — OFFICE VISIT (OUTPATIENT)
Dept: CARDIOLOGY | Facility: CLINIC | Age: 79
End: 2024-10-29
Payer: MEDICARE

## 2024-10-29 VITALS
OXYGEN SATURATION: 94 % | HEART RATE: 64 BPM | SYSTOLIC BLOOD PRESSURE: 127 MMHG | HEIGHT: 60 IN | WEIGHT: 113.63 LBS | BODY MASS INDEX: 22.31 KG/M2 | DIASTOLIC BLOOD PRESSURE: 75 MMHG

## 2024-10-29 DIAGNOSIS — E78.2 MIXED HYPERLIPIDEMIA: ICD-10-CM

## 2024-10-29 DIAGNOSIS — N18.30 CHRONIC KIDNEY DISEASE, STAGE III (MODERATE): Primary | ICD-10-CM

## 2024-10-29 DIAGNOSIS — I10 ESSENTIAL HYPERTENSION: Primary | ICD-10-CM

## 2024-10-29 DIAGNOSIS — D51.8 MACROCYTIC ANEMIA WITH VITAMIN B12 DEFICIENCY: ICD-10-CM

## 2024-10-29 DIAGNOSIS — R00.2 PALPITATIONS: ICD-10-CM

## 2024-10-29 DIAGNOSIS — I95.1 ORTHOSTATIC HYPOTENSION: ICD-10-CM

## 2024-10-29 DIAGNOSIS — K21.9 GASTROESOPHAGEAL REFLUX DISEASE, UNSPECIFIED WHETHER ESOPHAGITIS PRESENT: ICD-10-CM

## 2024-10-29 LAB
ALBUMIN SERPL BCP-MCNC: 3.8 G/DL (ref 3.5–5.2)
ALBUMIN/CREAT UR: 8.4 UG/MG (ref 0–30)
ANION GAP SERPL CALC-SCNC: 5 MMOL/L (ref 8–16)
BILIRUB UR QL STRIP: NEGATIVE
BUN SERPL-MCNC: 32 MG/DL (ref 8–23)
CALCIUM SERPL-MCNC: 9.3 MG/DL (ref 8.7–10.5)
CHLORIDE SERPL-SCNC: 108 MMOL/L (ref 95–110)
CLARITY UR: CLEAR
CO2 SERPL-SCNC: 28 MMOL/L (ref 23–29)
COLOR UR: YELLOW
CREAT SERPL-MCNC: 1.8 MG/DL (ref 0.5–1.4)
CREAT UR-MCNC: 138.6 MG/DL (ref 15–325)
EST. GFR  (NO RACE VARIABLE): 28.7 ML/MIN/1.73 M^2
GLUCOSE SERPL-MCNC: 89 MG/DL (ref 70–110)
GLUCOSE UR QL STRIP: NEGATIVE
HGB UR QL STRIP: NEGATIVE
KETONES UR QL STRIP: NEGATIVE
LEUKOCYTE ESTERASE UR QL STRIP: NEGATIVE
MICROALBUMIN UR DL<=1MG/L-MCNC: 11.6 UG/ML
MICROSCOPIC COMMENT: NORMAL
NITRITE UR QL STRIP: NEGATIVE
PH UR STRIP: 5 [PH] (ref 5–8)
PHOSPHATE SERPL-MCNC: 4.2 MG/DL (ref 2.7–4.5)
POTASSIUM SERPL-SCNC: 4.5 MMOL/L (ref 3.5–5.1)
PROT UR QL STRIP: NEGATIVE
PTH-INTACT SERPL-MCNC: 72 PG/ML (ref 9–77)
SODIUM SERPL-SCNC: 141 MMOL/L (ref 136–145)
SP GR UR STRIP: 1.02 (ref 1–1.03)
URN SPEC COLLECT METH UR: NORMAL
UROBILINOGEN UR STRIP-ACNC: NEGATIVE EU/DL

## 2024-10-29 PROCEDURE — 81001 URINALYSIS AUTO W/SCOPE: CPT | Performed by: INTERNAL MEDICINE

## 2024-10-29 PROCEDURE — 1126F AMNT PAIN NOTED NONE PRSNT: CPT | Mod: CPTII,S$GLB,, | Performed by: INTERNAL MEDICINE

## 2024-10-29 PROCEDURE — 1159F MED LIST DOCD IN RCRD: CPT | Mod: CPTII,S$GLB,, | Performed by: INTERNAL MEDICINE

## 2024-10-29 PROCEDURE — 3078F DIAST BP <80 MM HG: CPT | Mod: CPTII,S$GLB,, | Performed by: INTERNAL MEDICINE

## 2024-10-29 PROCEDURE — 83970 ASSAY OF PARATHORMONE: CPT | Performed by: INTERNAL MEDICINE

## 2024-10-29 PROCEDURE — 1160F RVW MEDS BY RX/DR IN RCRD: CPT | Mod: CPTII,S$GLB,, | Performed by: INTERNAL MEDICINE

## 2024-10-29 PROCEDURE — 3288F FALL RISK ASSESSMENT DOCD: CPT | Mod: CPTII,S$GLB,, | Performed by: INTERNAL MEDICINE

## 2024-10-29 PROCEDURE — 99214 OFFICE O/P EST MOD 30 MIN: CPT | Mod: S$GLB,,, | Performed by: INTERNAL MEDICINE

## 2024-10-29 PROCEDURE — 80069 RENAL FUNCTION PANEL: CPT | Performed by: INTERNAL MEDICINE

## 2024-10-29 PROCEDURE — 1101F PT FALLS ASSESS-DOCD LE1/YR: CPT | Mod: CPTII,S$GLB,, | Performed by: INTERNAL MEDICINE

## 2024-10-29 PROCEDURE — 3074F SYST BP LT 130 MM HG: CPT | Mod: CPTII,S$GLB,, | Performed by: INTERNAL MEDICINE

## 2024-10-29 PROCEDURE — 36415 COLL VENOUS BLD VENIPUNCTURE: CPT | Performed by: INTERNAL MEDICINE

## 2024-10-29 PROCEDURE — 82570 ASSAY OF URINE CREATININE: CPT | Performed by: INTERNAL MEDICINE

## 2024-10-29 PROCEDURE — 99999 PR PBB SHADOW E&M-EST. PATIENT-LVL IV: CPT | Mod: PBBFAC,,, | Performed by: INTERNAL MEDICINE

## 2024-10-29 RX ORDER — AMLODIPINE BESYLATE 2.5 MG/1
2.5 TABLET ORAL DAILY
COMMUNITY

## 2024-10-31 LAB
OHS QRS DURATION: 80 MS
OHS QTC CALCULATION: 415 MS

## 2025-01-24 ENCOUNTER — LAB VISIT (OUTPATIENT)
Dept: LAB | Facility: HOSPITAL | Age: 80
End: 2025-01-24
Attending: INTERNAL MEDICINE
Payer: MEDICARE

## 2025-01-24 DIAGNOSIS — N18.30 CHRONIC KIDNEY DISEASE, STAGE III (MODERATE): Primary | ICD-10-CM

## 2025-01-24 LAB
ALBUMIN SERPL BCP-MCNC: 3.8 G/DL (ref 3.5–5.2)
ALBUMIN/CREAT UR: 14 UG/MG (ref 0–30)
ANION GAP SERPL CALC-SCNC: 7 MMOL/L (ref 8–16)
BACTERIA #/AREA URNS HPF: ABNORMAL /HPF
BILIRUB UR QL STRIP: NEGATIVE
BUN SERPL-MCNC: 44 MG/DL (ref 8–23)
CALCIUM SERPL-MCNC: 9.7 MG/DL (ref 8.7–10.5)
CHLORIDE SERPL-SCNC: 106 MMOL/L (ref 95–110)
CLARITY UR: CLEAR
CO2 SERPL-SCNC: 27 MMOL/L (ref 23–29)
COLOR UR: YELLOW
CREAT SERPL-MCNC: 2 MG/DL (ref 0.5–1.4)
CREAT UR-MCNC: 142.3 MG/DL (ref 15–325)
EST. GFR  (NO RACE VARIABLE): 25.3 ML/MIN/1.73 M^2
GLUCOSE SERPL-MCNC: 92 MG/DL (ref 70–110)
GLUCOSE UR QL STRIP: NEGATIVE
HGB UR QL STRIP: NEGATIVE
HYALINE CASTS #/AREA URNS LPF: 3 /LPF
KETONES UR QL STRIP: NEGATIVE
LEUKOCYTE ESTERASE UR QL STRIP: ABNORMAL
MICROALBUMIN UR DL<=1MG/L-MCNC: 19.9 UG/ML
MICROSCOPIC COMMENT: ABNORMAL
NITRITE UR QL STRIP: NEGATIVE
PH UR STRIP: 5 [PH] (ref 5–8)
PHOSPHATE SERPL-MCNC: 5.2 MG/DL (ref 2.7–4.5)
POTASSIUM SERPL-SCNC: 4.5 MMOL/L (ref 3.5–5.1)
PROT UR QL STRIP: NEGATIVE
RBC #/AREA URNS HPF: 1 /HPF (ref 0–4)
SODIUM SERPL-SCNC: 140 MMOL/L (ref 136–145)
SP GR UR STRIP: 1.01 (ref 1–1.03)
SQUAMOUS #/AREA URNS HPF: 1 /HPF
URN SPEC COLLECT METH UR: ABNORMAL
UROBILINOGEN UR STRIP-ACNC: NEGATIVE EU/DL
WBC #/AREA URNS HPF: 3 /HPF (ref 0–5)

## 2025-01-24 PROCEDURE — 81001 URINALYSIS AUTO W/SCOPE: CPT | Performed by: INTERNAL MEDICINE

## 2025-01-24 PROCEDURE — 80069 RENAL FUNCTION PANEL: CPT | Performed by: INTERNAL MEDICINE

## 2025-01-24 PROCEDURE — 82043 UR ALBUMIN QUANTITATIVE: CPT | Performed by: INTERNAL MEDICINE

## 2025-01-24 PROCEDURE — 36415 COLL VENOUS BLD VENIPUNCTURE: CPT | Performed by: INTERNAL MEDICINE

## 2025-03-03 ENCOUNTER — LAB VISIT (OUTPATIENT)
Dept: LAB | Facility: HOSPITAL | Age: 80
End: 2025-03-03
Attending: INTERNAL MEDICINE
Payer: MEDICARE

## 2025-03-03 DIAGNOSIS — N18.4 CHRONIC KIDNEY DISEASE, STAGE IV (SEVERE): Primary | ICD-10-CM

## 2025-03-03 LAB
ALBUMIN SERPL BCP-MCNC: 3.6 G/DL (ref 3.5–5.2)
ANION GAP SERPL CALC-SCNC: 8 MMOL/L (ref 8–16)
BUN SERPL-MCNC: 25 MG/DL (ref 8–23)
CALCIUM SERPL-MCNC: 8.7 MG/DL (ref 8.7–10.5)
CHLORIDE SERPL-SCNC: 106 MMOL/L (ref 95–110)
CO2 SERPL-SCNC: 26 MMOL/L (ref 23–29)
CREAT SERPL-MCNC: 1.8 MG/DL (ref 0.5–1.4)
EST. GFR  (NO RACE VARIABLE): 28.5 ML/MIN/1.73 M^2
GLUCOSE SERPL-MCNC: 91 MG/DL (ref 70–110)
PHOSPHATE SERPL-MCNC: 2.8 MG/DL (ref 2.7–4.5)
POTASSIUM SERPL-SCNC: 4.1 MMOL/L (ref 3.5–5.1)
SODIUM SERPL-SCNC: 140 MMOL/L (ref 136–145)

## 2025-03-03 PROCEDURE — 80069 RENAL FUNCTION PANEL: CPT | Performed by: INTERNAL MEDICINE

## 2025-03-03 PROCEDURE — 36415 COLL VENOUS BLD VENIPUNCTURE: CPT | Performed by: INTERNAL MEDICINE

## 2025-03-11 ENCOUNTER — OFFICE VISIT (OUTPATIENT)
Dept: URGENT CARE | Facility: CLINIC | Age: 80
End: 2025-03-11
Payer: MEDICARE

## 2025-03-11 VITALS
DIASTOLIC BLOOD PRESSURE: 85 MMHG | SYSTOLIC BLOOD PRESSURE: 154 MMHG | HEIGHT: 60 IN | OXYGEN SATURATION: 95 % | RESPIRATION RATE: 18 BRPM | TEMPERATURE: 98 F | BODY MASS INDEX: 22.19 KG/M2 | HEART RATE: 82 BPM | WEIGHT: 113 LBS

## 2025-03-11 DIAGNOSIS — L30.9 DERMATITIS: Primary | ICD-10-CM

## 2025-03-11 PROCEDURE — 99214 OFFICE O/P EST MOD 30 MIN: CPT | Mod: S$GLB,,,

## 2025-03-11 RX ORDER — TRIAMCINOLONE ACETONIDE 1 MG/G
CREAM TOPICAL 2 TIMES DAILY
Qty: 45 G | Refills: 0 | Status: SHIPPED | OUTPATIENT
Start: 2025-03-11

## 2025-03-11 RX ORDER — CETIRIZINE HYDROCHLORIDE 10 MG/1
10 TABLET ORAL DAILY
Qty: 30 TABLET | Refills: 0 | Status: SHIPPED | OUTPATIENT
Start: 2025-03-11 | End: 2025-04-10

## 2025-03-11 NOTE — PROGRESS NOTES
Subjective:      Patient ID: Tyra Fernandes is a 80 y.o. female.    Vitals:  height is 5' (1.524 m) and weight is 51.3 kg (113 lb). Her oral temperature is 97.5 °F (36.4 °C). Her blood pressure is 154/85 (abnormal) and her pulse is 82. Her respiration is 18 and oxygen saturation is 95%.     Chief Complaint: Rash    Recurrent pruritic red rash on anterior chest with a present for 3 days.  Recently seen by Dermatology 3 weeks ago for same rash.  Patient believes rashes related to shrimp.  Her diet consists of strep.  Last time having shrimp was on Friday.  She has been using Claritin, and topical lotions for treatment.  Rash has not spread.  Denies known new allergens, or use of soaps, detergents, or medications.  Has no pets in the house.  No known plant contact.    Rash  This is a recurrent problem. Episode onset: x3 days. The problem has been gradually worsening since onset. The affected locations include the chest. The rash is characterized by redness and itchiness. She was exposed to nothing. Past treatments include anti-itch cream and antibiotics. The treatment provided mild relief.       Constitution: Negative.   HENT: Negative.     Neck: neck negative.   Cardiovascular: Negative.    Eyes: Negative.    Respiratory: Negative.     Gastrointestinal: Negative.    Endocrine: negative.   Genitourinary: Negative.    Musculoskeletal: Negative.    Skin:  Positive for rash.   Allergic/Immunologic: Positive for itching.   Neurological: Negative.    Hematologic/Lymphatic: Negative.    Psychiatric/Behavioral: Negative.        Objective:     Physical Exam   Constitutional: She is oriented to person, place, and time. She is cooperative.  Non-toxic appearance. She does not appear ill. No distress.   HENT:   Head: Normocephalic and atraumatic.   Ears:   Right Ear: External ear normal.   Left Ear: External ear normal.   Nose: Nose normal.   Mouth/Throat: Uvula is midline, oropharynx is clear and moist and mucous membranes are  normal. Mucous membranes are moist. Oropharynx is clear.   Eyes: Conjunctivae and lids are normal. Pupils are equal, round, and reactive to light. Extraocular movement intact   Neck: Trachea normal and phonation normal. Neck supple.   Cardiovascular: Normal rate, regular rhythm, normal heart sounds and normal pulses.   Pulmonary/Chest: Effort normal and breath sounds normal. No stridor. She has no wheezes. She has no rhonchi. She has no rales.       Abdominal: Normal appearance.   Lymphadenopathy:     She has no cervical adenopathy.   Neurological: no focal deficit. She is alert, oriented to person, place, and time and at baseline. She has normal motor skills, normal sensation and intact cranial nerves (2-12). Gait and coordination normal. GCS eye subscore is 4. GCS verbal subscore is 5. GCS motor subscore is 6.   Skin: Skin is warm, dry, not diaphoretic and rash. Capillary refill takes 2 to 3 seconds.   Psychiatric: She experiences Normal attention and Normal perception. Her speech is normal and behavior is normal. Mood, judgment and thought content normal.   Nursing note and vitals reviewed.      Assessment:     1. Dermatitis        Plan:       Dermatitis  -     triamcinolone acetonide 0.1% (KENALOG) 0.1 % cream; Apply topically 2 (two) times daily.  Dispense: 45 g; Refill: 0  -     cetirizine (ZYRTEC) 10 MG tablet; Take 1 tablet (10 mg total) by mouth once daily.  Dispense: 30 tablet; Refill: 0          Medical Decision Making:   History:   Old Medical Records: I decided to obtain old medical records.  Initial Assessment:   Scattered maculopapular pruritic and erythematous rash on anterior chest.  Patient states Rash is similar to rash that she had approximately 3 weeks ago.  She states she was placed on methylprednisone, and did not finish last 2 doses due to developing an illness.  She is currently using topical lotions as recommended by her dermatologist.  She does have a dermatology appointment on Thursday.   Rash interferes with sleep, so patient decided to come to clinic for evaluation.  Differential Diagnosis:   Zoster, cellulitis, scabies  Urgent Care Management:  Supportive treatments.

## 2025-03-11 NOTE — PATIENT INSTRUCTIONS
Do not use Kenalog cream on face, or genitals.  Use a glove when applying.  Use Zyrtec during the day, Benadryl at nighttime.

## 2025-04-21 ENCOUNTER — HOSPITAL ENCOUNTER (INPATIENT)
Facility: HOSPITAL | Age: 80
LOS: 2 days | Discharge: HOME HEALTH | DRG: 683 | End: 2025-04-24
Attending: STUDENT IN AN ORGANIZED HEALTH CARE EDUCATION/TRAINING PROGRAM | Admitting: INTERNAL MEDICINE
Payer: MEDICARE

## 2025-04-21 DIAGNOSIS — N17.9 AKI (ACUTE KIDNEY INJURY): ICD-10-CM

## 2025-04-21 DIAGNOSIS — E87.1 HYPONATREMIA: Primary | ICD-10-CM

## 2025-04-21 DIAGNOSIS — R53.1 GENERALIZED WEAKNESS: ICD-10-CM

## 2025-04-21 LAB
ABSOLUTE EOSINOPHIL (SMH): 0.01 K/UL
ABSOLUTE MONOCYTE (SMH): 0.83 K/UL (ref 0.3–1)
ABSOLUTE NEUTROPHIL COUNT (SMH): 7.2 K/UL (ref 1.8–7.7)
ALBUMIN SERPL-MCNC: 3.6 G/DL (ref 3.5–5.2)
ALP SERPL-CCNC: 54 UNIT/L (ref 55–135)
ALT SERPL-CCNC: 7 UNIT/L (ref 10–44)
ANION GAP (SMH): 10 MMOL/L (ref 8–16)
AST SERPL-CCNC: 19 UNIT/L (ref 10–40)
BACTERIA #/AREA URNS AUTO: ABNORMAL /HPF
BASOPHILS # BLD AUTO: 0.02 K/UL
BASOPHILS NFR BLD AUTO: 0.2 %
BILIRUB SERPL-MCNC: 0.3 MG/DL (ref 0.1–1)
BILIRUB UR QL STRIP.AUTO: NEGATIVE
BUN SERPL-MCNC: 54 MG/DL (ref 8–23)
CALCIUM SERPL-MCNC: 9.4 MG/DL (ref 8.7–10.5)
CHLORIDE SERPL-SCNC: 98 MMOL/L (ref 95–110)
CLARITY UR: ABNORMAL
CO2 SERPL-SCNC: 20 MMOL/L (ref 23–29)
COLOR UR AUTO: YELLOW
CREAT SERPL-MCNC: 2.9 MG/DL (ref 0.5–1.4)
ERYTHROCYTE [DISTWIDTH] IN BLOOD BY AUTOMATED COUNT: 13.2 % (ref 11.5–14.5)
GFR SERPLBLD CREATININE-BSD FMLA CKD-EPI: 16 ML/MIN/1.73/M2
GLUCOSE SERPL-MCNC: 100 MG/DL (ref 70–110)
GLUCOSE UR QL STRIP: NEGATIVE
HCT VFR BLD AUTO: 40.4 % (ref 37–48.5)
HGB BLD-MCNC: 13.5 GM/DL (ref 12–16)
HGB UR QL STRIP: ABNORMAL
IMM GRANULOCYTES # BLD AUTO: 0.06 K/UL (ref 0–0.04)
IMM GRANULOCYTES NFR BLD AUTO: 0.7 % (ref 0–0.5)
INFLUENZA A MOLECULAR (OHS): NEGATIVE
INFLUENZA B MOLECULAR (OHS): NEGATIVE
KETONES UR QL STRIP: NEGATIVE
LDH SERPL L TO P-CCNC: 0.83 MMOL/L (ref 0.5–2.2)
LEUKOCYTE ESTERASE UR QL STRIP: ABNORMAL
LYMPHOCYTES # BLD AUTO: 0.54 K/UL (ref 1–4.8)
MCH RBC QN AUTO: 31.8 PG (ref 27–31)
MCHC RBC AUTO-ENTMCNC: 33.4 G/DL (ref 32–36)
MCV RBC AUTO: 95 FL (ref 82–98)
MICROSCOPIC COMMENT: ABNORMAL
NITRITE UR QL STRIP: NEGATIVE
NUCLEATED RBC (/100WBC) (SMH): 0 /100 WBC
PH UR STRIP: 6 [PH]
PLATELET # BLD AUTO: 145 K/UL (ref 150–450)
PMV BLD AUTO: 11.1 FL (ref 9.2–12.9)
POTASSIUM SERPL-SCNC: 4.4 MMOL/L (ref 3.5–5.1)
PROT SERPL-MCNC: 7.5 GM/DL (ref 6–8.4)
PROT UR QL STRIP: ABNORMAL
RBC # BLD AUTO: 4.24 M/UL (ref 4–5.4)
RBC #/AREA URNS AUTO: 1 /HPF
RELATIVE EOSINOPHIL (SMH): 0.1 % (ref 0–8)
RELATIVE LYMPHOCYTE (SMH): 6.2 % (ref 18–48)
RELATIVE MONOCYTE (SMH): 9.6 % (ref 4–15)
RELATIVE NEUTROPHIL (SMH): 83.2 % (ref 38–73)
SAMPLE: NORMAL
SARS-COV-2 RDRP RESP QL NAA+PROBE: NEGATIVE
SODIUM SERPL-SCNC: 128 MMOL/L (ref 136–145)
SP GR UR STRIP: 1.01
SQUAMOUS #/AREA URNS AUTO: 7 /HPF
UROBILINOGEN UR STRIP-ACNC: NEGATIVE EU/DL
WBC # BLD AUTO: 8.67 K/UL (ref 3.9–12.7)
WBC #/AREA URNS AUTO: 64 /HPF
WBC CLUMPS UR QL AUTO: ABNORMAL

## 2025-04-21 PROCEDURE — 93005 ELECTROCARDIOGRAM TRACING: CPT | Performed by: INTERNAL MEDICINE

## 2025-04-21 PROCEDURE — 96365 THER/PROPH/DIAG IV INF INIT: CPT

## 2025-04-21 PROCEDURE — 87040 BLOOD CULTURE FOR BACTERIA: CPT | Performed by: STUDENT IN AN ORGANIZED HEALTH CARE EDUCATION/TRAINING PROGRAM

## 2025-04-21 PROCEDURE — 93010 ELECTROCARDIOGRAM REPORT: CPT | Mod: ,,, | Performed by: INTERNAL MEDICINE

## 2025-04-21 PROCEDURE — 96361 HYDRATE IV INFUSION ADD-ON: CPT

## 2025-04-21 PROCEDURE — 85025 COMPLETE CBC W/AUTO DIFF WBC: CPT | Performed by: PHYSICIAN ASSISTANT

## 2025-04-21 PROCEDURE — 81001 URINALYSIS AUTO W/SCOPE: CPT | Performed by: STUDENT IN AN ORGANIZED HEALTH CARE EDUCATION/TRAINING PROGRAM

## 2025-04-21 PROCEDURE — U0002 COVID-19 LAB TEST NON-CDC: HCPCS | Performed by: STUDENT IN AN ORGANIZED HEALTH CARE EDUCATION/TRAINING PROGRAM

## 2025-04-21 PROCEDURE — 87502 INFLUENZA DNA AMP PROBE: CPT | Performed by: STUDENT IN AN ORGANIZED HEALTH CARE EDUCATION/TRAINING PROGRAM

## 2025-04-21 PROCEDURE — 36415 COLL VENOUS BLD VENIPUNCTURE: CPT | Performed by: STUDENT IN AN ORGANIZED HEALTH CARE EDUCATION/TRAINING PROGRAM

## 2025-04-21 PROCEDURE — 99285 EMERGENCY DEPT VISIT HI MDM: CPT | Mod: 25

## 2025-04-21 PROCEDURE — 80053 COMPREHEN METABOLIC PANEL: CPT | Performed by: PHYSICIAN ASSISTANT

## 2025-04-21 PROCEDURE — 25000003 PHARM REV CODE 250: Performed by: STUDENT IN AN ORGANIZED HEALTH CARE EDUCATION/TRAINING PROGRAM

## 2025-04-21 PROCEDURE — 63600175 PHARM REV CODE 636 W HCPCS

## 2025-04-21 PROCEDURE — 87086 URINE CULTURE/COLONY COUNT: CPT | Performed by: STUDENT IN AN ORGANIZED HEALTH CARE EDUCATION/TRAINING PROGRAM

## 2025-04-21 RX ORDER — CALCITRIOL 0.25 UG/1
0.25 CAPSULE ORAL
COMMUNITY
Start: 2025-02-14

## 2025-04-21 RX ORDER — TALC
9 POWDER (GRAM) TOPICAL NIGHTLY PRN
Status: DISCONTINUED | OUTPATIENT
Start: 2025-04-22 | End: 2025-04-24 | Stop reason: HOSPADM

## 2025-04-21 RX ORDER — IBUPROFEN 200 MG
24 TABLET ORAL
Status: DISCONTINUED | OUTPATIENT
Start: 2025-04-22 | End: 2025-04-24 | Stop reason: HOSPADM

## 2025-04-21 RX ORDER — SODIUM CHLORIDE 9 MG/ML
INJECTION, SOLUTION INTRAVENOUS CONTINUOUS
Status: DISCONTINUED | OUTPATIENT
Start: 2025-04-22 | End: 2025-04-22

## 2025-04-21 RX ORDER — ONDANSETRON HYDROCHLORIDE 2 MG/ML
4 INJECTION, SOLUTION INTRAVENOUS EVERY 6 HOURS PRN
Status: DISCONTINUED | OUTPATIENT
Start: 2025-04-22 | End: 2025-04-24 | Stop reason: HOSPADM

## 2025-04-21 RX ORDER — LEVOFLOXACIN 5 MG/ML
750 INJECTION, SOLUTION INTRAVENOUS
Status: COMPLETED | OUTPATIENT
Start: 2025-04-21 | End: 2025-04-22

## 2025-04-21 RX ORDER — AMLODIPINE BESYLATE 2.5 MG/1
2.5 TABLET ORAL 2 TIMES DAILY PRN
Status: DISCONTINUED | OUTPATIENT
Start: 2025-04-22 | End: 2025-04-24 | Stop reason: HOSPADM

## 2025-04-21 RX ORDER — ALUMINUM HYDROXIDE, MAGNESIUM HYDROXIDE, AND SIMETHICONE 1200; 120; 1200 MG/30ML; MG/30ML; MG/30ML
30 SUSPENSION ORAL 4 TIMES DAILY PRN
Status: DISCONTINUED | OUTPATIENT
Start: 2025-04-22 | End: 2025-04-24 | Stop reason: HOSPADM

## 2025-04-21 RX ORDER — SODIUM CHLORIDE 0.9 % (FLUSH) 0.9 %
10 SYRINGE (ML) INJECTION EVERY 12 HOURS PRN
Status: DISCONTINUED | OUTPATIENT
Start: 2025-04-22 | End: 2025-04-24 | Stop reason: HOSPADM

## 2025-04-21 RX ORDER — ACETAMINOPHEN 325 MG/1
650 TABLET ORAL EVERY 4 HOURS PRN
Status: DISCONTINUED | OUTPATIENT
Start: 2025-04-22 | End: 2025-04-24 | Stop reason: HOSPADM

## 2025-04-21 RX ORDER — NALOXONE HCL 0.4 MG/ML
0.02 VIAL (ML) INJECTION
Status: DISCONTINUED | OUTPATIENT
Start: 2025-04-22 | End: 2025-04-24 | Stop reason: HOSPADM

## 2025-04-21 RX ORDER — LEVOFLOXACIN 5 MG/ML
750 INJECTION, SOLUTION INTRAVENOUS
Status: DISCONTINUED | OUTPATIENT
Start: 2025-04-22 | End: 2025-04-22

## 2025-04-21 RX ORDER — ASPIRIN 81 MG/1
81 TABLET ORAL DAILY
Status: DISCONTINUED | OUTPATIENT
Start: 2025-04-22 | End: 2025-04-24 | Stop reason: HOSPADM

## 2025-04-21 RX ORDER — CALCITRIOL 0.25 UG/1
0.25 CAPSULE ORAL
Status: DISCONTINUED | OUTPATIENT
Start: 2025-04-23 | End: 2025-04-24 | Stop reason: HOSPADM

## 2025-04-21 RX ORDER — PROCHLORPERAZINE EDISYLATE 5 MG/ML
5 INJECTION INTRAMUSCULAR; INTRAVENOUS EVERY 6 HOURS PRN
Status: DISCONTINUED | OUTPATIENT
Start: 2025-04-22 | End: 2025-04-24 | Stop reason: HOSPADM

## 2025-04-21 RX ORDER — GLUCAGON 1 MG
1 KIT INJECTION
Status: DISCONTINUED | OUTPATIENT
Start: 2025-04-22 | End: 2025-04-24 | Stop reason: HOSPADM

## 2025-04-21 RX ORDER — IBUPROFEN 200 MG
16 TABLET ORAL
Status: DISCONTINUED | OUTPATIENT
Start: 2025-04-22 | End: 2025-04-24 | Stop reason: HOSPADM

## 2025-04-21 RX ADMIN — SODIUM CHLORIDE 1000 ML: 9 INJECTION, SOLUTION INTRAVENOUS at 10:04

## 2025-04-21 RX ADMIN — LEVOFLOXACIN 750 MG: 750 INJECTION, SOLUTION INTRAVENOUS at 11:04

## 2025-04-22 PROBLEM — D69.6 THROMBOCYTOPENIA: Status: ACTIVE | Noted: 2025-04-22

## 2025-04-22 PROBLEM — E87.1 DEHYDRATION WITH HYPONATREMIA: Status: ACTIVE | Noted: 2025-04-22

## 2025-04-22 PROBLEM — E86.0 DEHYDRATION WITH HYPONATREMIA: Status: ACTIVE | Noted: 2025-04-22

## 2025-04-22 PROBLEM — N30.00 ACUTE CYSTITIS WITHOUT HEMATURIA: Status: ACTIVE | Noted: 2025-04-22

## 2025-04-22 LAB
ANION GAP (SMH): 9 MMOL/L (ref 8–16)
BUN SERPL-MCNC: 52 MG/DL (ref 8–23)
CALCIUM SERPL-MCNC: 8 MG/DL (ref 8.7–10.5)
CHLORIDE SERPL-SCNC: 100 MMOL/L (ref 95–110)
CHLORIDE UR-SCNC: 54 MMOL/L (ref 25–200)
CO2 SERPL-SCNC: 18 MMOL/L (ref 23–29)
CREAT SERPL-MCNC: 2.7 MG/DL (ref 0.5–1.4)
CREAT UR-MCNC: 60 MG/DL (ref 15–325)
EAG (SMH): 105 MG/DL (ref 68–131)
GFR SERPLBLD CREATININE-BSD FMLA CKD-EPI: 18 ML/MIN/1.73/M2
GLUCOSE SERPL-MCNC: 236 MG/DL (ref 70–110)
HBA1C MFR BLD: 5.3 % (ref 4.5–6.2)
LACTATE SERPL-SCNC: 0.6 MMOL/L (ref 0.5–1.9)
MAGNESIUM SERPL-MCNC: 2.2 MG/DL (ref 1.6–2.6)
OSMOLALITY SERPL: 292 MOSM/KG (ref 275–295)
OSMOLALITY UR: 346 MOSM/KG (ref 50–1200)
PHOSPHATE SERPL-MCNC: 3 MG/DL (ref 2.7–4.5)
POTASSIUM SERPL-SCNC: 4.9 MMOL/L (ref 3.5–5.1)
POTASSIUM UR-SCNC: 17 MMOL/L (ref 15–95)
PROCALCITONIN SERPL-MCNC: 41.6 NG/ML
PROT UR-MCNC: 50 MG/DL
SODIUM SERPL-SCNC: 127 MMOL/L (ref 136–145)
SODIUM UR-SCNC: 61 MMOL/L (ref 20–250)
UUN UR-MCNC: 518 MG/DL (ref 140–1050)

## 2025-04-22 PROCEDURE — 12000002 HC ACUTE/MED SURGE SEMI-PRIVATE ROOM

## 2025-04-22 PROCEDURE — 84156 ASSAY OF PROTEIN URINE: CPT | Performed by: INTERNAL MEDICINE

## 2025-04-22 PROCEDURE — 83735 ASSAY OF MAGNESIUM: CPT

## 2025-04-22 PROCEDURE — 97535 SELF CARE MNGMENT TRAINING: CPT

## 2025-04-22 PROCEDURE — 97162 PT EVAL MOD COMPLEX 30 MIN: CPT

## 2025-04-22 PROCEDURE — 83935 ASSAY OF URINE OSMOLALITY: CPT

## 2025-04-22 PROCEDURE — 84300 ASSAY OF URINE SODIUM: CPT | Performed by: INTERNAL MEDICINE

## 2025-04-22 PROCEDURE — 25000003 PHARM REV CODE 250: Performed by: INTERNAL MEDICINE

## 2025-04-22 PROCEDURE — 25000003 PHARM REV CODE 250

## 2025-04-22 PROCEDURE — 84133 ASSAY OF URINE POTASSIUM: CPT | Performed by: INTERNAL MEDICINE

## 2025-04-22 PROCEDURE — 82570 ASSAY OF URINE CREATININE: CPT | Performed by: INTERNAL MEDICINE

## 2025-04-22 PROCEDURE — 97165 OT EVAL LOW COMPLEX 30 MIN: CPT

## 2025-04-22 PROCEDURE — 82436 ASSAY OF URINE CHLORIDE: CPT | Performed by: INTERNAL MEDICINE

## 2025-04-22 PROCEDURE — 63600175 PHARM REV CODE 636 W HCPCS

## 2025-04-22 PROCEDURE — 80048 BASIC METABOLIC PNL TOTAL CA: CPT

## 2025-04-22 PROCEDURE — 83036 HEMOGLOBIN GLYCOSYLATED A1C: CPT

## 2025-04-22 PROCEDURE — 36415 COLL VENOUS BLD VENIPUNCTURE: CPT

## 2025-04-22 PROCEDURE — 84100 ASSAY OF PHOSPHORUS: CPT

## 2025-04-22 PROCEDURE — 84540 ASSAY OF URINE/UREA-N: CPT | Performed by: INTERNAL MEDICINE

## 2025-04-22 PROCEDURE — 96375 TX/PRO/DX INJ NEW DRUG ADDON: CPT

## 2025-04-22 PROCEDURE — 83930 ASSAY OF BLOOD OSMOLALITY: CPT

## 2025-04-22 PROCEDURE — 96361 HYDRATE IV INFUSION ADD-ON: CPT

## 2025-04-22 PROCEDURE — 83605 ASSAY OF LACTIC ACID: CPT

## 2025-04-22 PROCEDURE — 84145 PROCALCITONIN (PCT): CPT

## 2025-04-22 RX ORDER — SODIUM BICARBONATE 650 MG/1
650 TABLET ORAL 2 TIMES DAILY
Status: DISCONTINUED | OUTPATIENT
Start: 2025-04-22 | End: 2025-04-24 | Stop reason: HOSPADM

## 2025-04-22 RX ORDER — PANTOPRAZOLE SODIUM 40 MG/10ML
40 INJECTION, POWDER, LYOPHILIZED, FOR SOLUTION INTRAVENOUS DAILY
Status: DISCONTINUED | OUTPATIENT
Start: 2025-04-22 | End: 2025-04-24 | Stop reason: HOSPADM

## 2025-04-22 RX ORDER — SODIUM CHLORIDE 9 MG/ML
INJECTION, SOLUTION INTRAVENOUS CONTINUOUS
Status: DISCONTINUED | OUTPATIENT
Start: 2025-04-22 | End: 2025-04-24

## 2025-04-22 RX ORDER — LEVOFLOXACIN 5 MG/ML
500 INJECTION, SOLUTION INTRAVENOUS
Status: DISCONTINUED | OUTPATIENT
Start: 2025-04-23 | End: 2025-04-24 | Stop reason: HOSPADM

## 2025-04-22 RX ADMIN — SODIUM CHLORIDE: 9 INJECTION, SOLUTION INTRAVENOUS at 10:04

## 2025-04-22 RX ADMIN — PANTOPRAZOLE SODIUM 40 MG: 40 INJECTION, POWDER, FOR SOLUTION INTRAVENOUS at 09:04

## 2025-04-22 RX ADMIN — SODIUM BICARBONATE 650 MG TABLET 650 MG: at 09:04

## 2025-04-22 RX ADMIN — SODIUM BICARBONATE 650 MG TABLET 650 MG: at 08:04

## 2025-04-22 RX ADMIN — ASPIRIN 81 MG: 81 TABLET, COATED ORAL at 09:04

## 2025-04-22 RX ADMIN — SODIUM CHLORIDE: 9 INJECTION, SOLUTION INTRAVENOUS at 11:04

## 2025-04-22 NOTE — PLAN OF CARE
[facility]  Initial Discharge Assessment       Primary Care Provider: Nadia Sanderson MD    Admission Diagnosis: KRIS (acute kidney injury) [N17.9]  Hyponatremia [E87.1]    Admission Date: 4/21/2025  Expected Discharge Date: 4/25/2025    SW met with patient bedside. SW verified demographics, insurance, supports, and PCP.  Patient reported living in the home alone and drives self to appointments. SW assessed patient's needs. Patient is able to complete ADLs independently. Patient verified at home DME: none.  Patient verified No HH, No Dialysis, No Blood Thinners, and No Oxygen.     Patient verified pharmacy of choice: [facility] [address]  Patient confirmed her sister, Ramirez Riley, will be source of transportation at the time of discharge.     Transition of Care Barriers: None    Payor: [place] [employer] [facility] MCARE / Plan: [employer] MEDICARE ADVANTAGE / Product Type: Medicare Advantage /     Extended Emergency Contact Information  Primary Emergency Contact: Ramirez Riley   [facility]  Home Phone: [phone]  Work Phone: [phone]  Mobile Phone: [phone]  Relation: Sister  Secondary Emergency Contact: Trang Riley  Mobile Phone: [phone]  Relation: Brother    Discharge Plan A: Home  Discharge Plan B: Home      [facility] [address] [place] - [address]  [address] [facility] [address]  Phone: [phone] Fax: [phone]    [facility] - [facility] [address]  [address] [facility] [address]  Phone: [phone] Fax: [phone]      Initial Assessment (most recent)       Adult Discharge Assessment - 04/22/25 1207          Discharge Assessment    Assessment Type Discharge Planning Assessment     Confirmed/corrected address, phone number and insurance Yes     Confirmed Demographics Correct on Facesheet     Source of Information patient     When was your last doctors appointment? --    couple weeks ago    Communicated FABIEN with patient/caregiver Date not available/Unable to determine     Reason For Admission KRIS     People in Home alone     Do you expect to return to your current living situation? Yes     Do you have help at home or someone to help you manage your care at home? No     Prior to hospitilization cognitive status: Unable to Assess     Current cognitive status: Alert/Oriented     Walking or Climbing Stairs Difficulty no     Dressing/Bathing Difficulty no     Home Accessibility wheelchair accessible     Home Layout Able to live on 1st floor     Equipment Currently Used at Home none     Readmission within 30 days? No     Patient currently being followed by outpatient case management? No     Do you currently have service(s) that help you manage your care at home? No     Do you take prescription medications? Yes     Do you have prescription coverage? Yes     Coverage [employer] Medicare     Do you have any problems affording any of your prescribed medications? No     Is the patient taking medications as prescribed? yes     Who is going to help you get home at discharge? Ramirez Riley, sister     How do you get to doctors appointments? car, drives self     Are you on dialysis? No     Do you take coumadin? No     Discharge Plan A Home     Discharge Plan B Home     DME Needed Upon Discharge  none     Discharge Plan discussed with: Patient     Transition of Care Barriers None

## 2025-04-22 NOTE — ASSESSMENT & PLAN NOTE
-urine cultures pending  -started on Levaquin IV, renally dosed   -continuous IV fluids  -strict I's and O's

## 2025-04-22 NOTE — ASSESSMENT & PLAN NOTE
Hyponatremia is likely due to Dehydration/hypovolemia. The patient's most recent sodium results are listed below.  Recent Labs     04/21/25 2054 04/22/25  0452   * 127*     Plan  - Correct the sodium by 4-6mEq in 24 hours.   - Obtain the following studies: Urine sodium, urine osmolality, serum osmolality.  - Will treat the hyponatremia with IV fluids as follows:  Normal saline  - Monitor sodium Daily.   - Patient hyponatremia is stable  - consult nephrology

## 2025-04-22 NOTE — ASSESSMENT & PLAN NOTE
Patient's blood pressure range in the last 24 hours was: BP  Min: 113/59  Max: 165/70.The patient's inpatient anti-hypertensive regimen is listed below:  Current Antihypertensives  amLODIPine tablet 2.5 mg, 2 times daily PRN, Oral    Plan  - BP is controlled, no changes needed to their regimen

## 2025-04-22 NOTE — ASSESSMENT & PLAN NOTE
Hyponatremia is likely due to Dehydration/hypovolemia. The patient's most recent sodium results are listed below.  Recent Labs     04/21/25 2054   *     Plan  - Correct the sodium by 4-6mEq in 24 hours.   - Obtain the following studies: Urine sodium, urine osmolality, serum osmolality.  - Will treat the hyponatremia with IV fluids as follows:  Normal saline  - Monitor sodium Daily.   - Patient hyponatremia is stable  -consult nephrology

## 2025-04-22 NOTE — ASSESSMENT & PLAN NOTE
Patient's blood pressure range in the last 24 hours was: BP  Min: 123/79  Max: 165/70.The patient's inpatient anti-hypertensive regimen is listed below:  Current Antihypertensives  amLODIPine tablet 2.5 mg, 2 times daily PRN, Oral    Plan  - BP is controlled, no changes needed to their regimen

## 2025-04-22 NOTE — SUBJECTIVE & OBJECTIVE
Interval History: Patient seen and examined at bedside.  NAD.  Reports feeling a little better today, but still with generalized weakness and fatigue.   SrCr slightly improved today.  Nephrology following.   Continue IV fluids, abx for UTI    Review of Systems   Constitutional:  Positive for fatigue.   Respiratory:  Negative for shortness of breath.    Cardiovascular:  Negative for chest pain.   Gastrointestinal:  Negative for abdominal pain, nausea and vomiting.   Neurological:  Positive for weakness (generalized).     Objective:     Vital Signs (Most Recent):  Temp: 97.7 °F (36.5 °C) (04/22/25 1025)  Pulse: 79 (04/22/25 1025)  Resp: 20 (04/22/25 1025)  BP: (!) 145/77 (04/22/25 1025)  SpO2: 100 % (04/22/25 1025) Vital Signs (24h Range):  Temp:  [97.7 °F (36.5 °C)-98.8 °F (37.1 °C)] 97.7 °F (36.5 °C)  Pulse:  [75-90] 79  Resp:  [18-20] 20  SpO2:  [96 %-100 %] 100 %  BP: (113-165)/(56-79) 145/77     Weight: 50.6 kg (111 lb 8.8 oz)  Body mass index is 21.79 kg/m².    Intake/Output Summary (Last 24 hours) at 4/22/2025 1416  Last data filed at 4/22/2025 1025  Gross per 24 hour   Intake 1149 ml   Output 100 ml   Net 1049 ml         Physical Exam  Vitals and nursing note reviewed.   Constitutional:       General: She is not in acute distress.  Cardiovascular:      Rate and Rhythm: Normal rate and regular rhythm.   Pulmonary:      Effort: Pulmonary effort is normal. No respiratory distress.      Breath sounds: Normal breath sounds.   Abdominal:      Palpations: Abdomen is soft.      Tenderness: There is no abdominal tenderness.   Musculoskeletal:      Right lower leg: No edema.      Left lower leg: No edema.   Skin:     General: Skin is warm and dry.   Neurological:      Mental Status: She is alert and oriented to person, place, and time.               Significant Labs: All pertinent labs within the past 24 hours have been reviewed.  CBC:   Recent Labs   Lab 04/21/25 2054   WBC 8.67   HGB 13.5   HCT 40.4   *      CMP:   Recent Labs   Lab 04/21/25 2054 04/22/25  0452   * 127*   K 4.4 4.9   CO2 20* 18*   BUN 54* 52*   CREATININE 2.9* 2.7*   CALCIUM 9.4 8.0*   ALBUMIN 3.6  --    BILITOT 0.3  --    ALKPHOS 54*  --    AST 19  --    ALT 7*  --      Magnesium:   Recent Labs   Lab 04/22/25  0452   MG 2.2       Significant Imaging: I have reviewed all pertinent imaging results/findings within the past 24 hours.

## 2025-04-22 NOTE — PT/OT/SLP EVAL
Physical Therapy Evaluation    Patient Name:  Tyra Fernandes   MRN:  [id number]    Recommendations:     Discharge Recommendations: High Intensity Therapy /Low Intensity depending on progress  Discharge Equipment Recommendations:  (TBD)   Barriers to discharge: Decreased caregiver support    Assessment:     Tyra Fernandes is a 80 y.o. female admitted with a medical diagnosis of KRIS (acute kidney injury).  She presents with the following impairments/functional limitations: weakness, impaired endurance, impaired self care skills, impaired functional mobility, gait instability, impaired balance, decreased lower extremity function, decreased safety awareness, decreased upper extremity function, impaired cardiopulmonary response to activity .    Rehab Prognosis: Good; patient would benefit from acute skilled PT services to address these deficits and reach maximum level of function.    Recent Surgery: * No surgery found *      Plan:     During this hospitalization, patient to be seen 6 x/week to address the identified rehab impairments via gait training, therapeutic activities, therapeutic exercises and progress toward the following goals:    Plan of Care Expires:  05/22/25    Subjective     Chief Complaint: decreased standing balance  Patient/Family Comments/goals: Return to PLOF  Pain/Comfort:  Pain Rating 1: 0/10    Patients cultural, spiritual, Jewish conflicts given the current situation:      Living Environment:  Pt lives at home alone.  Prior to admission, patients level of function was independent .  Equipment used at home: none.  DME owned (not currently used): none.  Upon discharge, patient will have assistance from facility/friends     Objective:     Communicated with nurse  prior to session.  Patient found supine with telemetry, peripheral IV  upon PT entry to room.    General Precautions: Standard, fall  Orthopedic Precautions:    Braces:    Respiratory Status: Room air    Exams:  Cognitive Exam:  Patient  is oriented to Person, Place, Time, and Situation  RLE ROM: WFL  RLE Strength: WFL  LLE ROM: WFL  LLE Strength: WFL    Functional Mobility:  Bed Mobility:     Supine to Sit: minimum assistance. Extra long time to complete   Sit to Supine: minimum assistance  Transfers:     Sit to Stand:  minimum assistance with rolling walker  Gait: 6 ft RW and Min A  with moderate fatigue/weakness .  Small step length  Balance: unsupported sitting , Min A for standing with RW      AM-PAC 6 CLICK MOBILITY  Total Score:17       Treatment & Education:  Pt was educated on safety, use of  nghia light, PT POC/DC recommendations    Patient left supine with all lines intact, call button in reach, and bed alarm on.    GOALS:   Multidisciplinary Problems       Physical Therapy Goals          Problem: Physical Therapy    Goal Priority Disciplines Outcome Interventions   Physical Therapy Goal     PT, PT/OT     Description: Goals to be met by: 2025     Patient will increase functional independence with mobility by performin. Supine to sit with Mod I  2. Sit to supine with Mod I  3. Sit to stand transfer with Mod I  4. Gait  x 300   feet with Mod I    No AD                         DME Justifications:  No DME recommended requiring DME justifications    History:     Past Medical History:   Diagnosis Date    Abnormal Pap smear     colpo then hyst    Allergy     Breast cancer ,     Colon polyp     Fever blister     GERD (gastroesophageal reflux disease)     Hyperlipidemia     Hypertension     Hyperthyroidism     Kidney cysts     Osteoporosis, unspecified     Seasonal allergies        Past Surgical History:   Procedure Laterality Date    BILATERAL MASTECTOMY  2000    BLADDER REPAIR      BREAST RECONSTRUCTION      CATARACT EXTRACTION      COLONOSCOPY N/A 2016    Procedure: COLONOSCOPY;  Surgeon: Kieran Krishnamurthy MD;  Location: [address];  Service: Endoscopy;  Laterality: N/A;    COLONOSCOPY N/A 2021     "Procedure: COLONOSCOPY;  Surgeon: Gila Lopez MD;  Location: [facility];  Service: Endoscopy;  Laterality: N/A;    COLONOSCOPY W/ POLYPECTOMY      HYSTERECTOMY  1994    JODI, ovaries remain ("pain")    LASIK      MASTECTOMY  2000    Bilateral    OOPHORECTOMY  1994    PARTIAL NEPHRECTOMY      cyst from left kidney    Toes surgery      Hammer toe surgery    TONSILLECTOMY         Time Tracking:     PT Received On: 04/22/25  PT Start Time: 1000     PT Stop Time: 1011  PT Total Time (min): 11 min     Billable Minutes: Evaluation 11 minutes      04/22/2025  "

## 2025-04-22 NOTE — FIRST PROVIDER EVALUATION
Emergency Department TeleTriage Encounter Note      CHIEF COMPLAINT    Chief Complaint   Patient presents with    Fatigue     Generalized fatigue/weakness getting worse since Thursday.  +diarrhea +nausea/vomiting       VITAL SIGNS   Initial Vitals [04/21/25 1804]   BP Pulse Resp Temp SpO2   123/79 90 18 98.8 °F (37.1 °C) 100 %      MAP       --            ALLERGIES    Review of patient's allergies indicates:   Allergen Reactions    Pcn [penicillins]     Penicillamine      Other reaction(s): Unknown    Propoxyphene n-acetaminophen      Other reaction(s): Unknown    Sulfa (sulfonamide antibiotics)     Statins-hmg-coa reductase inhibitors Rash       PROVIDER TRIAGE NOTE  This is a teletriage evaluation of a 80 y.o. female presenting to the ED complaining of fatigue. Patient reports generalized weakness for 3 days. She has had diarrhea for 3 days.She has also had decreased appetite.    Patient is alert and oriented. She speaks in complete sentences. She is sitting upright in the chair in no distress.     Initial orders will be placed and care will be transferred to an alternate provider when patient is roomed for a full evaluation. Any additional orders and the final disposition will be determined by that provider.         ORDERS  Labs Reviewed   CBC W/ AUTO DIFFERENTIAL    Narrative:     The following orders were created for panel order CBC auto differential.  Procedure                               Abnormality         Status                     ---------                               -----------         ------                     CBC with Differential[[id number]]                                                        Please view results for these tests on the individual orders.   COMPREHENSIVE METABOLIC PANEL   URINALYSIS, REFLEX TO URINE CULTURE   CBC WITH DIFFERENTIAL       ED Orders (720h ago, onward)      Start Ordered     Status Ordering Provider    04/21/25 1902 04/21/25 1902  CBC auto differential  STAT          Ordered RAYMOND, MICHAEL G.    04/21/25 1902 04/21/25 1902  Comprehensive metabolic panel  STAT         Ordered RAYMOND, MICHAEL G.    04/21/25 1902 04/21/25 1902  Insert Saline lock IV  Once         Ordered RAYMOND, MICHAEL G.    04/21/25 1902 04/21/25 1902  EKG 12-lead  Once         Ordered RAYMOND, MICHAEL G.    04/21/25 1902 04/21/25 1902  Urinalysis, Reflex to Urine Culture  STAT         Ordered RAYMOND, MICHAEL G.    04/21/25 1902 04/21/25 1902  CBC with Differential  PROCEDURE ONCE         Ordered RAYMOND, MICHAEL G.              Virtual Visit Note: The provider triage portion of this emergency department evaluation and documentation was performed via [employer], a HIPAA-compliant telemedicine application, in concert with a tele-presenter in the room. A face to face patient evaluation with one of my colleagues will occur once the patient is placed in an emergency department room.      DISCLAIMER: This note was prepared with [employer] voice recognition transcription software. Garbled syntax, mangled pronouns, and other bizarre constructions may be attributed to that software system.

## 2025-04-22 NOTE — ASSESSMENT & PLAN NOTE
Creatine stable for now. BMP reviewed- noted Estimated Creatinine Clearance: 12.3 mL/min (A) (based on SCr of 2.7 mg/dL (H)). according to latest data. Based on current GFR, CKD stage is stage 4 - GFR 15-29.  Monitor UOP and serial BMP and adjust therapy as needed. Renally dose meds. Avoid nephrotoxic medications and procedures.  Nephrology following

## 2025-04-22 NOTE — SUBJECTIVE & OBJECTIVE
"Past Medical History:   Diagnosis Date    Abnormal Pap smear     colpo then hyst    Allergy     Breast cancer 1997, 2000    Colon polyp     Fever blister     GERD (gastroesophageal reflux disease)     Hyperlipidemia     Hypertension     Hyperthyroidism     Kidney cysts     Osteoporosis, unspecified     Seasonal allergies        Past Surgical History:   Procedure Laterality Date    BILATERAL MASTECTOMY  02/2000    BLADDER REPAIR      BREAST RECONSTRUCTION      CATARACT EXTRACTION      COLONOSCOPY N/A 1/4/2016    Procedure: COLONOSCOPY;  Surgeon: Kieran Krishnamurthy MD;  Location: [facility] ENDO ([address] [place] [address]);  Service: Endoscopy;  Laterality: N/A;    COLONOSCOPY N/A 2/24/2021    Procedure: COLONOSCOPY;  Surgeon: Gila Lopez MD;  Location: [facility] ENDO;  Service: Endoscopy;  Laterality: N/A;    COLONOSCOPY W/ POLYPECTOMY      HYSTERECTOMY  1994    JODI, ovaries remain ("pain")    LASIK      MASTECTOMY  2000    Bilateral    OOPHORECTOMY  1994    PARTIAL NEPHRECTOMY      cyst from left kidney    Toes surgery      Hammer toe surgery    TONSILLECTOMY         Review of patient's allergies indicates:   Allergen Reactions    Pcn [penicillins]     Penicillamine      Other reaction(s): Unknown    Propoxyphene n-acetaminophen      Other reaction(s): Unknown    Sulfa (sulfonamide antibiotics)     Statins-hmg-coa reductase inhibitors Rash       No current facility-administered medications on file prior to encounter.     Current Outpatient Medications on File Prior to Encounter   Medication Sig    amLODIPine (NORVASC) 2.5 MG tablet Take 2.5 mg by mouth 2 (two) times daily as needed (BP over 150/80).    ascorbic acid, vitamin C, (VITAMIN C) 500 MG tablet Take 500 mg by mouth once daily.    aspirin (ECOTRIN) 81 MG EC tablet Take 81 mg by mouth once daily.    calcitRIOL (ROCALTROL) 0.25 MCG Cap Take 0.25 mcg by mouth every Mon, Wed, Fri.    calcium citrate-vitamin D3 315-200 mg (CITRACAL+D) 315 mg-5 mcg (200 unit) per tablet Take 1 tablet by " mouth once daily.    magnesium oxide 84.5 mg mag (140 mg) Cap Take 1 tablet by mouth once daily.    valsartan (DIOVAN) 40 MG tablet Take 1 tablet (40 mg total) by mouth once daily.    vit C/E/Zn/coppr/lutein/zeaxan (PRESERVISION AREDS-2 ORAL) Take 1 capsule by mouth every other day. 1 every other day    raloxifene (EVISTA) 60 mg tablet Take 1 tablet (60 mg total) by mouth once daily. (Patient not taking: Reported on 4/21/2025)    triamcinolone acetonide 0.1% (KENALOG) 0.1 % cream Apply topically 2 (two) times daily. (Patient not taking: Reported on 4/21/2025)    [DISCONTINUED] albuterol (VENTOLIN HFA) 90 mcg/actuation inhaler Inhale 2 puffs into the lungs every 6 (six) hours as needed for Wheezing or Shortness of Breath. Rescue    [DISCONTINUED] ascorbic acid/multivit-min (EMERGEN-C ORAL) Take by mouth. (Patient not taking: Reported on 3/11/2025)    [DISCONTINUED] cetirizine (ZYRTEC) 10 MG tablet Take 1 tablet (10 mg total) by mouth once daily.    [DISCONTINUED] ergocalciferol, vitamin D2, (VITAMIN D ORAL) Take by mouth. (Patient not taking: Reported on 3/11/2025)    [DISCONTINUED] fluocinonide 0.05% (LIDEX) 0.05 % cream Apply topically 2 (two) times daily.    [DISCONTINUED] fluticasone propionate (FLONASE) 50 mcg/actuation nasal spray 1 spray (50 mcg total) by Each Nostril route once daily.    [DISCONTINUED] hydroquinone 4 % Crea Apply 1 application  topically 2 (two) times daily. Patient to start 4% HQ carefully every other day for a week or so before attempting daily.  Later can go to twice daily, morning and night.Watch out for irritation, which can cause more darkening, which is not what we want.If dryness occurs, take a break and apply coconut oil.If irritation occurs, take a break and apply OTC hydrocortisone. (Patient not taking: Reported on 3/11/2025)    [DISCONTINUED] hydrOXYzine pamoate (VISTARIL) 25 MG Cap Take 1 capsule (25 mg total) by mouth every 8 (eight) hours as needed (itching). (Patient not  taking: Reported on 3/11/2025)    [DISCONTINUED] multivitamin with minerals tablet Take 1 tablet by mouth once daily.    [DISCONTINUED] rosuvastatin (CRESTOR) 20 MG tablet Take 1 tablet (20 mg total) by mouth once daily. (Patient not taking: Reported on 4/21/2025)    [DISCONTINUED] SHINGRIX, PF, 50 mcg/0.5 mL injection  (Patient not taking: Reported on 5/8/2024)     Family History       Problem Relation (Age of Onset)    Breast cancer Mother, Sister, Cousin, Cousin, Other    Cancer Mother, Father, Sister, Sister, Maternal Aunt, Paternal Aunt    Celiac disease Cousin    Colon cancer Maternal Aunt, Paternal Aunt    Heart failure Paternal Aunt, Paternal Uncle, Paternal Grandmother    Osteoporosis Sister          Tobacco Use    Smoking status: Never    Smokeless tobacco: Never   Substance and Sexual Activity    Alcohol use: Yes     Alcohol/week: 1.0 standard drink of alcohol     Types: 1 Glasses of wine per week     Comment: Rare    Drug use: No    Sexual activity: Never     Review of Systems   Constitutional:  Positive for appetite change and fatigue. Negative for activity change, chills, diaphoresis, fever and unexpected weight change.   HENT:  Negative for congestion, rhinorrhea and sneezing.    Respiratory:  Positive for cough. Negative for shortness of breath.    Cardiovascular:  Negative for chest pain and leg swelling.   Gastrointestinal:  Positive for diarrhea, nausea and vomiting. Negative for abdominal distention, abdominal pain and constipation.   Genitourinary:  Positive for dysuria and frequency. Negative for hematuria.   Musculoskeletal:  Negative for back pain.   Neurological:  Positive for weakness. Negative for dizziness, seizures, syncope, facial asymmetry, speech difficulty, light-headedness, numbness and headaches.     Objective:     Vital Signs (Most Recent):  Temp: 98.2 °F (36.8 °C) (04/21/25 2345)  Pulse: 77 (04/21/25 2330)  Resp: 18 (04/21/25 1804)  BP: (!) 127/56 (04/21/25 2330)  SpO2: 96 %  "(04/21/25 2330) Vital Signs (24h Range):  Temp:  [98.2 °F (36.8 °C)-98.8 °F (37.1 °C)] 98.2 °F (36.8 °C)  Pulse:  [75-90] 77  Resp:  [18] 18  SpO2:  [96 %-100 %] 96 %  BP: (123-165)/(56-79) 127/56     Weight: 49.9 kg (110 lb)  Body mass index is 21.48 kg/m².     Physical Exam  Vitals and nursing note reviewed.   Constitutional:       General: She is awake.      Appearance: She is not ill-appearing.   Eyes:      Pupils: Pupils are equal, round, and reactive to light.   Cardiovascular:      Rate and Rhythm: Normal rate and regular rhythm.      Pulses: Normal pulses.      Heart sounds: Normal heart sounds.   Pulmonary:      Effort: Pulmonary effort is normal.      Breath sounds: Normal breath sounds.   Abdominal:      General: Bowel sounds are normal. There is no distension.      Palpations: Abdomen is soft.      Tenderness: There is no abdominal tenderness. There is no guarding.   Musculoskeletal:      Right lower leg: No edema.      Left lower leg: No edema.   Skin:     General: Skin is warm and dry.      Capillary Refill: Capillary refill takes less than 2 seconds.   Neurological:      Mental Status: She is alert and oriented to person, place, and time.      GCS: GCS eye subscore is 4. GCS verbal subscore is 5. GCS motor subscore is 6.              CRANIAL NERVES     CN III, IV, VI   Pupils are equal, round, and reactive to light.       Significant Labs: All pertinent labs within the past 24 hours have been reviewed.  A1C: No results for input(s): "HGBA1C" in the last 4320 hours.  ABGs: No results for input(s): "PH", "PCO2", "HCO3", "POCSATURATED", "BE", "TOTALHB", "COHB", "METHB", "O2HB", "POCFIO2", "PO2" in the last 48 hours.  Bilirubin:   Recent Labs   Lab 04/21/25 2054   BILITOT 0.3     Blood Culture: No results for input(s): "LABBLOO" in the last 48 hours.  BMP:   Recent Labs   Lab 04/21/25 2054   *   K 4.4   CO2 20*   BUN 54*   CREATININE 2.9*   CALCIUM 9.4     CBC:   Recent Labs   Lab 04/21/25 2054 "   WBC 8.67   HGB 13.5   HCT 40.4   *     CMP:   Recent Labs   Lab 04/21/25 2054   *   K 4.4   CO2 20*   BUN 54*   CREATININE 2.9*   CALCIUM 9.4   ALBUMIN 3.6   BILITOT 0.3   ALKPHOS 54*   AST 19   ALT 7*       Urine Studies:   Recent Labs   Lab 04/21/25 2202   APPEARANCEUA Hazy*   SPECGRAV 1.010   PROTEINUA 1+*   BILIRUBINUA Negative   UROBILINOGEN Negative   LEUKOCYTESUR 3+*   RBCUA 1   WBCUA 64*   BACTERIA Rare       Significant Imaging: I have reviewed all pertinent imaging results/findings within the past 24 hours.    X-Ray Chest AP Portable  Order: [id number]   Status: Final result       Next appt: None    Test Result Released: Yes (not seen)    0 Result Notes  Details    Reading Physician Reading Date Result Priority   Gaudencio Simpson MD  [phone]  4/21/2025 STAT     Narrative & Impression  EXAMINATION:  XR CHEST AP PORTABLE     CLINICAL HISTORY:  Sepsis;     TECHNIQUE:  Single frontal view of the chest was performed.     COMPARISON:  07/26/2021     FINDINGS:  The lungs are clear, with normal appearance of pulmonary vasculature and no pleural effusion or pneumothorax.     The cardiac silhouette is normal in size. The hilar and mediastinal contours are unremarkable.     Bones are intact.     Impression:     No acute abnormality.        Electronically signed by:Gaudencio Simpson  Date:                                            04/21/2025  Time:                                           22:22        Exam Ended: 04/21/25 22:09 CDT Last Resulted: 04/21/25 22:22 CDT       Order Details        View Encounter        Lab and Collection Details        Routing        Result History     View All Conversations on this Encounter     Result Care Coordination      Patient Communication     Add Comments   Add Notifications  Back to Top     Other Results from 4/21/2025      COVID-19 Rapid Screening  Order: [id number]   Status: Final result       Next appt: None       Test Result Released: Yes (not seen)    0  Result Notes      Component  Ref Range & Units (hover) 1 d ago   SARS COV-2 Molecular Negative   Comment: This test utilizes isothermal nucleic acid amplification technology to detect the SARS-CoV-2 RdRp nucleic acid segment. The analytical sensitivity (limit of detection) is 500 copies/swab.    A POSITIVE result is indicative of the presence of SARS-CoV-2 RNA; clinical correlation with patient history and other diagnostic information is necessary to determine patient infection status.    A NEGATIVE result means that SARS-CoV-2 nucleic acids are not present above the limit of detection. A NEGATIVE result should be treated as presumptive. It does not rule out the possibility of COVID-19 and should not be the sole basis for treatment decisions. If COVID-19 is strongly suspected based on clinical and exposure history, re-testing using an alternate molecular assay should be considered.    This test is FDA approved.    Performance characteristics of this test has been independently verified by [facility] in conjunction with [facility] Department of Pathology and Laboratory Medicine.      Resulting Agency [id number]             Specimen Collected: 04/21/25 22:17 CDT Last Resulted: 04/21/25 22:41 CDT

## 2025-04-22 NOTE — ASSESSMENT & PLAN NOTE
KRIS is likely due to pre-renal azotemia due to dehydration. Baseline creatinine is 1.8. Most recent creatinine and eGFR are listed below.  Recent Labs     04/21/25 2054 04/22/25  0452   CREATININE 2.9* 2.7*   EGFRNORACEVR 16* 18*      Plan  - KRIS is stable  - Avoid nephrotoxins and renally dose meds for GFR listed above  - Monitor urine output, serial BMP, and adjust therapy as needed  - nephrology consult   - IV hydration  - Treat UTI

## 2025-04-22 NOTE — PROGRESS NOTES
[facility] Medicine  Progress Note    Patient Name: Tyra Fernandes  MRN: [id number]  Patient Class: IP- Inpatient   Admission Date: 4/21/2025  Length of Stay: 0 days  Attending Physician: Sheri Vásquez MD  Primary Care Provider: Nadia Sanderson MD        Subjective     Principal Problem:KRIS (acute kidney injury)        HPI:  Tyra Fernandes is a 80 year old female with a past medical history of chronic kidney disease, anemia, hyperlipidemia, and breast cancer status post mastectomy in 2000 who presents with complaints of generalized fatigue, weakness, diarrhea, nausea, and vomiting associated with decreased appetite and urinary frequency for 4 days, since Thursday. She endorses having a nonproductive cough for about a month but denies taking any antibiotics. She denies any recent travel or sick contacts. She reports having similar symptoms back in February. However at that time symptoms did not last as long. She denies chest pain, shortness of breath, fever, chills, dizziness, headaches, lightheadedness, hematuria, hematochezia, or abdominal pain.  ED workup reveals:  Chest x-ray no acute finding.  She is COVID and influenza negative.  CBC with platelets 145 otherwise unremarkable.  CMP with hyponatremia 128, BUN 54, creatinine 2.9 baseline about 1.8. Urinalysis positive with elevated WBCs. Urine and blood cultures pending. Lactic acid 0.83. She received bolus of fluids in ED and started on Levaquin. Nephrology consult placed.  Admitted to hospital medicine for further management and treatment.          Overview/Hospital Course:  Patient monitored closely during hospitalization.  She was admitted for UTI and KRIS.  Urine and blood cultures obtained and pending.  She was started on renally dosed IV levofloxacin.  Nephrology was consulted.  IV fluids to continue.      Interval History: Patient seen and examined at bedside.  NAD.  Reports feeling a little better today, but still with  generalized weakness and fatigue.   SrCr slightly improved today.  Nephrology following.   Continue IV fluids, abx for UTI    Review of Systems   Constitutional:  Positive for fatigue.   Respiratory:  Negative for shortness of breath.    Cardiovascular:  Negative for chest pain.   Gastrointestinal:  Negative for abdominal pain, nausea and vomiting.   Neurological:  Positive for weakness (generalized).     Objective:     Vital Signs (Most Recent):  Temp: 97.7 °F (36.5 °C) (04/22/25 1025)  Pulse: 79 (04/22/25 1025)  Resp: 20 (04/22/25 1025)  BP: (!) 145/77 (04/22/25 1025)  SpO2: 100 % (04/22/25 1025) Vital Signs (24h Range):  Temp:  [97.7 °F (36.5 °C)-98.8 °F (37.1 °C)] 97.7 °F (36.5 °C)  Pulse:  [75-90] 79  Resp:  [18-20] 20  SpO2:  [96 %-100 %] 100 %  BP: (113-165)/(56-79) 145/77     Weight: 50.6 kg (111 lb 8.8 oz)  Body mass index is 21.79 kg/m².    Intake/Output Summary (Last 24 hours) at 4/22/2025 1416  Last data filed at 4/22/2025 1025  Gross per 24 hour   Intake 1149 ml   Output 100 ml   Net 1049 ml         Physical Exam  Vitals and nursing note reviewed.   Constitutional:       General: She is not in acute distress.  Cardiovascular:      Rate and Rhythm: Normal rate and regular rhythm.   Pulmonary:      Effort: Pulmonary effort is normal. No respiratory distress.      Breath sounds: Normal breath sounds.   Abdominal:      Palpations: Abdomen is soft.      Tenderness: There is no abdominal tenderness.   Musculoskeletal:      Right lower leg: No edema.      Left lower leg: No edema.   Skin:     General: Skin is warm and dry.   Neurological:      Mental Status: She is alert and oriented to person, place, and time.               Significant Labs: All pertinent labs within the past 24 hours have been reviewed.  CBC:   Recent Labs   Lab 04/21/25 2054   WBC 8.67   HGB 13.5   HCT 40.4   *     CMP:   Recent Labs   Lab 04/21/25 2054 04/22/25  0452   * 127*   K 4.4 4.9   CO2 20* 18*   BUN 54* 52*    CREATININE 2.9* 2.7*   CALCIUM 9.4 8.0*   ALBUMIN 3.6  --    BILITOT 0.3  --    ALKPHOS 54*  --    AST 19  --    ALT 7*  --      Magnesium:   Recent Labs   Lab 04/22/25  0452   MG 2.2       Significant Imaging: I have reviewed all pertinent imaging results/findings within the past 24 hours.      Assessment & Plan  KRIS (acute kidney injury)  KRIS is likely due to pre-renal azotemia due to dehydration. Baseline creatinine is  1.8 . Most recent creatinine and eGFR are listed below.  Recent Labs     04/21/25 2054 04/22/25  0452   CREATININE 2.9* 2.7*   EGFRNORACEVR 16* 18*      Plan  - KRIS is stable  - Avoid nephrotoxins and renally dose meds for GFR listed above  - Monitor urine output, serial BMP, and adjust therapy as needed  - nephrology consult   - IV hydration  - Treat UTI  Acute cystitis without hematuria  -urine cultures pending  -started on Levaquin IV, renally dosed   -continuous IV fluids  -strict I's and O's    Iron deficiency anemia  Anemia is likely due to Iron deficiency. Most recent hemoglobin and hematocrit are listed below.  Recent Labs     04/21/25 2054   HGB 13.5   HCT 40.4     Plan  - Monitor serial CBC: Daily  - Transfuse PRBC if patient becomes hemodynamically unstable, symptomatic or H/H drops below 7/21.  - Patient has not received any PRBC transfusions to date  - Patient's anemia is currently stable    Essential hypertension  Patient's blood pressure range in the last 24 hours was: BP  Min: 113/59  Max: 165/70.The patient's inpatient anti-hypertensive regimen is listed below:  Current Antihypertensives  amLODIPine tablet 2.5 mg, 2 times daily PRN, Oral    Plan  - BP is controlled, no changes needed to their regimen    Stage 3a chronic kidney disease  Creatine stable for now. BMP reviewed- noted Estimated Creatinine Clearance: 12.3 mL/min (A) (based on SCr of 2.7 mg/dL (H)). according to latest data. Based on current GFR, CKD stage is stage 4 - GFR 15-29.  Monitor UOP and serial BMP and  adjust therapy as needed. Renally dose meds. Avoid nephrotoxic medications and procedures.  Nephrology following  Gastroesophageal reflux disease without esophagitis  -chronic condition noted  - PPI IV     Dehydration with hyponatremia  Hyponatremia is likely due to Dehydration/hypovolemia. The patient's most recent sodium results are listed below.  Recent Labs     04/21/25 2054 04/22/25  0452   * 127*     Plan  - Correct the sodium by 4-6mEq in 24 hours.   - Obtain the following studies: Urine sodium, urine osmolality, serum osmolality.  - Will treat the hyponatremia with IV fluids as follows:  Normal saline  - Monitor sodium Daily.   - Patient hyponatremia is stable  - consult nephrology    Thrombocytopenia  The likely etiology of thrombocytopenia is  unknown . The patients 3 most recent labs are listed below.  Recent Labs     04/21/25 2054   *     Plan  - Will transfuse if platelet count is <50k (if undergoing surgical procedure or have active bleeding).      VTE Risk Mitigation (From admission, onward)           Ordered     IP VTE HIGH RISK PATIENT  Once         04/21/25 2334     Place sequential compression device  Until discontinued         04/21/25 2334                    Discharge Planning   FABIEN: 4/25/2025     Code Status: Full Code   Medical Readiness for Discharge Date:   Discharge Plan A: Home                  Norma Lynn NP  Department of Hospital Medicine   [facility]

## 2025-04-22 NOTE — H&P
[facility] - Emergency Dept  Hospital Medicine  History & Physical    Patient Name: Tyra Fernandes  MRN: [id number]  Patient Class: OP- Observation  Admission Date: 4/21/2025  Attending Physician: Claire Saenz MD   Primary Care Provider: Nadia Sanderson MD         Patient information was obtained from patient, past medical records, and ER records.     Subjective:     Principal Problem:KRIS (acute kidney injury)    Chief Complaint:   Chief Complaint   Patient presents with    Fatigue     Generalized fatigue/weakness getting worse since Thursday.  +diarrhea +nausea/vomiting        HPI: Tyra Fernandes is a 80 year old female with a past medical history of chronic kidney disease, anemia, hyperlipidemia, and breast cancer status post mastectomy in 2000 who presents with complaints of generalized fatigue, weakness, diarrhea, nausea, and vomiting associated with decreased appetite and urinary frequency for 4 days, since Thursday. She endorses having a nonproductive cough for about a month but denies taking any antibiotics. She denies any recent travel or sick contacts. She reports having similar symptoms back in February. However at that time symptoms did not last as long. She denies chest pain, shortness of breath, fever, chills, dizziness, headaches, lightheadedness, hematuria, hematochezia, or abdominal pain.  ED workup reveals:  Chest x-ray no acute finding.  She is COVID and influenza negative.  CBC with platelets 145 otherwise unremarkable.  CMP with hyponatremia 128, BUN 54, creatinine 2.9 baseline about 1.8. Urinalysis positive with elevated WBCs. Urine and blood cultures pending. Lactic acid 0.83. She received bolus of fluids in ED and started on Levaquin. Nephrology consult placed.  Admitted to hospital medicine for further management and treatment.          Past Medical History:   Diagnosis Date    Abnormal Pap smear     colpo then hyst    Allergy     Breast cancer 1997, 2000    Colon polyp   "   Fever blister     GERD (gastroesophageal reflux disease)     Hyperlipidemia     Hypertension     Hyperthyroidism     Kidney cysts     Osteoporosis, unspecified     Seasonal allergies        Past Surgical History:   Procedure Laterality Date    BILATERAL MASTECTOMY  02/2000    BLADDER REPAIR      BREAST RECONSTRUCTION      CATARACT EXTRACTION      COLONOSCOPY N/A 1/4/2016    Procedure: COLONOSCOPY;  Surgeon: Kieran Krishnamurthy MD;  Location: [facility] ([address] [place] [address]);  Service: Endoscopy;  Laterality: N/A;    COLONOSCOPY N/A 2/24/2021    Procedure: COLONOSCOPY;  Surgeon: Gila Lopez MD;  Location: [facility];  Service: Endoscopy;  Laterality: N/A;    COLONOSCOPY W/ POLYPECTOMY      HYSTERECTOMY  1994    JODI, ovaries remain ("pain")    LASIK      MASTECTOMY  2000    Bilateral    OOPHORECTOMY  1994    PARTIAL NEPHRECTOMY      cyst from left kidney    Toes surgery      Hammer toe surgery    TONSILLECTOMY         Review of patient's allergies indicates:   Allergen Reactions    Pcn [penicillins]     Penicillamine      Other reaction(s): Unknown    Propoxyphene n-acetaminophen      Other reaction(s): Unknown    Sulfa (sulfonamide antibiotics)     Statins-hmg-coa reductase inhibitors Rash       No current facility-administered medications on file prior to encounter.     Current Outpatient Medications on File Prior to Encounter   Medication Sig    amLODIPine (NORVASC) 2.5 MG tablet Take 2.5 mg by mouth 2 (two) times daily as needed (BP over 150/80).    ascorbic acid, vitamin C, (VITAMIN C) 500 MG tablet Take 500 mg by mouth once daily.    aspirin (ECOTRIN) 81 MG EC tablet Take 81 mg by mouth once daily.    calcitRIOL (ROCALTROL) 0.25 MCG Cap Take 0.25 mcg by mouth every Mon, Wed, Fri.    calcium citrate-vitamin D3 315-200 mg (CITRACAL+D) 315 mg-5 mcg (200 unit) per tablet Take 1 tablet by mouth once daily.    magnesium oxide 84.5 mg mag (140 mg) Cap Take 1 tablet by mouth once daily.    valsartan (DIOVAN) 40 MG tablet Take " 1 tablet (40 mg total) by mouth once daily.    vit C/E/Zn/coppr/lutein/zeaxan (PRESERVISION AREDS-2 ORAL) Take 1 capsule by mouth every other day. 1 every other day    raloxifene (EVISTA) 60 mg tablet Take 1 tablet (60 mg total) by mouth once daily. (Patient not taking: Reported on 4/21/2025)    triamcinolone acetonide 0.1% (KENALOG) 0.1 % cream Apply topically 2 (two) times daily. (Patient not taking: Reported on 4/21/2025)    [DISCONTINUED] albuterol (VENTOLIN HFA) 90 mcg/actuation inhaler Inhale 2 puffs into the lungs every 6 (six) hours as needed for Wheezing or Shortness of Breath. Rescue    [DISCONTINUED] ascorbic acid/multivit-min (EMERGEN-C ORAL) Take by mouth. (Patient not taking: Reported on 3/11/2025)    [DISCONTINUED] cetirizine (ZYRTEC) 10 MG tablet Take 1 tablet (10 mg total) by mouth once daily.    [DISCONTINUED] ergocalciferol, vitamin D2, (VITAMIN D ORAL) Take by mouth. (Patient not taking: Reported on 3/11/2025)    [DISCONTINUED] fluocinonide 0.05% (LIDEX) 0.05 % cream Apply topically 2 (two) times daily.    [DISCONTINUED] fluticasone propionate (FLONASE) 50 mcg/actuation nasal spray 1 spray (50 mcg total) by Each Nostril route once daily.    [DISCONTINUED] hydroquinone 4 % Crea Apply 1 application  topically 2 (two) times daily. Patient to start 4% HQ carefully every other day for a week or so before attempting daily.  Later can go to twice daily, morning and night.Watch out for irritation, which can cause more darkening, which is not what we want.If dryness occurs, take a break and apply coconut oil.If irritation occurs, take a break and apply OTC hydrocortisone. (Patient not taking: Reported on 3/11/2025)    [DISCONTINUED] hydrOXYzine pamoate (VISTARIL) 25 MG Cap Take 1 capsule (25 mg total) by mouth every 8 (eight) hours as needed (itching). (Patient not taking: Reported on 3/11/2025)    [DISCONTINUED] multivitamin with minerals tablet Take 1 tablet by mouth once daily.    [DISCONTINUED]  rosuvastatin (CRESTOR) 20 MG tablet Take 1 tablet (20 mg total) by mouth once daily. (Patient not taking: Reported on 4/21/2025)    [DISCONTINUED] SHINGRIX, PF, 50 mcg/0.5 mL injection  (Patient not taking: Reported on 5/8/2024)     Family History       Problem Relation (Age of Onset)    Breast cancer Mother, Sister, Cousin, Cousin, Other    Cancer Mother, Father, Sister, Sister, Maternal Aunt, Paternal Aunt    Celiac disease Cousin    Colon cancer Maternal Aunt, Paternal Aunt    Heart failure Paternal Aunt, Paternal Uncle, Paternal Grandmother    Osteoporosis Sister          Tobacco Use    Smoking status: Never    Smokeless tobacco: Never   Substance and Sexual Activity    Alcohol use: Yes     Alcohol/week: 1.0 standard drink of alcohol     Types: 1 Glasses of wine per week     Comment: Rare    Drug use: No    Sexual activity: Never     Review of Systems   Constitutional:  Positive for appetite change and fatigue. Negative for activity change, chills, diaphoresis, fever and unexpected weight change.   HENT:  Negative for congestion, rhinorrhea and sneezing.    Respiratory:  Positive for cough. Negative for shortness of breath.    Cardiovascular:  Negative for chest pain and leg swelling.   Gastrointestinal:  Positive for diarrhea, nausea and vomiting. Negative for abdominal distention, abdominal pain and constipation.   Genitourinary:  Positive for dysuria and frequency. Negative for hematuria.   Musculoskeletal:  Negative for back pain.   Neurological:  Positive for weakness. Negative for dizziness, seizures, syncope, facial asymmetry, speech difficulty, light-headedness, numbness and headaches.     Objective:     Vital Signs (Most Recent):  Temp: 98.2 °F (36.8 °C) (04/21/25 2345)  Pulse: 77 (04/21/25 2330)  Resp: 18 (04/21/25 1804)  BP: (!) 127/56 (04/21/25 2330)  SpO2: 96 % (04/21/25 2330) Vital Signs (24h Range):  Temp:  [98.2 °F (36.8 °C)-98.8 °F (37.1 °C)] 98.2 °F (36.8 °C)  Pulse:  [75-90] 77  Resp:  [18]  "18  SpO2:  [96 %-100 %] 96 %  BP: (123-165)/(56-79) 127/56     Weight: 49.9 kg (110 lb)  Body mass index is 21.48 kg/m².     Physical Exam  Vitals and nursing note reviewed.   Constitutional:       General: She is awake.      Appearance: She is not ill-appearing.   Eyes:      Pupils: Pupils are equal, round, and reactive to light.   Cardiovascular:      Rate and Rhythm: Normal rate and regular rhythm.      Pulses: Normal pulses.      Heart sounds: Normal heart sounds.   Pulmonary:      Effort: Pulmonary effort is normal.      Breath sounds: Normal breath sounds.   Abdominal:      General: Bowel sounds are normal. There is no distension.      Palpations: Abdomen is soft.      Tenderness: There is no abdominal tenderness. There is no guarding.   Musculoskeletal:      Right lower leg: No edema.      Left lower leg: No edema.   Skin:     General: Skin is warm and dry.      Capillary Refill: Capillary refill takes less than 2 seconds.   Neurological:      Mental Status: She is alert and oriented to person, place, and time.      GCS: GCS eye subscore is 4. GCS verbal subscore is 5. GCS motor subscore is 6.              CRANIAL NERVES     CN III, IV, VI   Pupils are equal, round, and reactive to light.       Significant Labs: All pertinent labs within the past 24 hours have been reviewed.  A1C: No results for input(s): "HGBA1C" in the last 4320 hours.  ABGs: No results for input(s): "PH", "PCO2", "HCO3", "POCSATURATED", "BE", "TOTALHB", "COHB", "METHB", "O2HB", "POCFIO2", "PO2" in the last 48 hours.  Bilirubin:   Recent Labs   Lab 04/21/25 2054   BILITOT 0.3     Blood Culture: No results for input(s): "LABBLOO" in the last 48 hours.  BMP:   Recent Labs   Lab 04/21/25 2054   *   K 4.4   CO2 20*   BUN 54*   CREATININE 2.9*   CALCIUM 9.4     CBC:   Recent Labs   Lab 04/21/25 2054   WBC 8.67   HGB 13.5   HCT 40.4   *     CMP:   Recent Labs   Lab 04/21/25 2054   *   K 4.4   CO2 20*   BUN 54*   CREATININE " 2.9*   CALCIUM 9.4   ALBUMIN 3.6   BILITOT 0.3   ALKPHOS 54*   AST 19   ALT 7*       Urine Studies:   Recent Labs   Lab 04/21/25 2202   APPEARANCEUA Hazy*   SPECGRAV 1.010   PROTEINUA 1+*   BILIRUBINUA Negative   UROBILINOGEN Negative   LEUKOCYTESUR 3+*   RBCUA 1   WBCUA 64*   BACTERIA Rare       Significant Imaging: I have reviewed all pertinent imaging results/findings within the past 24 hours.    X-Ray Chest AP Portable  Order: [id number]   Status: Final result       Next appt: None    Test Result Released: Yes (not seen)    0 Result Notes  Details    Reading Physician Reading Date Result Priority   Gaudencio Simpson MD  [phone]  4/21/2025 STAT     Narrative & Impression  EXAMINATION:  XR CHEST AP PORTABLE     CLINICAL HISTORY:  Sepsis;     TECHNIQUE:  Single frontal view of the chest was performed.     COMPARISON:  07/26/2021     FINDINGS:  The lungs are clear, with normal appearance of pulmonary vasculature and no pleural effusion or pneumothorax.     The cardiac silhouette is normal in size. The hilar and mediastinal contours are unremarkable.     Bones are intact.     Impression:     No acute abnormality.        Electronically signed by:Gaudencio Simpson  Date:                                            04/21/2025  Time:                                           22:22        Exam Ended: 04/21/25 22:09 CDT Last Resulted: 04/21/25 22:22 CDT       Order Details        View Encounter        Lab and Collection Details        Routing        Result History     View All Conversations on this Encounter     Result Care Coordination      Patient Communication     Add Comments   Add Notifications  Back to Top     Other Results from 4/21/2025      COVID-19 Rapid Screening  Order: [id number]   Status: Final result       Next appt: None       Test Result Released: Yes (not seen)    0 Result Notes      Component  Ref Range & Units (hover) 1 d ago   SARS COV-2 Molecular Negative   Comment: This test utilizes isothermal  nucleic acid amplification technology to detect the SARS-CoV-2 RdRp nucleic acid segment. The analytical sensitivity (limit of detection) is 500 copies/swab.    A POSITIVE result is indicative of the presence of SARS-CoV-2 RNA; clinical correlation with patient history and other diagnostic information is necessary to determine patient infection status.    A NEGATIVE result means that SARS-CoV-2 nucleic acids are not present above the limit of detection. A NEGATIVE result should be treated as presumptive. It does not rule out the possibility of COVID-19 and should not be the sole basis for treatment decisions. If COVID-19 is strongly suspected based on clinical and exposure history, re-testing using an alternate molecular assay should be considered.    This test is FDA approved.    Performance characteristics of this test has been independently verified by [facility] in conjunction with [facility] Department of Pathology and Laboratory Medicine.      [facility]             Specimen Collected: 04/21/25 22:17 CDT Last Resulted: 04/21/25 22:41 CDT       Assessment/Plan:     Assessment & Plan  KRIS (acute kidney injury)  KRIS is likely due to pre-renal azotemia due to dehydration. Baseline creatinine is  1.8 . Most recent creatinine and eGFR are listed below.  Recent Labs     04/21/25 2054   CREATININE 2.9*   EGFRNORACEVR 16*      Plan  - KRIS is stable  - Avoid nephrotoxins and renally dose meds for GFR listed above  - Monitor urine output, serial BMP, and adjust therapy as needed  - nephrology consult   Iron deficiency anemia  Anemia is likely due to Iron deficiency. Most recent hemoglobin and hematocrit are listed below.  Recent Labs     04/21/25 2054   HGB 13.5   HCT 40.4     Plan  - Monitor serial CBC: Daily  - Transfuse PRBC if patient becomes hemodynamically unstable, symptomatic or H/H drops below 7/21.  - Patient has not received any PRBC transfusions to date  -  Patient's anemia is currently stable    Essential hypertension  Patient's blood pressure range in the last 24 hours was: BP  Min: 123/79  Max: 165/70.The patient's inpatient anti-hypertensive regimen is listed below:  Current Antihypertensives  amLODIPine tablet 2.5 mg, 2 times daily PRN, Oral    Plan  - BP is controlled, no changes needed to their regimen    Stage 3a chronic kidney disease  Creatine stable for now. BMP reviewed- noted Estimated Creatinine Clearance: 11.5 mL/min (A) (based on SCr of 2.9 mg/dL (H)). according to latest data. Based on current GFR, CKD stage is stage 4 - GFR 15-29.  Monitor UOP and serial BMP and adjust therapy as needed. Renally dose meds. Avoid nephrotoxic medications and procedures.  Gastroesophageal reflux disease without esophagitis  -chronic condition noted  - PPI IV     Dehydration with hyponatremia  Hyponatremia is likely due to Dehydration/hypovolemia. The patient's most recent sodium results are listed below.  Recent Labs     04/21/25 2054   *     Plan  - Correct the sodium by 4-6mEq in 24 hours.   - Obtain the following studies: Urine sodium, urine osmolality, serum osmolality.  - Will treat the hyponatremia with IV fluids as follows:  Normal saline  - Monitor sodium Daily.   - Patient hyponatremia is stable  -consult nephrology  Thrombocytopenia  The likely etiology of thrombocytopenia is  unknown . The patients 3 most recent labs are listed below.  Recent Labs     04/21/25 2054   *     Plan  - Will transfuse if platelet count is <50k (if undergoing surgical procedure or have active bleeding).      Acute cystitis without hematuria  -urine cultures pending  -started on Levaquin IV  -continuous IV fluids  -strict I's and O's    VTE Risk Mitigation (From admission, onward)           Ordered     IP VTE HIGH RISK PATIENT  Once         04/21/25 2334     Place sequential compression device  Until discontinued         04/21/25 2334                              Pharmacist Renal Dose Adjustment Note    Tyra Fernandes is a 80 y.o. female being treated with Levofloxacin.     Patient Data:    Vital Signs (Most Recent):  Temp: 98.2 °F (36.8 °C) (04/21/25 2345)  Pulse: 77 (04/21/25 2330)  Resp: 18 (04/21/25 1804)  BP: (!) 127/56 (04/21/25 2330)  SpO2: 96 % (04/21/25 2330) Vital Signs (72h Range):  Temp:  [98.2 °F (36.8 °C)-98.8 °F (37.1 °C)]   Pulse:  [75-90]   Resp:  [18]   BP: (123-165)/(56-79)   SpO2:  [96 %-100 %]      Recent Labs   Lab 04/21/25 2054   CREATININE 2.9*     Serum creatinine: 2.9 mg/dL (H) 04/21/25 2054  Estimated creatinine clearance: 11.5 mL/min (A)    Levofloxacin 750 mg every 24 hours will be changed to Levofloxacin 500 mg every 48 hours due to CrCl <20 mL/min per Renal Dose Adjustment protocol.     Pharmacist's Name: Melisa Tyra Moya  Pharmacist's Extension: [phone]      Lo Vaca DNP  Department of Hospital Medicine  [facility] - Emergency Dept

## 2025-04-22 NOTE — HPI
Tyra Fernandes is a 80 year old female with a past medical history of chronic kidney disease, anemia, hyperlipidemia, and breast cancer status post mastectomy in 2000 who presents with complaints of generalized fatigue, weakness, diarrhea, nausea, and vomiting associated with decreased appetite and urinary frequency for 4 days, since Thursday. She endorses having a nonproductive cough for about a month but denies taking any antibiotics. She denies any recent travel or sick contacts. She reports having similar symptoms back in February. However at that time symptoms did not last as long. She denies chest pain, shortness of breath, fever, chills, dizziness, headaches, lightheadedness, hematuria, hematochezia, or abdominal pain.  ED workup reveals:  Chest x-ray no acute finding.  She is COVID and influenza negative.  CBC with platelets 145 otherwise unremarkable.  CMP with hyponatremia 128, BUN 54, creatinine 2.9 baseline about 1.8. Urinalysis positive with elevated WBCs. Urine and blood cultures pending. Lactic acid 0.83. She received bolus of fluids in ED and started on Levaquin. Nephrology consult placed.  Admitted to hospital medicine for further management and treatment.

## 2025-04-22 NOTE — HOSPITAL COURSE
The patient was admitted with nausea, vomiting and urinary frequency found to have a UTI in addition to an KRIS with hyponatremia.  Through admission fluids were administered and levofloxacin administered.  With this she improved significantly.  Tolerating oral intake with resolution of symptoms at the time of discharge.  Hyponatremia has improved as has renal function.  She was seen by Nephrology while in-house.  By their recommendation she will be discharged on bicarbonate tablets and is to follow with that service as instructed by that service.    She will be discharged with 1 additional dose of levofloxacin to complete course of therapy for UTI.

## 2025-04-22 NOTE — ASSESSMENT & PLAN NOTE
KRIS is likely due to pre-renal azotemia due to dehydration. Baseline creatinine is 1.8. Most recent creatinine and eGFR are listed below.  Recent Labs     04/21/25 2054   CREATININE 2.9*   EGFRNORACEVR 16*      Plan  - KRIS is stable  - Avoid nephrotoxins and renally dose meds for GFR listed above  - Monitor urine output, serial BMP, and adjust therapy as needed  - nephrology consult

## 2025-04-22 NOTE — CONSULTS
INPATIENT NEPHROLOGY CONSULT   [facility] NEPHROLOGY    Tyra Fernandes  04/22/2025    Reason for consultation:    Acute kidney injury    Chief Complaint:   Chief Complaint   Patient presents with    Fatigue     Generalized fatigue/weakness getting worse since Thursday.  +diarrhea +nausea/vomiting          History of Present Illness:    Per H and P   Tyra Fernandes is a 80 year old female with a past medical history of chronic kidney disease, anemia, hyperlipidemia, and breast cancer status post mastectomy in 2000 who presents with complaints of generalized fatigue, weakness, diarrhea, nausea, and vomiting associated with decreased appetite and urinary frequency for 4 days, since Thursday. She endorses having a nonproductive cough for about a month but denies taking any antibiotics. She denies any recent travel or sick contacts. She reports having similar symptoms back in February. However at that time symptoms did not last as long. She denies chest pain, shortness of breath, fever, chills, dizziness, headaches, lightheadedness, hematuria, hematochezia, or abdominal pain.  ED workup reveals:  Chest x-ray no acute finding.  She is COVID and influenza negative.  CBC with platelets 145 otherwise unremarkable.  CMP with hyponatremia 128, BUN 54, creatinine 2.9 baseline about 1.8. Urinalysis positive with elevated WBCs. Urine and blood cultures pending. Lactic acid 0.83. She received bolus of fluids in ED and started on Levaquin. Nephrology consult placed.  Admitted to hospital medicine for further management and treatment.         Plan of Care:       Assessment:    Acute kidney injury likely secondary to hemodynamically mediated renal injury   --Avoid NSAIDS, Jang II inhibitors, and other non-essential nephrotoxic agents  --Keep mean arterial pressure above 60 and systolic blood pressure above 100 as able to maintain renal perfusion  --hold valsartan  --strict I/Os  --iv fluids    Urinary tract infection  --renal dose  abx  --no bactrim, aminoglycosides, or vancomycin/zosyn combination     Hyponatremia  --urine osm  --bnp  --uric acid  --avoid hypotonic iv piggybacks    CKDIV  --Avoiding fluctuations in blood pressure (high and low spikes)  Maintain good glucose control if you have diabetes  Reduce/avoid extra salt intake  Avoid NSAIDs and Coxibs.  Acetaminophen ok.    Aerobic exercise at least 3x weekly as tolerated  Eat more whole foods (vegetables, fruit, fish, chicken, pork, beef, dairy) and less processed food  Control weight  Avoid smoking  Stay hydrated, avoid dehydration.  Annual flu shot and routine preventative cancer screening via your primary care doctor    Secondary hyperparathyroidism  --continue calcitriol  --vit d level    Thank you for allowing us to participate in this patient's care. We will continue to follow.    Vital Signs:  Temp Readings from Last 3 Encounters:   04/21/25 98.2 °F (36.8 °C)   03/11/25 97.5 °F (36.4 °C) (Oral)   01/26/24 99.3 °F (37.4 °C) (Oral)       Pulse Readings from Last 3 Encounters:   04/22/25 79   03/11/25 82   10/29/24 64       BP Readings from Last 3 Encounters:   04/22/25 (!) 144/65   03/11/25 (!) 154/85   10/29/24 127/75       Weight:  Wt Readings from Last 3 Encounters:   04/21/25 49.9 kg (110 lb)   03/11/25 51.3 kg (113 lb)   10/29/24 51.5 kg (113 lb 9.6 oz)       Past Medical & Surgical History:  Past Medical History:   Diagnosis Date    Abnormal Pap smear     colpo then hyst    Allergy     Breast cancer 1997, 2000    Colon polyp     Fever blister     GERD (gastroesophageal reflux disease)     Hyperlipidemia     Hypertension     Hyperthyroidism     Kidney cysts     Osteoporosis, unspecified     Seasonal allergies        Past Surgical History:   Procedure Laterality Date    BILATERAL MASTECTOMY  02/2000    BLADDER REPAIR      BREAST RECONSTRUCTION      CATARACT EXTRACTION      COLONOSCOPY N/A 1/4/2016    Procedure: COLONOSCOPY;  Surgeon: Kieran Krishnamurthy MD;  Location: [facility]  "(4TH FLR);  Service: Endoscopy;  Laterality: N/A;    COLONOSCOPY N/A 2/24/2021    Procedure: COLONOSCOPY;  Surgeon: Gila Lopez MD;  Location: [facility];  Service: Endoscopy;  Laterality: N/A;    COLONOSCOPY W/ POLYPECTOMY      HYSTERECTOMY  1994    JODI, ovaries remain ("pain")    LASIK      MASTECTOMY  2000    Bilateral    OOPHORECTOMY  1994    PARTIAL NEPHRECTOMY      cyst from left kidney    Toes surgery      Hammer toe surgery    TONSILLECTOMY         Past Social History:  Social History     Socioeconomic History    Marital status: Single    Number of children: 1    Highest education level: Bachelor's degree (e.g., BA, AB, BS)   Occupational History     Comment: [employer]    Tobacco Use    Smoking status: Never    Smokeless tobacco: Never   Substance and Sexual Activity    Alcohol use: Yes     Alcohol/week: 1.0 standard drink of alcohol     Types: 1 Glasses of wine per week     Comment: Rare    Drug use: No    Sexual activity: Never   Other Topics Concern    Are you pregnant or think you may be? No   Social History Narrative    Retired     Children:  Mike ([place])     Qian: Hinduism    Hobbies: Line Dancing once a week , in [employer] , [employer]      Social Drivers of Health     Financial Resource Strain: Low Risk  (9/22/2022)    Overall Financial Resource Strain (CARDIA)     Difficulty of Paying Living Expenses: Not hard at all   Food Insecurity: No Food Insecurity (9/22/2022)    Hunger Vital Sign     Worried About Running Out of Food in the Last Year: Never true     Ran Out of Food in the Last Year: Never true   Transportation Needs: No Transportation Needs (9/22/2022)    PRAPARE - Transportation     Lack of Transportation (Medical): No     Lack of Transportation (Non-Medical): No   Physical Activity: Sufficiently Active (9/22/2022)    Exercise Vital Sign     Days of Exercise per Week: 3 days     Minutes of Exercise per Session: 60 min   Stress: No Stress Concern Present " (9/22/2022)    [employer] [place] [employer] - Occupational Stress Questionnaire     Feeling of Stress : Only a little   Housing Stability: Low Risk  (9/22/2022)    Housing Stability Vital Sign     Unable to Pay for Housing in the Last Year: No     Number of Places Lived in the Last Year: 1     Unstable Housing in the Last Year: No       Medications:  Medications Ordered Prior to Encounter[1]  Scheduled Meds:   aspirin  81 mg Oral Daily    [START ON 4/23/2025] calcitRIOL  0.25 mcg Oral Every Mon, Wed, Fri    [START ON 4/23/2025] levoFLOXacin  500 mg Intravenous Q48H    pantoprazole  40 mg Intravenous Daily    sodium bicarbonate  650 mg Oral BID     Continuous Infusions:  PRN Meds:.  Current Facility-Administered Medications:     acetaminophen, 650 mg, Oral, Q4H PRN    aluminum-magnesium hydroxide-simethicone, 30 mL, Oral, QID PRN    amLODIPine, 2.5 mg, Oral, BID PRN    dextrose 50%, 12.5 g, Intravenous, PRN    dextrose 50%, 25 g, Intravenous, PRN    glucagon (human recombinant), 1 mg, Intramuscular, PRN    glucose, 16 g, Oral, PRN    glucose, 24 g, Oral, PRN    melatonin, 9 mg, Oral, Nightly PRN    naloxone, 0.02 mg, Intravenous, PRN    ondansetron, 4 mg, Intravenous, Q6H PRN    prochlorperazine, 5 mg, Intravenous, Q6H PRN    sodium chloride 0.9%, 10 mL, Intravenous, Q12H PRN    Allergies:  Pcn [penicillins], Penicillamine, Propoxyphene n-acetaminophen, Sulfa (sulfonamide antibiotics), and Statins-hmg-coa reductase inhibitors    Past Family History:  Reviewed; refer to Hospitalist Admission Note    Review of Systems:  Review of Systems - All 14 systems reviewed and negative, except as noted in HPI    Physical Exam:    BP (!) 144/65   Pulse 79   Temp 98.2 °F (36.8 °C)   Resp 18   Ht 5' (1.524 m)   Wt 49.9 kg (110 lb)   SpO2 100%   BMI 21.48 kg/m²     General Appearance:    Alert, cooperative, no distress, appears stated age   Head:    Normocephalic, without obvious abnormality, atraumatic   Eyes:     PER, conjunctiva/corneas clear, EOM's intact in both eyes        Throat:   Lips, mucosa, and tongue normal; teeth and gums normal   Back:     Symmetric, no curvature, ROM normal, no CVA tenderness   Lungs:     Clear to auscultation bilaterally, respirations unlabored   Chest wall:    No tenderness or deformity   Heart:    Regular rate and rhythm, S1 and S2 normal, no murmur, rub   or gallop   Abdomen:     Soft, non-tender, bowel sounds active all four quadrants,     no masses, no organomegaly   Extremities:   Extremities normal, atraumatic, no cyanosis or edema   Pulses:   2+ and symmetric all extremities   MSK:   No joint or muscle swelling, tenderness or deformity   Skin:   Skin color, texture, turgor normal, no rashes or lesions   Neurologic:   CNII-XII intact, normal strength and sensation       Throughout.  No flap     Results:  Lab Results   Component Value Date     (L) 04/22/2025    K 4.9 04/22/2025     03/03/2025    CO2 18 (L) 04/22/2025    BUN 52 (H) 04/22/2025    CREATININE 2.7 (H) 04/22/2025    CALCIUM 8.0 (L) 04/22/2025    ANIONGAP 8 03/03/2025    ESTGFRAFRICA 39.0 (A) 07/05/2022    EGFRNONAA 33.8 (A) 07/05/2022       Lab Results   Component Value Date    CALCIUM 8.0 (L) 04/22/2025    PHOS 3.0 04/22/2025       Recent Labs   Lab 04/21/25 2054   WBC 8.67   RBC 4.24   HGB 13.5   HCT 40.4   *   MCV 95   MCH 31.8*   MCHC 33.4          I have personally reviewed pertinent radiological imaging and reports.    Tony Lunsford MD  [facility] [place]  [phone]          [1]   No current facility-administered medications on file prior to encounter.     Current Outpatient Medications on File Prior to Encounter   Medication Sig Dispense Refill    amLODIPine (NORVASC) 2.5 MG tablet Take 2.5 mg by mouth 2 (two) times daily as needed (BP over 150/80).      ascorbic acid, vitamin C, (VITAMIN C) 500 MG tablet Take 500 mg by mouth once daily.      aspirin (ECOTRIN) 81 MG EC tablet Take 81  mg by mouth once daily.      calcitRIOL (ROCALTROL) 0.25 MCG Cap Take 0.25 mcg by mouth every Mon, Wed, Fri.      calcium citrate-vitamin D3 315-200 mg (CITRACAL+D) 315 mg-5 mcg (200 unit) per tablet Take 1 tablet by mouth once daily.      magnesium oxide 84.5 mg mag (140 mg) Cap Take 1 tablet by mouth once daily.      valsartan (DIOVAN) 40 MG tablet Take 1 tablet (40 mg total) by mouth once daily. 90 tablet 3    vit C/E/Zn/coppr/lutein/zeaxan (PRESERVISION AREDS-2 ORAL) Take 1 capsule by mouth every other day. 1 every other day      raloxifene (EVISTA) 60 mg tablet Take 1 tablet (60 mg total) by mouth once daily. (Patient not taking: Reported on 4/21/2025) 90 tablet 3    triamcinolone acetonide 0.1% (KENALOG) 0.1 % cream Apply topically 2 (two) times daily. (Patient not taking: Reported on 4/21/2025) 45 g 0

## 2025-04-22 NOTE — PROGRESS NOTES
Pharmacist Renal Dose Adjustment Note    Tyra Fernandes is a 80 y.o. female being treated with Levofloxacin.     Patient Data:    Vital Signs (Most Recent):  Temp: 98.2 °F (36.8 °C) (04/21/25 2345)  Pulse: 77 (04/21/25 2330)  Resp: 18 (04/21/25 1804)  BP: (!) 127/56 (04/21/25 2330)  SpO2: 96 % (04/21/25 2330) Vital Signs (72h Range):  Temp:  [98.2 °F (36.8 °C)-98.8 °F (37.1 °C)]   Pulse:  [75-90]   Resp:  [18]   BP: (123-165)/(56-79)   SpO2:  [96 %-100 %]      Recent Labs   Lab 04/21/25 2054   CREATININE 2.9*     Serum creatinine: 2.9 mg/dL (H) 04/21/25 2054  Estimated creatinine clearance: 11.5 mL/min (A)    Levofloxacin 750 mg every 24 hours will be changed to Levofloxacin 500 mg every 48 hours due to CrCl <20 mL/min per Renal Dose Adjustment protocol.     Pharmacist's Name: Melisa Barajasen  Pharmacist's Extension: [phone]

## 2025-04-22 NOTE — ASSESSMENT & PLAN NOTE
Creatine stable for now. BMP reviewed- noted Estimated Creatinine Clearance: 11.5 mL/min (A) (based on SCr of 2.9 mg/dL (H)). according to latest data. Based on current GFR, CKD stage is stage 4 - GFR 15-29.  Monitor UOP and serial BMP and adjust therapy as needed. Renally dose meds. Avoid nephrotoxic medications and procedures.

## 2025-04-22 NOTE — ED PROVIDER NOTES
Encounter Date: 4/21/2025       History     Chief Complaint   Patient presents with    Fatigue     Generalized fatigue/weakness getting worse since Thursday.  +diarrhea +nausea/vomiting     HPI    Tyra Fernandes is a 80 y.o. female with a past medical history of CKD, anemia, hyperlipidemia, and breast cancer status post mastectomy in 2000 that presents to the ED for generalized fatigue, weakness, diarrhea, nausea, and vomiting.  Patient began feeling unwell 4 days ago.  Had episodes of vomiting and diarrhea at that time.  Since then, she has had poor p.o. intake and was found to have a blood pressure of 90s over 50s on Friday.  She feels like she has constant urination.  Has tolerated water but no solids.  She was having some fecal incontinence with urination, but this has resolved.  She has also been having a cough for the past 3-4 weeks which is nonproductive.  Denies fevers, chest pain, shortness of breath, numbness, weakness, headache, and dizziness.    Review of patient's allergies indicates:   Allergen Reactions    Pcn [penicillins]     Penicillamine      Other reaction(s): Unknown    Propoxyphene n-acetaminophen      Other reaction(s): Unknown    Sulfa (sulfonamide antibiotics)     Statins-hmg-coa reductase inhibitors Rash     Past Medical History:   Diagnosis Date    Abnormal Pap smear     colpo then hyst    Allergy     Breast cancer 1997, 2000    Colon polyp     Fever blister     GERD (gastroesophageal reflux disease)     Hyperlipidemia     Hypertension     Hyperthyroidism     Kidney cysts     Osteoporosis, unspecified     Seasonal allergies      Past Surgical History:   Procedure Laterality Date    BILATERAL MASTECTOMY  02/2000    BLADDER REPAIR      BREAST RECONSTRUCTION      CATARACT EXTRACTION      COLONOSCOPY N/A 1/4/2016    Procedure: COLONOSCOPY;  Surgeon: Kieran Krishnamurthy MD;  Location: [address];  Service: Endoscopy;  Laterality: N/A;    COLONOSCOPY N/A 2/24/2021    Procedure:  "COLONOSCOPY;  Surgeon: Gila Lopez MD;  Location: [facility];  Service: Endoscopy;  Laterality: N/A;    COLONOSCOPY W/ POLYPECTOMY      HYSTERECTOMY      JODI, ovaries remain ("pain")    LASIK      MASTECTOMY      Bilateral    OOPHORECTOMY      PARTIAL NEPHRECTOMY      cyst from left kidney    Toes surgery      Hammer toe surgery    TONSILLECTOMY       Family History   Problem Relation Name Age of Onset    Cancer Mother      Breast cancer Mother      Cancer Father          Lung Cancer    Cancer Sister      Osteoporosis Sister      Breast cancer Sister      Cancer Sister      Colon cancer Maternal Aunt      Cancer Maternal Aunt          colon    Colon cancer Paternal Aunt      Heart failure Paternal Aunt      Cancer Paternal Aunt          colon    Heart failure Paternal Uncle      Heart failure Paternal Grandmother      Breast cancer Cousin      Celiac disease Cousin      Breast cancer Cousin      Breast cancer Other niece     Diabetes Neg Hx      Eclampsia Neg Hx      Hypertension Neg Hx      Miscarriages / Stillbirths Neg Hx      Ovarian cancer Neg Hx       labor Neg Hx      Stroke Neg Hx      Melanoma Neg Hx      Cirrhosis Neg Hx      Crohn's disease Neg Hx      Ulcerative colitis Neg Hx      Stomach cancer Neg Hx      Rectal cancer Neg Hx      Liver cancer Neg Hx      Esophageal cancer Neg Hx      Irritable bowel syndrome Neg Hx       Social History[1]  Review of Systems   Constitutional:  Positive for fatigue. Negative for fever.   HENT:  Negative for sore throat.    Respiratory:  Positive for cough. Negative for shortness of breath.    Cardiovascular:  Negative for chest pain.   Gastrointestinal:  Positive for abdominal pain, diarrhea, nausea and vomiting.   Genitourinary:  Positive for frequency. Negative for dysuria and hematuria.   Musculoskeletal:  Negative for back pain.   Skin:  Negative for rash.   Neurological:  Negative for dizziness, weakness, numbness and headaches. "   Hematological:  Does not bruise/bleed easily.       Physical Exam     Initial Vitals [04/21/25 1804]   BP Pulse Resp Temp SpO2   123/79 90 18 98.8 °F (37.1 °C) 100 %      MAP       --         Physical Exam    Nursing note and vitals reviewed.  Constitutional: She appears well-developed and well-nourished.   HENT:   Head: Normocephalic and atraumatic.   Cracked lips.  Dry mucous membranes.   Eyes: EOM are normal. Pupils are equal, round, and reactive to light.   Neck:   Normal range of motion.  Cardiovascular:  Normal rate, regular rhythm and normal heart sounds.           Pulmonary/Chest: Breath sounds normal. No respiratory distress. She has no wheezes. She has no rhonchi. She has no rales.   Abdominal: Abdomen is soft. She exhibits no distension. There is no abdominal tenderness. There is no rebound.   Musculoskeletal:         General: Normal range of motion.      Cervical back: Normal range of motion.     Neurological: She is alert and oriented to person, place, and time. She has normal strength. No cranial nerve deficit or sensory deficit. GCS score is 15. GCS eye subscore is 4. GCS verbal subscore is 5. GCS motor subscore is 6.   Skin: Capillary refill takes less than 2 seconds. No rash noted.   Poor skin turgor   Psychiatric: She has a normal mood and affect. Thought content normal.         ED Course   Procedures  Labs Reviewed   COMPREHENSIVE METABOLIC PANEL - Abnormal       Result Value    Sodium 128 (*)     Potassium 4.4      Chloride 98      CO2 20 (*)     Glucose 100      BUN 54 (*)     Creatinine 2.9 (*)     Calcium 9.4      Protein Total 7.5      Albumin 3.6      Bilirubin Total 0.3      ALP 54 (*)     AST 19      ALT 7 (*)     Anion Gap 10      eGFR 16 (*)    CBC WITH DIFFERENTIAL - Abnormal    WBC 8.67      RBC 4.24      Hgb 13.5      Hct 40.4      MCV 95      MCH 31.8 (*)     MCHC 33.4      RDW 13.2      Platelet Count 145 (*)     MPV 11.1      Nucleated RBC 0      Neut % 83.2 (*)     Lymph % 6.2  (*)     Mono % 9.6      Eos % 0.1      Basophil % 0.2      Imm Grans % 0.7 (*)     Neut # 7.2      Lymph # 0.54 (*)     Mono # 0.83      Eos # 0.01      Baso # 0.02      Imm Grans # 0.06 (*)    URINALYSIS, REFLEX TO URINE CULTURE - Abnormal    Color, UA Yellow      Appearance, UA Hazy (*)     Spec Grav UA 1.010      pH, UA 6.0      Protein, UA 1+ (*)     Glucose, UA Negative      Ketones, UA Negative      Blood, UA 1+ (*)     Bilirubin, UA Negative      Urobilinogen, UA Negative      Nitrites, UA Negative      Leukocyte Esterase, UA 3+ (*)    URINALYSIS MICROSCOPIC - Abnormal    RBC, UA 1      WBC, UA 64 (*)     WBC Clumps, UA Few (*)     Bacteria, UA Rare      Squamous Epithelial Cells, UA 7      Microscopic Comment       INFLUENZA A & B BY MOLECULAR - Normal    INFLUENZA A MOLECULAR Negative      INFLUENZA B MOLECULAR  Negative     SARS-COV-2 RNA AMPLIFICATION, QUAL - Normal    SARS COV-2 Molecular Negative     LACTIC ACID, PLASMA - Normal    Lactic Acid Level 0.6      Narrative:     Falsely low lactic acid results can be found in samples containing >=13.0 mg/dL total bilirubin and/or >=3.5 mg/dL direct bilirubin.    CULTURE, BLOOD   CULTURE, BLOOD   CULTURE, URINE   CBC W/ AUTO DIFFERENTIAL    Narrative:     The following orders were created for panel order CBC auto differential.  Procedure                               Abnormality         Status                     ---------                               -----------         ------                     CBC with Differential[[id number]]       Abnormal            Final result                 Please view results for these tests on the individual orders.   EXTRA TUBES    Narrative:     The following orders were created for panel order EXTRA TUBES.  Procedure                               Abnormality         Status                     ---------                               -----------         ------                     Light Blue Top Hold[[id number]]                              In process                 Lavender Top Hold[[id number]]                               In process                 Gold Top Hold[[id number]]                                   In process                 Gold Top Hold[[id number]]                                   In process                   Please view results for these tests on the individual orders.   LIGHT BLUE TOP HOLD   LAVENDER TOP HOLD   GOLD TOP HOLD   GOLD TOP HOLD   SODIUM, URINE, RANDOM   OSMOLALITY, URINE RANDOM   OSMOLALITY, SERUM   BASIC METABOLIC PANEL   MAGNESIUM   PHOSPHORUS   PROCALCITONIN   ISTAT LACTATE    POC Lactate 0.83      Sample VENOUS     POCT LACTATE     EKG Readings: (Independently Interpreted)   Initial Reading: No STEMI. Rhythm: Normal Sinus Rhythm. Heart Rate: 81. Ectopy: No Ectopy. Conduction: Normal. ST Segments: Normal ST Segments. T Waves: Normal. Clinical Impression: Normal Sinus Rhythm       Imaging Results              X-Ray Chest AP Portable (Final result)  Result time 04/21/25 22:22:25      Final result by Gaudencio Simpson MD (04/21/25 22:22:25)                   Impression:      No acute abnormality.      Electronically signed by: Gaudencio Simpson  Date:    04/21/2025  Time:    22:22               Narrative:    EXAMINATION:  XR CHEST AP PORTABLE    CLINICAL HISTORY:  Sepsis;    TECHNIQUE:  Single frontal view of the chest was performed.    COMPARISON:  07/26/2021    FINDINGS:  The lungs are clear, with normal appearance of pulmonary vasculature and no pleural effusion or pneumothorax.    The cardiac silhouette is normal in size. The hilar and mediastinal contours are unremarkable.    Bones are intact.                                       Medications   amLODIPine tablet 2.5 mg (has no administration in time range)   aspirin EC tablet 81 mg (has no administration in time range)   calcitRIOL capsule 0.25 mcg (has no administration in time range)   sodium chloride 0.9% flush 10 mL (has no administration in time range)    melatonin tablet 9 mg (has no administration in time range)   naloxone 0.4 mg/mL injection 0.02 mg (has no administration in time range)   glucose chewable tablet 16 g (has no administration in time range)   glucose chewable tablet 24 g (has no administration in time range)   dextrose 50% injection 12.5 g (has no administration in time range)   dextrose 50% injection 25 g (has no administration in time range)   glucagon (human recombinant) injection 1 mg (has no administration in time range)   0.9% NaCl infusion (has no administration in time range)   acetaminophen tablet 650 mg (has no administration in time range)   ondansetron injection 4 mg (has no administration in time range)   prochlorperazine injection Soln 5 mg (has no administration in time range)   aluminum-magnesium hydroxide-simethicone 200-200-20 mg/5 mL suspension 30 mL (has no administration in time range)   levoFLOXacin 500 mg/100 mL IVPB 500 mg (has no administration in time range)   pantoprazole injection 40 mg (has no administration in time range)   sodium chloride 0.9% bolus 1,000 mL 1,000 mL (1,000 mLs Intravenous New Bag 4/21/25 2236)   levoFLOXacin 750 mg/150 mL IVPB 750 mg (750 mg Intravenous New Bag 4/21/25 2340)     Medical Decision Making  Tyra Fernandes is a 80 y.o. female with a past medical history of CKD, hyperlipidemia, and anemia that presents to the ED for evaluation of generalized fatigue, nausea, vomiting, and diarrhea.  Vitals were stable.  Nontoxic female.  Lungs are clear.  Heart sounds within normal limits.  Abdominal exam benign.  Dry mucous membranes with poor skin turgor.  No CVA tenderness.  Differential includes but isn't limited to pneumonia, pneumothorax, ACS, sepsis, UTI, pyelonephritis, KRIS, and electrolyte disturbance.  Chest x-ray shows no acute cardiopulmonary abnormality.  EKG shows normal sinus rhythm without acute ST segment or T-wave changes.  Viral swabs were negative.  Lactic was normal.  Blood cultures  are pending.  UA with 3+ leuk esterase.  CMP with sodium of 128.  Given fluids and Levaquin.  Admitted to Hospital Medicine.    Amount and/or Complexity of Data Reviewed  Labs: ordered.  Radiology: ordered.                                      Clinical Impression:  Final diagnoses:  [R53.1] Generalized weakness  [N17.9] KRIS (acute kidney injury)  [E87.1] Hyponatremia (Primary)          ED Disposition Condition    Observation                     [1]   Social History  Tobacco Use    Smoking status: Never    Smokeless tobacco: Never   Substance Use Topics    Alcohol use: Yes     Alcohol/week: 1.0 standard drink of alcohol     Types: 1 Glasses of wine per week     Comment: Rare    Drug use: No        Anthony Mishra MD  04/22/25 1119

## 2025-04-22 NOTE — ASSESSMENT & PLAN NOTE
Anemia is likely due to Iron deficiency. Most recent hemoglobin and hematocrit are listed below.  Recent Labs     04/21/25 2054   HGB 13.5   HCT 40.4     Plan  - Monitor serial CBC: Daily  - Transfuse PRBC if patient becomes hemodynamically unstable, symptomatic or H/H drops below 7/21.  - Patient has not received any PRBC transfusions to date  - Patient's anemia is currently stable

## 2025-04-22 NOTE — PT/OT/SLP EVAL
Occupational Therapy   Evaluation    Name: Tyra Fernandes  MRN: [id number]  Admitting Diagnosis: KRIS (acute kidney injury)  Recent Surgery: * No surgery found *      Recommendations:     Discharge Recommendations:  (High vs Low Intensity, depending progression with therapy.)  Discharge Equipment Recommendations:   (TBD)  Barriers to discharge:  Decreased caregiver support (increased physical assistance with ADLs and functional mobility.)    Assessment:     Tyra Fernandes is a 80 y.o. female with a medical diagnosis of KRIS (acute kidney injury).  She presents with general weakness. Performance deficits affecting function: weakness, impaired endurance, impaired self care skills, impaired functional mobility, gait instability, impaired balance, decreased safety awareness, decreased lower extremity function, decreased upper extremity function.      Rehab Prognosis: Good; patient would benefit from acute skilled OT services to address these deficits and reach maximum level of function.       Plan:     Patient to be seen 6 x/week to address the above listed problems via self-care/home management, therapeutic activities, therapeutic exercises  Plan of Care Expires: 05/22/25  Plan of Care Reviewed with: patient    Subjective     Chief Complaint: General weakness  Patient/Family Comments/goals: Improved functional mobility and ADL independence.     Occupational Profile:  Living Environment: General weakness  Previous level of function: Improved functional mobility and ADL independence.   Roles and Routines: primary homemaker  Equipment Used at Home: none  Assistance upon Discharge: Limited support at home.    Pain/Comfort:  Pain Rating 1: 0/10  Pain Rating Post-Intervention 1: 0/10    Patients cultural, spiritual, Sabianist conflicts given the current situation: no    Objective:     Communicated with: nurse prior to session.  Patient found HOB elevated with telemetry, peripheral IV upon OT entry to room.    General  Precautions: Standard, fall  Orthopedic Precautions: N/A  Braces: N/A  Respiratory Status: Room air    Occupational Performance:    Bed Mobility:    Patient completed Scooting/Bridging with minimum assistance  Patient completed Supine to Sit with minimum assistance  Patient completed Sit to Supine with minimum assistance  Performed unsupported sitting EOB with contact guard assistance.     Functional Mobility/Transfers:  Patient completed Sit <> Stand x1 Trial with minimum assistance using rolling walker     Activities of Daily Living:  Lower Body Dressing: minimum assistance to don/doff socks sitting EOB.     Cognitive/Visual Perceptual:  Cognitive/Psychosocial Skills:     -       Oriented to: Person, Place, Time, and Situation   -       Follows Commands/attention:Follows multistep  commands  -       Communication: clear/fluent  -       Memory: No Deficits noted  -       Safety awareness/insight to disability: impaired   -       Mood/Affect/Coping skills/emotional control: Cooperative and Pleasant  Visual/Perceptual:      -Intact Acuity    Physical Exam:  Balance:    -       Sitting: Contact Guard, Standing: Minimal Assist  Upper Extremity Range of Motion:     -       Right Upper Extremity: WFL  -       Left Upper Extremity: WFL  Upper Extremity Strength:    -       Right Upper Extremity: WFL  -       Left Upper Extremity: WFL   Strength:    -       Right Upper Extremity: WFL  -       Left Upper Extremity: WFL  Fine Motor Coordination:    -       Intact    AMPAC 6 Click ADL:  AMPAC Total Score: 19    Treatment & Education:  Patient educated on the purpose of Occupational Therapy and the importance of getting OOB.     Patient left HOB elevated with all lines intact and call button in reach    GOALS:   Multidisciplinary Problems       Occupational Therapy Goals          Problem: Occupational Therapy    Goal Priority Disciplines Outcome Interventions   Occupational Therapy Goal     OT, PT/OT     Description: Goals  "to be met by: 5/22/2025     Patient will increase functional independence with ADLs by performing:    UE Dressing with Supervision.  LE Dressing with Supervision.  Grooming while standing at sink with Supervision.  Toileting from toilet with Supervision for hygiene and clothing management.   Toilet transfer to toilet with Supervision.  Perform sitting/standing ADL activity with no LOB.                         History:     Past Medical History:   Diagnosis Date    Abnormal Pap smear     colpo then hyst    Allergy     Breast cancer 1997, 2000    Colon polyp     Fever blister     GERD (gastroesophageal reflux disease)     Hyperlipidemia     Hypertension     Hyperthyroidism     Kidney cysts     Osteoporosis, unspecified     Seasonal allergies          Past Surgical History:   Procedure Laterality Date    BILATERAL MASTECTOMY  02/2000    BLADDER REPAIR      BREAST RECONSTRUCTION      CATARACT EXTRACTION      COLONOSCOPY N/A 1/4/2016    Procedure: COLONOSCOPY;  Surgeon: Kieran Krishnamurthy MD;  Location: [facility] ([address] [place] [address]);  Service: Endoscopy;  Laterality: N/A;    COLONOSCOPY N/A 2/24/2021    Procedure: COLONOSCOPY;  Surgeon: Gila Lopez MD;  Location: [facility];  Service: Endoscopy;  Laterality: N/A;    COLONOSCOPY W/ POLYPECTOMY      HYSTERECTOMY  1994    JODI, ovaries remain ("pain")    LASIK      MASTECTOMY  2000    Bilateral    OOPHORECTOMY  1994    PARTIAL NEPHRECTOMY      cyst from left kidney    Toes surgery      Hammer toe surgery    TONSILLECTOMY         Time Tracking:     OT Date of Treatment: 04/22/25  OT Start Time: 0915  OT Stop Time: 0935  OT Total Time (min): 20 min    Billable Minutes:Evaluation 10  Self Care/Home Management 10    4/22/2025  "

## 2025-04-22 NOTE — ASSESSMENT & PLAN NOTE
The likely etiology of thrombocytopenia is unknown. The patients 3 most recent labs are listed below.  Recent Labs     04/21/25 2054   *     Plan  - Will transfuse if platelet count is <50k (if undergoing surgical procedure or have active bleeding).

## 2025-04-22 NOTE — NURSING
Patient arrived to floor at 1500 - no acute signs of distress - vital signs within normal parameters - fluids running continuously at 75 ml/hr - NADIA Christiansen RN

## 2025-04-22 NOTE — PLAN OF CARE
Arrived to floor today - no signs of distress - patient on fluids at this time running continuously -

## 2025-04-23 LAB
25(OH)D3+25(OH)D2 SERPL-MCNC: 87 NG/ML (ref 30–96)
ABORH RETYPE: NORMAL
ABSOLUTE EOSINOPHIL (SMH): 0.05 K/UL
ABSOLUTE MONOCYTE (SMH): 0.89 K/UL (ref 0.3–1)
ABSOLUTE NEUTROPHIL COUNT (SMH): 7.3 K/UL (ref 1.8–7.7)
ALBUMIN SERPL-MCNC: 2.7 G/DL (ref 3.5–5.2)
ALP SERPL-CCNC: 39 UNIT/L (ref 55–135)
ALT SERPL-CCNC: 6 UNIT/L (ref 10–44)
ANION GAP (SMH): 5 MMOL/L (ref 8–16)
ANION GAP (SMH): 8 MMOL/L (ref 8–16)
AST SERPL-CCNC: 13 UNIT/L (ref 10–40)
BACTERIA #/AREA URNS AUTO: NORMAL /HPF
BASOPHILS # BLD AUTO: 0.02 K/UL
BASOPHILS NFR BLD AUTO: 0.2 %
BILIRUB SERPL-MCNC: 0.3 MG/DL (ref 0.1–1)
BILIRUB UR QL STRIP.AUTO: NEGATIVE
BNP SERPL-MCNC: 353 PG/ML
BUN SERPL-MCNC: 48 MG/DL (ref 8–23)
BUN SERPL-MCNC: 55 MG/DL (ref 8–23)
CALCIUM SERPL-MCNC: 8.3 MG/DL (ref 8.7–10.5)
CALCIUM SERPL-MCNC: 8.3 MG/DL (ref 8.7–10.5)
CHLORIDE SERPL-SCNC: 108 MMOL/L (ref 95–110)
CHLORIDE SERPL-SCNC: 108 MMOL/L (ref 95–110)
CLARITY UR: CLEAR
CO2 SERPL-SCNC: 18 MMOL/L (ref 23–29)
CO2 SERPL-SCNC: 21 MMOL/L (ref 23–29)
COLOR UR AUTO: COLORLESS
CREAT SERPL-MCNC: 2.7 MG/DL (ref 0.5–1.4)
CREAT SERPL-MCNC: 2.7 MG/DL (ref 0.5–1.4)
ERYTHROCYTE [DISTWIDTH] IN BLOOD BY AUTOMATED COUNT: 13.7 % (ref 11.5–14.5)
ERYTHROCYTE [DISTWIDTH] IN BLOOD BY AUTOMATED COUNT: 13.9 % (ref 11.5–14.5)
FERRITIN SERPL-MCNC: 220.6 NG/ML (ref 20–300)
FOLATE SERPL-MCNC: 15.7 NG/ML (ref 4–24)
GFR SERPLBLD CREATININE-BSD FMLA CKD-EPI: 18 ML/MIN/1.73/M2
GFR SERPLBLD CREATININE-BSD FMLA CKD-EPI: 18 ML/MIN/1.73/M2
GLUCOSE SERPL-MCNC: 130 MG/DL (ref 70–110)
GLUCOSE SERPL-MCNC: 91 MG/DL (ref 70–110)
GLUCOSE UR QL STRIP: NEGATIVE
HCT VFR BLD AUTO: 25.4 % (ref 37–48.5)
HCT VFR BLD AUTO: 27.1 % (ref 37–48.5)
HGB BLD-MCNC: 8.3 GM/DL (ref 12–16)
HGB BLD-MCNC: 8.8 GM/DL (ref 12–16)
HGB UR QL STRIP: NEGATIVE
IMM GRANULOCYTES # BLD AUTO: 0.16 K/UL (ref 0–0.04)
IMM GRANULOCYTES NFR BLD AUTO: 1.8 % (ref 0–0.5)
INDIRECT COOMBS: NORMAL
IRON SATN MFR SERPL: <10 % (ref 20–50)
IRON SERPL-MCNC: 17 UG/DL (ref 30–160)
KETONES UR QL STRIP: NEGATIVE
LEUKOCYTE ESTERASE UR QL STRIP: ABNORMAL
LYMPHOCYTES # BLD AUTO: 0.56 K/UL (ref 1–4.8)
MCH RBC QN AUTO: 31.7 PG (ref 27–31)
MCH RBC QN AUTO: 32.1 PG (ref 27–31)
MCHC RBC AUTO-ENTMCNC: 32.5 G/DL (ref 32–36)
MCHC RBC AUTO-ENTMCNC: 32.7 G/DL (ref 32–36)
MCV RBC AUTO: 97 FL (ref 82–98)
MCV RBC AUTO: 99 FL (ref 82–98)
MICROSCOPIC COMMENT: NORMAL
NITRITE UR QL STRIP: NEGATIVE
NUCLEATED RBC (/100WBC) (SMH): 0 /100 WBC
PH UR STRIP: 5 [PH]
PHOSPHATE SERPL-MCNC: 3 MG/DL (ref 2.7–4.5)
PLATELET # BLD AUTO: 154 K/UL (ref 150–450)
PLATELET # BLD AUTO: 167 K/UL (ref 150–450)
PMV BLD AUTO: 10.5 FL (ref 9.2–12.9)
PMV BLD AUTO: 10.7 FL (ref 9.2–12.9)
POTASSIUM SERPL-SCNC: 4.2 MMOL/L (ref 3.5–5.1)
POTASSIUM SERPL-SCNC: 4.6 MMOL/L (ref 3.5–5.1)
PROT SERPL-MCNC: 5.7 GM/DL (ref 6–8.4)
PROT UR QL STRIP: NEGATIVE
PTH-INTACT SERPL-MCNC: 46.7 PG/ML (ref 9–77)
RBC # BLD AUTO: 2.62 M/UL (ref 4–5.4)
RBC # BLD AUTO: 2.74 M/UL (ref 4–5.4)
RBC #/AREA URNS AUTO: 1 /HPF
RELATIVE EOSINOPHIL (SMH): 0.6 % (ref 0–8)
RELATIVE LYMPHOCYTE (SMH): 6.3 % (ref 18–48)
RELATIVE MONOCYTE (SMH): 9.9 % (ref 4–15)
RELATIVE NEUTROPHIL (SMH): 81.2 % (ref 38–73)
RH BLD: NORMAL
SODIUM SERPL-SCNC: 134 MMOL/L (ref 136–145)
SODIUM SERPL-SCNC: 134 MMOL/L (ref 136–145)
SP GR UR STRIP: 1
SPECIMEN OUTDATE: NORMAL
SQUAMOUS #/AREA URNS AUTO: 1 /HPF
TIBC SERPL-MCNC: 188 UG/DL (ref 250–450)
TRANSFERRIN SERPL-MCNC: 134 MG/DL (ref 200–375)
UROBILINOGEN UR STRIP-ACNC: NEGATIVE EU/DL
VIT B12 SERPL-MCNC: >1500 PG/ML (ref 210–950)
WBC # BLD AUTO: 7.66 K/UL (ref 3.9–12.7)
WBC # BLD AUTO: 8.96 K/UL (ref 3.9–12.7)
WBC #/AREA URNS AUTO: 5 /HPF

## 2025-04-23 PROCEDURE — 82746 ASSAY OF FOLIC ACID SERUM: CPT | Performed by: INTERNAL MEDICINE

## 2025-04-23 PROCEDURE — 25000003 PHARM REV CODE 250

## 2025-04-23 PROCEDURE — 97116 GAIT TRAINING THERAPY: CPT | Mod: CQ

## 2025-04-23 PROCEDURE — 25000003 PHARM REV CODE 250: Performed by: INTERNAL MEDICINE

## 2025-04-23 PROCEDURE — 84550 ASSAY OF BLOOD/URIC ACID: CPT | Performed by: INTERNAL MEDICINE

## 2025-04-23 PROCEDURE — 97110 THERAPEUTIC EXERCISES: CPT

## 2025-04-23 PROCEDURE — 82607 VITAMIN B-12: CPT | Performed by: INTERNAL MEDICINE

## 2025-04-23 PROCEDURE — 82040 ASSAY OF SERUM ALBUMIN: CPT

## 2025-04-23 PROCEDURE — 83880 ASSAY OF NATRIURETIC PEPTIDE: CPT | Performed by: INTERNAL MEDICINE

## 2025-04-23 PROCEDURE — 81001 URINALYSIS AUTO W/SCOPE: CPT | Performed by: INTERNAL MEDICINE

## 2025-04-23 PROCEDURE — 63600175 PHARM REV CODE 636 W HCPCS

## 2025-04-23 PROCEDURE — 12000002 HC ACUTE/MED SURGE SEMI-PRIVATE ROOM

## 2025-04-23 PROCEDURE — 36415 COLL VENOUS BLD VENIPUNCTURE: CPT

## 2025-04-23 PROCEDURE — 86850 RBC ANTIBODY SCREEN: CPT | Performed by: INTERNAL MEDICINE

## 2025-04-23 PROCEDURE — 85025 COMPLETE CBC W/AUTO DIFF WBC: CPT

## 2025-04-23 PROCEDURE — 85027 COMPLETE CBC AUTOMATED: CPT | Performed by: INTERNAL MEDICINE

## 2025-04-23 PROCEDURE — 84100 ASSAY OF PHOSPHORUS: CPT | Performed by: INTERNAL MEDICINE

## 2025-04-23 PROCEDURE — 82306 VITAMIN D 25 HYDROXY: CPT | Performed by: INTERNAL MEDICINE

## 2025-04-23 PROCEDURE — 82728 ASSAY OF FERRITIN: CPT | Performed by: INTERNAL MEDICINE

## 2025-04-23 PROCEDURE — 84466 ASSAY OF TRANSFERRIN: CPT | Performed by: INTERNAL MEDICINE

## 2025-04-23 PROCEDURE — 82310 ASSAY OF CALCIUM: CPT | Performed by: INTERNAL MEDICINE

## 2025-04-23 PROCEDURE — 36415 COLL VENOUS BLD VENIPUNCTURE: CPT | Performed by: INTERNAL MEDICINE

## 2025-04-23 PROCEDURE — 83970 ASSAY OF PARATHORMONE: CPT | Performed by: INTERNAL MEDICINE

## 2025-04-23 RX ORDER — CETIRIZINE HYDROCHLORIDE 5 MG/1
5 TABLET ORAL DAILY
Status: DISCONTINUED | OUTPATIENT
Start: 2025-04-23 | End: 2025-04-24 | Stop reason: HOSPADM

## 2025-04-23 RX ADMIN — SODIUM CHLORIDE: 9 INJECTION, SOLUTION INTRAVENOUS at 02:04

## 2025-04-23 RX ADMIN — SODIUM BICARBONATE 650 MG TABLET 650 MG: at 08:04

## 2025-04-23 RX ADMIN — CETIRIZINE HYDROCHLORIDE 5 MG: 5 TABLET ORAL at 11:04

## 2025-04-23 RX ADMIN — PANTOPRAZOLE SODIUM 40 MG: 40 INJECTION, POWDER, FOR SOLUTION INTRAVENOUS at 08:04

## 2025-04-23 RX ADMIN — CALCITRIOL CAPSULES 0.25 MCG 0.25 MCG: 0.25 CAPSULE ORAL at 04:04

## 2025-04-23 RX ADMIN — ASPIRIN 81 MG: 81 TABLET, COATED ORAL at 08:04

## 2025-04-23 RX ADMIN — SODIUM BICARBONATE 650 MG TABLET 650 MG: at 09:04

## 2025-04-23 RX ADMIN — LEVOFLOXACIN 500 MG: 5 INJECTION, SOLUTION INTRAVENOUS at 11:04

## 2025-04-23 NOTE — ASSESSMENT & PLAN NOTE
Patient's blood pressure range in the last 24 hours was: BP  Min: 132/72  Max: 148/72.The patient's inpatient anti-hypertensive regimen is listed below:  Current Antihypertensives  amLODIPine tablet 2.5 mg, 2 times daily PRN, Oral    Plan  - BP is controlled, no changes needed to their regimen

## 2025-04-23 NOTE — CONSULTS
INPATIENT NEPHROLOGY CONSULT   [facility] NEPHROLOGY    Tyra Fernandes  04/23/2025    Reason for consultation:    Acute kidney injury    Chief Complaint:   Chief Complaint   Patient presents with    Fatigue     Generalized fatigue/weakness getting worse since Thursday.  +diarrhea +nausea/vomiting          History of Present Illness:    Per H and P   Tyra Fernandes is a 80 year old female with a past medical history of chronic kidney disease, anemia, hyperlipidemia, and breast cancer status post mastectomy in 2000 who presents with complaints of generalized fatigue, weakness, diarrhea, nausea, and vomiting associated with decreased appetite and urinary frequency for 4 days, since Thursday. She endorses having a nonproductive cough for about a month but denies taking any antibiotics. She denies any recent travel or sick contacts. She reports having similar symptoms back in February. However at that time symptoms did not last as long. She denies chest pain, shortness of breath, fever, chills, dizziness, headaches, lightheadedness, hematuria, hematochezia, or abdominal pain.  ED workup reveals:  Chest x-ray no acute finding.  She is COVID and influenza negative.  CBC with platelets 145 otherwise unremarkable.  CMP with hyponatremia 128, BUN 54, creatinine 2.9 baseline about 1.8. Urinalysis positive with elevated WBCs. Urine and blood cultures pending. Lactic acid 0.83. She received bolus of fluids in ED and started on Levaquin. Nephrology consult placed.  Admitted to hospital medicine for further management and treatment.       4/23  AFVSS,  output not recorded, pvr 102cc, no nausea, cp, sob        Plan of Care:       Assessment:    Acute kidney injury likely secondary to hemodynamically mediated renal injury   --Avoid NSAIDS, Jang II inhibitors, and other non-essential nephrotoxic agents  --Keep mean arterial pressure above 60 and systolic blood pressure above 100 as able to maintain renal perfusion  --hold  valsartan  --strict I/Os  --iv fluids  --FENa 2.0% and FEUrea 45% both  implicating prerenal pathology (acute tubular necrosis).  Check orthostatics.  Recheck ua with micro    Urinary tract infection  --renal dose abx  --no bactrim, aminoglycosides, or vancomycin/zosyn combination   --urine culture 4/21 pos for GNR    Hyponatremia  --urine osm 346 consistent with impaired free water clearance  --bnp 353  --uric acid  --avoid hypotonic iv piggybacks    CKDIV  --Avoiding fluctuations in blood pressure (high and low spikes)  Maintain good glucose control if you have diabetes  Reduce/avoid extra salt intake  Avoid NSAIDs and Coxibs.  Acetaminophen ok.    Aerobic exercise at least 3x weekly as tolerated  Eat more whole foods (vegetables, fruit, fish, chicken, pork, beef, dairy) and less processed food  Control weight  Avoid smoking  Stay hydrated, avoid dehydration.  Annual flu shot and routine preventative cancer screening via your primary care doctor    Secondary hyperparathyroidism  --continue calcitriol  --vit d level 87    Thank you for allowing us to participate in this patient's care. We will continue to follow.    Vital Signs:  Temp Readings from Last 3 Encounters:   04/23/25 98 °F (36.7 °C) (Oral)   03/11/25 97.5 °F (36.4 °C) (Oral)   01/26/24 99.3 °F (37.4 °C) (Oral)       Pulse Readings from Last 3 Encounters:   04/23/25 75   03/11/25 82   10/29/24 64       BP Readings from Last 3 Encounters:   04/23/25 123/80   03/11/25 (!) 154/85   10/29/24 127/75       Weight:  Wt Readings from Last 3 Encounters:   04/22/25 50.6 kg (111 lb 8.8 oz)   03/11/25 51.3 kg (113 lb)   10/29/24 51.5 kg (113 lb 9.6 oz)       Past Medical & Surgical History:  Past Medical History:   Diagnosis Date    Abnormal Pap smear     colpo then hyst    Allergy     Breast cancer 1997, 2000    Colon polyp     Fever blister     GERD (gastroesophageal reflux disease)     Hyperlipidemia     Hypertension     Hyperthyroidism     Kidney cysts      "Osteoporosis, unspecified     Seasonal allergies        Past Surgical History:   Procedure Laterality Date    BILATERAL MASTECTOMY  02/2000    BLADDER REPAIR      BREAST RECONSTRUCTION      CATARACT EXTRACTION      COLONOSCOPY N/A 1/4/2016    Procedure: COLONOSCOPY;  Surgeon: Kieran Krishnamurthy MD;  Location: [facility] ENDO ([address] [place] [address]);  Service: Endoscopy;  Laterality: N/A;    COLONOSCOPY N/A 2/24/2021    Procedure: COLONOSCOPY;  Surgeon: Gila Lopez MD;  Location: [facility] ENDO;  Service: Endoscopy;  Laterality: N/A;    COLONOSCOPY W/ POLYPECTOMY      HYSTERECTOMY  1994    JODI, ovaries remain ("pain")    LASIK      MASTECTOMY  2000    Bilateral    OOPHORECTOMY  1994    PARTIAL NEPHRECTOMY      cyst from left kidney    Toes surgery      Hammer toe surgery    TONSILLECTOMY         Past Social History:  Social History     Socioeconomic History    Marital status: Single    Number of children: 1    Highest education level: Bachelor's degree (e.g., BA, AB, BS)   Occupational History     Comment: [employer]    Tobacco Use    Smoking status: Never    Smokeless tobacco: Never   Substance and Sexual Activity    Alcohol use: Yes     Alcohol/week: 1.0 standard drink of alcohol     Types: 1 Glasses of wine per week     Comment: Rare    Drug use: No    Sexual activity: Never   Other Topics Concern    Are you pregnant or think you may be? No   Social History Narrative    Retired     Children:  Mike ([place])     Qian: Roman Catholic    Hobbies: Line Dancing once a week , in [employer] , [employer]      Social Drivers of Health     Financial Resource Strain: Low Risk  (4/22/2025)    Overall Financial Resource Strain (CARDIA)     Difficulty of Paying Living Expenses: Not hard at all   Food Insecurity: No Food Insecurity (4/22/2025)    Hunger Vital Sign     Worried About Running Out of Food in the Last Year: Never true     Ran Out of Food in the Last Year: Never true   Transportation Needs: No Transportation Needs " (4/22/2025)    PRAPARE - Transportation     Lack of Transportation (Medical): No     Lack of Transportation (Non-Medical): No   Physical Activity: Sufficiently Active (9/22/2022)    Exercise Vital Sign     Days of Exercise per Week: 3 days     Minutes of Exercise per Session: 60 min   Stress: No Stress Concern Present (4/22/2025)    [employer] [place] [employer] - Occupational Stress Questionnaire     Feeling of Stress : Only a little   Housing Stability: Low Risk  (4/22/2025)    Housing Stability Vital Sign     Unable to Pay for Housing in the Last Year: No     Homeless in the Last Year: No       Medications:  Medications Ordered Prior to Encounter[1]  Scheduled Meds:   aspirin  81 mg Oral Daily    calcitRIOL  0.25 mcg Oral Every Mon, Wed, Fri    cetirizine  5 mg Oral Daily    levoFLOXacin  500 mg Intravenous Q48H    pantoprazole  40 mg Intravenous Daily    sodium bicarbonate  650 mg Oral BID     Continuous Infusions:   0.9% NaCl   Intravenous Continuous 75 mL/hr at 04/22/25 2345 New Bag at 04/22/25 2345     PRN Meds:.  Current Facility-Administered Medications:     acetaminophen, 650 mg, Oral, Q4H PRN    aluminum-magnesium hydroxide-simethicone, 30 mL, Oral, QID PRN    amLODIPine, 2.5 mg, Oral, BID PRN    dextrose 50%, 12.5 g, Intravenous, PRN    dextrose 50%, 25 g, Intravenous, PRN    glucagon (human recombinant), 1 mg, Intramuscular, PRN    glucose, 16 g, Oral, PRN    glucose, 24 g, Oral, PRN    melatonin, 9 mg, Oral, Nightly PRN    naloxone, 0.02 mg, Intravenous, PRN    ondansetron, 4 mg, Intravenous, Q6H PRN    prochlorperazine, 5 mg, Intravenous, Q6H PRN    sodium chloride 0.9%, 10 mL, Intravenous, Q12H PRN    Allergies:  Pcn [penicillins], Penicillamine, Propoxyphene n-acetaminophen, Sulfa (sulfonamide antibiotics), and Statins-hmg-coa reductase inhibitors    Past Family History:  Reviewed; refer to Hospitalist Admission Note    Review of Systems:  Review of Systems - All 14 systems reviewed and  negative, except as noted in HPI    Physical Exam:    /80   Pulse 75   Temp 98 °F (36.7 °C) (Oral)   Resp 18   Ht 5' (1.524 m)   Wt 50.6 kg (111 lb 8.8 oz)   SpO2 100%   BMI 21.79 kg/m²     General Appearance:    Alert, cooperative, no distress, appears stated age   Head:    Normocephalic, without obvious abnormality, atraumatic   Eyes:    PER, conjunctiva/corneas clear, EOM's intact in both eyes        Throat:   Lips, mucosa, and tongue normal; teeth and gums normal   Back:     Symmetric, no curvature, ROM normal, no CVA tenderness   Lungs:     Clear to auscultation bilaterally, respirations unlabored   Chest wall:    No tenderness or deformity   Heart:    Regular rate and rhythm, S1 and S2 normal, no murmur, rub   or gallop   Abdomen:     Soft, non-tender, bowel sounds active all four quadrants,     no masses, no organomegaly   Extremities:   Extremities normal, atraumatic, no cyanosis or edema   Pulses:   2+ and symmetric all extremities   MSK:   No joint or muscle swelling, tenderness or deformity   Skin:   Skin color, texture, turgor normal, no rashes or lesions   Neurologic:   CNII-XII intact, normal strength and sensation       Throughout.  No flap     Results:  Lab Results   Component Value Date     (L) 04/23/2025    K 4.6 04/23/2025     03/03/2025    CO2 18 (L) 04/23/2025    BUN 55 (H) 04/23/2025    CREATININE 2.7 (H) 04/23/2025    CALCIUM 8.3 (L) 04/23/2025    ANIONGAP 8 03/03/2025    ESTGFRAFRICA 39.0 (A) 07/05/2022    EGFRNONAA 33.8 (A) 07/05/2022       Lab Results   Component Value Date    CALCIUM 8.3 (L) 04/23/2025    PHOS 3.0 04/23/2025       Recent Labs   Lab 04/23/25  0543   WBC 8.96   RBC 2.74*   HGB 8.8*   HCT 27.1*      MCV 99*   MCH 32.1*   MCHC 32.5          I have personally reviewed pertinent radiological imaging and reports.    Tony Lunsford MD  [facility] [place]  [phone]            [1]   No current facility-administered medications on file  prior to encounter.     Current Outpatient Medications on File Prior to Encounter   Medication Sig Dispense Refill    amLODIPine (NORVASC) 2.5 MG tablet Take 2.5 mg by mouth 2 (two) times daily as needed (BP over 150/80).      ascorbic acid, vitamin C, (VITAMIN C) 500 MG tablet Take 500 mg by mouth once daily.      aspirin (ECOTRIN) 81 MG EC tablet Take 81 mg by mouth once daily.      calcitRIOL (ROCALTROL) 0.25 MCG Cap Take 0.25 mcg by mouth every Mon, Wed, Fri.      calcium citrate-vitamin D3 315-200 mg (CITRACAL+D) 315 mg-5 mcg (200 unit) per tablet Take 1 tablet by mouth once daily.      magnesium oxide 84.5 mg mag (140 mg) Cap Take 1 tablet by mouth once daily.      valsartan (DIOVAN) 40 MG tablet Take 1 tablet (40 mg total) by mouth once daily. 90 tablet 3    vit C/E/Zn/coppr/lutein/zeaxan (PRESERVISION AREDS-2 ORAL) Take 1 capsule by mouth every other day. 1 every other day      raloxifene (EVISTA) 60 mg tablet Take 1 tablet (60 mg total) by mouth once daily. (Patient not taking: Reported on 4/21/2025) 90 tablet 3    triamcinolone acetonide 0.1% (KENALOG) 0.1 % cream Apply topically 2 (two) times daily. (Patient not taking: Reported on 4/21/2025) 45 g 0

## 2025-04-23 NOTE — PT/OT/SLP PROGRESS
Physical Therapy Treatment    Patient Name:  Tyra Fernandes   MRN:  [id number]    Recommendations:     Discharge Recommendations: Low Intensity Therapy  Discharge Equipment Recommendations:  (TBD)  Barriers to discharge: None    Assessment:     Tyra Fernandes is a 80 y.o. female admitted with a medical diagnosis of KRIS (acute kidney injury).  She presents with the following impairments/functional limitations: weakness, impaired endurance, impaired functional mobility, impaired cardiopulmonary response to activity . Pt ambulated with and without RW no LOB. Pt reports feeling more comfortable without AD at this time. Her gait speed did increase without use of RW.     Rehab Prognosis: Fair; patient would benefit from acute skilled PT services to address these deficits and reach maximum level of function.    Recent Surgery: * No surgery found *      Plan:     During this hospitalization, patient to be seen 6 x/week to address the identified rehab impairments via gait training, therapeutic activities, therapeutic exercises and progress toward the following goals:    Plan of Care Expires:  05/22/25    Subjective     Chief Complaint: None stated  Patient/Family Comments/goals: I'm feeling much better today.   Pain/Comfort:  Pain Rating 1: 0/10      Objective:     Communicated with RN prior to session.  Patient found HOB elevated with telemetry, peripheral IV upon PT entry to room.     General Precautions: Standard, fall  Orthopedic Precautions:    Braces:    Respiratory Status: Room air     Functional Mobility:  Bed Mobility:     Supine to Sit: independence  Sit to Supine: independence  Transfers:     Sit to Stand:  supervision with rolling walker  Gait: 40 feet SBA with rw, 120 feet SBA no AD      AM-PAC 6 CLICK MOBILITY          Treatment & Education:  Pt was educated on the following: call light use, importance of OOB activity and functional mobility to negate the negative effects of prolonged bed rest during this  hospitalization, safe transfers/ambulation and discharge planning recommendations/options.     Patient left HOB elevated with all lines intact and call button in reach..    GOALS:   Multidisciplinary Problems       Physical Therapy Goals          Problem: Physical Therapy    Goal Priority Disciplines Outcome Interventions   Physical Therapy Goal     PT, PT/OT     Description: Goals to be met by: 2025     Patient will increase functional independence with mobility by performin. Supine to sit with Stand-by Assistance  2. Sit to supine with Stand-by Assistance  3. Sit to stand transfer with Stand-by Assistance  4. Gait  x 150  feet with Stand-by Assistance using Rolling Walker.                              Time Tracking:     PT Received On: 25  PT Start Time: 1017     PT Stop Time: 1028  PT Total Time (min): 11 min     Billable Minutes: Gait Training 11    Treatment Type: Treatment  PT/PTA: PTA     Number of PTA visits since last PT visit: 2025

## 2025-04-23 NOTE — ASSESSMENT & PLAN NOTE
The likely etiology of thrombocytopenia is unknown. The patients 3 most recent labs are listed below.  Recent Labs     04/21/25 2054 04/23/25  0543   * 154     Plan  - Will transfuse if platelet count is <50k (if undergoing surgical procedure or have active bleeding).

## 2025-04-23 NOTE — PROGRESS NOTES
[facility] Medicine  Progress Note    Patient Name: Tyra Fernandes  MRN: [id number]  Patient Class: IP- Inpatient   Admission Date: 4/21/2025  Length of Stay: 1 days  Attending Physician: Trang Salmeron MD  Primary Care Provider: Nadia Sanderson MD        Subjective     Principal Problem:KRIS (acute kidney injury)        HPI:  Tyra Fernandes is a 80 year old female with a past medical history of chronic kidney disease, anemia, hyperlipidemia, and breast cancer status post mastectomy in 2000 who presents with complaints of generalized fatigue, weakness, diarrhea, nausea, and vomiting associated with decreased appetite and urinary frequency for 4 days, since Thursday. She endorses having a nonproductive cough for about a month but denies taking any antibiotics. She denies any recent travel or sick contacts. She reports having similar symptoms back in February. However at that time symptoms did not last as long. She denies chest pain, shortness of breath, fever, chills, dizziness, headaches, lightheadedness, hematuria, hematochezia, or abdominal pain.  ED workup reveals:  Chest x-ray no acute finding.  She is COVID and influenza negative.  CBC with platelets 145 otherwise unremarkable.  CMP with hyponatremia 128, BUN 54, creatinine 2.9 baseline about 1.8. Urinalysis positive with elevated WBCs. Urine and blood cultures pending. Lactic acid 0.83. She received bolus of fluids in ED and started on Levaquin. Nephrology consult placed.  Admitted to hospital medicine for further management and treatment.          Overview/Hospital Course:  Patient monitored closely during hospitalization.  She was admitted for UTI and KRIS.  Urine and blood cultures obtained and pending.  She was started on renally dosed IV levofloxacin.  Nephrology was consulted.  IV fluids to continue.      Interval History: patient feels significantly better than on admission .  Tolerating oral intake.  Denies lightheadedness.   No chest pain or shortness for breath.    No clinical evidence for a rapid active bleed    Complains of cough and request an antihistamine    Review of Systems  Objective:     Vital Signs (Most Recent):  Temp: 98.4 °F (36.9 °C) (04/23/25 0825)  Pulse: 76 (04/23/25 0825)  Resp: 17 (04/23/25 0421)  BP: 132/72 (04/23/25 0825)  SpO2: 99 % (04/23/25 0825) Vital Signs (24h Range):  Temp:  [97.7 °F (36.5 °C)-98.4 °F (36.9 °C)] 98.4 °F (36.9 °C)  Pulse:  [] 76  Resp:  [17-20] 17  SpO2:  [99 %-100 %] 99 %  BP: (132-148)/(72-89) 132/72     Weight: 50.6 kg (111 lb 8.8 oz)  Body mass index is 21.79 kg/m².    Intake/Output Summary (Last 24 hours) at 4/23/2025 1006  Last data filed at 4/22/2025 2002  Gross per 24 hour   Intake 125 ml   Output 100 ml   Net 25 ml         Physical Exam        NAD, A/O 3   RRR   CTAB  Soft nontender nondistended, bowel sounds present   No edema     Significant Labs: All pertinent labs within the past 24 hours have been reviewed.  CBC:   Recent Labs   Lab 04/21/25 2054 04/23/25  0543   WBC 8.67 8.96   HGB 13.5 8.8*   HCT 40.4 27.1*   * 154     CMP:   Recent Labs   Lab 04/21/25 2054 04/22/25  0452 04/23/25  0543   * 127* 134*   K 4.4 4.9 4.6   CO2 20* 18* 18*   BUN 54* 52* 55*   CREATININE 2.9* 2.7* 2.7*   CALCIUM 9.4 8.0* 8.3*   ALBUMIN 3.6  --  2.7*   BILITOT 0.3  --  0.3   ALKPHOS 54*  --  39*   AST 19  --  13   ALT 7*  --  6*     Magnesium:   Recent Labs   Lab 04/22/25  0452   MG 2.2       Significant Imaging: I have reviewed all pertinent imaging results/findings within the past 24 hours.      Assessment & Plan  KRIS (acute kidney injury)  KRIS is likely due to pre-renal azotemia due to dehydration. Baseline creatinine is  1.8 . Most recent creatinine and eGFR are listed below.  Recent Labs     04/21/25  2054 04/22/25  0452 04/23/25  0543   CREATININE 2.9* 2.7* 2.7*   EGFRNORACEVR 16* 18* 18*      Plan  - KRIS is stable  - Avoid nephrotoxins and renally dose meds for GFR listed  above  - Monitor urine output, serial BMP, and adjust therapy as needed  - nephrology consult   - IV hydration  on bicarb tabs  Acute cystitis without hematuria  -urine cultures pending  -started on Levaquin IV, renally dosed       Iron deficiency anemia  Anemia is likely due to Iron deficiency. Most recent hemoglobin and hematocrit are listed below.  Recent Labs     04/21/25 2054 04/23/25  0543   HGB 13.5 8.8*   HCT 40.4 27.1*     Plan  - Monitor serial CBC: Daily  - Transfuse PRBC if patient becomes hemodynamically unstable, symptomatic or H/H drops below 7/21.  - Patient has not received any PRBC transfusions to date  - Patient's anemia is currently stable    -hemoglobin identified to be lower on 04/23..  Likely dilutional.  Clinically no evidence of an active rapid bleed.  Afternoon labs requested    Essential hypertension  Patient's blood pressure range in the last 24 hours was: BP  Min: 132/72  Max: 148/72.The patient's inpatient anti-hypertensive regimen is listed below:  Current Antihypertensives  amLODIPine tablet 2.5 mg, 2 times daily PRN, Oral    Plan  - BP is controlled, no changes needed to their regimen    Stage 3a chronic kidney disease  Creatine stable for now. BMP reviewed- noted Estimated Creatinine Clearance: 12.3 mL/min (A) (based on SCr of 2.7 mg/dL (H)). according to latest data. Based on current GFR, CKD stage is stage 4 - GFR 15-29.  Monitor UOP and serial BMP and adjust therapy as needed. Renally dose meds. Avoid nephrotoxic medications and procedures.  Nephrology following  Gastroesophageal reflux disease without esophagitis  -chronic condition noted  - PPI IV     Dehydration with hyponatremia  Hyponatremia is likely due to Dehydration/hypovolemia. The patient's most recent sodium results are listed below.  Recent Labs     04/21/25 2054 04/22/25  0452 04/23/25  0543   * 127* 134*     Plan  - Correct the sodium by 4-6mEq in 24 hours.   - Obtain the following studies: Urine sodium,  urine osmolality, serum osmolality.  - Will treat the hyponatremia with IV fluids as follows:  Normal saline  - Monitor sodium Daily.   - Patient hyponatremia is stable  - consult nephrology    Thrombocytopenia  The likely etiology of thrombocytopenia is  unknown . The patients 3 most recent labs are listed below.  Recent Labs     04/21/25 2054 04/23/25  0543   * 154     Plan  - Will transfuse if platelet count is <50k (if undergoing surgical procedure or have active bleeding).      VTE Risk Mitigation (From admission, onward)           Ordered     IP VTE HIGH RISK PATIENT  Once         04/21/25 2334     Place sequential compression device  Until discontinued         04/21/25 2334                    Discharge Planning   FABIEN: 4/25/2025     Code Status: Full Code   Medical Readiness for Discharge Date:   Discharge Plan A: Home                Please place Justification for DME        Trang Salmeron MD  Department of Hospital Medicine   Formerly [facility], later [facility]

## 2025-04-23 NOTE — SUBJECTIVE & OBJECTIVE
Interval History: patient feels significantly better than on admission .  Tolerating oral intake.  Denies lightheadedness.  No chest pain or shortness for breath.    No clinical evidence for a rapid active bleed    Complains of cough and request an antihistamine    Review of Systems  Objective:     Vital Signs (Most Recent):  Temp: 98.4 °F (36.9 °C) (04/23/25 0825)  Pulse: 76 (04/23/25 0825)  Resp: 17 (04/23/25 0421)  BP: 132/72 (04/23/25 0825)  SpO2: 99 % (04/23/25 0825) Vital Signs (24h Range):  Temp:  [97.7 °F (36.5 °C)-98.4 °F (36.9 °C)] 98.4 °F (36.9 °C)  Pulse:  [] 76  Resp:  [17-20] 17  SpO2:  [99 %-100 %] 99 %  BP: (132-148)/(72-89) 132/72     Weight: 50.6 kg (111 lb 8.8 oz)  Body mass index is 21.79 kg/m².    Intake/Output Summary (Last 24 hours) at 4/23/2025 1006  Last data filed at 4/22/2025 2002  Gross per 24 hour   Intake 125 ml   Output 100 ml   Net 25 ml         Physical Exam        NAD, A/O 3   RRR   CTAB  Soft nontender nondistended, bowel sounds present   No edema     Significant Labs: All pertinent labs within the past 24 hours have been reviewed.  CBC:   Recent Labs   Lab 04/21/25 2054 04/23/25  0543   WBC 8.67 8.96   HGB 13.5 8.8*   HCT 40.4 27.1*   * 154     CMP:   Recent Labs   Lab 04/21/25 2054 04/22/25  0452 04/23/25  0543   * 127* 134*   K 4.4 4.9 4.6   CO2 20* 18* 18*   BUN 54* 52* 55*   CREATININE 2.9* 2.7* 2.7*   CALCIUM 9.4 8.0* 8.3*   ALBUMIN 3.6  --  2.7*   BILITOT 0.3  --  0.3   ALKPHOS 54*  --  39*   AST 19  --  13   ALT 7*  --  6*     Magnesium:   Recent Labs   Lab 04/22/25  0452   MG 2.2       Significant Imaging: I have reviewed all pertinent imaging results/findings within the past 24 hours.

## 2025-04-23 NOTE — PT/OT/SLP PROGRESS
"Occupational Therapy   Treatment    Name: Tyra Fernandes  MRN: [id number]  Admitting Diagnosis:  KRIS (acute kidney injury)       Recommendations:     Discharge Recommendations: Low Intensity Therapy  Discharge Equipment Recommendations:  none  Barriers to discharge:  None    Assessment:     Tyra Fernandes is a 80 y.o. female with a medical diagnosis of KRIS (acute kidney injury).  She is anxious to get home and she does not feel that she needs any adaptive equipment. She is open to receiving Home Health services. Performance deficits affecting function are weakness, impaired endurance, impaired functional mobility, impaired cardiopulmonary response to activity.     Rehab Prognosis:  Good; patient would benefit from acute skilled OT services to address these deficits and reach maximum level of function.       Plan:     Patient to be seen 6 x/week to address the above listed problems via self-care/home management, therapeutic activities, therapeutic exercises  Plan of Care Expires: 05/22/25  Plan of Care Reviewed with: patient    Subjective     Chief Complaint: She is ready to go home.  She stated, "I am feeling a heck of a lot better. "  Patient/Family Comments/goals: return home alone  Pain/Comfort:  Pain Rating 1: 0/10    Objective:     Communicated with: nurse prior to session.  Patient found HOB elevated with telemetry, peripheral IV upon OT entry to room.    General Precautions: Standard, fall    Orthopedic Precautions:N/A  Braces: N/A  Respiratory Status: Room air     Occupational Performance:     Bed Mobility:    Patient completed Rolling/Turning to Right with supervision  Patient completed Supine to Sit with supervision  Patient completed Sit to Supine with supervision     Functional Mobility/Transfers:  Patient completed Sit <> Stand Transfer with supervision  with  no assistive device   Functional Mobility: She took 2 steps to the HOB with Supervision.    Therapeutic exercise- performed seated EOB  Anterior/ " posterior tilt x 5,  shoulder rollsx 10 forward and backward, scapula retraction x 5, neck exercises in all planes of motion x 10, UE exercises in all planes of motion x 10,  Over head reach x 5 with side bend.   To increase lymph flow in the Bilateral lower extremities she performed ankle pumps, toe crunches, heel raises, marching in place 1 set x 10 reps.     [facility] 6 Click ADL: 24    Treatment & Education:  Pt seen for UE and LE therex as stated above.  All questions/concerns addressed within scope.  Call staff for BTB/OOB assist. Call bell use explained. Patient  acknowledged.  Purpose of OT and POC     Patient left HOB elevated with all lines intact, call button in reach, and sister present    GOALS:   Multidisciplinary Problems       Occupational Therapy Goals          Problem: Occupational Therapy    Goal Priority Disciplines Outcome Interventions   Occupational Therapy Goal     OT, PT/OT     Description: Goals to be met by: 5/22/2025     Patient will increase functional independence with ADLs by performing:    UE Dressing with Supervision.  LE Dressing with Supervision.  Grooming while standing at sink with Supervision.  Toileting from toilet with Supervision for hygiene and clothing management.   Toilet transfer to toilet with Supervision.  Perform sitting/standing ADL activity with no LOB.                         DME Justifications:  No DME recommended requiring DME justifications    Time Tracking:     OT Date of Treatment: 04/23/25  OT Start Time: 1131  OT Stop Time: 1146  OT Total Time (min): 15 min    Billable Minutes:Therapeutic Exercise 15    OT/SAÚL: OT          4/23/2025

## 2025-04-23 NOTE — ASSESSMENT & PLAN NOTE
Hyponatremia is likely due to Dehydration/hypovolemia. The patient's most recent sodium results are listed below.  Recent Labs     04/21/25  2054 04/22/25  0452 04/23/25  0543   * 127* 134*     Plan  - Correct the sodium by 4-6mEq in 24 hours.   - Obtain the following studies: Urine sodium, urine osmolality, serum osmolality.  - Will treat the hyponatremia with IV fluids as follows:  Normal saline  - Monitor sodium Daily.   - Patient hyponatremia is stable  - consult nephrology

## 2025-04-23 NOTE — PLAN OF CARE
Problem: Adult Inpatient Plan of Care  Goal: Plan of Care Review  Outcome: Progressing  Goal: Patient-Specific Goal (Individualized)  Outcome: Progressing  Goal: Absence of Hospital-Acquired Illness or Injury  Outcome: Progressing  Goal: Optimal Comfort and Wellbeing  Outcome: Progressing  Goal: Readiness for Transition of Care  Outcome: Progressing     Problem: Acute Kidney Injury/Impairment  Goal: Fluid and Electrolyte Balance  Outcome: Progressing  Goal: Improved Oral Intake  Outcome: Progressing  Goal: Effective Renal Function  Outcome: Progressing     Problem: Oral Intake Inadequate  Goal: Improved Oral Intake  Outcome: Progressing

## 2025-04-23 NOTE — PLAN OF CARE
Problem: Oral Intake Inadequate  Goal: Improved Oral Intake  Intervention: Promote and Optimize Oral Intake  Flowsheets (Taken 4/23/2025 1435)  Nutrition Interventions:   supplemental drinks provided   food preferences provided   diet liberalized

## 2025-04-23 NOTE — PROGRESS NOTES
[facility]  Adult Nutrition   Progress Note (Initial Assessment)     SUMMARY     Recommendations  Recommendation/Intervention: 1. Continue Heart Healty diet. Monitor renal function lab values and adjust diet as needed. 2. Recommend Boost Breeze, Up BID with meals. 3.  to obtain daily menu choices.  Nutrition Goal Status: new  Communication of RD Recs: reviewed with RN    Nutrition Goals: PO intake will meet >50% of estimated needs by RD follow up., Lab values to trend toward normal range by RD follow up., and Oral supplement consumption of >50% by RD follow up.    Nutrition Interventions: Cholesterol modified diet, Fluid modified diet, Sodium modified diet, and Medical Food Supplement Therapy    Nutrition Diagnosis PES Statement: Underweight related to inadequate energy intake as evidenced by patient with recent illness, nausea and vomiting and BMI < 23 with age > 65 years.,      Nutrition Diagnosis Status:   New    Dietitian Rounds Brief  Patient screened for underweight for age. Patient reports usual weight 114 lbs. Current weight is 111 lbs. Patient had N/V prior to admit but reports feeling better now. Pt agreed to Boost Breeze BID. Last BM 4/22/25.RD to monitor for intake, labs, and status PRN.    Nutrition Related Social Determinants of Health: SDOH: None Identified    Malnutrition Assessment             Weight Loss (Malnutrition): other (see comments) (Patient reports  lbs, Current body weight 111 lbs)   Orbital Region (Subcutaneous Fat Loss): well nourished  Upper Arm Region (Subcutaneous Fat Loss): well nourished   [place] Region (Muscle Loss): well nourished  Clavicle Bone Region (Muscle Loss): well nourished                 Diet order:   Current Diet Order: Heart Healty diet                 Evaluation of Received Nutrient/Fluid Intake  IV Fluid (mL): 1800  Energy Calories Required: not meeting needs  Protein Required: not meeting needs  Fluid Required: meeting  needs  Tolerance: tolerating     % Intake of Estimated Energy Needs: 50 - 75 %  % Meal Intake: 50 - 75 %      Intake/Output Summary (Last 24 hours) at 4/23/2025 1423  Last data filed at 4/23/2025 1254  Gross per 24 hour   Intake 605 ml   Output 125 ml   Net 480 ml        Anthropometrics  Height: 5' (152.4 cm)  Height (inches): 60 in  Height Method: Stated  Weight: 50.6 kg (111 lb 8.8 oz)  Weight (lb): 111.55 lb  Weight Method: Bed Scale  Ideal Body Weight (IBW), Female: 100 lb  % Ideal Body Weight, Female (lb): 111.55 %  BMI (Calculated): 21.8  BMI Grade: less than 23 (older than 65 years) - underweight       Estimated/Assessed Needs  Weight Used For Calorie Calculations: 50.6 kg (111 lb 8.8 oz)  Energy Calorie Requirements (kcal): 1633 kcal/day) 32 kcal/kg for weight gain)  Energy Need Method: Kcal/kg, [facility]  Protein Requirements: 40-61 gm/day (0.8-1.2 gm/kg)  Weight Used For Protein Calculations: 50.6 kg (111 lb 8.8 oz)     Estimated Fluid Requirement Method: RDA Method  RDA Method (mL): 1633       Reason for Assessment  Reason For Assessment: identified at risk by screening criteria (underweight)  Diagnosis: renal disease (KRIS with history of CKD IV)  General Information Comments: Patient with history of chronic kidney disease, anemia, hyperlipidemia, and breast cancer status post mastectomy in 2000 who presents with complaints of generalized fatigue, weakness, diarrhea, nausea, and vomiting associated with decreased appetite and urinary frequency for 4 days, since Thursday. She endorses having a nonproductive cough for about a month but denies taking any antibiotics.    Final diagnoses:  [R53.1] Generalized weakness  [N17.9] KRIS (acute kidney injury)  [E87.1] Hyponatremia (Primary)     Past Medical History:   Diagnosis Date    Abnormal Pap smear     colpo then hyst    Allergy     Breast cancer 1997, 2000    Colon polyp     Fever blister     GERD (gastroesophageal reflux disease)     Hyperlipidemia      Hypertension     Hyperthyroidism     Kidney cysts     Osteoporosis, unspecified     Seasonal allergies         Nutrition/Diet History  Nutrition Intake History: Recent decreased appetite.  Food Preferences:  obtained.  Spiritual, Cultural Beliefs, Mormonism Practices, Values that Affect Care: no  Food Allergies: NKFA  Factors Affecting Nutritional Intake: None identified at this time    Nutrition Risk Screen        MST Score: 0  Have you recently lost weight without trying?: No  Weight loss score: 0  Have you been eating poorly because of a decreased appetite?: No  Appetite score: 0       Weight History:  Wt Readings from Last 10 Encounters:   04/22/25 50.6 kg (111 lb 8.8 oz)   03/11/25 51.3 kg (113 lb)   10/29/24 51.5 kg (113 lb 9.6 oz)   05/08/24 52 kg (114 lb 12 oz)   01/26/24 52.3 kg (115 lb 3.2 oz)   10/14/23 51.8 kg (114 lb 3.2 oz)   07/06/23 51.3 kg (113 lb)   05/22/23 50.1 kg (110 lb 7.2 oz)   10/24/22 52.2 kg (114 lb 15.5 oz)   10/11/22 51.7 kg (114 lb)        Lab/Procedures/Meds: Pertinent Labs/Meds Reviewed    Medications:Pertinent Medications Reviewed  Scheduled Meds:   aspirin  81 mg Oral Daily    calcitRIOL  0.25 mcg Oral Every Mon, Wed, Fri    cetirizine  5 mg Oral Daily    levoFLOXacin  500 mg Intravenous Q48H    pantoprazole  40 mg Intravenous Daily    sodium bicarbonate  650 mg Oral BID     Continuous Infusions:   0.9% NaCl   Intravenous Continuous 75 mL/hr at 04/22/25 2345 New Bag at 04/22/25 2345     PRN Meds:.  Current Facility-Administered Medications:     acetaminophen, 650 mg, Oral, Q4H PRN    aluminum-magnesium hydroxide-simethicone, 30 mL, Oral, QID PRN    amLODIPine, 2.5 mg, Oral, BID PRN    dextrose 50%, 12.5 g, Intravenous, PRN    dextrose 50%, 25 g, Intravenous, PRN    glucagon (human recombinant), 1 mg, Intramuscular, PRN    glucose, 16 g, Oral, PRN    glucose, 24 g, Oral, PRN    melatonin, 9 mg, Oral, Nightly PRN    naloxone, 0.02 mg, Intravenous, PRN    ondansetron, 4 mg,  "Intravenous, Q6H PRN    prochlorperazine, 5 mg, Intravenous, Q6H PRN    sodium chloride 0.9%, 10 mL, Intravenous, Q12H PRN    Labs: Pertinent Labs Reviewed  Clinical Chemistry:  Recent Labs   Lab 04/21/25 2054 04/22/25  0452 04/23/25  0543   * 127* 134*   K 4.4 4.9 4.6   CO2 20* 18* 18*   BUN 54* 52* 55*   CREATININE 2.9* 2.7* 2.7*   CALCIUM 9.4 8.0* 8.3*   ALBUMIN 3.6  --  2.7*   BILITOT 0.3  --  0.3   ALKPHOS 54*  --  39*   AST 19  --  13   ALT 7*  --  6*   GLUCOSE 100 236* 91   MG  --  2.2  --    PHOS  --  3.0 3.0     CBC:   Recent Labs   Lab 04/23/25  0543   WBC 8.96   RBC 2.74*   HGB 8.8*   HCT 27.1*      MCV 99*   MCH 32.1*   MCHC 32.5     Lipid Panel:  No results for input(s): "CHOL", "HDL", "LDLCALC", "TRIG", "CHOLHDL" in the last 168 hours.  Cardiac Profile:  Recent Labs   Lab 04/23/25  0543   *     Inflammatory Labs:  No results for input(s): "CRP" in the last 168 hours.  Diabetes:  Recent Labs   Lab 04/22/25  0735   HGBA1C 5.3     Thyroid & Parathyroid:  No results for input(s): "TSH", "FREET4", "X5EUAUQ", "W9NBLRQ", "THYROIDAB" in the last 168 hours.    Monitor and Evaluation  Monitor and Evaluation: Food and beverage intake, Diet order, Electrolyte and renal panel     Discharge Planning  Nutrition Discharge Planning: Therapeutic diet (comments), Oral supplement regimen (comments)  Therapeutic diet (comments): Heart Healthy    Nutrition Risk  Level of Risk/Frequency of Follow-up:  (1 x / week)     Nutrition Follow-Up  RD Follow-up?: Yes            "

## 2025-04-23 NOTE — ASSESSMENT & PLAN NOTE
KRIS is likely due to pre-renal azotemia due to dehydration. Baseline creatinine is 1.8. Most recent creatinine and eGFR are listed below.  Recent Labs     04/21/25  2054 04/22/25  0452 04/23/25  0543   CREATININE 2.9* 2.7* 2.7*   EGFRNORACEVR 16* 18* 18*      Plan  - KRIS is stable  - Avoid nephrotoxins and renally dose meds for GFR listed above  - Monitor urine output, serial BMP, and adjust therapy as needed  - nephrology consult   - IV hydration  on bicarb tabs

## 2025-04-23 NOTE — ASSESSMENT & PLAN NOTE
Anemia is likely due to Iron deficiency. Most recent hemoglobin and hematocrit are listed below.  Recent Labs     04/21/25 2054 04/23/25  0543   HGB 13.5 8.8*   HCT 40.4 27.1*     Plan  - Monitor serial CBC: Daily  - Transfuse PRBC if patient becomes hemodynamically unstable, symptomatic or H/H drops below 7/21.  - Patient has not received any PRBC transfusions to date  - Patient's anemia is currently stable    -hemoglobin identified to be lower on 04/23..  Likely dilutional.  Clinically no evidence of an active rapid bleed.  Afternoon labs requested

## 2025-04-23 NOTE — PLAN OF CARE
[facility]  Discharge Reassessment    Primary Care Provider: Nadia Sanderson MD    Expected Discharge Date: 4/25/2025    Met with patient at bedside to review discharge recommendation of SNF/Rehab and she is not agreeable to plan.  CM discussed lower levels of care and pt is agreeable to home health services.    Patient provided with a list of facilities in-network with patient's payor plan. Providers that are owned, operated, or affiliated with [facility] are included on the list.     Notified that referrals will be sent to the below listed facilities from in-network list based on proximity to home/family support:   [facility]    Patient expressed having no preference of provider.    If an additional preferred facility not listed above is identified, additional referral to be sent. If above facilities unable to accept, will send additional referrals to in-network providers.      Reassessment (most recent)       Discharge Reassessment - 04/23/25 1353          Discharge Reassessment    Assessment Type Discharge Planning Reassessment     Did the patient's condition or plan change since previous assessment? Yes   dc disposition changed    Discharge Plan discussed with: Patient     Discharge Plan A Home Health     Discharge Plan B Home        Post-Acute Status    Post-Acute Authorization Placement;Home Health     Post-Acute Placement Status Patient declined/refused     Home Health Status Referrals Sent     Patient choice form signed by patient/caregiver List with quality metrics by geographic area provided     Discharge Delays None known at this time

## 2025-04-24 VITALS
OXYGEN SATURATION: 96 % | BODY MASS INDEX: 21.9 KG/M2 | HEART RATE: 82 BPM | SYSTOLIC BLOOD PRESSURE: 118 MMHG | HEIGHT: 60 IN | RESPIRATION RATE: 18 BRPM | WEIGHT: 111.56 LBS | TEMPERATURE: 98 F | DIASTOLIC BLOOD PRESSURE: 64 MMHG

## 2025-04-24 LAB
ABSOLUTE EOSINOPHIL (SMH): 0.14 K/UL
ABSOLUTE MONOCYTE (SMH): 0.6 K/UL (ref 0.3–1)
ABSOLUTE NEUTROPHIL COUNT (SMH): 5.7 K/UL (ref 1.8–7.7)
ALBUMIN SERPL-MCNC: 2.5 G/DL (ref 3.5–5.2)
ALP SERPL-CCNC: 37 UNIT/L (ref 55–135)
ALT SERPL-CCNC: 5 UNIT/L (ref 10–44)
ANION GAP (SMH): 6 MMOL/L (ref 8–16)
AST SERPL-CCNC: 11 UNIT/L (ref 10–40)
BACTERIA UR CULT: ABNORMAL
BASOPHILS # BLD AUTO: 0.02 K/UL
BASOPHILS NFR BLD AUTO: 0.3 %
BILIRUB SERPL-MCNC: 0.3 MG/DL (ref 0.1–1)
BUN SERPL-MCNC: 42 MG/DL (ref 8–23)
CALCIUM SERPL-MCNC: 8.2 MG/DL (ref 8.7–10.5)
CHLORIDE SERPL-SCNC: 112 MMOL/L (ref 95–110)
CO2 SERPL-SCNC: 21 MMOL/L (ref 23–29)
CREAT SERPL-MCNC: 2.6 MG/DL (ref 0.5–1.4)
ERYTHROCYTE [DISTWIDTH] IN BLOOD BY AUTOMATED COUNT: 14.2 % (ref 11.5–14.5)
GFR SERPLBLD CREATININE-BSD FMLA CKD-EPI: 18 ML/MIN/1.73/M2
GLUCOSE SERPL-MCNC: 94 MG/DL (ref 70–110)
HCT VFR BLD AUTO: 26.2 % (ref 37–48.5)
HGB BLD-MCNC: 8.7 GM/DL (ref 12–16)
IMM GRANULOCYTES # BLD AUTO: 0.1 K/UL (ref 0–0.04)
IMM GRANULOCYTES NFR BLD AUTO: 1.4 % (ref 0–0.5)
LYMPHOCYTES # BLD AUTO: 0.83 K/UL (ref 1–4.8)
MAGNESIUM SERPL-MCNC: 1.9 MG/DL (ref 1.6–2.6)
MCH RBC QN AUTO: 31.9 PG (ref 27–31)
MCHC RBC AUTO-ENTMCNC: 33.2 G/DL (ref 32–36)
MCV RBC AUTO: 96 FL (ref 82–98)
NUCLEATED RBC (/100WBC) (SMH): 0 /100 WBC
PHOSPHATE SERPL-MCNC: 3.3 MG/DL (ref 2.7–4.5)
PLATELET # BLD AUTO: 186 K/UL (ref 150–450)
PMV BLD AUTO: 10.8 FL (ref 9.2–12.9)
POTASSIUM SERPL-SCNC: 4.3 MMOL/L (ref 3.5–5.1)
PROT SERPL-MCNC: 5.4 GM/DL (ref 6–8.4)
RBC # BLD AUTO: 2.73 M/UL (ref 4–5.4)
RELATIVE EOSINOPHIL (SMH): 1.9 % (ref 0–8)
RELATIVE LYMPHOCYTE (SMH): 11.3 % (ref 18–48)
RELATIVE MONOCYTE (SMH): 8.2 % (ref 4–15)
RELATIVE NEUTROPHIL (SMH): 76.9 % (ref 38–73)
SODIUM SERPL-SCNC: 139 MMOL/L (ref 136–145)
URATE SERPL-MCNC: 8.4 MG/DL (ref 2.4–5.7)
WBC # BLD AUTO: 7.34 K/UL (ref 3.9–12.7)

## 2025-04-24 PROCEDURE — 97535 SELF CARE MNGMENT TRAINING: CPT | Mod: CO

## 2025-04-24 PROCEDURE — 25000003 PHARM REV CODE 250: Performed by: INTERNAL MEDICINE

## 2025-04-24 PROCEDURE — 25000003 PHARM REV CODE 250

## 2025-04-24 PROCEDURE — 83735 ASSAY OF MAGNESIUM: CPT | Performed by: INTERNAL MEDICINE

## 2025-04-24 PROCEDURE — 80053 COMPREHEN METABOLIC PANEL: CPT

## 2025-04-24 PROCEDURE — 85025 COMPLETE CBC W/AUTO DIFF WBC: CPT

## 2025-04-24 PROCEDURE — 36415 COLL VENOUS BLD VENIPUNCTURE: CPT

## 2025-04-24 PROCEDURE — 84100 ASSAY OF PHOSPHORUS: CPT | Performed by: INTERNAL MEDICINE

## 2025-04-24 PROCEDURE — 63600175 PHARM REV CODE 636 W HCPCS

## 2025-04-24 RX ORDER — VALSARTAN 40 MG/1
40 TABLET ORAL DAILY
Qty: 90 TABLET | Refills: 3 | Status: SHIPPED | OUTPATIENT
Start: 2025-04-24 | End: 2026-04-24

## 2025-04-24 RX ORDER — FERROUS SULFATE 325(65) MG
325 TABLET ORAL DAILY
Qty: 30 TABLET | Refills: 2 | Status: SHIPPED | OUTPATIENT
Start: 2025-04-24

## 2025-04-24 RX ORDER — LEVOFLOXACIN 500 MG/1
500 TABLET, FILM COATED ORAL EVERY OTHER DAY
Qty: 1 TABLET | Refills: 0 | Status: SHIPPED | OUTPATIENT
Start: 2025-04-25

## 2025-04-24 RX ORDER — SODIUM BICARBONATE 650 MG/1
650 TABLET ORAL 2 TIMES DAILY
Qty: 60 TABLET | Refills: 0 | Status: SHIPPED | OUTPATIENT
Start: 2025-04-24 | End: 2025-05-24

## 2025-04-24 RX ADMIN — SODIUM CHLORIDE: 9 INJECTION, SOLUTION INTRAVENOUS at 03:04

## 2025-04-24 RX ADMIN — SODIUM BICARBONATE 650 MG TABLET 650 MG: at 08:04

## 2025-04-24 RX ADMIN — ASPIRIN 81 MG: 81 TABLET, COATED ORAL at 08:04

## 2025-04-24 RX ADMIN — PANTOPRAZOLE SODIUM 40 MG: 40 INJECTION, POWDER, FOR SOLUTION INTRAVENOUS at 08:04

## 2025-04-24 RX ADMIN — CETIRIZINE HYDROCHLORIDE 5 MG: 5 TABLET ORAL at 08:04

## 2025-04-24 NOTE — PLAN OF CARE
Problem: Adult Inpatient Plan of Care  Goal: Plan of Care Review  Outcome: Met  Goal: Patient-Specific Goal (Individualized)  Outcome: Met  Goal: Absence of Hospital-Acquired Illness or Injury  Outcome: Met  Goal: Optimal Comfort and Wellbeing  Outcome: Met  Goal: Readiness for Transition of Care  Outcome: Met     Problem: Acute Kidney Injury/Impairment  Goal: Fluid and Electrolyte Balance  Outcome: Met  Goal: Improved Oral Intake  Outcome: Met  Goal: Effective Renal Function  Outcome: Met     Problem: Oral Intake Inadequate  Goal: Improved Oral Intake  Outcome: Met

## 2025-04-24 NOTE — ASSESSMENT & PLAN NOTE
The likely etiology of thrombocytopenia is unknown. The patients 3 most recent labs are listed below.  Recent Labs     04/23/25  0543 04/23/25  1418 04/24/25  0435    167 186     Plan  - Will transfuse if platelet count is <50k (if undergoing surgical procedure or have active bleeding).

## 2025-04-24 NOTE — ASSESSMENT & PLAN NOTE
Secondary to E coli.  Discharging on levofloxacin to complete course of therapy.  Present on admission.

## 2025-04-24 NOTE — ASSESSMENT & PLAN NOTE
Anemia is likely due to Iron deficiency. Most recent hemoglobin and hematocrit are listed below.  Recent Labs     04/23/25  0543 04/23/25  1418 04/24/25  0435   HGB 8.8* 8.3* 8.7*   HCT 27.1* 25.4* 26.2*         -  Clinically no evidence of an active rapid bleed.  Hemoglobin stable.  Patient will be discharged on iron.

## 2025-04-24 NOTE — ASSESSMENT & PLAN NOTE
Patient's blood pressure range in the last 24 hours was: BP  Min: 102/62  Max: 120/58.The patient's inpatient anti-hypertensive regimen is listed below:      Plan  - BP is controlled, no changes needed to their regimen  Valsartan held until follow-up.  Resume remainder of home regimen

## 2025-04-24 NOTE — ASSESSMENT & PLAN NOTE
KRIS is likely due to pre-renal azotemia due to dehydration. Baseline creatinine is 1.8. Most recent creatinine and eGFR are listed below.  Recent Labs     04/23/25  0543 04/23/25  1418 04/24/25  0435   CREATININE 2.7* 2.7* 2.6*   EGFRNORACEVR 18* 18* 18*      Plan  - improved with fluids.  Oral intake has normalized.  GI symptoms resolved.  -discharging on bicarbonate tablets as recommended by Nephrology

## 2025-04-24 NOTE — DISCHARGE SUMMARY
Formerly [facility], [facility] Medicine  Discharge Summary      Patient Name: Tyra Fernandes  MRN: [id number]  [facility]: [id number]  Patient Class: IP- Inpatient  Admission Date: 4/21/2025  Hospital Length of Stay: 2 days  Discharge Date and Time: 04/24/2025 3:18 PM  Attending Physician: Trang Salmeron MD   Discharging Provider: Trang Salmeron MD  Primary Care Provider: Nadia Sanderson MD    Primary Care Team: Networked reference to record PCT     HPI:   Tyra Fernandes is a 80 year old female with a past medical history of chronic kidney disease, anemia, hyperlipidemia, and breast cancer status post mastectomy in 2000 who presents with complaints of generalized fatigue, weakness, diarrhea, nausea, and vomiting associated with decreased appetite and urinary frequency for 4 days, since Thursday. She endorses having a nonproductive cough for about a month but denies taking any antibiotics. She denies any recent travel or sick contacts. She reports having similar symptoms back in February. However at that time symptoms did not last as long. She denies chest pain, shortness of breath, fever, chills, dizziness, headaches, lightheadedness, hematuria, hematochezia, or abdominal pain.  ED workup reveals:  Chest x-ray no acute finding.  She is COVID and influenza negative.  CBC with platelets 145 otherwise unremarkable.  CMP with hyponatremia 128, BUN 54, creatinine 2.9 baseline about 1.8. Urinalysis positive with elevated WBCs. Urine and blood cultures pending. Lactic acid 0.83. She received bolus of fluids in ED and started on Levaquin. Nephrology consult placed.  Admitted to hospital medicine for further management and treatment.          * No surgery found *      Hospital Course:   The patient was admitted with nausea, vomiting and urinary frequency found to have a UTI in addition to an KRIS with hyponatremia.  Through admission fluids were administered and levofloxacin administered.  With this she improved  significantly.  Tolerating oral intake with resolution of symptoms at the time of discharge.  Hyponatremia has improved as has renal function.  She was seen by Nephrology while in-house.  By their recommendation she will be discharged on bicarbonate tablets and is to follow with that service as instructed by that service.    She will be discharged with 1 additional dose of levofloxacin to complete course of therapy for UTI.       Goals of Care Treatment Preferences:  Code Status: Full Code      SDOH Screening:  The patient was screened for utility difficulties, food insecurity, transport difficulties, housing insecurity, and interpersonal safety and there were no concerns identified this admission.     Consults:   Consults (From admission, onward)          Status Ordering Provider     Inpatient consult to   Once        Provider:  (Not yet assigned)    Acknowledged FAUSTINO MARIA     Inpatient consult to Nephrology  Once        Provider:  Tony Lunsford MD    Acknowledged ALLAN BATISTA            Assessment & Plan  KRIS (acute kidney injury)  KRIS is likely due to pre-renal azotemia due to dehydration. Baseline creatinine is  1.8 . Most recent creatinine and eGFR are listed below.  Recent Labs     04/23/25  0543 04/23/25  1418 04/24/25  0435   CREATININE 2.7* 2.7* 2.6*   EGFRNORACEVR 18* 18* 18*      Plan  - improved with fluids.  Oral intake has normalized.  GI symptoms resolved.  -discharging on bicarbonate tablets as recommended by Nephrology  Acute cystitis without hematuria  Secondary to E coli.  Discharging on levofloxacin to complete course of therapy.  Present on admission.    Iron deficiency anemia  Anemia is likely due to Iron deficiency. Most recent hemoglobin and hematocrit are listed below.  Recent Labs     04/23/25  0543 04/23/25  1418 04/24/25  0435   HGB 8.8* 8.3* 8.7*   HCT 27.1* 25.4* 26.2*         -  Clinically no evidence of an active rapid bleed.  Hemoglobin stable.  Patient  will be discharged on iron.  Essential hypertension  Patient's blood pressure range in the last 24 hours was: BP  Min: 102/62  Max: 120/58.The patient's inpatient anti-hypertensive regimen is listed below:      Plan  - BP is controlled, no changes needed to their regimen  Valsartan held until follow-up.  Resume remainder of home regimen  Stage 3a chronic kidney disease  Creatine stable for now. BMP reviewed- noted Estimated Creatinine Clearance: 12.8 mL/min (A) (based on SCr of 2.6 mg/dL (H)). according to latest data. Based on current GFR, CKD stage is stage 4 - GFR 15-29.  Monitor UOP and serial BMP and adjust therapy as needed. Renally dose meds. Avoid nephrotoxic medications and procedures.  Nephrology following  Gastroesophageal reflux disease without esophagitis  -chronic condition noted.  Defer management/workup to outpatient providers      Dehydration with hyponatremia  Hyponatremia is likely due to Dehydration/hypovolemia. The patient's most recent sodium results are listed below.  Recent Labs     04/23/25  0543 04/23/25  1418 04/24/25  0435   * 134* 139     Plan  -improved with fluid administration.    Thrombocytopenia  The likely etiology of thrombocytopenia is  unknown . The patients 3 most recent labs are listed below.  Recent Labs     04/23/25  0543 04/23/25  1418 04/24/25  0435    167 186     Plan  - Will transfuse if platelet count is <50k (if undergoing surgical procedure or have active bleeding).      Final Active Diagnoses:    Diagnosis Date Noted POA    PRINCIPAL PROBLEM:  KRIS (acute kidney injury) [N17.9] 05/05/2020 Yes    Dehydration with hyponatremia [E86.0, E87.1] 04/22/2025 Yes    Thrombocytopenia [D69.6] 04/22/2025 Yes    Acute cystitis without hematuria [N30.00] 04/22/2025 Yes    Gastroesophageal reflux disease without esophagitis [K21.9] 08/05/2021 Yes    Stage 3a chronic kidney disease [N18.31] 11/13/2020 Yes    Essential hypertension [I10] 02/17/2020 Yes    Iron deficiency  anemia [D50.9] 01/04/2016 Yes      Problems Resolved During this Admission:       Discharged Condition: stable    Disposition: Home-Health Care [facility]    Follow Up:   Follow-up Information       Tony Lunsford MD Follow up.    Specialty: Nephrology  Why: Clinic to schedule  Contact information:  [address] [facility] [place]  [place] [address]  [phone]               Nadia Sanderson MD Follow up on 5/1/2025.    Specialty: Internal Medicine  Why: @11:10am for hospital follow up  Contact information:  [address]  [address]  [place] [address]  [phone]                           Patient Instructions:      Ambulatory referral/consult to Home Health   Standing Status: Future   Referral Priority: Routine Referral Type: Home Health   Referral Reason: Specialty Services Required   Requested Specialty: Home Health Services   Number of Visits Requested: 1     Notify your health care provider if you experience any of the following:  temperature >100.4     Notify your health care provider if you experience any of the following:  persistent nausea and vomiting or diarrhea     Notify your health care provider if you experience any of the following:  severe uncontrolled pain     Notify your health care provider if you experience any of the following:  difficulty breathing or increased cough     Notify your health care provider if you experience any of the following:  severe persistent headache     Notify your health care provider if you experience any of the following:  worsening rash     Notify your health care provider if you experience any of the following:  persistent dizziness, light-headedness, or visual disturbances     Notify your health care provider if you experience any of the following:  increased confusion or weakness     Notify your health care provider if you experience any of the following:       Significant Diagnostic Studies: N/A    Pending Diagnostic Studies:       Procedure  Component Value Units Date/Time    EXTRA TUBES [[id number]] Collected: 04/21/25 2054    Order Status: Sent Lab Status: In process Updated: 04/21/25 2114    Specimen: Blood, Venous     Narrative:      The following orders were created for panel order EXTRA TUBES.  Procedure                               Abnormality         Status                     ---------                               -----------         ------                     Light Blue Top Hold[[id number]]                             In process                 Lavender Top Hold[[id number]]                               In process                 Gold Top Hold[[id number]]                                   In process                 Gold Top Hold[[id number]]                                   In process                   Please view results for these tests on the individual orders.           Medications:  Reconciled Home Medications:      Medication List        START taking these medications      ferrous sulfate 325 mg (65 mg iron) Tab tablet  Commonly known as: IRON (FERROUS SULFATE)  Take 1 tablet (325 mg total) by mouth once daily.     levoFLOXacin 500 MG tablet  Commonly known as: LEVAQUIN  Take 1 tablet (500 mg total) by mouth every other day. Only remaining dose is for the evening of 4/25  Start taking on: April 25, 2025     sodium bicarbonate 650 MG tablet  Take 1 tablet (650 mg total) by mouth 2 (two) times daily.            CHANGE how you take these medications      valsartan 40 MG tablet  Commonly known as: DIOVAN  Take 1 tablet (40 mg total) by mouth once daily. Hold this medication until your follow up with your PCP or nephrologist  What changed: additional instructions            CONTINUE taking these medications      amLODIPine 2.5 MG tablet  Commonly known as: NORVASC  Take 2.5 mg by mouth 2 (two) times daily as needed (BP over 150/80).     ascorbic acid (vitamin C) 500 MG tablet  Commonly known as: VITAMIN C  Take 500 mg by mouth once daily.      aspirin 81 MG EC tablet  Commonly known as: ECOTRIN  Take 81 mg by mouth once daily.     calcitRIOL 0.25 MCG Cap  Commonly known as: ROCALTROL  Take 0.25 mcg by mouth every Mon, Wed, Fri.     calcium citrate-vitamin D3 315-200 mg 315 mg-5 mcg (200 unit) per tablet  Commonly known as: CITRACAL+D  Take 1 tablet by mouth once daily.     magnesium oxide 84.5 mg mag (140 mg) Cap  Take 1 tablet by mouth once daily.     PRESERVISION AREDS-2 ORAL  Take 1 capsule by mouth every other day. 1 every other day            ASK your doctor about these medications      raloxifene 60 mg tablet  Commonly known as: EVISTA  Take 1 tablet (60 mg total) by mouth once daily.     triamcinolone acetonide 0.1% 0.1 % cream  Commonly known as: KENALOG  Apply topically 2 (two) times daily.              Indwelling Lines/Drains at time of discharge:   Lines/Drains/Airways       None                   Time spent on the discharge of patient: 35 minutes         Trang Salmeron MD  Department of Hospital Medicine  [facility]

## 2025-04-24 NOTE — CONSULTS
INPATIENT NEPHROLOGY CONSULT   [facility] NEPHROLOGY    Tyra Fernandes  04/24/2025    Reason for consultation:    Acute kidney injury    Chief Complaint:   Chief Complaint   Patient presents with    Fatigue     Generalized fatigue/weakness getting worse since Thursday.  +diarrhea +nausea/vomiting          History of Present Illness:    Per H and P   Tyra Fernandes is a 80 year old female with a past medical history of chronic kidney disease, anemia, hyperlipidemia, and breast cancer status post mastectomy in 2000 who presents with complaints of generalized fatigue, weakness, diarrhea, nausea, and vomiting associated with decreased appetite and urinary frequency for 4 days, since Thursday. She endorses having a nonproductive cough for about a month but denies taking any antibiotics. She denies any recent travel or sick contacts. She reports having similar symptoms back in February. However at that time symptoms did not last as long. She denies chest pain, shortness of breath, fever, chills, dizziness, headaches, lightheadedness, hematuria, hematochezia, or abdominal pain.  ED workup reveals:  Chest x-ray no acute finding.  She is COVID and influenza negative.  CBC with platelets 145 otherwise unremarkable.  CMP with hyponatremia 128, BUN 54, creatinine 2.9 baseline about 1.8. Urinalysis positive with elevated WBCs. Urine and blood cultures pending. Lactic acid 0.83. She received bolus of fluids in ED and started on Levaquin. Nephrology consult placed.  Admitted to hospital medicine for further management and treatment.       4/23  AFVSS,  output not recorded, pvr 102cc, no nausea, cp, sob  4/24  AFVSS, 1535 cc uop plus 3 unmeasured occurrences.  Orthostatics noted.      Plan of Care:       Assessment:    Acute kidney injury likely secondary to hemodynamically mediated renal injury   --Avoid NSAIDS, Jang II inhibitors, and other non-essential nephrotoxic agents  --Keep mean arterial pressure above 60 and systolic blood  pressure above 100 as able to maintain renal perfusion  --hold valsartan  --strict I/Os  --iv fluids  --FENa 2.0% and FEUrea 45% both  implicating prerenal pathology (acute tubular necrosis).  Check orthostatics.  Recheck ua with micro unremarkable    Stable for d/c.      Urinary tract infection  --renal dose abx  --no bactrim, aminoglycosides, or vancomycin/zosyn combination   --urine culture 4/21 pos for GNR    Hyponatremia  --urine osm 346 consistent with impaired free water clearance  --bnp 353  --uric acid  --avoid hypotonic iv piggybacks    CKDIV  --Avoiding fluctuations in blood pressure (high and low spikes)  Maintain good glucose control if you have diabetes  Reduce/avoid extra salt intake  Avoid NSAIDs and Coxibs.  Acetaminophen ok.    Aerobic exercise at least 3x weekly as tolerated  Eat more whole foods (vegetables, fruit, fish, chicken, pork, beef, dairy) and less processed food  Control weight  Avoid smoking  Stay hydrated, avoid dehydration.  Annual flu shot and routine preventative cancer screening via your primary care doctor    Secondary hyperparathyroidism  --continue calcitriol  --vit d level 87    Thank you for allowing us to participate in this patient's care. We will continue to follow.    Vital Signs:  Temp Readings from Last 3 Encounters:   04/24/25 98.4 °F (36.9 °C) (Oral)   03/11/25 97.5 °F (36.4 °C) (Oral)   01/26/24 99.3 °F (37.4 °C) (Oral)       Pulse Readings from Last 3 Encounters:   04/24/25 95   03/11/25 82   10/29/24 64       BP Readings from Last 3 Encounters:   04/24/25 102/62   03/11/25 (!) 154/85   10/29/24 127/75       Weight:  Wt Readings from Last 3 Encounters:   04/22/25 50.6 kg (111 lb 8.8 oz)   03/11/25 51.3 kg (113 lb)   10/29/24 51.5 kg (113 lb 9.6 oz)       Past Medical & Surgical History:  Past Medical History:   Diagnosis Date    Abnormal Pap smear     colpo then hyst    Allergy     Breast cancer 1997, 2000    Colon polyp     Fever blister     GERD (gastroesophageal  "reflux disease)     Hyperlipidemia     Hypertension     Hyperthyroidism     Kidney cysts     Osteoporosis, unspecified     Seasonal allergies        Past Surgical History:   Procedure Laterality Date    BILATERAL MASTECTOMY  02/2000    BLADDER REPAIR      BREAST RECONSTRUCTION      CATARACT EXTRACTION      COLONOSCOPY N/A 1/4/2016    Procedure: COLONOSCOPY;  Surgeon: Kieran Krishnamurthy MD;  [facility]  Service: Endoscopy;  Laterality: N/A;    COLONOSCOPY N/A 2/24/2021    Procedure: COLONOSCOPY;  Surgeon: Gila Lopez MD;  Location: [facility] ENDO;  Service: Endoscopy;  Laterality: N/A;    COLONOSCOPY W/ POLYPECTOMY      HYSTERECTOMY  1994    JODI, ovaries remain ("pain")    LASIK      MASTECTOMY  2000    Bilateral    OOPHORECTOMY  1994    PARTIAL NEPHRECTOMY      cyst from left kidney    Toes surgery      Hammer toe surgery    TONSILLECTOMY         Past Social History:  Social History     Socioeconomic History    Marital status: Single    Number of children: 1    Highest education level: Bachelor's degree (e.g., BA, AB, BS)   Occupational History     Comment: [employer]    Tobacco Use    Smoking status: Never    Smokeless tobacco: Never   Substance and Sexual Activity    Alcohol use: Yes     Alcohol/week: 1.0 standard drink of alcohol     Types: 1 Glasses of wine per week     Comment: Rare    Drug use: No    Sexual activity: Never   Other Topics Concern    Are you pregnant or think you may be? No   Social History Narrative    Retired     Children:  Mike ([place])     Qian: [employer]    Hobbies: Line Dancing once a week , in [employer] choir , [employer]      Social Drivers of Health     Financial Resource Strain: Low Risk  (4/22/2025)    Overall Financial Resource Strain (CARDIA)     Difficulty of Paying Living Expenses: Not hard at all   Food Insecurity: No Food Insecurity (4/22/2025)    Hunger Vital Sign     Worried About Running Out of Food in the Last Year: Never true     " Ran Out of Food in the Last Year: Never true   Transportation Needs: No Transportation Needs (4/22/2025)    PRAPARE - Transportation     Lack of Transportation (Medical): No     Lack of Transportation (Non-Medical): No   Physical Activity: Sufficiently Active (9/22/2022)    Exercise Vital Sign     Days of Exercise per Week: 3 days     Minutes of Exercise per Session: 60 min   Stress: No Stress Concern Present (4/22/2025)    [place] [employer] - Occupational Stress Questionnaire     Feeling of Stress : Only a little   Housing Stability: Low Risk  (4/22/2025)    Housing Stability Vital Sign     Unable to Pay for Housing in the Last Year: No     Homeless in the Last Year: No       Medications:  Medications Ordered Prior to Encounter[1]  Scheduled Meds:   aspirin  81 mg Oral Daily    calcitRIOL  0.25 mcg Oral Every Mon, Wed, Fri    cetirizine  5 mg Oral Daily    levoFLOXacin  500 mg Intravenous Q48H    pantoprazole  40 mg Intravenous Daily    sodium bicarbonate  650 mg Oral BID     Continuous Infusions:   0.9% NaCl   Intravenous Continuous 75 mL/hr at 04/24/25 0545 Rate Verify at 04/24/25 0545     PRN Meds:.  Current Facility-Administered Medications:     acetaminophen, 650 mg, Oral, Q4H PRN    aluminum-magnesium hydroxide-simethicone, 30 mL, Oral, QID PRN    amLODIPine, 2.5 mg, Oral, BID PRN    dextrose 50%, 12.5 g, Intravenous, PRN    dextrose 50%, 25 g, Intravenous, PRN    glucagon (human recombinant), 1 mg, Intramuscular, PRN    glucose, 16 g, Oral, PRN    glucose, 24 g, Oral, PRN    melatonin, 9 mg, Oral, Nightly PRN    naloxone, 0.02 mg, Intravenous, PRN    ondansetron, 4 mg, Intravenous, Q6H PRN    prochlorperazine, 5 mg, Intravenous, Q6H PRN    sodium chloride 0.9%, 10 mL, Intravenous, Q12H PRN    Allergies:  Pcn [penicillins], Penicillamine, Propoxyphene n-acetaminophen, Sulfa (sulfonamide antibiotics), and Statins-hmg-coa reductase inhibitors    Past Family History:  Reviewed; refer to  Hospitalist Admission Note    Review of Systems:  Review of Systems - All 14 systems reviewed and negative, except as noted in HPI    Physical Exam:    /62   Pulse 95   Temp 98.4 °F (36.9 °C) (Oral)   Resp 18   Ht 5' (1.524 m)   Wt 50.6 kg (111 lb 8.8 oz)   SpO2 99%   BMI 21.79 kg/m²     General Appearance:    Alert, cooperative, no distress, appears stated age   Head:    Normocephalic, without obvious abnormality, atraumatic   Eyes:    PER, conjunctiva/corneas clear, EOM's intact in both eyes        Throat:   Lips, mucosa, and tongue normal; teeth and gums normal   Back:     Symmetric, no curvature, ROM normal, no CVA tenderness   Lungs:     Clear to auscultation bilaterally, respirations unlabored   Chest wall:    No tenderness or deformity   Heart:    Regular rate and rhythm, S1 and S2 normal, no murmur, rub   or gallop   Abdomen:     Soft, non-tender, bowel sounds active all four quadrants,     no masses, no organomegaly   Extremities:   Extremities normal, atraumatic, no cyanosis or edema   Pulses:   2+ and symmetric all extremities   MSK:   No joint or muscle swelling, tenderness or deformity   Skin:   Skin color, texture, turgor normal, no rashes or lesions   Neurologic:   CNII-XII intact, normal strength and sensation       Throughout.  No flap     Results:  Lab Results   Component Value Date     04/24/2025    K 4.3 04/24/2025     03/03/2025    CO2 21 (L) 04/24/2025    BUN 42 (H) 04/24/2025    CREATININE 2.6 (H) 04/24/2025    CALCIUM 8.2 (L) 04/24/2025    ANIONGAP 8 03/03/2025    ESTGFRAFRICA 39.0 (A) 07/05/2022    EGFRNONAA 33.8 (A) 07/05/2022       Lab Results   Component Value Date    CALCIUM 8.2 (L) 04/24/2025    PHOS 3.3 04/24/2025       Recent Labs   Lab 04/24/25  0435   WBC 7.34   RBC 2.73*   HGB 8.7*   HCT 26.2*      MCV 96   MCH 31.9*   MCHC 33.2          I have personally reviewed pertinent radiological imaging and reports.    Tony Lunsford MD  [facility]  [place]  [phone]            [1]   No current facility-administered medications on file prior to encounter.     Current Outpatient Medications on File Prior to Encounter   Medication Sig Dispense Refill    amLODIPine (NORVASC) 2.5 MG tablet Take 2.5 mg by mouth 2 (two) times daily as needed (BP over 150/80).      ascorbic acid, vitamin C, (VITAMIN C) 500 MG tablet Take 500 mg by mouth once daily.      aspirin (ECOTRIN) 81 MG EC tablet Take 81 mg by mouth once daily.      calcitRIOL (ROCALTROL) 0.25 MCG Cap Take 0.25 mcg by mouth every Mon, Wed, Fri.      calcium citrate-vitamin D3 315-200 mg (CITRACAL+D) 315 mg-5 mcg (200 unit) per tablet Take 1 tablet by mouth once daily.      magnesium oxide 84.5 mg mag (140 mg) Cap Take 1 tablet by mouth once daily.      valsartan (DIOVAN) 40 MG tablet Take 1 tablet (40 mg total) by mouth once daily. 90 tablet 3    vit C/E/Zn/coppr/lutein/zeaxan (PRESERVISION AREDS-2 ORAL) Take 1 capsule by mouth every other day. 1 every other day      raloxifene (EVISTA) 60 mg tablet Take 1 tablet (60 mg total) by mouth once daily. (Patient not taking: Reported on 4/21/2025) 90 tablet 3    triamcinolone acetonide 0.1% (KENALOG) 0.1 % cream Apply topically 2 (two) times daily. (Patient not taking: Reported on 4/21/2025) 45 g 0

## 2025-04-24 NOTE — PLAN OF CARE
Pt cleared from cm- PENDING medication delivery   sister to transport home at dc  Appts added to AVS, referrals added to AVS  [facility] SOC 4/26 04/24/25 1511   Final Note   Assessment Type Final Discharge Note   Anticipated Discharge Disposition Home-Health   Hospital Resources/Appts/Education Provided Appointments scheduled and added to AVS   Post-Acute Status   Post-Acute Authorization Home Health   Home Health Status Set-up Complete/Auth obtained   Discharge Delays None known at this time

## 2025-04-24 NOTE — ASSESSMENT & PLAN NOTE
Creatine stable for now. BMP reviewed- noted Estimated Creatinine Clearance: 12.8 mL/min (A) (based on SCr of 2.6 mg/dL (H)). according to latest data. Based on current GFR, CKD stage is stage 4 - GFR 15-29.  Monitor UOP and serial BMP and adjust therapy as needed. Renally dose meds. Avoid nephrotoxic medications and procedures.  Nephrology following

## 2025-04-24 NOTE — ASSESSMENT & PLAN NOTE
Hyponatremia is likely due to Dehydration/hypovolemia. The patient's most recent sodium results are listed below.  Recent Labs     04/23/25  0543 04/23/25  1418 04/24/25  0435   * 134* 139     Plan  -improved with fluid administration.

## 2025-04-24 NOTE — PLAN OF CARE
[facility]      HOME HEALTH ORDERS  FACE TO FACE ENCOUNTER    Patient Name: Tyra Fernandes  YOB: 1945    PCP: Nadia Sanderson MD   PCP Address: [address] [place] [address] [address] / [place] [address]  PCP Phone Number: [phone]  PCP Fax: [phone]    Encounter Date: 4/21/25    Admit to Home Health    Diagnoses:  Active Hospital Problems    Diagnosis  POA    *KRIS (acute kidney injury) [N17.9]  Yes    Dehydration with hyponatremia [E86.0, E87.1]  Yes    Thrombocytopenia [D69.6]  Yes    Acute cystitis without hematuria [N30.00]  Yes    Gastroesophageal reflux disease without esophagitis [K21.9]  Yes    Stage 3a chronic kidney disease [N18.31]  Yes    Essential hypertension [I10]  Yes    Iron deficiency anemia [D50.9]  Yes      Resolved Hospital Problems   No resolved problems to display.       Follow Up Appointments:  No future appointments.    Allergies:  Review of patient's allergies indicates:   Allergen Reactions    Pcn [penicillins]     Penicillamine      Other reaction(s): Unknown    Propoxyphene n-acetaminophen      Other reaction(s): Unknown    Sulfa (sulfonamide antibiotics)     Statins-hmg-coa reductase inhibitors Rash       Medications: Review discharge medications with patient and family and provide education.    Current Medications[1]     Medication List        START taking these medications      levoFLOXacin 500 MG tablet  Commonly known as: LEVAQUIN  Take 1 tablet (500 mg total) by mouth every other day. Only remaining dose is for the evening of 4/25  Start taking on: April 25, 2025     sodium bicarbonate 650 MG tablet  Take 1 tablet (650 mg total) by mouth 2 (two) times daily.            CHANGE how you take these medications      valsartan 40 MG tablet  Commonly known as: DIOVAN  Take 1 tablet (40 mg total) by mouth once daily. Hold this medication until your follow up with your PCP or nephrologist  What changed: additional instructions            CONTINUE taking these  medications      amLODIPine 2.5 MG tablet  Commonly known as: NORVASC  Take 2.5 mg by mouth 2 (two) times daily as needed (BP over 150/80).     ascorbic acid (vitamin C) 500 MG tablet  Commonly known as: VITAMIN C  Take 500 mg by mouth once daily.     aspirin 81 MG EC tablet  Commonly known as: ECOTRIN  Take 81 mg by mouth once daily.     calcitRIOL 0.25 MCG Cap  Commonly known as: ROCALTROL  Take 0.25 mcg by mouth every Mon, Wed, Fri.     calcium citrate-vitamin D3 315-200 mg 315 mg-5 mcg (200 unit) per tablet  Commonly known as: CITRACAL+D  Take 1 tablet by mouth once daily.     magnesium oxide 84.5 mg mag (140 mg) Cap  Take 1 tablet by mouth once daily.     PRESERVISION AREDS-2 ORAL  Take 1 capsule by mouth every other day. 1 every other day            ASK your doctor about these medications      raloxifene 60 mg tablet  Commonly known as: EVISTA  Take 1 tablet (60 mg total) by mouth once daily.     triamcinolone acetonide 0.1% 0.1 % cream  Commonly known as: KENALOG  Apply topically 2 (two) times daily.                I have seen and examined this patient within the last 30 days. My clinical findings that support the need for the home health skilled services and home bound status are the following:no   Weakness/numbness causing balance and gait disturbance due to Infection making it taxing to leave home.     Diet:   renal diet    Labs:  CBC, CMP, Mg weekly for three weeks. Send results to PCP     Referrals/ Consults  Physical Therapy to evaluate and treat. Evaluate for home safety and equipment needs; Establish/upgrade home exercise program. Perform / instruct on therapeutic exercises, gait training, transfer training, and Range of Motion.  Occupational Therapy to evaluate and treat. Evaluate home environment for safety and equipment needs. Perform/Instruct on transfers, ADL training, ROM, and therapeutic exercises.    Activities:   activity as tolerated    Nursing:   Agency to admit patient within 24 hours of  hospital discharge unless specified on physician order or at patient request    SN to complete comprehensive assessment including routine vital signs. Instruct on disease process and s/s of complications to report to MD. Review/verify medication list sent home with the patient at time of discharge  and instruct patient/caregiver as needed. Frequency may be adjusted depending on start of care date.     Skilled nurse to perform up to 3 visits PRN for symptoms related to diagnosis    Notify MD if SBP > 160 or < 90; DBP > 90 or < 50; HR > 120 or < 50; Temp > 101; O2 < 88%; Other:       Ok to schedule additional visits based on staff availability and patient request on consecutive days within the home health episode.    When multiple disciplines ordered:    Start of Care occurs on Sunday - Wednesday schedule remaining discipline evaluations as ordered on separate consecutive days following the start of care.    Thursday SOC -schedule subsequent evaluations Friday and Monday the following week.     Friday - Saturday SOC - schedule subsequent discipline evaluations on consecutive days starting Monday of the following week.    For all post-discharge communication and subsequent orders please contact patient's primary care physician. If unable to reach primary care physician or do not receive response within 30 minutes, please contact PCP for clinical staff order clarification        Home Health Aide:  Nursing Three times weekly, Physical Therapy Three times weekly, and Occupational Therapy Three times weekly        I certify that this patient is confined to her home and needs intermittent skilled nursing care, physical therapy, and occupational therapy.              [1]   Current Facility-Administered Medications   Medication Dose Route Frequency Provider Last Rate Last Admin    acetaminophen tablet 650 mg  650 mg Oral Q4H PRN Lo Vaca, LAURIE        aluminum-magnesium hydroxide-simethicone 200-200-20 mg/5 mL suspension  30 mL  30 mL Oral QID PRN Lo Vaca DNP        amLODIPine tablet 2.5 mg  2.5 mg Oral BID PRN Lo Vaca DNP        aspirin EC tablet 81 mg  81 mg Oral Daily Lo Vaca DNP   81 mg at 04/24/25 0841    calcitRIOL capsule 0.25 mcg  0.25 mcg Oral Every Mon, Wed, Fri Lo Vaca DNP   0.25 mcg at 04/23/25 1659    cetirizine tablet 5 mg  5 mg Oral Daily Trang Salmeron MD   5 mg at 04/24/25 0841    dextrose 50% injection 12.5 g  12.5 g Intravenous PRN Lo Vaca DNP        dextrose 50% injection 25 g  25 g Intravenous PRN Lo Vaca DNP        glucagon (human recombinant) injection 1 mg  1 mg Intramuscular PRN Lo Vaca DNP        glucose chewable tablet 16 g  16 g Oral PRN Lo Vaca DNP        glucose chewable tablet 24 g  24 g Oral PRN Lo Vaca DNP        levoFLOXacin 500 mg/100 mL IVPB 500 mg  500 mg Intravenous Q48H Lo Vaca DNP   Stopped at 04/24/25 0029    melatonin tablet 9 mg  9 mg Oral Nightly PRN Lo Vaca DNP        naloxone 0.4 mg/mL injection 0.02 mg  0.02 mg Intravenous PRN Lo Vaca DNP        ondansetron injection 4 mg  4 mg Intravenous Q6H PRN Lo Vaca DNP        pantoprazole injection 40 mg  40 mg Intravenous Daily Lo Vaca DNP   40 mg at 04/24/25 0842    prochlorperazine injection Soln 5 mg  5 mg Intravenous Q6H PRN Lo Vaca DNP        sodium bicarbonate tablet 650 mg  650 mg Oral BID Lo Vaca DNP   650 mg at 04/24/25 0841    sodium chloride 0.9% flush 10 mL  10 mL Intravenous Q12H PRN Lo Vaca DNP

## 2025-04-24 NOTE — PLAN OF CARE
[facility] referral sent  Waiting for SOC date   04/24/25 1235   Post-Acute Status   Post-Acute Authorization Home Health   Home Health Status Referrals Sent   Discharge Delays None known at this time   Discharge Plan   Discharge Plan A Home Health   Discharge Plan B Home Health

## 2025-04-24 NOTE — PT/OT/SLP PROGRESS
Occupational Therapy   Treatment    Name: Tyra Fernandes  MRN: [id number]  Admitting Diagnosis:  KRIS (acute kidney injury)       Recommendations:     Discharge Recommendations: Low Intensity Therapy  Discharge Equipment Recommendations:  none  Barriers to discharge:  None    Assessment:     Tyra Fernandes is a 80 y.o. female with a medical diagnosis of KRIS (acute kidney injury).  She presents with remarkable improvements in all areas of performance and is cleared to ambulate in room unassisted. She maintains intact safety awareness, good judgement,  and intact problem solving. She  reports only a minor feeling of fatigue post showering, dressing, grooming. Performance deficits affecting function are weakness, impaired endurance.     Rehab Prognosis:  Good; patient would benefit from acute skilled OT services to address these deficits and reach maximum level of function.       Plan:     Patient to be seen 3 x/week to address the above listed problems via self-care/home management, therapeutic activities, therapeutic exercises  Plan of Care Expires: 05/22/25  Plan of Care Reviewed with: patient    Subjective     Chief Complaint: haven't been cleared to shower yet and it would make me feel so much better.  Patient/Family Comments/goals: stay safe and get home  Pain/Comfort:  Pain Rating 1: 0/10    Objective:     Communicated with: nurseMoi prior to session.  Patient found HOB elevated with telemetry, peripheral IV upon OT entry to room.    General Precautions: Standard, fall    Orthopedic Precautions:N/A  Braces: N/A  Respiratory Status: Room air     Occupational Performance:     Bed Mobility:    Patient completed Scooting/Bridging with modified independence and with side rail  Patient completed Supine to Sit with modified independence and with side rail  Patient completed Sit to Supine with modified independence and with side rail     Functional Mobility/Transfers:  Patient completed Sit <> Stand Transfer with  modified independence  with  bed rail   Patient completed Toilet Transfer Step Transfer technique with modified independence with  grab bars  Patient completed  Shower Transfer Step Transfer technique with modified independence with grab bars and shower chair.  Functional Mobility: pt ambulated as need in room from bed to closet to sink to toilet to sink to shower and back to bed. She performed her own item retrieval.    Activities of Daily Living:  Grooming: independence while standing at the sink for hand hygiene post toileting  Bathing: modified independence with shower chair, grab bars  Upper Body Dressing: contact guard assistance due to telemetry donning through gown.  Lower Body Dressing: independence while standing and seated EOB for don/doff socks& underwear  Toileting: modified independence with grab bars in bathroom while standing.      [facility] 6 Click ADL: 24    Treatment & Education:  -Pt educated on call bell use and location, energy conservation, increased awareness to symptoms of fatigue, and completing ADLs unassisted while admitted to maintain functional strength. She v/u and agreeable.    Patient left HOB elevated with all lines intact, call button in reach, bed alarm off, and nurse, Moi and PCT Zaira notified    GOALS:   Multidisciplinary Problems       Occupational Therapy Goals          Problem: Occupational Therapy    Goal Priority Disciplines Outcome Interventions   Occupational Therapy Goal     OT, PT/OT Progressing    Description: Goals to be met by: 5/22/2025     Patient will increase functional independence with ADLs by performing:    UE Dressing with Supervision.  LE Dressing with Supervision.  Grooming while standing at sink with Supervision.  Toileting from toilet with Supervision for hygiene and clothing management.   Toilet transfer to toilet with Supervision.  Perform sitting/standing ADL activity with no LOB.                       Time Tracking:     OT Date of Treatment: 04/24/25  OT  Start Time: 1115  OT Stop Time: 1145  OT Total Time (min): 30 min    Billable Minutes:Self Care/Home Management 30 min    OT/SAÚL: SAÚL     Number of SAÚL visits since last OT visit: 1    4/24/2025

## 2025-04-24 NOTE — NURSING
Discharge instructions given and reviewed with patient. Verbalized understanding. IV removed without difficulty, catheter intact. Medications delivered to bedside. Patient discharged off the unit in stable condition to personal vehicle with family.

## 2025-04-24 NOTE — PLAN OF CARE
Problem: Occupational Therapy  Goal: Occupational Therapy Goal  Description: Goals to be met by: 5/22/2025     Patient will increase functional independence with ADLs by performing:    UE Dressing with Supervision.- CGA with gown 2° telemetry donning.  LE Dressing with Supervision.- independent while standing and sitting EOB.  Grooming while standing at sink with Supervision.- Independent today.  Toileting from toilet with Supervision for hygiene and clothing management. - [place] with grab bars  Toilet transfer to toilet with Supervision. -Mod I with grab bars  Perform sitting/standing ADL activity with no LOB. -[place] in shower x 10 mins using BSC as shower chair and grab bars when needed.    Outcome: Progressing; pt displays intact safety awareness and is cleared to ambulate unassisted. She has filed paper work to decline bed alarm.

## 2025-04-24 NOTE — PROGRESS NOTES
INPATIENT NEPHROLOGY CONSULT   [facility] NEPHROLOGY    Tyra Fernandes  04/24/2025    Reason for consultation:    Acute kidney injury    Chief Complaint:   Chief Complaint   Patient presents with    Fatigue     Generalized fatigue/weakness getting worse since Thursday.  +diarrhea +nausea/vomiting          History of Present Illness:    Per H and P   Tyra Fernandes is a 80 year old female with a past medical history of chronic kidney disease, anemia, hyperlipidemia, and breast cancer status post mastectomy in 2000 who presents with complaints of generalized fatigue, weakness, diarrhea, nausea, and vomiting associated with decreased appetite and urinary frequency for 4 days, since Thursday. She endorses having a nonproductive cough for about a month but denies taking any antibiotics. She denies any recent travel or sick contacts. She reports having similar symptoms back in February. However at that time symptoms did not last as long. She denies chest pain, shortness of breath, fever, chills, dizziness, headaches, lightheadedness, hematuria, hematochezia, or abdominal pain.  ED workup reveals:  Chest x-ray no acute finding.  She is COVID and influenza negative.  CBC with platelets 145 otherwise unremarkable.  CMP with hyponatremia 128, BUN 54, creatinine 2.9 baseline about 1.8. Urinalysis positive with elevated WBCs. Urine and blood cultures pending. Lactic acid 0.83. She received bolus of fluids in ED and started on Levaquin. Nephrology consult placed.  Admitted to hospital medicine for further management and treatment.       4/23  AFVSS,  output not recorded, pvr 102cc, no nausea, cp, sob  4/24  UOP 1.5L, VSS, Scr trended down some.  No complaints.      Plan of Care:       Assessment:    Acute kidney injury likely secondary to hemodynamically mediated renal injury   --Avoid NSAIDS, Jang II inhibitors, and other non-essential nephrotoxic agents  --Keep mean arterial pressure above 60 and systolic blood pressure above  100 as able to maintain renal perfusion  --hold valsartan  --strict I/Os  --iv fluids  --FENa 2.0% and FEUrea 45% both  implicating prerenal pathology (acute tubular necrosis).  Check orthostatics.  Recheck ua with micro    Urinary tract infection  --renal dose abx  --no bactrim, aminoglycosides, or vancomycin/zosyn combination   --urine culture 4/21 pos for GNR    Hyponatremia  --urine osm 346 consistent with impaired free water clearance  --bnp 353  --uric acid  --avoid hypotonic iv piggybacks    CKDIV  --Avoiding fluctuations in blood pressure (high and low spikes)  Maintain good glucose control if you have diabetes  Reduce/avoid extra salt intake  Avoid NSAIDs and Coxibs.  Acetaminophen ok.    Aerobic exercise at least 3x weekly as tolerated  Eat more whole foods (vegetables, fruit, fish, chicken, pork, beef, dairy) and less processed food  Control weight  Avoid smoking  Stay hydrated, avoid dehydration.  Annual flu shot and routine preventative cancer screening via your primary care doctor    Secondary hyperparathyroidism  --continue calcitriol  --vit d level 87    Thank you for allowing us to participate in this patient's care. We will continue to follow.    Vital Signs:  Temp Readings from Last 3 Encounters:   04/24/25 98.4 °F (36.9 °C) (Oral)   03/11/25 97.5 °F (36.4 °C) (Oral)   01/26/24 99.3 °F (37.4 °C) (Oral)       Pulse Readings from Last 3 Encounters:   04/24/25 95   03/11/25 82   10/29/24 64       BP Readings from Last 3 Encounters:   04/24/25 102/62   03/11/25 (!) 154/85   10/29/24 127/75       Weight:  Wt Readings from Last 3 Encounters:   04/22/25 50.6 kg (111 lb 8.8 oz)   03/11/25 51.3 kg (113 lb)   10/29/24 51.5 kg (113 lb 9.6 oz)       Past Medical & Surgical History:  Past Medical History:   Diagnosis Date    Abnormal Pap smear     colpo then hyst    Allergy     Breast cancer 1997, 2000    Colon polyp     Fever blister     GERD (gastroesophageal reflux disease)     Hyperlipidemia      "Hypertension     Hyperthyroidism     Kidney cysts     Osteoporosis, unspecified     Seasonal allergies        Past Surgical History:   Procedure Laterality Date    BILATERAL MASTECTOMY  02/2000    BLADDER REPAIR      BREAST RECONSTRUCTION      CATARACT EXTRACTION      COLONOSCOPY N/A 1/4/2016    Procedure: COLONOSCOPY;  Surgeon: Kieran Krishnamurthy MD;  [facility]  Service: Endoscopy;  Laterality: N/A;    COLONOSCOPY N/A 2/24/2021    Procedure: COLONOSCOPY;  Surgeon: Gila Lopez MD;  Location: [facility] ENDO;  Service: Endoscopy;  Laterality: N/A;    COLONOSCOPY W/ POLYPECTOMY      HYSTERECTOMY  1994    JODI, ovaries remain ("pain")    LASIK      MASTECTOMY  2000    Bilateral    OOPHORECTOMY  1994    PARTIAL NEPHRECTOMY      cyst from left kidney    Toes surgery      Hammer toe surgery    TONSILLECTOMY         Past Social History:  Social History     Socioeconomic History    Marital status: Single    Number of children: 1    Highest education level: Bachelor's degree (e.g., BA, AB, BS)   Occupational History     Comment: [employer]    Tobacco Use    Smoking status: Never    Smokeless tobacco: Never   Substance and Sexual Activity    Alcohol use: Yes     Alcohol/week: 1.0 standard drink of alcohol     Types: 1 Glasses of wine per week     Comment: Rare    Drug use: No    Sexual activity: Never   Other Topics Concern    Are you pregnant or think you may be? No   Social History Narrative    Retired     Children:  Mike ([place])     Qian: Sikh    Hobbies: Line Dancing once a week , in [employer] , [employer]      Social Drivers of Health     Financial Resource Strain: Low Risk  (4/22/2025)    Overall Financial Resource Strain (CARDIA)     Difficulty of Paying Living Expenses: Not hard at all   Food Insecurity: No Food Insecurity (4/22/2025)    Hunger Vital Sign     Worried About Running Out of Food in the Last Year: Never true     Ran Out of Food in the Last Year: Never " true   Transportation Needs: No Transportation Needs (4/22/2025)    PRAPARE - Transportation     Lack of Transportation (Medical): No     Lack of Transportation (Non-Medical): No   Physical Activity: Sufficiently Active (9/22/2022)    Exercise Vital Sign     Days of Exercise per Week: 3 days     Minutes of Exercise per Session: 60 min   Stress: No Stress Concern Present (4/22/2025)    [place] [employer] - Occupational Stress Questionnaire     Feeling of Stress : Only a little   Housing Stability: Low Risk  (4/22/2025)    Housing Stability Vital Sign     Unable to Pay for Housing in the Last Year: No     Homeless in the Last Year: No       Medications:  Medications Ordered Prior to Encounter[1]  Scheduled Meds:   aspirin  81 mg Oral Daily    calcitRIOL  0.25 mcg Oral Every Mon, Wed, Fri    cetirizine  5 mg Oral Daily    levoFLOXacin  500 mg Intravenous Q48H    pantoprazole  40 mg Intravenous Daily    sodium bicarbonate  650 mg Oral BID     Continuous Infusions:   0.9% NaCl   Intravenous Continuous 75 mL/hr at 04/24/25 0545 Rate Verify at 04/24/25 0545     PRN Meds:.  Current Facility-Administered Medications:     acetaminophen, 650 mg, Oral, Q4H PRN    aluminum-magnesium hydroxide-simethicone, 30 mL, Oral, QID PRN    amLODIPine, 2.5 mg, Oral, BID PRN    dextrose 50%, 12.5 g, Intravenous, PRN    dextrose 50%, 25 g, Intravenous, PRN    glucagon (human recombinant), 1 mg, Intramuscular, PRN    glucose, 16 g, Oral, PRN    glucose, 24 g, Oral, PRN    melatonin, 9 mg, Oral, Nightly PRN    naloxone, 0.02 mg, Intravenous, PRN    ondansetron, 4 mg, Intravenous, Q6H PRN    prochlorperazine, 5 mg, Intravenous, Q6H PRN    sodium chloride 0.9%, 10 mL, Intravenous, Q12H PRN    Allergies:  Pcn [penicillins], Penicillamine, Propoxyphene n-acetaminophen, Sulfa (sulfonamide antibiotics), and Statins-hmg-coa reductase inhibitors    Past Family History:  Reviewed; refer to Hospitalist Admission Note    Review of  Systems:  Review of Systems - All 14 systems reviewed and negative, except as noted in HPI    Physical Exam:    /62   Pulse 95   Temp 98.4 °F (36.9 °C) (Oral)   Resp 18   Ht 5' (1.524 m)   Wt 50.6 kg (111 lb 8.8 oz)   SpO2 99%   BMI 21.79 kg/m²     General Appearance:    Alert, cooperative, no distress, appears stated age   Head:    Normocephalic, without obvious abnormality, atraumatic   Eyes:    PER, conjunctiva/corneas clear, EOM's intact in both eyes        Throat:   Lips, mucosa, and tongue normal; teeth and gums normal   Back:     Symmetric, no curvature, ROM normal, no CVA tenderness   Lungs:     Clear to auscultation bilaterally, respirations unlabored   Chest wall:    No tenderness or deformity   Heart:    Regular rate and rhythm, S1 and S2 normal, no murmur, rub   or gallop   Abdomen:     Soft, non-tender, bowel sounds active all four quadrants,     no masses, no organomegaly   Extremities:   Extremities normal, atraumatic, no cyanosis or edema   Pulses:   2+ and symmetric all extremities   MSK:   No joint or muscle swelling, tenderness or deformity   Skin:   Skin color, texture, turgor normal, no rashes or lesions   Neurologic:   CNII-XII intact, normal strength and sensation       Throughout.  No flap     Results:  Lab Results   Component Value Date     04/24/2025    K 4.3 04/24/2025     03/03/2025    CO2 21 (L) 04/24/2025    BUN 42 (H) 04/24/2025    CREATININE 2.6 (H) 04/24/2025    CALCIUM 8.2 (L) 04/24/2025    ANIONGAP 8 03/03/2025    ESTGFRAFRICA 39.0 (A) 07/05/2022    EGFRNONAA 33.8 (A) 07/05/2022       Lab Results   Component Value Date    CALCIUM 8.2 (L) 04/24/2025    PHOS 3.3 04/24/2025       Recent Labs   Lab 04/24/25  0435   WBC 7.34   RBC 2.73*   HGB 8.7*   HCT 26.2*      MCV 96   MCH 31.9*   MCHC 33.2          I have personally reviewed pertinent radiological imaging and reports.    Marleny Jones NP    [facility]  [phone]             [1]   No current facility-administered medications on file prior to encounter.     Current Outpatient Medications on File Prior to Encounter   Medication Sig Dispense Refill    amLODIPine (NORVASC) 2.5 MG tablet Take 2.5 mg by mouth 2 (two) times daily as needed (BP over 150/80).      ascorbic acid, vitamin C, (VITAMIN C) 500 MG tablet Take 500 mg by mouth once daily.      aspirin (ECOTRIN) 81 MG EC tablet Take 81 mg by mouth once daily.      calcitRIOL (ROCALTROL) 0.25 MCG Cap Take 0.25 mcg by mouth every Mon, Wed, Fri.      calcium citrate-vitamin D3 315-200 mg (CITRACAL+D) 315 mg-5 mcg (200 unit) per tablet Take 1 tablet by mouth once daily.      magnesium oxide 84.5 mg mag (140 mg) Cap Take 1 tablet by mouth once daily.      valsartan (DIOVAN) 40 MG tablet Take 1 tablet (40 mg total) by mouth once daily. 90 tablet 3    vit C/E/Zn/coppr/lutein/zeaxan (PRESERVISION AREDS-2 ORAL) Take 1 capsule by mouth every other day. 1 every other day      raloxifene (EVISTA) 60 mg tablet Take 1 tablet (60 mg total) by mouth once daily. (Patient not taking: Reported on 4/21/2025) 90 tablet 3    triamcinolone acetonide 0.1% (KENALOG) 0.1 % cream Apply topically 2 (two) times daily. (Patient not taking: Reported on 4/21/2025) 45 g 0

## 2025-04-24 NOTE — PT/OT/SLP PROGRESS
Physical Therapy      Patient Name:  Tyra Fernandes   MRN:  [id number]    Patient not seen today secondary to prepping for d/c.

## 2025-04-26 LAB
BACTERIA BLD CULT: NORMAL
BACTERIA BLD CULT: NORMAL

## 2025-04-28 LAB
OHS QRS DURATION: 76 MS
OHS QTC CALCULATION: 404 MS

## 2025-04-29 ENCOUNTER — LAB REQUISITION (OUTPATIENT)
Dept: LAB | Facility: HOSPITAL | Age: 80
End: 2025-04-29
Payer: MEDICARE

## 2025-04-29 DIAGNOSIS — E87.1 HYPO-OSMOLALITY AND HYPONATREMIA: ICD-10-CM

## 2025-04-29 DIAGNOSIS — N17.9 ACUTE KIDNEY FAILURE, UNSPECIFIED: ICD-10-CM

## 2025-04-29 LAB
ABSOLUTE EOSINOPHIL (SMH): 0.1 K/UL
ABSOLUTE MONOCYTE (SMH): 0.43 K/UL (ref 0.3–1)
ABSOLUTE NEUTROPHIL COUNT (SMH): 3.5 K/UL (ref 1.8–7.7)
ALBUMIN SERPL-MCNC: 2.7 G/DL (ref 3.5–5.2)
ALP SERPL-CCNC: 43 UNIT/L (ref 40–150)
ALT SERPL-CCNC: <8 UNIT/L (ref 10–44)
ANION GAP (SMH): 9 MMOL/L (ref 8–16)
AST SERPL-CCNC: 17 UNIT/L (ref 11–45)
BASOPHILS # BLD AUTO: 0.03 K/UL
BASOPHILS NFR BLD AUTO: 0.6 %
BILIRUB SERPL-MCNC: 0.2 MG/DL (ref 0.1–1)
BUN SERPL-MCNC: 20 MG/DL (ref 8–23)
CALCIUM SERPL-MCNC: 9 MG/DL (ref 8.7–10.5)
CHLORIDE SERPL-SCNC: 104 MMOL/L (ref 95–110)
CO2 SERPL-SCNC: 28 MMOL/L (ref 23–29)
CREAT SERPL-MCNC: 1.9 MG/DL (ref 0.5–1.4)
ERYTHROCYTE [DISTWIDTH] IN BLOOD BY AUTOMATED COUNT: 13.8 % (ref 11.5–14.5)
GFR SERPLBLD CREATININE-BSD FMLA CKD-EPI: 27 ML/MIN/1.73/M2
GLUCOSE SERPL-MCNC: 99 MG/DL (ref 70–110)
HCT VFR BLD AUTO: 25.8 % (ref 37–48.5)
HGB BLD-MCNC: 8.6 GM/DL (ref 12–16)
IMM GRANULOCYTES # BLD AUTO: 0.03 K/UL (ref 0–0.04)
IMM GRANULOCYTES NFR BLD AUTO: 0.6 % (ref 0–0.5)
LYMPHOCYTES # BLD AUTO: 1.19 K/UL (ref 1–4.8)
MAGNESIUM SERPL-MCNC: 1.9 MG/DL (ref 1.6–2.6)
MCH RBC QN AUTO: 32.7 PG (ref 27–31)
MCHC RBC AUTO-ENTMCNC: 33.3 G/DL (ref 32–36)
MCV RBC AUTO: 98 FL (ref 82–98)
NUCLEATED RBC (/100WBC) (SMH): 0 /100 WBC
PLATELET # BLD AUTO: 293 K/UL (ref 150–450)
PLATELET BLD QL SMEAR: NORMAL
PMV BLD AUTO: 9.7 FL (ref 9.2–12.9)
POTASSIUM SERPL-SCNC: 3.7 MMOL/L (ref 3.5–5.1)
PROT SERPL-MCNC: 6.6 GM/DL (ref 6–8.4)
RBC # BLD AUTO: 2.63 M/UL (ref 4–5.4)
RELATIVE EOSINOPHIL (SMH): 1.9 % (ref 0–8)
RELATIVE LYMPHOCYTE (SMH): 22.4 % (ref 18–48)
RELATIVE MONOCYTE (SMH): 8.1 % (ref 4–15)
RELATIVE NEUTROPHIL (SMH): 66.4 % (ref 38–73)
SODIUM SERPL-SCNC: 141 MMOL/L (ref 136–145)
WBC # BLD AUTO: 5.31 K/UL (ref 3.9–12.7)

## 2025-04-29 PROCEDURE — 83735 ASSAY OF MAGNESIUM: CPT | Performed by: INTERNAL MEDICINE

## 2025-04-29 PROCEDURE — 85025 COMPLETE CBC W/AUTO DIFF WBC: CPT | Performed by: INTERNAL MEDICINE

## 2025-04-29 PROCEDURE — 82040 ASSAY OF SERUM ALBUMIN: CPT | Performed by: INTERNAL MEDICINE

## 2025-05-05 ENCOUNTER — LAB REQUISITION (OUTPATIENT)
Dept: LAB | Facility: HOSPITAL | Age: 80
End: 2025-05-05
Payer: MEDICARE

## 2025-05-05 DIAGNOSIS — N18.4 CHRONIC KIDNEY DISEASE, STAGE 4 (SEVERE): ICD-10-CM

## 2025-05-05 DIAGNOSIS — I12.9 HYPERTENSIVE CHRONIC KIDNEY DISEASE WITH STAGE 1 THROUGH STAGE 4 CHRONIC KIDNEY DISEASE, OR UNSPECIFIED CHRONIC KIDNEY DISEASE: ICD-10-CM

## 2025-05-05 LAB
ABSOLUTE EOSINOPHIL (OHS): 0.1 K/UL
ABSOLUTE MONOCYTE (OHS): 0.36 K/UL (ref 0.3–1)
ABSOLUTE NEUTROPHIL COUNT (OHS): 3.17 K/UL (ref 1.8–7.7)
ALBUMIN SERPL BCP-MCNC: 3 G/DL (ref 3.5–5.2)
ALP SERPL-CCNC: 47 UNIT/L (ref 40–150)
ALT SERPL W/O P-5'-P-CCNC: 5 UNIT/L (ref 10–44)
ANION GAP (OHS): 9 MMOL/L (ref 8–16)
AST SERPL-CCNC: 18 UNIT/L (ref 11–45)
BASOPHILS # BLD AUTO: 0.06 K/UL
BASOPHILS NFR BLD AUTO: 1.2 %
BILIRUB SERPL-MCNC: 0.2 MG/DL (ref 0.1–1)
BUN SERPL-MCNC: 28 MG/DL (ref 8–23)
CALCIUM SERPL-MCNC: 8.9 MG/DL (ref 8.7–10.5)
CHLORIDE SERPL-SCNC: 106 MMOL/L (ref 95–110)
CO2 SERPL-SCNC: 28 MMOL/L (ref 23–29)
CREAT SERPL-MCNC: 2.1 MG/DL (ref 0.5–1.4)
ERYTHROCYTE [DISTWIDTH] IN BLOOD BY AUTOMATED COUNT: 14.2 % (ref 11.5–14.5)
GFR SERPLBLD CREATININE-BSD FMLA CKD-EPI: 24 ML/MIN/1.73/M2
GLUCOSE SERPL-MCNC: 86 MG/DL (ref 70–110)
HCT VFR BLD AUTO: 29.4 % (ref 37–48.5)
HGB BLD-MCNC: 8.9 GM/DL (ref 12–16)
IMM GRANULOCYTES # BLD AUTO: 0.02 K/UL (ref 0–0.04)
IMM GRANULOCYTES NFR BLD AUTO: 0.4 % (ref 0–0.5)
LYMPHOCYTES # BLD AUTO: 1.41 K/UL (ref 1–4.8)
MAGNESIUM SERPL-MCNC: 2.4 MG/DL (ref 1.6–2.6)
MCH RBC QN AUTO: 30.7 PG (ref 27–31)
MCHC RBC AUTO-ENTMCNC: 30.3 G/DL (ref 32–36)
MCV RBC AUTO: 101 FL (ref 82–98)
NUCLEATED RBC (/100WBC) (OHS): 0 /100 WBC
PLATELET # BLD AUTO: 329 K/UL (ref 150–450)
PMV BLD AUTO: 10.4 FL (ref 9.2–12.9)
POTASSIUM SERPL-SCNC: 3.6 MMOL/L (ref 3.5–5.1)
PROT SERPL-MCNC: 7 GM/DL (ref 6–8.4)
RBC # BLD AUTO: 2.9 M/UL (ref 4–5.4)
RELATIVE EOSINOPHIL (OHS): 2 %
RELATIVE LYMPHOCYTE (OHS): 27.5 % (ref 18–48)
RELATIVE MONOCYTE (OHS): 7 % (ref 4–15)
RELATIVE NEUTROPHIL (OHS): 61.9 % (ref 38–73)
SODIUM SERPL-SCNC: 143 MMOL/L (ref 136–145)
WBC # BLD AUTO: 5.12 K/UL (ref 3.9–12.7)

## 2025-05-05 PROCEDURE — 83735 ASSAY OF MAGNESIUM: CPT | Performed by: INTERNAL MEDICINE

## 2025-05-05 PROCEDURE — 85025 COMPLETE CBC W/AUTO DIFF WBC: CPT | Performed by: INTERNAL MEDICINE

## 2025-05-05 PROCEDURE — 80053 COMPREHEN METABOLIC PANEL: CPT | Performed by: INTERNAL MEDICINE

## 2025-05-13 ENCOUNTER — LAB REQUISITION (OUTPATIENT)
Dept: LAB | Facility: HOSPITAL | Age: 80
End: 2025-05-13
Payer: MEDICARE

## 2025-05-13 DIAGNOSIS — N30.00 ACUTE CYSTITIS WITHOUT HEMATURIA: ICD-10-CM

## 2025-05-13 DIAGNOSIS — E87.1 HYPO-OSMOLALITY AND HYPONATREMIA: ICD-10-CM

## 2025-05-13 DIAGNOSIS — D50.9 IRON DEFICIENCY ANEMIA, UNSPECIFIED: ICD-10-CM

## 2025-05-13 LAB
ALBUMIN SERPL BCP-MCNC: 3.2 G/DL (ref 3.5–5.2)
ALP SERPL-CCNC: 46 UNIT/L (ref 40–150)
ALT SERPL W/O P-5'-P-CCNC: 7 UNIT/L (ref 10–44)
ANION GAP (OHS): 10 MMOL/L (ref 8–16)
AST SERPL-CCNC: 20 UNIT/L (ref 11–45)
BILIRUB SERPL-MCNC: 0.2 MG/DL (ref 0.1–1)
BUN SERPL-MCNC: 36 MG/DL (ref 8–23)
CALCIUM SERPL-MCNC: 9 MG/DL (ref 8.7–10.5)
CHLORIDE SERPL-SCNC: 107 MMOL/L (ref 95–110)
CO2 SERPL-SCNC: 23 MMOL/L (ref 23–29)
CREAT SERPL-MCNC: 2.1 MG/DL (ref 0.5–1.4)
GFR SERPLBLD CREATININE-BSD FMLA CKD-EPI: 24 ML/MIN/1.73/M2
GLUCOSE SERPL-MCNC: 82 MG/DL (ref 70–110)
MAGNESIUM SERPL-MCNC: 2.3 MG/DL (ref 1.6–2.6)
POTASSIUM SERPL-SCNC: 4.7 MMOL/L (ref 3.5–5.1)
PROT SERPL-MCNC: 6.9 GM/DL (ref 6–8.4)
SODIUM SERPL-SCNC: 140 MMOL/L (ref 136–145)

## 2025-05-13 PROCEDURE — 80053 COMPREHEN METABOLIC PANEL: CPT | Performed by: INTERNAL MEDICINE

## 2025-05-13 PROCEDURE — 83735 ASSAY OF MAGNESIUM: CPT | Performed by: INTERNAL MEDICINE

## 2025-05-13 PROCEDURE — 85025 COMPLETE CBC W/AUTO DIFF WBC: CPT | Performed by: INTERNAL MEDICINE

## 2025-05-14 LAB
ABSOLUTE EOSINOPHIL (OHS): 0.16 K/UL
ABSOLUTE MONOCYTE (OHS): 0.33 K/UL (ref 0.3–1)
ABSOLUTE NEUTROPHIL COUNT (OHS): 2.43 K/UL (ref 1.8–7.7)
BASOPHILS # BLD AUTO: 0.03 K/UL
BASOPHILS NFR BLD AUTO: 0.7 %
ERYTHROCYTE [DISTWIDTH] IN BLOOD BY AUTOMATED COUNT: 14.6 % (ref 11.5–14.5)
HCT VFR BLD AUTO: 26.9 % (ref 37–48.5)
HGB BLD-MCNC: 8.3 GM/DL (ref 12–16)
IMM GRANULOCYTES # BLD AUTO: 0.01 K/UL (ref 0–0.04)
IMM GRANULOCYTES NFR BLD AUTO: 0.2 % (ref 0–0.5)
LYMPHOCYTES # BLD AUTO: 1.12 K/UL (ref 1–4.8)
MCH RBC QN AUTO: 31.3 PG (ref 27–31)
MCHC RBC AUTO-ENTMCNC: 30.9 G/DL (ref 32–36)
MCV RBC AUTO: 102 FL (ref 82–98)
NUCLEATED RBC (/100WBC) (OHS): 0 /100 WBC
PLATELET # BLD AUTO: 215 K/UL (ref 150–450)
PMV BLD AUTO: 11.6 FL (ref 9.2–12.9)
RBC # BLD AUTO: 2.65 M/UL (ref 4–5.4)
RELATIVE EOSINOPHIL (OHS): 3.9 %
RELATIVE LYMPHOCYTE (OHS): 27.5 % (ref 18–48)
RELATIVE MONOCYTE (OHS): 8.1 % (ref 4–15)
RELATIVE NEUTROPHIL (OHS): 59.6 % (ref 38–73)
WBC # BLD AUTO: 4.08 K/UL (ref 3.9–12.7)

## 2025-07-20 PROBLEM — I50.9 HEART FAILURE, UNSPECIFIED HF CHRONICITY, UNSPECIFIED HEART FAILURE TYPE: Status: ACTIVE | Noted: 2025-07-20

## 2025-07-20 NOTE — PROGRESS NOTES
Subjective:    Patient ID:  Tyra Fernandes is a 80 y.o. female who presents for follow-up of   Chief Complaint   Patient presents with    Hypertension       HPI:  Known to [facility] DUMAS HTN, CKD stage 4 onset after COVID INFECTION with HTN,diabetes, anemia.  Does home BP check at home . BP at home up and down. One or two days every two months is high. Once systolic 99.  Line dance , once a week, walks in hospital at [facility].  SEES DR DANIELA SABA MONTH  BP this am 121/70.  RAN OUT of bicarbinate and gets stomach upset sometimes. Told to stay off of it by nephrologist.  No fluid retention.  EKG NSR MINIMAL VOLTAGE FOR LVH    Most Recent Echocardiogram Results  Results for orders placed during the hospital encounter of 08/03/22    Echo    Interpretation Summary  · The left ventricle is normal in size with concentric remodeling and low normal systolic function.  · The estimated ejection fraction is 50%.  · Normal left ventricular diastolic function.  · Normal right ventricular size with normal right ventricular systolic function.  · Mild tricuspid regurgitation.      Most Recent Nuclear Stress Test Results  No results found for this or any previous visit.      Most Recent Cardiac PET Stress Test Results  No results found for this or any previous visit.      Most Recent NM Myocardial Perfusion  No results found for this or any previous visit.      Most Recent Cardiovascular Angiogram results  No results found for this or any previous visit.      Other Most Recent Cardiology Results  Results for orders placed during the hospital encounter of 04/21/25    Cardiac monitoring strips    Results for orders placed or performed during the hospital encounter of 04/21/25   EKG 12-lead    Collection Time: 04/21/25  9:00 PM   Result Value Ref Range    QRS Duration 76 ms    O[facility] QTC Calculation 404 ms    Narrative    Test Reason : R53.1,    Vent. Rate :  81 BPM     Atrial Rate :  81 BPM     P-R Int : 134 ms          QRS Dur :  76 ms     "  QT Int : 348 ms       P-R-T Axes :  63  39  44 degrees    QTcB Int : 404 ms    Normal sinus rhythm  Normal ECG    Confirmed by Indiana Salazar ([id number]) on 4/28/2025 10:08:14 AM    Referred By: AAAREFERRAL SELF           Confirmed By: Indiana Salazar           Review of patient's allergies indicates:   Allergen Reactions    Doravirine     Pcn [penicillins]     Penicillamine      Other reaction(s): Unknown    Propoxyphene n-acetaminophen      Other reaction(s): Unknown    Sulfa (sulfonamide antibiotics)     Statins-hmg-coa reductase inhibitors Rash       Past Medical History:   Diagnosis Date    Abnormal Pap smear     colpo then hyst    Allergy     Breast cancer 1997, 2000    Colon polyp     Fever blister     GERD (gastroesophageal reflux disease)     Hyperlipidemia     Hypertension     Hyperthyroidism     Kidney cysts     Osteoporosis, unspecified     Seasonal allergies      Past Surgical History:   Procedure Laterality Date    BILATERAL MASTECTOMY  02/2000    BLADDER REPAIR      BREAST RECONSTRUCTION      CATARACT EXTRACTION      COLONOSCOPY N/A 1/4/2016    Procedure: COLONOSCOPY;  Surgeon: Kieran Krishnamurthy MD;  Location: [facility] ENDO ([address] [place] [address]);  Service: Endoscopy;  Laterality: N/A;    COLONOSCOPY N/A 2/24/2021    Procedure: COLONOSCOPY;  Surgeon: Gila Lopez MD;  Location: [facility] ENDO;  Service: Endoscopy;  Laterality: N/A;    COLONOSCOPY W/ POLYPECTOMY      HYSTERECTOMY  1994    JODI, ovaries remain ("pain")    LASIK      MASTECTOMY  2000    Bilateral    OOPHORECTOMY  1994    PARTIAL NEPHRECTOMY      cyst from left kidney    Toes surgery      Hammer toe surgery    TONSILLECTOMY       Social History[1]  Family History   Problem Relation Name Age of Onset    Cancer Mother      Breast cancer Mother      Cancer Father          Lung Cancer    Cancer Sister      Osteoporosis Sister      Breast cancer Sister      Cancer Sister      Colon cancer Maternal Aunt      Cancer Maternal Aunt          colon    Colon " cancer Paternal Aunt      Heart failure Paternal Aunt      Cancer Paternal Aunt          colon    Heart failure Paternal Uncle      Heart failure Paternal Grandmother      Breast cancer Cousin      Celiac disease Cousin      Breast cancer Cousin      Breast cancer Other niece     Diabetes Neg Hx      Eclampsia Neg Hx      Hypertension Neg Hx      Miscarriages / Stillbirths Neg Hx      Ovarian cancer Neg Hx       labor Neg Hx      Stroke Neg Hx      Melanoma Neg Hx      Cirrhosis Neg Hx      Crohn's disease Neg Hx      Ulcerative colitis Neg Hx      Stomach cancer Neg Hx      Rectal cancer Neg Hx      Liver cancer Neg Hx      Esophageal cancer Neg Hx      Irritable bowel syndrome Neg Hx          Review of Systems:   Constitution: Negative for diaphoresis and fever.   HEENT: Negative for nosebleeds.    Cardiovascular: Negative for chest pain       No dyspnea on exertion       No leg swelling        No palpitations  Respiratory: Negative for shortness of breath and wheezing.    Hematologic/Lymphatic: Negative for bleeding problem. Does not bruise/bleed easily.   Skin: Negative for color change and rash.   Musculoskeletal: Negative for falls and myalgias.   Gastrointestinal: Negative for hematemesis and hematochezia.   Genitourinary: Negative for hematuria.   Neurological: Negative for dizziness and light-headedness.   Psychiatric/Behavioral: Negative for altered mental status and memory loss.          Objective:        Vitals:    25 1009   BP: (!) 153/70   Pulse: 64       LIPIDS - LAST 2   Lab Results   Component Value Date    CHOL 165 2022    CHOL 166 2021    HDL 61 2022    HDL 53 2021    LDLCALC 84.8 2022    LDLCALC 78.6 2021    TRIG 96 2022    TRIG 172 (H) 2021    CHOLHDL 37.0 2022    CHOLHDL 31.9 2021       CBC - LAST 2  Lab Results   Component Value Date    WBC 4.08 2025    WBC 5.12 2025    RBC 2.65 (L) 2025    RBC 2.90 (L)  05/05/2025    HGB 8.3 (L) 05/13/2025    HGB 8.9 (L) 05/05/2025    HCT 26.9 (L) 05/13/2025    HCT 29.4 (L) 05/05/2025     (H) 05/13/2025     (H) 05/05/2025    MCH 31.3 (H) 05/13/2025    MCH 30.7 05/05/2025    MCHC 30.9 (L) 05/13/2025    MCHC 30.3 (L) 05/05/2025    RDW 14.6 (H) 05/13/2025    RDW 14.2 05/05/2025     05/13/2025     05/05/2025    MPV 11.6 05/13/2025    MPV 10.4 05/05/2025    GRAN 3.7 02/21/2024    GRAN 66.7 02/21/2024    LYMPH 27.5 05/13/2025    LYMPH 1.12 05/13/2025    MONO 8.1 05/13/2025    MONO 0.33 05/13/2025    BASO 0.03 02/21/2024    BASO 0.02 05/22/2023    NRBC 0 05/13/2025    NRBC 0 05/05/2025       CHEMISTRY & LIVER FUNCTION - LAST 2  Lab Results   Component Value Date     05/13/2025     05/05/2025    K 4.7 05/13/2025    K 3.6 05/05/2025     05/13/2025     05/05/2025    CO2 23 05/13/2025    CO2 28 05/05/2025    ANIONGAP 10 05/13/2025    ANIONGAP 9 05/05/2025    BUN 36 (H) 05/13/2025    BUN 28 (H) 05/05/2025    CREATININE 2.1 (H) 05/13/2025    CREATININE 2.1 (H) 05/05/2025    GLU 82 05/13/2025    GLU 86 05/05/2025    CALCIUM 9.0 05/13/2025    CALCIUM 8.9 05/05/2025    MG 2.3 05/13/2025    MG 2.4 05/05/2025    ALBUMIN 3.2 (L) 05/13/2025    ALBUMIN 3.0 (L) 05/05/2025    PROT 6.9 05/13/2025    PROT 7.0 05/05/2025    ALKPHOS 46 05/13/2025    ALKPHOS 47 05/05/2025    ALT 7 (L) 05/13/2025    ALT 5 (L) 05/05/2025    AST 20 05/13/2025    AST 18 05/05/2025    BILITOT 0.2 05/13/2025    BILITOT 0.2 05/05/2025        CARDIAC PROFILE - LAST 2  Lab Results   Component Value Date     (H) 04/23/2025    TROPONINI 0.009 05/06/2020    TROPONINI <0.006 05/05/2020        COAGULATION - LAST 2  Lab Results   Component Value Date    INR 1.0 04/12/2022    APTT 27.9 04/12/2022       ENDOCRINE & PSA - LAST 2  Lab Results   Component Value Date    HGBA1C 5.3 04/22/2025    HGBA1C 5.9 (H) 01/23/2024    TSH 2.33 01/23/2024    TSH 1.854 05/22/2023          Physical  Exam:  CONSTITUTIONAL: No fever, no chills  HEENT: Normocephalic, atraumatic,pupils reactive to light                 NECK:  No JVD no carotid bruit  CVS: S1S2+, RRR, no murmurs,   LUNGS: Clear  ABDOMEN: Soft, NT, BS+  EXTREMITIES: No cyanosis, edema  : No bell catheter  NEURO: AAO X 3  PSY: Normal affect    Medication List with Changes/Refills   Current Medications    AMLODIPINE (NORVASC) 2.5 MG TABLET    Take 2.5 mg by mouth 2 (two) times daily as needed (BP over 150/80).    ASPIRIN (ECOTRIN) 81 MG EC TABLET    Take 81 mg by mouth once daily.    CALCITRIOL (ROCALTROL) 0.25 MCG CAP    Take 0.25 mcg by mouth every Mon, Wed, Fri.    CALCIUM CITRATE-VITAMIN D3 315-200 MG (CITRACAL+D) 315 MG-5 MCG (200 UNIT) PER TABLET    Take 1 tablet by mouth once daily.    FERROUS SULFATE (IRON, FERROUS SULFATE,) 325 MG (65 MG IRON) TAB TABLET    Take 1 tablet (325 mg total) by mouth once daily.    MAGNESIUM OXIDE 84.5 MG MAG (140 MG) CAP    Take 1 tablet by mouth once daily.    SODIUM BICARBONATE 650 MG TABLET    Take 1 tablet (650 mg total) by mouth 2 (two) times daily.    TRIAMCINOLONE ACETONIDE 0.1% (KENALOG) 0.1 % CREAM    Apply topically 2 (two) times daily.    VALSARTAN (DIOVAN) 40 MG TABLET    Take 1 tablet (40 mg total) by mouth once daily. Hold this medication until your follow up with your PCP or nephrologist    VIT C/E/ZN/COPPR/LUTEIN/ZEAXAN (PRESERVISION AREDS-2 ORAL)    Take 1 capsule by mouth every other day. 1 every other day   Discontinued Medications    ASCORBIC ACID, VITAMIN C, (VITAMIN C) 500 MG TABLET    Take 500 mg by mouth once daily.    LEVOFLOXACIN (LEVAQUIN) 500 MG TABLET    Take 1 tablet (500 mg total) by mouth every other day. Only remaining dose is for the evening of 4/25    RALOXIFENE (EVISTA) 60 MG TABLET    Take 1 tablet (60 mg total) by mouth once daily.           Assessment:       1. Essential hypertension    2. Heart failure, unspecified HF chronicity, unspecified heart failure type    3.  Orthostatic hypotension    4. Mixed hyperlipidemia    5. Palpitations    6. Macrocytic anemia with vitamin B12 deficiency    7. Gastroesophageal reflux disease, unspecified whether esophagitis present    8. CKD (chronic kidney disease) stage 4, GFR 15-29 ml/min         Plan:     Problem List Items Addressed This Visit          Cardiac/Vascular    Essential hypertension - Primary    Palpitations    Relevant Orders    IN OFFICE EKG 12-LEAD (to Muse)    Mixed hyperlipidemia    Heart failure, unspecified HF chronicity, unspecified heart failure type       Oncology    Macrocytic anemia with vitamin B12 deficiency       GI    GERD (gastroesophageal reflux disease)     Other Visit Diagnoses         Orthostatic hypotension          CKD (chronic kidney disease) stage 4, GFR 15-29 ml/min              HTN continue same meds. BP DIARY    Lvh check echo for LVH.  She has no history of heart failure but somehow it is on the chart    CKD followup dr Law.  Consider Jardiance for renal protection.  She does not have a history of heart failure.  Should be okay to started up to a GFR of 20 currently EGFR is 24 05/13/2025      Follow up in about 6 months (around 1/21/2026).    The patients questions were answered, they verbalized understanding, and agreed with the treatment plan.       All pertinent data including labs, imaging, EKGs, and studies listed above were reviewed personally.       SHAZIA REYNOSO MD  [facility]         [1]   Social History  Tobacco Use    Smoking status: Never    Smokeless tobacco: Never   Substance Use Topics    Alcohol use: Yes     Alcohol/week: 1.0 standard drink of alcohol     Types: 1 Glasses of wine per week     Comment: Rare    Drug use: No

## 2025-07-21 ENCOUNTER — OFFICE VISIT (OUTPATIENT)
Dept: CARDIOLOGY | Facility: CLINIC | Age: 80
End: 2025-07-21
Payer: MEDICARE

## 2025-07-21 VITALS
SYSTOLIC BLOOD PRESSURE: 153 MMHG | DIASTOLIC BLOOD PRESSURE: 70 MMHG | HEART RATE: 64 BPM | BODY MASS INDEX: 21.09 KG/M2 | OXYGEN SATURATION: 99 % | WEIGHT: 108 LBS

## 2025-07-21 DIAGNOSIS — R00.2 PALPITATIONS: ICD-10-CM

## 2025-07-21 DIAGNOSIS — N18.4 CKD (CHRONIC KIDNEY DISEASE) STAGE 4, GFR 15-29 ML/MIN: ICD-10-CM

## 2025-07-21 DIAGNOSIS — I50.9 HEART FAILURE, UNSPECIFIED HF CHRONICITY, UNSPECIFIED HEART FAILURE TYPE: ICD-10-CM

## 2025-07-21 DIAGNOSIS — K21.9 GASTROESOPHAGEAL REFLUX DISEASE, UNSPECIFIED WHETHER ESOPHAGITIS PRESENT: ICD-10-CM

## 2025-07-21 DIAGNOSIS — E78.2 MIXED HYPERLIPIDEMIA: ICD-10-CM

## 2025-07-21 DIAGNOSIS — I95.1 ORTHOSTATIC HYPOTENSION: ICD-10-CM

## 2025-07-21 DIAGNOSIS — D51.8 MACROCYTIC ANEMIA WITH VITAMIN B12 DEFICIENCY: ICD-10-CM

## 2025-07-21 DIAGNOSIS — I10 ESSENTIAL HYPERTENSION: Primary | ICD-10-CM

## 2025-07-21 PROCEDURE — 1160F RVW MEDS BY RX/DR IN RCRD: CPT | Mod: CPTII,S$GLB,, | Performed by: GENERAL PRACTICE

## 2025-07-21 PROCEDURE — 99214 OFFICE O/P EST MOD 30 MIN: CPT | Mod: S$GLB,,, | Performed by: GENERAL PRACTICE

## 2025-07-21 PROCEDURE — 3077F SYST BP >= 140 MM HG: CPT | Mod: CPTII,S$GLB,, | Performed by: GENERAL PRACTICE

## 2025-07-21 PROCEDURE — 3078F DIAST BP <80 MM HG: CPT | Mod: CPTII,S$GLB,, | Performed by: GENERAL PRACTICE

## 2025-07-21 PROCEDURE — 1101F PT FALLS ASSESS-DOCD LE1/YR: CPT | Mod: CPTII,S$GLB,, | Performed by: GENERAL PRACTICE

## 2025-07-21 PROCEDURE — 1159F MED LIST DOCD IN RCRD: CPT | Mod: CPTII,S$GLB,, | Performed by: GENERAL PRACTICE

## 2025-07-21 PROCEDURE — 3288F FALL RISK ASSESSMENT DOCD: CPT | Mod: CPTII,S$GLB,, | Performed by: GENERAL PRACTICE

## 2025-07-21 PROCEDURE — 99999 PR PBB SHADOW E&M-EST. PATIENT-LVL III: CPT | Mod: PBBFAC,,, | Performed by: GENERAL PRACTICE

## 2025-07-26 LAB
OHS QRS DURATION: 76 MS
OHS QTC CALCULATION: 429 MS

## 2025-08-04 ENCOUNTER — LAB VISIT (OUTPATIENT)
Dept: LAB | Facility: HOSPITAL | Age: 80
End: 2025-08-04
Attending: INTERNAL MEDICINE
Payer: MEDICARE

## 2025-08-04 DIAGNOSIS — N18.4 CHRONIC KIDNEY DISEASE, STAGE IV (SEVERE): Primary | ICD-10-CM

## 2025-08-04 DIAGNOSIS — N30.01 ACUTE HEMORRHAGIC CYSTITIS: Primary | ICD-10-CM

## 2025-08-04 DIAGNOSIS — N18.9 CHRONIC KIDNEY DISEASE, UNSPECIFIED: ICD-10-CM

## 2025-08-04 DIAGNOSIS — N18.31 CHRONIC KIDNEY DISEASE (CKD) STAGE G3A/A1, MODERATELY DECREASED GLOMERULAR FILTRATION RATE (GFR) BETWEEN 45-59 ML/MIN/1.73 SQUARE METER AND ALBUMINURIA CREATININE RATIO LESS THAN 30 MG/G: ICD-10-CM

## 2025-08-04 LAB
ABSOLUTE EOSINOPHIL (SMH): 0.12 K/UL
ABSOLUTE MONOCYTE (SMH): 0.42 K/UL (ref 0.3–1)
ABSOLUTE NEUTROPHIL COUNT (SMH): 3 K/UL (ref 1.8–7.7)
ALBUMIN SERPL-MCNC: 3.9 G/DL (ref 3.5–5.2)
ALBUMIN/CREAT UR: NORMAL
ANION GAP (SMH): 5 MMOL/L (ref 8–16)
BACTERIA #/AREA URNS AUTO: ABNORMAL /HPF
BASOPHILS # BLD AUTO: 0.02 K/UL
BASOPHILS NFR BLD AUTO: 0.4 %
BILIRUB UR QL STRIP.AUTO: NEGATIVE
BUN SERPL-MCNC: 44 MG/DL (ref 8–23)
CALCIUM SERPL-MCNC: 8.8 MG/DL (ref 8.7–10.5)
CHLORIDE SERPL-SCNC: 112 MMOL/L (ref 95–110)
CLARITY UR: CLEAR
CO2 SERPL-SCNC: 22 MMOL/L (ref 23–29)
COLOR UR AUTO: YELLOW
CREAT SERPL-MCNC: 2.2 MG/DL (ref 0.5–1.4)
CREAT UR-MCNC: 106.1 MG/DL (ref 15–325)
ERYTHROCYTE [DISTWIDTH] IN BLOOD BY AUTOMATED COUNT: 12.3 % (ref 11.5–14.5)
GFR SERPLBLD CREATININE-BSD FMLA CKD-EPI: 22 ML/MIN/1.73/M2
GLUCOSE SERPL-MCNC: 81 MG/DL (ref 70–110)
GLUCOSE UR QL STRIP: NEGATIVE
HCT VFR BLD AUTO: 29.7 % (ref 37–48.5)
HGB BLD-MCNC: 9.7 GM/DL (ref 12–16)
HGB UR QL STRIP: ABNORMAL
IMM GRANULOCYTES # BLD AUTO: 0.02 K/UL (ref 0–0.04)
IMM GRANULOCYTES NFR BLD AUTO: 0.4 % (ref 0–0.5)
KETONES UR QL STRIP: NEGATIVE
LEUKOCYTE ESTERASE UR QL STRIP: NEGATIVE
LYMPHOCYTES # BLD AUTO: 1.05 K/UL (ref 1–4.8)
MAGNESIUM SERPL-MCNC: 2.3 MG/DL (ref 1.6–2.6)
MCH RBC QN AUTO: 32.8 PG (ref 27–31)
MCHC RBC AUTO-ENTMCNC: 32.7 G/DL (ref 32–36)
MCV RBC AUTO: 100 FL (ref 82–98)
MICROALBUMIN UR-MCNC: <7 UG/ML
MICROSCOPIC COMMENT: ABNORMAL
NITRITE UR QL STRIP: NEGATIVE
NUCLEATED RBC (/100WBC) (SMH): 0 /100 WBC
PH UR STRIP: 5 [PH]
PHOSPHATE SERPL-MCNC: 3.6 MG/DL (ref 2.7–4.5)
PLATELET # BLD AUTO: 181 K/UL (ref 150–450)
PMV BLD AUTO: 10.3 FL (ref 9.2–12.9)
POTASSIUM SERPL-SCNC: 4.7 MMOL/L (ref 3.5–5.1)
PROT UR QL STRIP: NEGATIVE
RBC # BLD AUTO: 2.96 M/UL (ref 4–5.4)
RBC #/AREA URNS AUTO: 5 /HPF
RELATIVE EOSINOPHIL (SMH): 2.6 % (ref 0–8)
RELATIVE LYMPHOCYTE (SMH): 22.5 % (ref 18–48)
RELATIVE MONOCYTE (SMH): 9 % (ref 4–15)
RELATIVE NEUTROPHIL (SMH): 65.1 % (ref 38–73)
SODIUM SERPL-SCNC: 139 MMOL/L (ref 136–145)
SP GR UR STRIP: 1.01
SQUAMOUS #/AREA URNS AUTO: 8 /HPF
URATE SERPL-MCNC: 6.9 MG/DL (ref 2.4–5.7)
UROBILINOGEN UR STRIP-ACNC: NEGATIVE EU/DL
WBC # BLD AUTO: 4.66 K/UL (ref 3.9–12.7)
WBC #/AREA URNS AUTO: 1 /HPF

## 2025-08-04 PROCEDURE — 36415 COLL VENOUS BLD VENIPUNCTURE: CPT

## 2025-08-04 PROCEDURE — 82947 ASSAY GLUCOSE BLOOD QUANT: CPT

## 2025-08-04 PROCEDURE — 84550 ASSAY OF BLOOD/URIC ACID: CPT

## 2025-08-04 PROCEDURE — 81003 URINALYSIS AUTO W/O SCOPE: CPT

## 2025-08-04 PROCEDURE — 83735 ASSAY OF MAGNESIUM: CPT

## 2025-08-04 PROCEDURE — 82043 UR ALBUMIN QUANTITATIVE: CPT

## 2025-08-04 PROCEDURE — 85025 COMPLETE CBC W/AUTO DIFF WBC: CPT

## 2025-08-13 ENCOUNTER — HOSPITAL ENCOUNTER (OUTPATIENT)
Dept: CARDIOLOGY | Facility: HOSPITAL | Age: 80
Discharge: HOME | End: 2025-08-13
Attending: GENERAL PRACTICE
Payer: MEDICARE

## 2025-08-13 VITALS — BODY MASS INDEX: 21.2 KG/M2 | WEIGHT: 108 LBS | HEIGHT: 60 IN

## 2025-08-13 DIAGNOSIS — K21.9 GASTROESOPHAGEAL REFLUX DISEASE, UNSPECIFIED WHETHER ESOPHAGITIS PRESENT: ICD-10-CM

## 2025-08-13 DIAGNOSIS — I10 ESSENTIAL HYPERTENSION: ICD-10-CM

## 2025-08-13 DIAGNOSIS — R00.2 PALPITATIONS: ICD-10-CM

## 2025-08-13 DIAGNOSIS — E78.2 MIXED HYPERLIPIDEMIA: ICD-10-CM

## 2025-08-13 DIAGNOSIS — D51.8 MACROCYTIC ANEMIA WITH VITAMIN B12 DEFICIENCY: ICD-10-CM

## 2025-08-13 LAB
AORTIC ROOT ANNULUS: 3.2 CM
AORTIC VALVE CUSP SEPERATION: 1.7 CM
APICAL FOUR CHAMBER EJECTION FRACTION: 52 %
APICAL TWO CHAMBER EJECTION FRACTION: 51 %
AV INDEX (PROSTH): 0.62
AV MEAN GRADIENT: 4 MMHG
AV PEAK GRADIENT: 8 MMHG
AV VALVE AREA BY VELOCITY RATIO: 1.8 CM²
AV VALVE AREA: 1.9 CM²
AV VELOCITY RATIO: 0.57
BSA FOR ECHO PROCEDURE: 1.44 M2
CV ECHO LV RWT: 0.38 CM
DOP CALC AO PEAK VEL: 1.4 M/S
DOP CALC AO VTI: 29.6 CM
DOP CALC LVOT AREA: 3.1 CM2
DOP CALC LVOT DIAMETER: 2 CM
DOP CALC LVOT PEAK VEL: 0.8 M/S
DOP CALC MV VTI: 29.4 CM
DOP CALCLVOT PEAK VEL VTI: 18.3 CM
E WAVE DECELERATION TIME: 190 MSEC
E/A RATIO: 0.74
E/E' RATIO: 14 M/S
ECHO LV POSTERIOR WALL: 0.9 CM (ref 0.6–1.1)
FRACTIONAL SHORTENING: 25.5 % (ref 28–44)
INTERVENTRICULAR SEPTUM: 0.9 CM (ref 0.6–1.1)
IVC DIAMETER: 1.3 CM
IVRT: 88 MSEC
LEFT ATRIUM AREA SYSTOLIC (APICAL 2 CHAMBER): 15.3 CM2
LEFT ATRIUM AREA SYSTOLIC (APICAL 4 CHAMBER): 15 CM2
LEFT ATRIUM SIZE: 3.9 CM
LEFT ATRIUM VOLUME INDEX MOD: 27 ML/M2
LEFT ATRIUM VOLUME MOD: 39 ML
LEFT INTERNAL DIMENSION IN SYSTOLE: 3.5 CM (ref 2.1–4)
LEFT VENTRICLE DIASTOLIC VOLUME INDEX: 70.83 ML/M2
LEFT VENTRICLE DIASTOLIC VOLUME: 102 ML
LEFT VENTRICLE END DIASTOLIC VOLUME APICAL 2 CHAMBER: 63.9 ML
LEFT VENTRICLE END DIASTOLIC VOLUME APICAL 4 CHAMBER INDEX BSA: 48.13 ML/M2
LEFT VENTRICLE END DIASTOLIC VOLUME APICAL 4 CHAMBER: 69.3 ML
LEFT VENTRICLE END SYSTOLIC VOLUME APICAL 2 CHAMBER: 40.4 ML
LEFT VENTRICLE END SYSTOLIC VOLUME APICAL 4 CHAMBER: 35.7 ML
LEFT VENTRICLE MASS INDEX: 99.1 G/M2
LEFT VENTRICLE SYSTOLIC VOLUME INDEX: 35.4 ML/M2
LEFT VENTRICLE SYSTOLIC VOLUME: 51 ML
LEFT VENTRICULAR INTERNAL DIMENSION IN DIASTOLE: 4.7 CM (ref 3.5–6)
LEFT VENTRICULAR MASS: 142.7 G
LV LATERAL E/E' RATIO: 12 M/S
LV SEPTAL E/E' RATIO: 18 M/S
LVED V (TEICH): 102 ML
LVES V (TEICH): 50.9 ML
LVOT MG: 2 MMHG
LVOT MV: 0.55 CM/S
MV MEAN GRADIENT: 2 MMHG
MV PEAK A VEL: 0.97 M/S
MV PEAK E VEL: 0.72 M/S
MV PEAK GRADIENT: 7 MMHG
MV VALVE AREA BY CONTINUITY EQUATION: 1.95 CM2
OHS CV CPX PATIENT HEIGHT IN: 60
OHS CV RV/LV RATIO: 0.45 CM
OHS LV EJECTION FRACTION SIMPSONS BIPLANE MOD: 51 %
PISA TR MAX VEL: 2.5 M/S
PV MV: 0.61 M/S
PV PEAK GRADIENT: 3 MMHG
PV PEAK VELOCITY: 0.93 M/S
RA PRESSURE ESTIMATED: 3 MMHG
RIGHT VENTRICLE DIASTOLIC BASEL DIMENSION: 2.1 CM
RIGHT VENTRICULAR END-DIASTOLIC DIMENSION: 2.1 CM
RV TB RVSP: 6 MMHG
RV TISSUE DOPPLER FREE WALL SYSTOLIC VELOCITY 1 (APICAL 4 CHAMBER VIEW): 12.3 CM/S
TDI LATERAL: 0.06 M/S
TDI SEPTAL: 0.04 M/S
TDI: 0.05 M/S
TR MAX PG: 25 MMHG
TRICUSPID ANNULAR PLANE SYSTOLIC EXCURSION: 1.5 CM
TV REST PULMONARY ARTERY PRESSURE: 28 MMHG
Z-SCORE OF LEFT VENTRICULAR DIMENSION IN END DIASTOLE: 0.71
Z-SCORE OF LEFT VENTRICULAR DIMENSION IN END SYSTOLE: 2

## 2025-08-13 PROCEDURE — 93306 TTE W/DOPPLER COMPLETE: CPT

## 2025-08-13 PROCEDURE — 93306 TTE W/DOPPLER COMPLETE: CPT | Mod: 26,,, | Performed by: GENERAL PRACTICE
